# Patient Record
Sex: FEMALE | Race: WHITE | NOT HISPANIC OR LATINO | Employment: FULL TIME | URBAN - METROPOLITAN AREA
[De-identification: names, ages, dates, MRNs, and addresses within clinical notes are randomized per-mention and may not be internally consistent; named-entity substitution may affect disease eponyms.]

---

## 2016-10-27 LAB — EXTERNAL HIV SCREEN: NORMAL

## 2017-09-07 ENCOUNTER — ALLSCRIPTS OFFICE VISIT (OUTPATIENT)
Dept: OTHER | Facility: OTHER | Age: 37
End: 2017-09-07

## 2018-01-10 NOTE — MISCELLANEOUS
Provider Comments  Provider Comments:   Our records indicate that you missed an appointment on 09/12/2016  If you have not done so already, please call our office and we will be happy to schedule another appointment for you  If you must cancel your appointment, our office policy requires you to call our office at least 24 hours in advance so that we may utilize your appointment time for another patient who requires our services  If you have any questions, please contact our office at [phone]           Signatures   Electronically signed by : Jani Tavarez, ; Sep 19 2016  2:47PM EST                       (Author)

## 2018-01-14 NOTE — PROGRESS NOTES
Assessment    1  Encounter for preventive health examination (V70 0) (Z00 00)   2  Seasonal allergies (477 9) (J30 2)   3  Cigarette nicotine dependence without complication (837 3) (D38 320)   4  Chronic constipation (564 00) (K59 09)   5  Anxiety disorder (300 00) (F41 9)    Plan  Anxiety disorder    · ALPRAZolam 0 5 MG Oral Tablet (Xanax); TAKE 1 TABLET AT BEDTIME AS  NEEDED FOR SLEEP   · Sertraline HCl - 50 MG Oral Tablet; TAKE 1/2 TABLET DAILY FOR 1 WEEK THEN  TAKE ONE TAB ONCE A DAY  Chronic constipation    · Linzess 145 MCG Oral Capsule; take 1 capsule daily  Cigarette nicotine dependence without complication    · Chantix Starting Month Faheem 0 5 MG X 11 & 1 MG X 42 Oral Tablet; TAKE ONE 0 5MG  TABLET DAILY ON DAYS 1-3, THEN ONE 0 5MG TABLET TWICE DAILY ON DAYS 4-7,  THEN ONE 1MG TABLET TWICE DAILY THEREAFTER  Health Maintenance    · Levocetirizine Dihydrochloride 5 MG Oral Tablet (Xyzal); Take 1 tablet daily   · Follow-up visit in 1 year Evaluation and Treatment  Follow-up  Status: Hold For -  Scheduling  Requested for: 73GYI2564   · Begin a limited exercise program ; Status:Complete;   Done: 42YKG1452   · Brush your teeth 3 times a day and floss at least once a day ; Status:Complete;   Done:  05EJM8176   · Eat a low fat and low cholesterol diet ; Status:Complete;   Done: 76LBJ2884   · There are many ways to reduce your risk of catching or spreading a sexually transmitted  Infection ; Status:Complete;   Done: 37TCE3886   · Vitamins can help you get daily requirements that your diet may not be giving you ;  Status:Complete;   Done: 72OHX4491   · We recommend routine visits to a dentist ; Status:Complete;   Done: 71SKD4411   · We recommend that you bring your body mass index down to 26 ; Status:Complete;    Done: 98VNR2808   · We recommend that you follow these steps to lower your risk of osteoporosis  ;  Status:Complete;   Done: 89KNL6317   · Call [phone] if: You find a new or different kind of lump in your breast ;  Status:Complete;   Done: 76VXT7180   · Call [phone] if: You have any warning signs of skin cancer ; Status:Complete;    Done: 91OXK5793   · Call 911 if: You have symptoms of toxic shock ; Status:Complete;   Done: 42JDE4765   · 1 - Chrystie Ormond MD, Nicol ELLIS Obstetrics/Gynecology Physician Referral  Consult  Status: Hold  For - Scheduling  Requested for: 81SLF5977  Care Summary provided  : Yes   · Stop: Fluzone Quadrivalent 0 5 ML Intramuscular Suspension Prefilled  Syringe  Seasonal allergies    · Mometasone Furoate 50 MCG/ACT Nasal Suspension; USE 2 SPRAYS IN EACH  NOSTRIL ONCE DAILY    f/u in 1 month with Dr Phyllis Mejía     Discussion/Summary  health maintenance visit Currently, she eats an adequate diet and has an adequate exercise regimen  the risks and benefits of cervical cancer screening were discussed cervical cancer screening is current cervical cancer screening is managed by GYN Breast cancer screening: the risks and benefits of breast cancer screening were discussed and breast cancer screening is not indicated  Colorectal cancer screening: the risks and benefits of colorectal cancer screening were discussed and colorectal cancer screening is not indicated  Osteoporosis screening: the risks and benefits of osteoporosis screening were discussed and bone mineral density testing is not indicated  Screening lab work includes lipid profile  The patient declines immunizations  She was advised to be evaluated by an ophthalmologist and a dentist  Advice and education were given regarding aerobic exercise and calcium supplements  Chief Complaint  Pt here for her physical      History of Present Illness  HM, Adult Female: The patient is being seen for a health maintenance evaluation  The last health maintenance visit was 1 year(s) ago  General Health: The patient's health since the last visit is described as good  She has regular dental visits  She complains of vision problems   Vision care includes wearing reading glasses and an eye examination within the last year  She denies hearing loss  Immunizations status: not up to date  Lifestyle:  She consumes a diverse and healthy diet  She does not have any weight concerns  She exercises regularly  She does not use tobacco  She consumes alcohol  She uses illicit drugs  Reproductive health:  she reports normal menses  she uses no contraception  she is sexually active  she denies prior pregnancies  Screening: cancer screening reviewed and updated  metabolic screening reviewed and updated  risk screening reviewed and updated  HPI: anxiety since she is taking care of her grandmother  she is taking her friend's Xanax currently and its helping her with her anxiety and insomnia      Review of Systems    Constitutional: No fever, no chills, feels well, no tiredness, no recent weight gain or weight loss  Eyes: No complaints of eye pain, no red eyes, no eyesight problems, no discharge, no dry eyes, no itching of eyes  ENT: no complaints of earache, no loss of hearing, no nose bleeds, no nasal discharge, no sore throat, no hoarseness  Cardiovascular: No complaints of slow heart rate, no fast heart rate, no chest pain, no palpitations, no leg claudication, no lower extremity edema  Respiratory: No complaints of shortness of breath, no wheezing, no cough, no SOB on exertion, no orthopnea, no PND  Gastrointestinal: No complaints of abdominal pain, no constipation, no nausea or vomiting, no diarrhea, no bloody stools  Genitourinary: No complaints of dysuria, no incontinence, no pelvic pain, no dysmenorrhea, no vaginal discharge or bleeding  Musculoskeletal: No complaints of arthralgias, no myalgias, no joint swelling or stiffness, no limb pain or swelling  Integumentary: No complaints of skin rash or lesions, no itching, no skin wounds, no breast pain or lump     Neurological: No complaints of headache, no confusion, no convulsions, no numbness, no dizziness or fainting, no tingling, no limb weakness, no difficulty walking  Psychiatric: Not suicidal, no sleep disturbance, no anxiety or depression, no change in personality, no emotional problems  Endocrine: No complaints of proptosis, no hot flashes, no muscle weakness, no deepening of the voice, no feelings of weakness  Hematologic/Lymphatic: No complaints of swollen glands, no swollen glands in the neck, does not bleed easily, does not bruise easily  Active Problems    1  Asthma (493 90) (J45 909)   2  Cigarette nicotine dependence without complication (044 7) (Y10 590)   3  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)   4  Macular atrophy, retinal (362 51) (H35 89)   5  Muscle ache (729 1) (M79 1)   6  Muscle spasms of neck (728 85) (M62 838)   7  Need for DTaP vaccination (V06 1) (Z23)   8  Oral contraceptive prescribed (V25 01) (Z30 011)   9  Seasonal allergies (477 9) (J30 2)    Past Medical History    · History of seasonal allergies (V15 09) (Z88 9)    Surgical History    · History of Pilonidal Cyst Resection    Family History  Mother    · Family history of diabetes mellitus (V18 0) (Z83 3)   · Family history of hypertension (V17 49) (Z82 49)  Father    · Family history of rosacea (V19 4) (Z84 0)   · Family history of Seasonal allergies  Paternal Grandfather    · Family history of liver cancer (V16 0) (Z80 0)  Aunt    · Family history of malignant neoplasm of breast (V16 3) (Z80 3)    Social History    · Current some day smoker (305 1) (F17 210)   · Drinks coffee   · Occasional alcohol use    Current Meds   1  Cyclobenzaprine HCl - 5 MG Oral Tablet; TAKE 1 TABLET 3 TIMES DAILY AS NEEDED; Therapy: 82LQC8288 to (Evaluate:31Jan2016)  Requested for: 42IHZ4159; Last   Rx:11Jan2016 Ordered   2  Levocetirizine Dihydrochloride 5 MG Oral Tablet; TAKE 1 TABLET DAILY IN THE   EVENING; Therapy: 11Flr9072 to (Evaluate:63Kgg3521)  Requested for: 58Ezp6908; Last   Rx:25Jpp8160 Ordered   3   Super B Complex/Vitamin C Oral Tablet; TAKE 1 TABLET DAILY AS DIRECTED; Therapy: 62Uct0730 to Recorded   4  Ventolin  (90 Base) MCG/ACT Inhalation Aerosol Solution; INHALE 2 PUFFS   EVERY 4-6 HOURS AS NEEDED; Therapy: 40TWS5575 to (Last Rx:30Gss7985)  Requested for: 63PKK2902 Ordered    Allergies    1  No Known Drug Allergies    2  Animal dander - Cats   3  Dust   4  Mold   5  Seasonal   6  Trees    Vitals   Recorded: 14MLO6252 49:57UA   Systolic 389   Diastolic 76   Heart Rate 72   Respiration 16   Height 5 ft 3 27 in   Weight 148 lb 8 oz   BMI Calculated 26 08   BSA Calculated 1 71     Physical Exam    Constitutional   General appearance: No acute distress, well appearing and well nourished  Head and Face   Head and face: Normal     Palpation of the face and sinuses: No sinus tenderness  Eyes   Conjunctiva and lids: No swelling, erythema or discharge  Pupils and irises: Equal, round, reactive to light  Ophthalmoscopic examination: Normal fundi and optic discs  Ears, Nose, Mouth, and Throat   External inspection of ears and nose: Normal     Otoscopic examination: Tympanic membranes translucent with normal light reflex  Canals patent without erythema  Hearing: Normal     Nasal mucosa, septum, and turbinates: Normal without edema or erythema  Lips, teeth, and gums: Normal, good dentition  Oropharynx: Normal with no erythema, edema, exudate or lesions  Neck   Neck: Supple, symmetric, trachea midline, no masses  Thyroid: Normal, no thyromegaly  Pulmonary   Respiratory effort: No increased work of breathing or signs of respiratory distress  Percussion of chest: Normal     Auscultation of lungs: Clear to auscultation  Cardiovascular   Palpation of heart: Normal PMI, no thrills  Auscultation of heart: Normal rate and rhythm, normal S1 and S2, no murmurs      Examination of extremities for edema and/or varicosities: Normal     Chest   Chest: Normal     Abdomen   Abdomen: Non-tender, no masses  Liver and spleen: No hepatomegaly or splenomegaly  Lymphatic   Palpation of lymph nodes in neck: No lymphadenopathy  Palpation of lymph nodes in axillae: No lymphadenopathy  Palpation of lymph nodes in groin: No lymphadenopathy  Musculoskeletal   Gait and station: Normal     Digits and nails: Normal without clubbing or cyanosis  Joints, bones, and muscles: Normal     Skin   Skin and subcutaneous tissue: Normal without rashes or lesions  Neurologic   Cranial nerves: Cranial nerves II-XII intact  Cortical function: Normal mental status  Reflexes: 2+ and symmetric  Sensation: No sensory loss  Coordination: Normal finger to nose and heel to shin  Psychiatric   Judgment and insight: Normal     Orientation to person, place, and time: Normal     Recent and remote memory: Intact      Mood and affect: Normal        Future Appointments    Date/Time Provider Specialty Site   11/23/2016 04:00 PM Bethany Raya DO [facility] [address] [facility]     Signatures   Electronically signed by : Racheal Melo MD; Oct  5 2016  3:51PM EST                       (Author)

## 2018-01-17 NOTE — PROGRESS NOTES
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)   6  Macular atrophy, retinal (362 51) (H35 89)   7  Muscle ache (729 1) (M79 1)   8  Muscle spasms of neck (728 85) (M62 838)   9  Need for DTaP vaccination (V06 1) (Z23)   10  Need for revaccination (V05 9) (Z23)   11  Oral contraceptive prescribed (V25 01) (Z30 011)   12  Seasonal allergies (477 9) (J30 2)    Current Meds   1  ALPRAZolam 0 5 MG Oral Tablet; TAKE 1 TABLET AT BEDTIME AS NEEDED FOR   SLEEP; Therapy: 70AQS5797 to (Evaluate:04Nov2016); Last Rx:05Oct2016 Ordered   2  Chantix Starting Month Faheem 0 5 MG X 11 & 1 MG X 42 Oral Tablet; TAKE ONE 0 5MG   TABLET DAILY ON DAYS 1-3, THEN ONE 0 5MG TABLET TWICE DAILY ON DAYS 4-7,   THEN ONE 1MG TABLET TWICE DAILY THEREAFTER; Therapy: 39EGF3012 to (Haim Lemus)  Requested for: 45QKK6472; Last   Rx:05Oct2016 Ordered   3  Cyclobenzaprine HCl - 5 MG Oral Tablet; TAKE 1 TABLET 3 TIMES DAILY AS NEEDED; Therapy: 58BSV2671 to (Evaluate:31Jan2016)  Requested for: 81FHF1037; Last   Rx:11Jan2016 Ordered   4  Levocetirizine Dihydrochloride 5 MG Oral Tablet; Take 1 tablet daily; Therapy: 35Mej3791 to (Jeramie Richard)  Requested for: 77HVF2722; Last   Rx:05Oct2016 Ordered   5  Linzess 145 MCG Oral Capsule; take 1 capsule daily; Therapy: 97IPN1550 to (Evaluate:17Oct2016)  Requested for: 70KMB8371; Last   Rx:05Oct2016; Status: ACTIVE - Transmit to Pharmacy - Awaiting Verification Ordered   6  Mometasone Furoate 50 MCG/ACT Nasal Suspension; USE 2 SPRAYS IN EACH   NOSTRIL ONCE DAILY; Therapy: 85NUX8230 to (Last Rx:05Oct2016)  Requested for: 05Oct2016 Ordered   7  Sertraline HCl - 50 MG Oral Tablet; Take 1 tablet daily;    Therapy: 71SNV6356 to (Evaluate:78Wgy0128) Requested for: 16RVK6302; Last   Rx:65Wwx7433 Ordered   8  Super B Complex/Vitamin C Oral Tablet; TAKE 1 TABLET DAILY AS DIRECTED; Therapy: 56Tdf9510 to Recorded   9  Ventolin  (90 Base) MCG/ACT Inhalation Aerosol Solution; INHALE 2 PUFFS   EVERY 4-6 HOURS AS NEEDED; Therapy: 20RQG8733 to (Last Rx:60Fma9741)  Requested for: 20KMO3995 Ordered    Allergies    1  No Known Drug Allergies    2  Animal dander - Cats   3  Dust   4  Mold   5  Seasonal   6   Trees    Plan  Need for influenza vaccination    · Fluzone Quadrivalent 0 5 ML Intramuscular Suspension Prefilled Syringe    Signatures   Electronically signed by : Adrian Smalls DO; Sep  7 2017  5:05PM EST                       (Author)

## 2018-01-25 ENCOUNTER — OFFICE VISIT (OUTPATIENT)
Dept: FAMILY MEDICINE CLINIC | Facility: CLINIC | Age: 38
End: 2018-01-25

## 2018-01-25 RX ORDER — ALBUTEROL SULFATE 90 UG/1
2 AEROSOL, METERED RESPIRATORY (INHALATION) EVERY 4 HOURS PRN
COMMUNITY
Start: 2016-05-25

## 2018-01-25 RX ORDER — MOMETASONE FUROATE 50 UG/1
2 SPRAY, METERED NASAL AS NEEDED
COMMUNITY
Start: 2016-10-05 | End: 2020-11-18

## 2018-01-25 RX ORDER — ALPRAZOLAM 0.5 MG/1
1 TABLET ORAL
COMMUNITY
Start: 2016-10-05 | End: 2018-01-29 | Stop reason: CLARIF

## 2018-01-25 RX ORDER — LEVOCETIRIZINE DIHYDROCHLORIDE 5 MG/1
1 TABLET, FILM COATED ORAL DAILY
COMMUNITY
Start: 2015-09-10 | End: 2020-03-24 | Stop reason: ALTCHOICE

## 2018-01-26 ENCOUNTER — OFFICE VISIT (OUTPATIENT)
Dept: SURGICAL ONCOLOGY | Facility: CLINIC | Age: 38
End: 2018-01-26
Payer: COMMERCIAL

## 2018-01-26 VITALS
BODY MASS INDEX: 26.46 KG/M2 | HEART RATE: 82 BPM | HEIGHT: 64 IN | WEIGHT: 155 LBS | TEMPERATURE: 98.1 F | SYSTOLIC BLOOD PRESSURE: 110 MMHG | DIASTOLIC BLOOD PRESSURE: 80 MMHG | RESPIRATION RATE: 16 BRPM

## 2018-01-26 DIAGNOSIS — Z17.0 MALIGNANT NEOPLASM OF UPPER-OUTER QUADRANT OF RIGHT BREAST IN FEMALE, ESTROGEN RECEPTOR POSITIVE (HCC): Primary | ICD-10-CM

## 2018-01-26 DIAGNOSIS — C50.411 MALIGNANT NEOPLASM OF UPPER-OUTER QUADRANT OF RIGHT BREAST IN FEMALE, ESTROGEN RECEPTOR POSITIVE (HCC): Primary | ICD-10-CM

## 2018-01-26 PROCEDURE — 99245 OFF/OP CONSLTJ NEW/EST HI 55: CPT | Performed by: SURGERY

## 2018-01-26 NOTE — PROGRESS NOTES
Surgical Oncology Follow Up       [address] [facility]  [facility] [address]  1980  [id number]  [address] [facility] [facility]  [facility] [address] [address]    The patient presents with a new diagnosis of right breast cancer  Assessment/Plan     Advance Care Planning/Advance Directives:  Discussed disease status, cancer treatment plans and/or cancer treatment goals with the patient  Malignant neoplasm of upper-outer quadrant of right breast in female, estrogen receptor positive ([address] )    1/19/2018 Initial Diagnosis     Ultrasound guided core biopsy of right breast mass at [facility]   Malignant neoplasm of upper-outer quadrant of right breast in female, estrogen receptor positive ([facility] [address] ), Her 2 positive              History of Present Illness: In we 1st appreciated a mass in the upper outer quadrant of her right breast back in the summer when she was in her last trimester  She felt this was most likely a galactocele but then recently noticed it had not decreased in size and had a mammogram which demonstrated a dense spiculated mass in the upper outer right breast which corresponded with the palpable area  She had pleomorphic calcifications in this area  She also had scattered calcifications along the 10-11 o'clock axis from the mass towards the nipple  Patient subsequently had ultrasound which demonstrated a 3 6 cm irregular mass corresponding to the palpable area of concern there was a 2nd 1 4 cm mass at the 10 o'clock position 8 cm from the nipple  They were vague hypoechoic tubular elements seen along the 10 o'clock axis between the mass and the nipple  Right axillary adenopathy was also seen    The patient had the large palpable mass biopsied which demonstrated invasive ductal carcinoma with mucinous features, grade 2, ER 30-40%, OR 2-3%, HER2 [place] 3+/positive  The patient now presents for an opinion regarding further management  Review of Systems   Constitutional: Negative for activity change, appetite change, fatigue and unexpected weight change  HENT: Negative for ear pain, tinnitus, trouble swallowing and voice change  Eyes: Negative for pain and visual disturbance  Respiratory: Negative for cough, shortness of breath, wheezing and stridor  Cardiovascular: Negative for chest pain, palpitations and leg swelling  Gastrointestinal: Negative for abdominal distention, abdominal pain and blood in stool  Endocrine: Negative for cold intolerance and heat intolerance  Genitourinary: Negative for difficulty urinating, dysuria, flank pain and hematuria  Musculoskeletal: Negative for arthralgias, back pain, gait problem and joint swelling  Skin: Negative for color change, rash and wound  Allergic/Immunologic: Negative for immunocompromised state  Neurological: Negative for dizziness, seizures, speech difficulty, weakness and headaches  Hematological: Negative for adenopathy  Psychiatric/Behavioral: Negative for confusion  Patient Active Problem List   Diagnosis    Anxiety disorder    Asthma    Cigarette nicotine dependence without complication    Fear of flying    Macular atrophy, retinal    Seasonal allergies    Malignant neoplasm of upper-outer quadrant of right breast in female, estrogen receptor positive ([facility] [address] )     No past medical history on file  No past surgical history on file  No family history on file  Social History     Social History    Marital status: /Civil Union     Spouse name: N/A    Number of children: N/A    Years of education: N/A     Occupational History    Not on file       Social History Main Topics    Smoking status: Former Smoker     Quit date: 1/26/2015    Smokeless tobacco: Never Used    Alcohol use Not on file    Drug use: Unknown    Sexual activity: Not on file     Other Topics Concern    Not on file     Social History Narrative    No narrative on file       Current Outpatient Prescriptions:     albuterol (VENTOLIN HFA) 90 mcg/act inhaler, Inhale 2 puffs every 4 (four) hours as needed, Disp: , Rfl:     ALPRAZolam (XANAX) 0 5 mg tablet, Take 1 tablet by mouth daily at bedtime, Disp: , Rfl:     B Complex-C (SUPER B COMPLEX/VITAMIN C PO), Take 1 tablet by mouth daily, Disp: , Rfl:     levocetirizine (XYZAL) 5 MG tablet, Take 1 tablet by mouth daily, Disp: , Rfl:     Linaclotide (LINZESS) 145 MCG CAPS, Take 1 capsule by mouth daily, Disp: , Rfl:     mometasone (NASONEX) 50 mcg/act nasal spray, 2 sprays into each nostril daily, Disp: , Rfl:     sertraline (ZOLOFT) 50 mg tablet, Take 1 tablet by mouth daily, Disp: , Rfl:   Allergies   Allergen Reactions    Cat Hair Extract     Dust Mite Extract     Molds & Smuts     Pollen Extract     Tree Extract      Vitals:    01/26/18 1309   BP: 110/80   Pulse: 82   Resp: 16   Temp: 98 1 °F (36 7 °C)       Physical Exam   Constitutional: She is oriented to person, place, and time  She appears well-developed and well-nourished  HENT:   Head: Normocephalic and atraumatic  Mouth/Throat: Oropharynx is clear and moist    Eyes: EOM are normal  Pupils are equal, round, and reactive to light  Neck: Normal range of motion  Neck supple  No JVD present  No tracheal deviation present  No thyromegaly present  Cardiovascular: Normal rate, regular rhythm, normal heart sounds and intact distal pulses  Exam reveals no gallop and no friction rub  No murmur heard  Pulmonary/Chest: Effort normal and breath sounds normal  No respiratory distress  She has no wheezes  She has no rales  Right breast exhibits no inverted nipple, no mass, no nipple discharge, no skin change and no tenderness  Left breast exhibits no inverted nipple, no mass, no nipple discharge, no skin change and no tenderness   Breasts are symmetrical        The left breast is normal in both the sitting and supine position  The right breast demonstrates a dominant mass in the upper outer quadrant consistent with her imaging  There is a subtle but appreciable adenopathy also consistent with her ultrasound imaging  The breast parenchymal tissue is significantly increased between the mass and the nipple in comparison to the contralateral breast but no clear discernible dominant mass is appreciated  Abdominal: Soft  She exhibits no distension and no mass  There is no hepatomegaly  There is no tenderness  There is no rebound and no guarding  Musculoskeletal: Normal range of motion  She exhibits no edema or tenderness  Lymphadenopathy:     She has no cervical adenopathy  Neurological: She is alert and oriented to person, place, and time  No cranial nerve deficit  Skin: Skin is warm and dry  No rash noted  No erythema  Psychiatric: She has a normal mood and affect  Her behavior is normal    Vitals reviewed  Imaging  I reviewed the ultrasound films and reports the mammogram films and reports I concur with her result  I have reviewed them both with Dr Ning Horan  Given the microcalcifications in Abbott daily on clinical exam I have recommended an MRI to evaluate the right breast     Discussion/Summary:  Based on the 8th a cheese cc staging system this is a stage IB breast cancer  I am concerned that her low OH percentage, the classifying her as OH positive is functionally negative am also concerned about her HER2 positive status and most likely positive lymph nodes  I have recommended genetic testing as well as an MRI given her atypical findings on clinical examination mammogram and ultrasound between the mass and the nipple  We talked about possible neoadjuvant chemotherapy given her probable lymphatic metastasis and HER2 status  I will review this with Dr Sunshine Sandoval  We will contact the patient with her results and our discussions

## 2018-01-26 NOTE — LETTER
January 26, 2018     Donna Conde, [address] [facility]  [address] [address]    Patient: Mariano Acevedo   YOB: 1980   Date of Visit: 1/26/2018       Dear Dr Domi Vann:    Thank you for referring Zulma Zepeda to me for evaluation  Below are my notes for this consultation  If you have questions, please do not hesitate to call me  I look forward to following your patient along with you           Sincerely,        Lizeth Urbina MD        CC: No Recipients

## 2018-01-26 NOTE — LETTER
January 26, 2018     Radha Kim, [address] [facility]  [address] [facility]  [address] [address]    Patient: Madai Gerardo   YOB: 1980   Date of Visit: 1/26/2018       Dear Dr Nadya Tomlinson:    Thank you for referring Zhen Sellers to me for evaluation  Below are my notes for this consultation  If you have questions, please do not hesitate to call me  I look forward to following your patient along with you  Sincerely,        Sarita Horton MD        CC: No Recipients  Sarita Horton MD  1/26/2018  3:12 PM  Sign at close encounter               Surgical Oncology Follow Up       [address] [place] [address]  1980  [id number]  [address] [facility],[address]  [facility] [address] [address]    The patient presents with a new diagnosis of right breast cancer  Assessment/Plan     Advance Care Planning/Advance Directives:  Discussed disease status, cancer treatment plans and/or cancer treatment goals with the patient  Malignant neoplasm of upper-outer quadrant of right breast in female, estrogen receptor positive ([address] )    1/19/2018 Initial Diagnosis     Ultrasound guided core biopsy of right breast mass at [facility]   Malignant neoplasm of upper-outer quadrant of right breast in female, estrogen receptor positive ([facility] ), Her 2 positive              History of Present Illness: In we 1st appreciated a mass in the upper outer quadrant of her right breast back in the summer when she was in her last trimester  She felt this was most likely a galactocele but then recently noticed it had not decreased in size and had a mammogram which demonstrated a dense spiculated mass in the upper outer right breast which corresponded with the palpable area  She had pleomorphic calcifications in this area   She also had scattered calcifications along the 10-11 o'clock axis from the mass towards the nipple  Patient subsequently had ultrasound which demonstrated a 3 6 cm irregular mass corresponding to the palpable area of concern there was a 2nd 1 4 cm mass at the 10 o'clock position 8 cm from the nipple  They were vague hypoechoic tubular elements seen along the 10 o'clock axis between the mass and the nipple  Right axillary adenopathy was also seen  The patient had the large palpable mass biopsied which demonstrated invasive ductal carcinoma with mucinous features, grade 2, ER 30-40%, CO 2-3%, HER2 [place] 3+/positive  The patient now presents for an opinion regarding further management  Review of Systems   Constitutional: Negative for activity change, appetite change, fatigue and unexpected weight change  HENT: Negative for ear pain, tinnitus, trouble swallowing and voice change  Eyes: Negative for pain and visual disturbance  Respiratory: Negative for cough, shortness of breath, wheezing and stridor  Cardiovascular: Negative for chest pain, palpitations and leg swelling  Gastrointestinal: Negative for abdominal distention, abdominal pain and blood in stool  Endocrine: Negative for cold intolerance and heat intolerance  Genitourinary: Negative for difficulty urinating, dysuria, flank pain and hematuria  Musculoskeletal: Negative for arthralgias, back pain, gait problem and joint swelling  Skin: Negative for color change, rash and wound  Allergic/Immunologic: Negative for immunocompromised state  Neurological: Negative for dizziness, seizures, speech difficulty, weakness and headaches  Hematological: Negative for adenopathy  Psychiatric/Behavioral: Negative for confusion           Patient Active Problem List   Diagnosis    Anxiety disorder    Asthma    Cigarette nicotine dependence without complication    Fear of flying    Macular atrophy, retinal    Seasonal allergies    Malignant neoplasm of upper-outer quadrant of right breast in female, estrogen receptor positive ([facility] )     No past medical history on file  No past surgical history on file  No family history on file  Social History     Social History    Marital status: /Civil Union     Spouse name: N/A    Number of children: N/A    Years of education: N/A     Occupational History    Not on file  Social History Main Topics    Smoking status: Former Smoker     Quit date: 1/26/2015    Smokeless tobacco: Never Used    Alcohol use Not on file    Drug use: Unknown    Sexual activity: Not on file     Other Topics Concern    Not on file     Social History Narrative    No narrative on file       Current Outpatient Prescriptions:     albuterol (VENTOLIN HFA) 90 mcg/act inhaler, Inhale 2 puffs every 4 (four) hours as needed, Disp: , Rfl:     ALPRAZolam (XANAX) 0 5 mg tablet, Take 1 tablet by mouth daily at bedtime, Disp: , Rfl:     B Complex-C (SUPER B COMPLEX/VITAMIN C PO), Take 1 tablet by mouth daily, Disp: , Rfl:     levocetirizine (XYZAL) 5 MG tablet, Take 1 tablet by mouth daily, Disp: , Rfl:     Linaclotide (LINZESS) 145 MCG CAPS, Take 1 capsule by mouth daily, Disp: , Rfl:     mometasone (NASONEX) 50 mcg/act nasal spray, 2 sprays into each nostril daily, Disp: , Rfl:     sertraline (ZOLOFT) 50 mg tablet, Take 1 tablet by mouth daily, Disp: , Rfl:   Allergies   Allergen Reactions    Cat Hair Extract     Dust Mite Extract     Molds & Smuts     Pollen Extract     Tree Extract      Vitals:    01/26/18 1309   BP: 110/80   Pulse: 82   Resp: 16   Temp: 98 1 °F (36 7 °C)       Physical Exam   Constitutional: She is oriented to person, place, and time  She appears well-developed and well-nourished  HENT:   Head: Normocephalic and atraumatic  Mouth/Throat: Oropharynx is clear and moist    Eyes: EOM are normal  Pupils are equal, round, and reactive to light  Neck: Normal range of motion  Neck supple  No JVD present  No tracheal deviation present  No thyromegaly present  Cardiovascular: Normal rate, regular rhythm, normal heart sounds and intact distal pulses  Exam reveals no gallop and no friction rub  No murmur heard  Pulmonary/Chest: Effort normal and breath sounds normal  No respiratory distress  She has no wheezes  She has no rales  Right breast exhibits no inverted nipple, no mass, no nipple discharge, no skin change and no tenderness  Left breast exhibits no inverted nipple, no mass, no nipple discharge, no skin change and no tenderness  Breasts are symmetrical        The right breast is normal in both the sitting and supine position  The left breast demonstrates a dominant mass in the upper outer quadrant consistent with her imaging  There is a subtle but appreciable adenopathy also consistent with her ultrasound imaging  The breast parenchymal tissue is significantly increased between the mass and the nipple in comparison to the contralateral breast but no clear discernible dominant mass is appreciated  Abdominal: Soft  She exhibits no distension and no mass  There is no hepatomegaly  There is no tenderness  There is no rebound and no guarding  Musculoskeletal: Normal range of motion  She exhibits no edema or tenderness  Lymphadenopathy:     She has no cervical adenopathy  Neurological: She is alert and oriented to person, place, and time  No cranial nerve deficit  Skin: Skin is warm and dry  No rash noted  No erythema  Psychiatric: She has a normal mood and affect  Her behavior is normal    Vitals reviewed  Imaging  I reviewed the ultrasound films and reports the mammogram films and reports I concur with her result  I have reviewed them both with Dr Rohan Bangura  Given the microcalcifications in Abbott daily on clinical exam I have recommended an MRI to evaluate the right breast     Discussion/Summary:  Based on the 8th a cheese cc staging system this is a stage IB breast cancer    I am concerned that her low MI percentage, the classifying her as FL positive is functionally negative am also concerned about her HER2 positive status and most likely positive lymph nodes  I have recommended genetic testing as well as an MRI given her atypical findings on clinical examination mammogram and ultrasound between the mass and the nipple  We talked about possible neoadjuvant chemotherapy given her probable lymphatic metastasis and HER2 status  I will review this with Dr Antoine Camacho  We will contact the patient with her results and our discussions

## 2018-01-27 ENCOUNTER — HOSPITAL ENCOUNTER (OUTPATIENT)
Dept: RADIOLOGY | Facility: HOSPITAL | Age: 38
Discharge: HOME | End: 2018-01-27
Attending: SURGERY
Payer: COMMERCIAL

## 2018-01-27 PROCEDURE — C8908 MRI W/O FOL W/CONT, BREAST,: HCPCS

## 2018-01-27 PROCEDURE — A9585 GADOBUTROL INJECTION: HCPCS | Performed by: SURGERY

## 2018-01-27 PROCEDURE — 0159T HB CAD BREAST MRI: CPT

## 2018-01-27 RX ADMIN — GADOBUTROL 7 ML: 604.72 INJECTION INTRAVENOUS at 15:47

## 2018-01-28 LAB
BUN SERPL-MCNC: 16 MG/DL (ref 7–25)
BUN/CREAT SERPL: NORMAL (CALC) (ref 6–22)
CREAT SERPL-MCNC: 0.84 MG/DL (ref 0.5–1.1)
SL AMB EGFR AFRICAN AMERICAN: 104 ML/MIN/1.73M2
SL AMB EGFR NON AFRICAN AMERICAN: 90 ML/MIN/1.73M2

## 2018-01-29 ENCOUNTER — OFFICE VISIT (OUTPATIENT)
Dept: GYNECOLOGIC ONCOLOGY | Facility: CLINIC | Age: 38
End: 2018-01-29

## 2018-01-29 ENCOUNTER — OFFICE VISIT (OUTPATIENT)
Dept: FAMILY MEDICINE CLINIC | Facility: CLINIC | Age: 38
End: 2018-01-29
Payer: COMMERCIAL

## 2018-01-29 ENCOUNTER — LAB REQUISITION (OUTPATIENT)
Dept: LAB | Facility: HOSPITAL | Age: 38
End: 2018-01-29
Payer: COMMERCIAL

## 2018-01-29 VITALS
SYSTOLIC BLOOD PRESSURE: 122 MMHG | HEIGHT: 64 IN | RESPIRATION RATE: 16 BRPM | HEART RATE: 68 BPM | WEIGHT: 165.2 LBS | BODY MASS INDEX: 28.2 KG/M2 | DIASTOLIC BLOOD PRESSURE: 72 MMHG

## 2018-01-29 DIAGNOSIS — R11.0 NAUSEA: Primary | ICD-10-CM

## 2018-01-29 DIAGNOSIS — Z17.0 MALIGNANT NEOPLASM OF UPPER-OUTER QUADRANT OF RIGHT BREAST IN FEMALE, ESTROGEN RECEPTOR POSITIVE (HCC): ICD-10-CM

## 2018-01-29 DIAGNOSIS — J45.20 MILD INTERMITTENT ASTHMA WITHOUT COMPLICATION: ICD-10-CM

## 2018-01-29 DIAGNOSIS — C50.919 MALIGNANT NEOPLASM OF FEMALE BREAST (HCC): ICD-10-CM

## 2018-01-29 DIAGNOSIS — C50.411 MALIGNANT NEOPLASM OF UPPER-OUTER QUADRANT OF RIGHT BREAST IN FEMALE, ESTROGEN RECEPTOR POSITIVE (HCC): Primary | ICD-10-CM

## 2018-01-29 DIAGNOSIS — Z17.0 MALIGNANT NEOPLASM OF UPPER-OUTER QUADRANT OF RIGHT BREAST IN FEMALE, ESTROGEN RECEPTOR POSITIVE (HCC): Primary | ICD-10-CM

## 2018-01-29 DIAGNOSIS — C50.411 MALIGNANT NEOPLASM OF UPPER-OUTER QUADRANT OF RIGHT BREAST IN FEMALE, ESTROGEN RECEPTOR POSITIVE (HCC): ICD-10-CM

## 2018-01-29 DIAGNOSIS — K59.09 OTHER CONSTIPATION: ICD-10-CM

## 2018-01-29 DIAGNOSIS — F41.1 GENERALIZED ANXIETY DISORDER: Primary | ICD-10-CM

## 2018-01-29 PROCEDURE — 99214 OFFICE O/P EST MOD 30 MIN: CPT | Performed by: FAMILY MEDICINE

## 2018-01-29 PROCEDURE — 99205 OFFICE O/P NEW HI 60 MIN: CPT | Performed by: GENETIC COUNSELOR, MS

## 2018-01-29 RX ORDER — PROCHLORPERAZINE MALEATE 10 MG
10 TABLET ORAL EVERY 6 HOURS PRN
Qty: 30 TABLET | Refills: 0 | Status: SHIPPED | OUTPATIENT
Start: 2018-01-29 | End: 2018-03-15 | Stop reason: SDUPTHER

## 2018-01-29 RX ORDER — ALPRAZOLAM 0.25 MG/1
0.25 TABLET ORAL
Qty: 30 TABLET | Refills: 0 | Status: SHIPPED | OUTPATIENT
Start: 2018-01-29 | End: 2018-01-29 | Stop reason: ALTCHOICE

## 2018-01-29 RX ORDER — LORAZEPAM 1 MG/1
1 TABLET ORAL
Qty: 30 TABLET | Refills: 0 | Status: SHIPPED | OUTPATIENT
Start: 2018-01-29 | End: 2018-02-16 | Stop reason: SDUPTHER

## 2018-01-29 NOTE — PROGRESS NOTES
Assessment/Plan:       Diagnoses and all orders for this visit:    Generalized anxiety disorder  -     Discontinue: ALPRAZolam (XANAX) 0 25 mg tablet; Take 1 tablet (0 25 mg total) by mouth daily at bedtime as needed for anxiety  -     LORazepam (ATIVAN) 1 mg tablet; Take 1 tablet (1 mg total) by mouth daily at bedtime    Malignant neoplasm of upper-outer quadrant of right breast in female, estrogen receptor positive (HCC)    Other constipation  -     Linaclotide (LINZESS) 145 MCG CAPS; Take 1 capsule (145 mcg total) by mouth daily    Mild intermittent asthma without complication      - Albuterol PRN - well controlled at this time   - seeing hematologist at [facility] tomorrow for second opinion with her breast cancer     Subjective:      Patient ID: Hugo Childress is a 30 y o  female  Recently diagnosed with breast cancer  Seeing [facility] hematologist and Dr Raiza Bose for initial visit  Onset was 1 to 4 weeks ago  The problem has been waxing and waning  Symptoms include excessive worry, insomnia, irritability, muscle tension and nervous/anxious behavior  Patient reports no chest pain, dizziness, dry mouth or nausea  Symptoms occur most days  The severity of symptoms is mild  Exacerbated by: recent diagnosis of breast cancer      There are no known risk factors  Her past medical history is significant for anxiety/panic attacks and asthma  There is no history of bipolar disorder  Past treatments include benzodiazephines  The treatment provided moderate relief  Compliance with prior treatments has been good  Constipation   This is a chronic problem  The current episode started more than 1 year ago  The problem has been gradually improving since onset  Her stool frequency is 4 to 5 times per week  The patient is on a high fiber diet  She does not exercise regularly  There has been adequate water intake   Pertinent negatives include no abdominal pain, anorexia, fecal incontinence, melena, nausea or rectal pain  Risk factors include stress  She has tried nothing for the symptoms  The treatment provided moderate relief  There is no history of abdominal surgery, endocrine disease, metabolic disease, neuromuscular disease, psychiatric history or radiation treatment  The following portions of the patient's history were reviewed and updated as appropriate: allergies, current medications, past medical history, past social history, past surgical history and problem list     Review of Systems   Constitutional: Positive for irritability  HENT: Negative  Eyes: Negative  Respiratory: Negative  Cardiovascular: Negative  Negative for chest pain  Gastrointestinal: Positive for constipation  Negative for abdominal pain, anorexia, melena, nausea and rectal pain  Endocrine: Negative  Genitourinary: Negative  Musculoskeletal: Negative  Neurological: Negative  Negative for dizziness  Psychiatric/Behavioral: The patient is nervous/anxious and has insomnia  Past Medical History:   Diagnosis Date    [facility] 2018    Seasonal allergies      Past Surgical History:   Procedure Laterality Date     SECTION      PILONIDAL CYST EXCISION      resection     Social History     Social History    Marital status: /Civil Union     Spouse name: N/A    Number of children: N/A    Years of education: N/A     Occupational History    Not on file       Social History Main Topics    Smoking status: Former Smoker     Quit date: 2015    Smokeless tobacco: Never Used      Comment: current some day smoker per [employer]    Alcohol use Yes      Comment: occasional    Drug use: No    Sexual activity: Yes     Partners: Male     Birth control/ protection: Other     Other Topics Concern    Not on file     Social History Narrative    Drinks coffee         Allergies   Allergen Reactions    Cat Hair Extract     Dust Mite Extract     Molds & Smuts     Pollen Extract     Tree Extract          Family History   Problem Relation Age of Onset    Diabetes Mother     Hypertension Mother     Rosacea Father     Allergies Father      seasonal    Liver cancer Paternal Grandfather     Breast cancer Family            Current Outpatient Prescriptions:     albuterol (VENTOLIN HFA) 90 mcg/act inhaler, Inhale 2 puffs every 4 (four) hours as needed, Disp: , Rfl:     levocetirizine (XYZAL) 5 MG tablet, Take 1 tablet by mouth daily, Disp: , Rfl:     mometasone (NASONEX) 50 mcg/act nasal spray, 2 sprays into each nostril daily, Disp: , Rfl:     Linaclotide (LINZESS) 145 MCG CAPS, Take 1 capsule (145 mcg total) by mouth daily, Disp: 90 capsule, Rfl: 0    LORazepam (ATIVAN) 1 mg tablet, Take 1 tablet (1 mg total) by mouth daily at bedtime, Disp: 30 tablet, Rfl: 0      Vitals:    01/29/18 1506   BP: 122/72   Pulse: 68   Resp: 16         Objective:     Physical Exam   Constitutional: She is oriented to person, place, and time  She appears well-developed  HENT:   Head: Normocephalic  Eyes: Pupils are equal, round, and reactive to light  Neck: Normal range of motion  Cardiovascular: Normal rate and regular rhythm  Pulmonary/Chest: Effort normal    Abdominal: Soft  Musculoskeletal: Normal range of motion  Neurological: She is alert and oriented to person, place, and time  Skin: Skin is warm  Psychiatric: Her mood appears anxious

## 2018-01-29 NOTE — PROGRESS NOTES
Status:  Genetic testing pending, estimated turn around time: 2 weeks    Summary:    The patient was seen for genetic counseling regarding possible genetic testing, relative to her recent diagnosis of breast cancer    Family information was reviewed and a three generation pedigree drawn  The patient was recently diagnosed with breast cancer at the age of 30  The  family history is significant for a paternal aunt who was diagnosed with breast cancer at the age of 62, her paternal grandfather who had prostate cancer in his 76s, two paternal great aunts with breast cancer, and her maternal grandmother who was diagnsoed with breast cancer 48  The patient believes that her paternal aunt had genetic testing which was positive for a gene mutation but she does not know which gene and was not able to get additional information from her aunt  Please see pedigree for additional family history details  We reviewed the genetics of cancer, hereditary and familial risks, and the main benefits and limitation of genetic testing for susceptibility to cancer  We discussed hereditary cancer syndromes associated with breast cancer including BRCA1, BRCA2, and TP53  We reviewed cancer risks associated with these genes, options for medical management, and potential risks for family members  Genetic Testing: We reviewed the genetic testing process, insurance concerns, and possible results  The patient elected to pursue genetic testing for genes associated with hereditary breast cancer  Informed consent was obtained and a DNA sample was collected  The sample was sent to [facility] for the STAT breast cancer panel, with additional testing as part of the TME study  Initial test results should be available in approximately 2 weeks  The patient elected to have results disclosed by phone and she will be contacted once results are available

## 2018-02-01 ENCOUNTER — OFFICE VISIT (OUTPATIENT)
Dept: HEMATOLOGY ONCOLOGY | Facility: CLINIC | Age: 38
End: 2018-02-01
Payer: COMMERCIAL

## 2018-02-01 VITALS
RESPIRATION RATE: 16 BRPM | TEMPERATURE: 97.8 F | BODY MASS INDEX: 28.51 KG/M2 | HEIGHT: 64 IN | DIASTOLIC BLOOD PRESSURE: 68 MMHG | OXYGEN SATURATION: 95 % | HEART RATE: 78 BPM | WEIGHT: 167 LBS | SYSTOLIC BLOOD PRESSURE: 118 MMHG

## 2018-02-01 DIAGNOSIS — Z17.0 MALIGNANT NEOPLASM OF UPPER-OUTER QUADRANT OF RIGHT BREAST IN FEMALE, ESTROGEN RECEPTOR POSITIVE (HCC): ICD-10-CM

## 2018-02-01 DIAGNOSIS — L65.9 ALOPECIA: Primary | ICD-10-CM

## 2018-02-01 DIAGNOSIS — C50.411 MALIGNANT NEOPLASM OF UPPER-OUTER QUADRANT OF RIGHT BREAST IN FEMALE, ESTROGEN RECEPTOR POSITIVE (HCC): ICD-10-CM

## 2018-02-01 PROCEDURE — 99245 OFF/OP CONSLTJ NEW/EST HI 55: CPT | Performed by: INTERNAL MEDICINE

## 2018-02-01 RX ORDER — OMEPRAZOLE 20 MG/1
20 CAPSULE, DELAYED RELEASE ORAL DAILY
COMMUNITY

## 2018-02-01 NOTE — PROGRESS NOTES
Hematology / Oncology Outpatient Consult Note    Karina Burger 30 y o  female [id number]         Date:  2/1/2018        Assessment/ Plan:    A 29-year-old premenopausal woman with newly diagnosed locally advanced right breast cancer  She has quite large palpable right axillary adenopathy  This was mucinous histology, grade 2, ER 30% positive, RI 2% positive, her 2 3+ disease  She presents today with her mother to discuss neoadjuvant chemotherapy  She was previously recommended by physician at [facility] to have neoadjuvant TCH P  we had extensive discussion regarding the diagnosis, tumor phenotype, locally advanced nature of disease, potentially curable disease, high risk disease, prognosis and treatment options  Following 2 options for thoroughly discussed  1    TCH P, consistent of Taxotere, carboplatin, trastuzumab and pertuzumab x6 cycles  2   Dose dense AC x4 followed by weekly paclitaxel, trastuzumab and try weekly pertuzumab  Pros and cons of above options were thoroughly discussed  After lengthy discussion, concerning her lifestyle, she chose to be on option 1 which is very reasonable treatment options  Side effects of this regimen was thoroughly discussed, including but not limited to alopecia, nausea, vomiting, neutropenia, risk of infection, cardiotoxicity, allergic reaction as well as neuropathy  I told her that she may have up to 5% risk of cardiotoxicity  Risk of secondary leukemia is very small  She may have 20 to 30% risk of permanent menopause with this regimen  She understood and wished to proceed  At this moment, I am going to schedule CT scan of chest abdomen pelvis and bone scan to rule out distant metastasis  I am going to ask interventional radiologist to place a Port-A-Cath  She is going to have MUGA scan for initial cardiac monitoring    She is going to start neoadjuvant chemotherapy in February 16, 2018 at which time I am going to see her again  All the patient's and her mother's questions were answered to their satisfaction  Subjective:       CC:  Locally advanced right breast cancer with invasive mucinous histology, grade 2, ER 30% positive, TX 2% positive, her 2 3+ disease  Diagnosed in January 2018  HPI:  A 26-year-old premenopausal woman who initially noticed right breast lump when she was 8 months pregnant  She delivered 1st baby in June 2017  After the delivery, she noticed right breast lump to be slowly enlarging  She brought this to medical attention  She underwent mammography which was quite abnormal   Biopsy from right breast at 10:00 position 12 cm from nipple showed mucinous carcinoma, grade 2  This was ER 30 to 40% positive, TX 2 to 3% positive, her 2 3+ disease  She was seen by Dr Fatmata Arriaza who ordered MRI which showed extensive abnormality in her right breast including 3 5 cm of right breast mass as well as 4 cm of axillary adenopathy  She was referred to me to discuss neoadjuvant chemotherapy  She went to [facility] where she was recommended to have [address] P  she presents today with her mother to discuss treatment options  She is somewhat anxious  Otherwise, she feels well  She denied any bone pain  Her weight is stable  She has no respiratory symptoms  Her performance status is normal  She does not have significant past medical history  Her paternal aunt had breast cancer at age of 62  Her 2 paternal grandfather's sister had breast cancer in their 46s  She underwent genetic testing of which result is pending at this time  ROS: Review of Systems   All other systems reviewed and are negative  Objective:     Primary Diagnosis:    Locally advanced right breast cancer, invasive mucinous histology, grade 2, ER 30% positive, TX 2% positive, her 2 3+ disease  Diagnosed in January 2018      Cancer Staging:  Malignant neoplasm of upper-outer quadrant of right breast in female, estrogen receptor positive ([facility] )    Staging form: Breast, AJCC 8th Edition    - Clinical stage from 1/26/2018: Stage IB (cT2(3), cN1, cM0, G2, ER: Positive, FL: Positive, HER2: Positive) - Signed by Merary Tariq MD on 1/26/2018      Previous Hematologic/ Oncologic Treatment:         Current Hematologic/ Oncologic Treatment:    Neoadjuvant chemotherapy with [facility] with pertuzumab to be started in February 16, 2018  Disease Status:         Test Results:    Pathology:    Biopsy from right breast showed invasive mucinous carcinoma, grade 2, ER 30 to 40% positive, FL 2 to 3% positive, her 2 3+ disease  Radiology:    MRI of the breast showed extensive abnormality in her right breast including 3 5 cm of mass at 10:00 position as well as 4 cm of axillary adenopathy  Imaging: Mri Breast Bilateral W Or Wo Contrast    Result Date: 1/29/2018  Narrative: Recent diagnosis of right breast cancer  MRI Breast Bilateral W or WO Contrast: January 27, 2018 - Check In #: [de-identified] Technologist: Jun Bailon TECHNIQUE:  MRI of both breasts was performed in axial and sagittal planes utilizing a combination of fat suppressed sagittal T2 , gradient echo in phase T1 weighting followed by sagittal dynamic post contrast imaging with (VIBRANT) 3D fat suppressed gradient-echo T1 sequences at 1 5 minute intervals  Subtraction image reconstruction was also provided  This exam was read in conjunction with [employer] software  IV Contrast:  7 mL of gadobutrol injection (MULTI-DOSE) Comparison: No mammography or outside imaging is currently available to me Breasts are symmetric in size with normal skin and areolar complex  The breasts are comprised of moderate heterogeneous and confluent parenchymal elements throughout  A single metallic clip artifact is noted in the upper outer posterior right breast  T2 sequence shows no significant cyst formation or fluid collections   Within the left breast, dynamic postcontrast imaging shows no dominant mass or region of suspicious asymmetric enhancement to suggest invasive malignancy  The right breast however demonstrates suspicious enhancement involving multiple quadrants including metastatic lymphadenopathy in the axilla  The dominant enhancement is identified at the 8:00 through 12:00 position of the right breast   Suspicious enhancement can be identified throughout the upper outer quadrant  A 2nd prominent mass is noted in the anterior retroareolar breast   The largest "measurable" component measures up to 3 5 cm at the 10 o'clock position  However, this mass extends inferomedially  Other scattered subcentimeter foci of suspicious enhancement are identified in the breast  Metastatic lymphadenopathy is present in the right axilla (largest approaching 4 cm in greatest dimension  )  This large largest lymph node somewhat obliterates the fat plane between the chest wall (series 8, image 124)  IMPRESSION: Extensive disease throughout the upper outer right breast   2nd prominent mass is noted in the anterior right breast   Numerous other foci of suspicious enhancement including metastatic right axillary lymphadenopathy  No evidence of left breast disease  ACR BI-RADS® Assessments: BiRad:6 - Known biopsy proven malignancy - Right Recommendation: Surgical evaluation of the right breast  The patient is scheduled in a reminder system for screening mammography   Transcription Location: [address] [place], [place] [address]way: [id number] Risk Value(s): Tyrer-Cuzick 10 Year: 0 900%, Tyrer-Cuzick Lifetime: 11 700%, Myriad Table: 1 5%, RAMON 5 Year: 0 2%, NCI Lifetime: 6 9%      Labs: No results found for: WBC, HGB, HCT, MCV, PLT  Lab Results   Component Value Date    BUN 16 01/27/2018    CREATININE 0 84 01/27/2018         Active Problems:   Patient Active Problem List   Diagnosis    Asthma    Other constipation    Cigarette nicotine dependence without complication    Macular atrophy, retinal    Seasonal allergies    Malignant neoplasm of upper-outer quadrant of right breast in female, estrogen receptor positive ([facility] )       Past Medical History:   Past Medical History:   Diagnosis Date    [facility]    Seasonal allergies        Surgical History:   Past Surgical History:   Procedure Laterality Date     SECTION      PILONIDAL CYST EXCISION      resection       Family History:    Family History   Problem Relation Age of Onset    Diabetes Mother     Hypertension Mother     Rosacea Father     Allergies Father      seasonal    Liver cancer Paternal Grandfather     Breast cancer Family        Cancer-related family history includes Breast cancer in her family; Liver cancer in her paternal grandfather  Social History:   Social History     Social History    Marital status: /Civil Union     Spouse name: N/A    Number of children: N/A    Years of education: N/A     Occupational History    Not on file       Social History Main Topics    Smoking status: Former Smoker     Quit date: 2015    Smokeless tobacco: Never Used      Comment: current some day smoker per [employer]    Alcohol use Yes      Comment: occasional    Drug use: No    Sexual activity: Yes     Partners: Male     Birth control/ protection: Other     Other Topics Concern    Not on file     Social History Narrative    Drinks coffee           Current Medications:   Current Outpatient Prescriptions   Medication Sig Dispense Refill    albuterol (VENTOLIN HFA) 90 mcg/act inhaler Inhale 2 puffs every 4 (four) hours as needed      levocetirizine (XYZAL) 5 MG tablet Take 1 tablet by mouth daily      Linaclotide (LINZESS) 145 MCG CAPS Take 1 capsule (145 mcg total) by mouth daily 90 capsule 0    LORazepam (ATIVAN) 1 mg tablet Take 1 tablet (1 mg total) by mouth daily at bedtime 30 tablet 0    mometasone (NASONEX) 50 mcg/act nasal spray 2 sprays into each nostril daily      omeprazole (PriLOSEC) 20 mg delayed release capsule Take 20 mg by mouth      prochlorperazine (COMPAZINE) 10 mg tablet Take 1 tablet (10 mg total) by mouth every 6 (six) hours as needed for nausea or vomiting 30 tablet 0     No current facility-administered medications for this visit  Allergies: Allergies   Allergen Reactions    Cat Hair Extract     Dust Mite Extract     Molds & Smuts     Pollen Extract     Tree Extract          Physical Exam:    Body surface area is 1 81 meters squared  Wt Readings from Last 3 Encounters:   02/01/18 75 8 kg (167 lb)   01/29/18 74 9 kg (165 lb 3 2 oz)   01/26/18 70 3 kg (155 lb)        Temp Readings from Last 3 Encounters:   02/01/18 97 8 °F (36 6 °C) (Tympanic)   01/26/18 98 1 °F (36 7 °C)        BP Readings from Last 3 Encounters:   02/01/18 118/68   01/29/18 122/72   01/26/18 110/80         Pulse Readings from Last 3 Encounters:   02/01/18 78   01/29/18 68   01/26/18 82     @LASTSAO2(3)@    General Appearance:    Alert, oriented        Eyes:    PERRL   Ears:    Normal external ear canals, both ears   Nose:   Nares normal, septum midline   Throat:   Mucosa moist  Pharynx without injection  Neck:   Supple       Lungs:     Clear to auscultation bilaterally   Chest Wall:    No tenderness or deformity    Heart:    Regular rate and rhythm       Abdomen:     Soft, non-tender, bowel sounds +, no organomegaly           Extremities:   Extremities no cyanosis or edema       Skin:   no rash or icterus  Lymph nodes:   Cervical, supraclavicular, and axillary nodes normal   Neurologic:   CNII-XII intact, normal strength, sensation and reflexes     Throughout          Breast exam: Large right axillary adenopathy measuring 5 x 6 cm  Adjacent to the right axillary adenopathy, abnormal soft tissue was noted in the right breast   Left breast exam is negative

## 2018-02-01 NOTE — LETTER
February 1, 2018     Lisette Sy MD  [address] [place] [id number]    Patient: María Elena Kaba   YOB: 1980   Date of Visit: 2/1/2018       Dear Dr Karry Phoenix:    Thank you for referring Scott Rodriguez to me for evaluation  Below are my notes for this consultation  If you have questions, please do not hesitate to call me  I look forward to following your patient along with you  Sincerely,        Heide Hoover MD        CC: No Recipients  Heide Hoover MD  2/1/2018  2:22 PM  Sign at close encounter  Hematology / Oncology Outpatient Consult Note    María Elena Kaba 30 y o  female [id number]         Date:  2/1/2018        Assessment/ Plan:    A 17-year-old premenopausal woman with newly diagnosed locally advanced right breast cancer  She has quite large palpable right axillary adenopathy  This was mucinous histology, grade 2, ER 30% positive, ID 2% positive, her 2 3+ disease  She presents today with her mother to discuss neoadjuvant chemotherapy  She was previously recommended by physician at [facility] to have neoadjuvant TCH P  we had extensive discussion regarding the diagnosis, tumor phenotype, locally advanced nature of disease, potentially curable disease, high risk disease, prognosis and treatment options  Following 2 options for thoroughly discussed  1    TCH P, consistent of Taxotere, carboplatin, trastuzumab and pertuzumab x6 cycles  2   Dose dense AC x4 followed by weekly paclitaxel, trastuzumab and try weekly pertuzumab  Pros and cons of above options were thoroughly discussed  After lengthy discussion, concerning her lifestyle, she chose to be on option 1 which is very reasonable treatment options  Side effects of this regimen was thoroughly discussed, including but not limited to alopecia, nausea, vomiting, neutropenia, risk of infection, cardiotoxicity, allergic reaction as well as neuropathy    I told her that she may have up to 5% risk of cardiotoxicity  Risk of secondary leukemia is very small  She may have 20 to 30% risk of permanent menopause with this regimen  She understood and wished to proceed  At this moment, I am going to schedule CT scan of chest abdomen pelvis and bone scan to rule out distant metastasis  I am going to ask interventional radiologist to place a Port-A-Cath  She is going to have MUGA scan for initial cardiac monitoring  She is going to start neoadjuvant chemotherapy in February 16, 2018 at which time I am going to see her again  All the patient's and her mother's questions were answered to their satisfaction  Subjective:       CC:  Locally advanced right breast cancer with invasive mucinous histology, grade 2, ER 30% positive, WI 2% positive, her 2 3+ disease  Diagnosed in January 2018  HPI:  A 73-year-old premenopausal woman who initially noticed right breast lump when she was 8 months pregnant  She delivered 1st baby in June 2017  After the delivery, she noticed right breast lump to be slowly enlarging  She brought this to medical attention  She underwent mammography which was quite abnormal   Biopsy from right breast at 10:00 position 12 cm from nipple showed mucinous carcinoma, grade 2  This was ER 30 to 40% positive, WI 2 to 3% positive, her 2 3+ disease  She was seen by Dr Meena Bonilla who ordered MRI which showed extensive abnormality in her right breast including 3 5 cm of right breast mass as well as 4 cm of axillary adenopathy  She was referred to me to discuss neoadjuvant chemotherapy  She went to [facility] where she was recommended to have [address] P  she presents today with her mother to discuss treatment options  She is somewhat anxious  Otherwise, she feels well  She denied any bone pain  Her weight is stable  She has no respiratory symptoms  Her performance status is normal  She does not have significant past medical history    Her paternal aunt had breast cancer at age of 62  Her 2 paternal grandfather's sister had breast cancer in their 46s  She underwent genetic testing of which result is pending at this time  ROS: Review of Systems   All other systems reviewed and are negative  Objective:     Primary Diagnosis:    Locally advanced right breast cancer, invasive mucinous histology, grade 2, ER 30% positive, MD 2% positive, her 2 3+ disease  Diagnosed in January 2018  Cancer Staging:  Malignant neoplasm of upper-outer quadrant of right breast in female, estrogen receptor positive ([facility] )    Staging form: Breast, AJCC 8th Edition    - Clinical stage from 1/26/2018: Stage IB (cT2(3), cN1, cM0, G2, ER: Positive, MD: Positive, HER2: Positive) - Signed by Roosevelt oBcanegra MD on 1/26/2018      Previous Hematologic/ Oncologic Treatment:         Current Hematologic/ Oncologic Treatment:    Neoadjuvant chemotherapy with [employer] [facility] with pertuzumab to be started in February 16, 2018  Disease Status:         Test Results:    Pathology:    Biopsy from right breast showed invasive mucinous carcinoma, grade 2, ER 30 to 40% positive, MD 2 to 3% positive, her 2 3+ disease  Radiology:    MRI of the breast showed extensive abnormality in her right breast including 3 5 cm of mass at 10:00 position as well as 4 cm of axillary adenopathy  Imaging: Mri Breast Bilateral W Or Wo Contrast    Result Date: 1/29/2018  Narrative: Recent diagnosis of right breast cancer  MRI Breast Bilateral W or WO Contrast: January 27, 2018 - Check In #: [de-identified] Technologist: Krystal Salcido TECHNIQUE:  MRI of both breasts was performed in axial and sagittal planes utilizing a combination of fat suppressed sagittal T2 , gradient echo in phase T1 weighting followed by sagittal dynamic post contrast imaging with (VIBRANT) 3D fat suppressed gradient-echo T1 sequences at 1 5 minute intervals  Subtraction image reconstruction was also provided   This exam was read in conjunction with [employer] software  IV Contrast:  7 mL of gadobutrol injection (MULTI-DOSE) Comparison: No mammography or outside imaging is currently available to me Breasts are symmetric in size with normal skin and areolar complex  The breasts are comprised of moderate heterogeneous and confluent parenchymal elements throughout  A single metallic clip artifact is noted in the upper outer posterior right breast  T2 sequence shows no significant cyst formation or fluid collections  Within the left breast, dynamic postcontrast imaging shows no dominant mass or region of suspicious asymmetric enhancement to suggest invasive malignancy  The right breast however demonstrates suspicious enhancement involving multiple quadrants including metastatic lymphadenopathy in the axilla  The dominant enhancement is identified at the 8:00 through 12:00 position of the right breast   Suspicious enhancement can be identified throughout the upper outer quadrant  A 2nd prominent mass is noted in the anterior retroareolar breast   The largest "measurable" component measures up to 3 5 cm at the 10 o'clock position  However, this mass extends inferomedially  Other scattered subcentimeter foci of suspicious enhancement are identified in the breast  Metastatic lymphadenopathy is present in the right axilla (largest approaching 4 cm in greatest dimension  )  This large largest lymph node somewhat obliterates the fat plane between the chest wall (series 8, image 124)  IMPRESSION: Extensive disease throughout the upper outer right breast   2nd prominent mass is noted in the anterior right breast   Numerous other foci of suspicious enhancement including metastatic right axillary lymphadenopathy  No evidence of left breast disease  ACR BI-RADS® Assessments: BiRad:6 - Known biopsy proven malignancy - Right Recommendation: Surgical evaluation of the right breast  The patient is scheduled in a reminder system for screening mammography   Transcription Location: [id number] Signing Station: [id number] Risk Value(s): Tyrer-Cuzick 10 Year: 0 900%, Tyrer-Cuzick Lifetime: 11 700%, Myriad Table: 1 5%, RAMON 5 Year: 0 2%, NCI Lifetime: 6 9%      Labs: No results found for: WBC, HGB, HCT, MCV, PLT  Lab Results   Component Value Date    BUN 16 2018    CREATININE 0 84 2018         Active Problems:   Patient Active Problem List   Diagnosis    Asthma    Other constipation    Cigarette nicotine dependence without complication    Macular atrophy, retinal    Seasonal allergies    Malignant neoplasm of upper-outer quadrant of right breast in female, estrogen receptor positive ([facility] )       Past Medical History:   Past Medical History:   Diagnosis Date    Breast cancer ([facility] ) 2018    Seasonal allergies        Surgical History:   Past Surgical History:   Procedure Laterality Date     SECTION      PILONIDAL CYST EXCISION      resection       Family History:    Family History   Problem Relation Age of Onset    Diabetes Mother     Hypertension Mother     Rosacea Father     Allergies Father      seasonal    Liver cancer Paternal Grandfather     Breast cancer Family        Cancer-related family history includes Breast cancer in her family; Liver cancer in her paternal grandfather  Social History:   Social History     Social History    Marital status: /Civil Union     Spouse name: N/A    Number of children: N/A    Years of education: N/A     Occupational History    Not on file       Social History Main Topics    Smoking status: Former Smoker     Quit date: 2015    Smokeless tobacco: Never Used      Comment: current some day smoker per [employer]    Alcohol use Yes      Comment: occasional    Drug use: No    Sexual activity: Yes     Partners: Male     Birth control/ protection: Other     Other Topics Concern    Not on file     Social History Narrative    Drinks coffee           Current Medications:   Current Outpatient Prescriptions   Medication Sig Dispense Refill    albuterol (VENTOLIN HFA) 90 mcg/act inhaler Inhale 2 puffs every 4 (four) hours as needed      levocetirizine (XYZAL) 5 MG tablet Take 1 tablet by mouth daily      Linaclotide (LINZESS) 145 MCG CAPS Take 1 capsule (145 mcg total) by mouth daily 90 capsule 0    LORazepam (ATIVAN) 1 mg tablet Take 1 tablet (1 mg total) by mouth daily at bedtime 30 tablet 0    mometasone (NASONEX) 50 mcg/act nasal spray 2 sprays into each nostril daily      omeprazole (PriLOSEC) 20 mg delayed release capsule Take 20 mg by mouth      prochlorperazine (COMPAZINE) 10 mg tablet Take 1 tablet (10 mg total) by mouth every 6 (six) hours as needed for nausea or vomiting 30 tablet 0     No current facility-administered medications for this visit  Allergies: Allergies   Allergen Reactions    Cat Hair Extract     Dust Mite Extract     Molds & Smuts     Pollen Extract     Tree Extract          Physical Exam:    Body surface area is 1 81 meters squared  Wt Readings from Last 3 Encounters:   02/01/18 75 8 kg (167 lb)   01/29/18 74 9 kg (165 lb 3 2 oz)   01/26/18 70 3 kg (155 lb)        Temp Readings from Last 3 Encounters:   02/01/18 97 8 °F (36 6 °C) (Tympanic)   01/26/18 98 1 °F (36 7 °C)        BP Readings from Last 3 Encounters:   02/01/18 118/68   01/29/18 122/72   01/26/18 110/80         Pulse Readings from Last 3 Encounters:   02/01/18 78   01/29/18 68   01/26/18 82     @LASTSAO2(3)@    General Appearance:    Alert, oriented        Eyes:    PERRL   Ears:    Normal external ear canals, both ears   Nose:   Nares normal, septum midline   Throat:   Mucosa moist  Pharynx without injection  Neck:   Supple       Lungs:     Clear to auscultation bilaterally   Chest Wall:    No tenderness or deformity    Heart:    Regular rate and rhythm       Abdomen:     Soft, non-tender, bowel sounds +, no organomegaly           Extremities:   Extremities no cyanosis or edema       Skin:   no rash or icterus  Lymph nodes:   Cervical, supraclavicular, and axillary nodes normal   Neurologic:   CNII-XII intact, normal strength, sensation and reflexes     Throughout          Breast exam: Large right axillary adenopathy measuring 5 x 6 cm  Adjacent to the right axillary adenopathy, abnormal soft tissue was noted in the right breast   Left breast exam is negative

## 2018-02-02 ENCOUNTER — TELEPHONE (OUTPATIENT)
Dept: HEMATOLOGY ONCOLOGY | Facility: CLINIC | Age: 38
End: 2018-02-02

## 2018-02-02 DIAGNOSIS — IMO0001 EFFECT OF CHEMOTHERAPY, INITIAL ENCOUNTER: Primary | ICD-10-CM

## 2018-02-02 NOTE — TELEPHONE ENCOUNTER
PT SPOKE TO HER [place], [place], AND THEY DECLINED A MUGA SCAN  SHE SAID TO GO AHEAD WITH SCHEDULING HER FOR AN ECHO INSTEAD  PLEASE CALL WITH DATE AND TIME (MONDAY IS FINE)    TXS

## 2018-02-07 ENCOUNTER — TELEPHONE (OUTPATIENT)
Dept: HEMATOLOGY ONCOLOGY | Facility: CLINIC | Age: 38
End: 2018-02-07

## 2018-02-07 NOTE — TELEPHONE ENCOUNTER
Spoke with patient  She would like to change her chemo treatments to [facility] and preferably after noon  I told her I would try as she is in for a 5 1/2 hour slot in the infusion center  I will return her call with a new schedule

## 2018-02-07 NOTE — TELEPHONE ENCOUNTER
PT CALLED AND WANTS TO SPEAK TO YOU ABOUT CHANGING HER WHOLE CHEMO SCHED AND THE APT WITH DR KOLB      PLEASE CALL HER

## 2018-02-07 NOTE — TELEPHONE ENCOUNTER
Rescheduled patient to [facility] but was unable to get an appointment after 10am  She verbalized understanding

## 2018-02-08 ENCOUNTER — HOSPITAL ENCOUNTER (OUTPATIENT)
Dept: NON INVASIVE DIAGNOSTICS | Facility: HOSPITAL | Age: 38
Discharge: HOME | End: 2018-02-08
Attending: INTERNAL MEDICINE
Payer: COMMERCIAL

## 2018-02-08 ENCOUNTER — TELEPHONE (OUTPATIENT)
Dept: RADIOLOGY | Facility: HOSPITAL | Age: 38
End: 2018-02-08

## 2018-02-08 PROCEDURE — 93306 TTE W/DOPPLER COMPLETE: CPT | Performed by: INTERNAL MEDICINE

## 2018-02-08 PROCEDURE — 93306 TTE W/DOPPLER COMPLETE: CPT

## 2018-02-08 RX ORDER — SODIUM CHLORIDE 9 MG/ML
75 INJECTION, SOLUTION INTRAVENOUS CONTINUOUS
Status: CANCELLED | OUTPATIENT
Start: 2018-02-08

## 2018-02-09 ENCOUNTER — HOSPITAL ENCOUNTER (OUTPATIENT)
Dept: CT IMAGING | Facility: HOSPITAL | Age: 38
Discharge: HOME | End: 2018-02-09
Attending: INTERNAL MEDICINE
Payer: COMMERCIAL

## 2018-02-09 ENCOUNTER — HOSPITAL ENCOUNTER (OUTPATIENT)
Dept: NON INVASIVE DIAGNOSTICS | Facility: CLINIC | Age: 38
Discharge: HOME | End: 2018-02-09
Payer: COMMERCIAL

## 2018-02-09 ENCOUNTER — ANESTHESIA EVENT (OUTPATIENT)
Dept: PERIOP | Facility: AMBULARY SURGERY CENTER | Age: 38
End: 2018-02-09
Payer: COMMERCIAL

## 2018-02-09 PROCEDURE — 74177 CT ABD & PELVIS W/CONTRAST: CPT

## 2018-02-09 PROCEDURE — 71260 CT THORAX DX C+: CPT

## 2018-02-09 PROCEDURE — A9503 TC99M MEDRONATE: HCPCS

## 2018-02-09 PROCEDURE — 78306 BONE IMAGING WHOLE BODY: CPT

## 2018-02-09 RX ADMIN — IOHEXOL 100 ML: 350 INJECTION, SOLUTION INTRAVENOUS at 07:45

## 2018-02-12 ENCOUNTER — TELEPHONE (OUTPATIENT)
Dept: SURGICAL ONCOLOGY | Facility: CLINIC | Age: 38
End: 2018-02-12

## 2018-02-12 NOTE — TELEPHONE ENCOUNTER
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    Discussion  Genetic testing results were disclosed to the patient  A 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  A likely pathogenic variant is a gene variation with strong evidence in favor of pathogenicity; however the evidence is not yet sufficient to classify the variation as a pathogenic mutation  A likely pathogenic variant is considered clinically actionable therefore high risk medical management and testing for family members is recommended  Associated risks and management:  PALB2 mutations are associated with increased risks of pancreatic and breast cancers  Females with a PALB2 mutation have a 2 to 4-fold increase in risk for breast cancer  Women with PALB2 mutation have a 35% lifetime risk to develop breast cancer, this risk increases to 58%  for women with two or more relatives with breast cancer  The risk of pancreatic cancer is known to be increased with PALB2 mutations; however these risks are not well classified at this time  We discussed screening and preventative measures for breast cancer for women who carry a PALB2 mutation  The NCCN recommended screening for PALB2 mutation carriers includes mammography and MRI performed yearly, alternating every 6 months, beginning at the age of 27  Risk reducing mastectomy may be considered based on the patients family history  At this time there are no specific screening guidelines for pancreatic cancer  Current investigational protocols recommended by the [employer] of [place] includes endoscopic ultrasonography (EUS) and/or MRI/magnetic resonance cholangiopancreatography  Full body skin and eye examinations may be considered for melanoma risk  (NCCN Clinical Practice Guidelines in Oncology; Genetic/Familial High-Risk Assesment: Breast and Ovarian   Version 1 2018) We also discussed that the patient's relatives are at risk to have inherited the same mutation  All first degree relatives, which include parents, siblings and children, have a 50% chance to have inherited the same mutation  The patient's siblings, children, and extended relatives may want to consider genetic testing to determine if they are at increased risk to develop cancer  Relatives who do not have genetic testing to determine if they carry the mutation should be considered to be at an increased risk and should share this information with their physicians  Children are eligible for testing after the age of 25  The patients questions were answered and she was encouraged to call with any future questions or concerns

## 2018-02-13 ENCOUNTER — TRANSCRIBE ORDERS (OUTPATIENT)
Dept: LAB | Facility: CLINIC | Age: 38
End: 2018-02-13

## 2018-02-13 ENCOUNTER — APPOINTMENT (OUTPATIENT)
Dept: LAB | Facility: CLINIC | Age: 38
End: 2018-02-13
Payer: COMMERCIAL

## 2018-02-13 ENCOUNTER — ANESTHESIA (OUTPATIENT)
Dept: PERIOP | Facility: AMBULARY SURGERY CENTER | Age: 38
End: 2018-02-13
Payer: COMMERCIAL

## 2018-02-13 ENCOUNTER — HOSPITAL ENCOUNTER (OUTPATIENT)
Facility: AMBULARY SURGERY CENTER | Age: 38
Setting detail: OUTPATIENT SURGERY
Discharge: HOME | End: 2018-02-13
Attending: RADIOLOGY | Admitting: RADIOLOGY
Payer: COMMERCIAL

## 2018-02-13 ENCOUNTER — TELEPHONE (OUTPATIENT)
Dept: HEMATOLOGY ONCOLOGY | Facility: CLINIC | Age: 38
End: 2018-02-13

## 2018-02-13 ENCOUNTER — APPOINTMENT (OUTPATIENT)
Dept: RADIOLOGY | Facility: AMBULARY SURGERY CENTER | Age: 38
End: 2018-02-13
Payer: COMMERCIAL

## 2018-02-13 VITALS
HEART RATE: 70 BPM | DIASTOLIC BLOOD PRESSURE: 50 MMHG | TEMPERATURE: 97.6 F | HEIGHT: 63 IN | RESPIRATION RATE: 18 BRPM | BODY MASS INDEX: 27.11 KG/M2 | OXYGEN SATURATION: 100 % | WEIGHT: 153 LBS | SYSTOLIC BLOOD PRESSURE: 108 MMHG

## 2018-02-13 DIAGNOSIS — Z95.828 PORT CATHETER IN PLACE: Primary | ICD-10-CM

## 2018-02-13 DIAGNOSIS — Z95.828 PORT CATHETER IN PLACE: ICD-10-CM

## 2018-02-13 LAB
ALBUMIN SERPL BCP-MCNC: 3.6 G/DL (ref 3.5–5)
ALP SERPL-CCNC: 63 U/L (ref 46–116)
ALT SERPL W P-5'-P-CCNC: 21 U/L (ref 12–78)
ANION GAP SERPL CALCULATED.3IONS-SCNC: 7 MMOL/L (ref 4–13)
AST SERPL W P-5'-P-CCNC: 13 U/L (ref 5–45)
BASOPHILS # BLD AUTO: 0.04 THOUSANDS/ΜL (ref 0–0.1)
BASOPHILS NFR BLD AUTO: 1 % (ref 0–1)
BILIRUB SERPL-MCNC: 0.4 MG/DL (ref 0.2–1)
BUN SERPL-MCNC: 18 MG/DL (ref 5–25)
CALCIUM SERPL-MCNC: 8.5 MG/DL (ref 8.3–10.1)
CHLORIDE SERPL-SCNC: 105 MMOL/L (ref 100–108)
CO2 SERPL-SCNC: 26 MMOL/L (ref 21–32)
CREAT SERPL-MCNC: 0.84 MG/DL (ref 0.6–1.3)
EOSINOPHIL # BLD AUTO: 0.07 THOUSAND/ΜL (ref 0–0.61)
EOSINOPHIL NFR BLD AUTO: 1 % (ref 0–6)
ERYTHROCYTE [DISTWIDTH] IN BLOOD BY AUTOMATED COUNT: 12.9 % (ref 11.6–15.1)
GFR SERPL CREATININE-BSD FRML MDRD: 90 ML/MIN/1.73SQ M
GLUCOSE P FAST SERPL-MCNC: 101 MG/DL (ref 65–99)
HCT VFR BLD AUTO: 39 % (ref 34.8–46.1)
HGB BLD-MCNC: 13.1 G/DL (ref 11.5–15.4)
LYMPHOCYTES # BLD AUTO: 2.15 THOUSANDS/ΜL (ref 0.6–4.47)
LYMPHOCYTES NFR BLD AUTO: 35 % (ref 14–44)
MCH RBC QN AUTO: 31.6 PG (ref 26.8–34.3)
MCHC RBC AUTO-ENTMCNC: 33.6 G/DL (ref 31.4–37.4)
MCV RBC AUTO: 94 FL (ref 82–98)
MONOCYTES # BLD AUTO: 0.59 THOUSAND/ΜL (ref 0.17–1.22)
MONOCYTES NFR BLD AUTO: 10 % (ref 4–12)
NEUTROPHILS # BLD AUTO: 3.33 THOUSANDS/ΜL (ref 1.85–7.62)
NEUTS SEG NFR BLD AUTO: 53 % (ref 43–75)
PLATELET # BLD AUTO: 317 THOUSANDS/UL (ref 149–390)
PMV BLD AUTO: 8.8 FL (ref 8.9–12.7)
POTASSIUM SERPL-SCNC: 4.2 MMOL/L (ref 3.5–5.3)
PROT SERPL-MCNC: 7.1 G/DL (ref 6.4–8.2)
RBC # BLD AUTO: 4.15 MILLION/UL (ref 3.81–5.12)
SODIUM SERPL-SCNC: 138 MMOL/L (ref 136–145)
WBC # BLD AUTO: 6.18 THOUSAND/UL (ref 4.31–10.16)

## 2018-02-13 PROCEDURE — 77001 FLUOROGUIDE FOR VEIN DEVICE: CPT | Performed by: RADIOLOGY

## 2018-02-13 PROCEDURE — 36415 COLL VENOUS BLD VENIPUNCTURE: CPT | Performed by: INTERNAL MEDICINE

## 2018-02-13 PROCEDURE — C1788 PORT, INDWELLING, IMP: HCPCS | Performed by: RADIOLOGY

## 2018-02-13 PROCEDURE — 77001 FLUOROGUIDE FOR VEIN DEVICE: CPT

## 2018-02-13 PROCEDURE — 36561 INSERT TUNNELED CV CATH: CPT | Performed by: RADIOLOGY

## 2018-02-13 PROCEDURE — 76937 US GUIDE VASCULAR ACCESS: CPT | Performed by: RADIOLOGY

## 2018-02-13 PROCEDURE — 80053 COMPREHEN METABOLIC PANEL: CPT | Performed by: INTERNAL MEDICINE

## 2018-02-13 PROCEDURE — 85025 COMPLETE CBC W/AUTO DIFF WBC: CPT | Performed by: INTERNAL MEDICINE

## 2018-02-13 DEVICE — PORT DIGINTY 8FR MICRO-STICK KIT HEMODIAL MID SIZE: Type: IMPLANTABLE DEVICE | Site: NECK | Status: FUNCTIONAL

## 2018-02-13 RX ORDER — MIDAZOLAM HYDROCHLORIDE 1 MG/ML
INJECTION INTRAMUSCULAR; INTRAVENOUS AS NEEDED
Status: DISCONTINUED | OUTPATIENT
Start: 2018-02-13 | End: 2018-02-13 | Stop reason: SURG

## 2018-02-13 RX ORDER — PSEUDOEPHEDRINE HYDROCHLORIDE 30 MG/1
60 TABLET ORAL EVERY 4 HOURS PRN
COMMUNITY
End: 2018-06-22 | Stop reason: HOSPADM

## 2018-02-13 RX ORDER — FENTANYL CITRATE/PF 50 MCG/ML
50 SYRINGE (ML) INJECTION
Status: DISCONTINUED | OUTPATIENT
Start: 2018-02-13 | End: 2018-02-13 | Stop reason: HOSPADM

## 2018-02-13 RX ORDER — FENTANYL CITRATE 50 UG/ML
INJECTION, SOLUTION INTRAMUSCULAR; INTRAVENOUS AS NEEDED
Status: DISCONTINUED | OUTPATIENT
Start: 2018-02-13 | End: 2018-02-13 | Stop reason: SURG

## 2018-02-13 RX ORDER — SODIUM CHLORIDE 9 MG/ML
75 INJECTION, SOLUTION INTRAVENOUS CONTINUOUS
Status: DISCONTINUED | OUTPATIENT
Start: 2018-02-13 | End: 2018-02-13 | Stop reason: HOSPADM

## 2018-02-13 RX ORDER — OXYCODONE AND ACETAMINOPHEN 10; 325 MG/1; MG/1
1 TABLET ORAL EVERY 4 HOURS PRN
Qty: 10 TABLET | Refills: 0 | Status: SHIPPED | OUTPATIENT
Start: 2018-02-13 | End: 2018-02-16 | Stop reason: ALTCHOICE

## 2018-02-13 RX ORDER — LIDOCAINE HYDROCHLORIDE AND EPINEPHRINE 10; 10 MG/ML; UG/ML
INJECTION, SOLUTION INFILTRATION; PERINEURAL AS NEEDED
Status: DISCONTINUED | OUTPATIENT
Start: 2018-02-13 | End: 2018-02-13 | Stop reason: HOSPADM

## 2018-02-13 RX ORDER — OXYCODONE AND ACETAMINOPHEN 10; 325 MG/1; MG/1
1 TABLET ORAL EVERY 4 HOURS PRN
Qty: 10 TABLET | Refills: 0 | Status: SHIPPED | OUTPATIENT
Start: 2018-02-13 | End: 2018-02-13 | Stop reason: SDUPTHER

## 2018-02-13 RX ORDER — LIDOCAINE AND PRILOCAINE 25; 25 MG/G; MG/G
CREAM TOPICAL AS NEEDED
Qty: 30 G | Refills: 0 | Status: SHIPPED | OUTPATIENT
Start: 2018-02-13 | End: 2019-03-21 | Stop reason: ALTCHOICE

## 2018-02-13 RX ORDER — PROPOFOL 10 MG/ML
INJECTION, EMULSION INTRAVENOUS AS NEEDED
Status: DISCONTINUED | OUTPATIENT
Start: 2018-02-13 | End: 2018-02-13 | Stop reason: SURG

## 2018-02-13 RX ORDER — ONDANSETRON 2 MG/ML
4 INJECTION INTRAMUSCULAR; INTRAVENOUS ONCE AS NEEDED
Status: DISCONTINUED | OUTPATIENT
Start: 2018-02-13 | End: 2018-02-13 | Stop reason: HOSPADM

## 2018-02-13 RX ADMIN — FENTANYL CITRATE 100 MCG: 50 INJECTION, SOLUTION INTRAMUSCULAR; INTRAVENOUS at 09:13

## 2018-02-13 RX ADMIN — PROPOFOL 40 MG: 10 INJECTION, EMULSION INTRAVENOUS at 09:35

## 2018-02-13 RX ADMIN — SODIUM CHLORIDE: 0.9 INJECTION, SOLUTION INTRAVENOUS at 09:10

## 2018-02-13 RX ADMIN — PROPOFOL 60 MG: 10 INJECTION, EMULSION INTRAVENOUS at 09:31

## 2018-02-13 RX ADMIN — PROPOFOL 30 MG: 10 INJECTION, EMULSION INTRAVENOUS at 09:39

## 2018-02-13 RX ADMIN — MIDAZOLAM HYDROCHLORIDE 2 MG: 1 INJECTION, SOLUTION INTRAMUSCULAR; INTRAVENOUS at 09:13

## 2018-02-13 NOTE — ANESTHESIA PREPROCEDURE EVALUATION
Review of Systems/Medical History  Patient summary reviewed  Chart reviewed  No history of anesthetic complications     Cardiovascular  Negative cardio ROS    Pulmonary  Asthma: ,        GI/Hepatic    GERD ,             Endo/Other     GYN       Hematology   Musculoskeletal       Neurology   Psychology   Anxiety,              Physical Exam    Airway    Mallampati score: II  TM Distance: >3 FB  Neck ROM: full     Dental       Cardiovascular  Comment: Negative ROS,     Pulmonary      Other Findings        Anesthesia Plan  ASA Score- 2     Anesthesia Type- IV sedation with anesthesia with ASA Monitors  Additional Monitors:   Airway Plan:         Plan Factors-    Induction- intravenous  Postoperative Plan-     Informed Consent- Anesthetic plan and risks discussed with patient  I personally reviewed this patient with the CRNA  Discussed and agreed on the Anesthesia Plan with the CRNA  Carlin Arauz

## 2018-02-13 NOTE — PROGRESS NOTES
Patient arrived to [facility] for port placement    The procedure and risks were discussed with the patient  All questions were answered  Informed consent was obtained

## 2018-02-13 NOTE — DISCHARGE INSTRUCTIONS
Do not lay flat for 6 hours  Take off dressing in 24 hours  Allow skin glue to fall off on its own  Do not apply lotions or creams to site until healed  May shower is 24 hours  Report any signs of infection (reddness, swelling, foul smelling drainage, fever over 101) or bleeding to MD   May take over the counter pain meds as needed  Call MD if pain not tolerable  No heavy lifting over 5 pounds for 1 week

## 2018-02-13 NOTE — TELEPHONE ENCOUNTER
Spoke with patient  She is having 8/10 pain from her port placement this morning  She would like something stronger for the pain  She would also like to go over her CT scan results  I will discuss with Dr Claude Junes

## 2018-02-13 NOTE — H&P (VIEW-ONLY)
Hematology / Oncology Outpatient Consult Note    Rachell Coronado 30 y o  female [id number]         Date:  2/1/2018        Assessment/ Plan:    A 68-year-old premenopausal woman with newly diagnosed locally advanced right breast cancer  She has quite large palpable right axillary adenopathy  This was mucinous histology, grade 2, ER 30% positive, ND 2% positive, her 2 3+ disease  She presents today with her mother to discuss neoadjuvant chemotherapy  She was previously recommended by physician at [facility] to have neoadjuvant TCH P  we had extensive discussion regarding the diagnosis, tumor phenotype, locally advanced nature of disease, potentially curable disease, high risk disease, prognosis and treatment options  Following 2 options for thoroughly discussed  1    TCH P, consistent of Taxotere, carboplatin, trastuzumab and pertuzumab x6 cycles  2   Dose dense AC x4 followed by weekly paclitaxel, trastuzumab and try weekly pertuzumab  Pros and cons of above options were thoroughly discussed  After lengthy discussion, concerning her lifestyle, she chose to be on option 1 which is very reasonable treatment options  Side effects of this regimen was thoroughly discussed, including but not limited to alopecia, nausea, vomiting, neutropenia, risk of infection, cardiotoxicity, allergic reaction as well as neuropathy  I told her that she may have up to 5% risk of cardiotoxicity  Risk of secondary leukemia is very small  She may have 20 to 30% risk of permanent menopause with this regimen  She understood and wished to proceed  At this moment, I am going to schedule CT scan of chest abdomen pelvis and bone scan to rule out distant metastasis  I am going to ask interventional radiologist to place a Port-A-Cath  She is going to have MUGA scan for initial cardiac monitoring    She is going to start neoadjuvant chemotherapy in February 16, 2018 at which time I am going to see her again  All the patient's and her mother's questions were answered to their satisfaction  Subjective:       CC:  Locally advanced right breast cancer with invasive mucinous histology, grade 2, ER 30% positive, OH 2% positive, her 2 3+ disease  Diagnosed in January 2018  HPI:  A 29-year-old premenopausal woman who initially noticed right breast lump when she was 8 months pregnant  She delivered 1st baby in June 2017  After the delivery, she noticed right breast lump to be slowly enlarging  She brought this to medical attention  She underwent mammography which was quite abnormal   Biopsy from right breast at 10:00 position 12 cm from nipple showed mucinous carcinoma, grade 2  This was ER 30 to 40% positive, OH 2 to 3% positive, her 2 3+ disease  She was seen by Dr Lesley Jain who ordered MRI which showed extensive abnormality in her right breast including 3 5 cm of right breast mass as well as 4 cm of axillary adenopathy  She was referred to me to discuss neoadjuvant chemotherapy  She went to [facility] where she was recommended to have [address] P  she presents today with her mother to discuss treatment options  She is somewhat anxious  Otherwise, she feels well  She denied any bone pain  Her weight is stable  She has no respiratory symptoms  Her performance status is normal  She does not have significant past medical history  Her paternal aunt had breast cancer at age of 62  Her 2 paternal grandfather's sister had breast cancer in their 46s  She underwent genetic testing of which result is pending at this time  ROS: Review of Systems   All other systems reviewed and are negative  Objective:     Primary Diagnosis:    Locally advanced right breast cancer, invasive mucinous histology, grade 2, ER 30% positive, OH 2% positive, her 2 3+ disease  Diagnosed in January 2018      Cancer Staging:  Malignant neoplasm of upper-outer quadrant of right breast in female, estrogen receptor positive ([facility] [address] )    Staging form: Breast, AJCC 8th Edition    - Clinical stage from 1/26/2018: Stage IB (cT2(3), cN1, cM0, G2, ER: Positive, IA: Positive, HER2: Positive) - Signed by Dina Zaidi MD on 1/26/2018      Previous Hematologic/ Oncologic Treatment:         Current Hematologic/ Oncologic Treatment:    Neoadjuvant chemotherapy with [facility] with pertuzumab to be started in February 16, 2018  Disease Status:         Test Results:    Pathology:    Biopsy from right breast showed invasive mucinous carcinoma, grade 2, ER 30 to 40% positive, IA 2 to 3% positive, her 2 3+ disease  Radiology:    MRI of the breast showed extensive abnormality in her right breast including 3 5 cm of mass at 10:00 position as well as 4 cm of axillary adenopathy  Imaging: Mri Breast Bilateral W Or Wo Contrast    Result Date: 1/29/2018  Narrative: Recent diagnosis of right breast cancer  MRI Breast Bilateral W or WO Contrast: January 27, 2018 - Check In #: [de-identified] Technologist: Laxmi Cleveland TECHNIQUE:  MRI of both breasts was performed in axial and sagittal planes utilizing a combination of fat suppressed sagittal T2 , gradient echo in phase T1 weighting followed by sagittal dynamic post contrast imaging with (VIBRANT) 3D fat suppressed gradient-echo T1 sequences at 1 5 minute intervals  Subtraction image reconstruction was also provided  This exam was read in conjunction with [employer] software  IV Contrast:  7 mL of gadobutrol injection (MULTI-DOSE) Comparison: No mammography or outside imaging is currently available to me Breasts are symmetric in size with normal skin and areolar complex  The breasts are comprised of moderate heterogeneous and confluent parenchymal elements throughout  A single metallic clip artifact is noted in the upper outer posterior right breast  T2 sequence shows no significant cyst formation or fluid collections   Within the left breast, dynamic postcontrast imaging shows no dominant mass or region of suspicious asymmetric enhancement to suggest invasive malignancy  The right breast however demonstrates suspicious enhancement involving multiple quadrants including metastatic lymphadenopathy in the axilla  The dominant enhancement is identified at the 8:00 through 12:00 position of the right breast   Suspicious enhancement can be identified throughout the upper outer quadrant  A 2nd prominent mass is noted in the anterior retroareolar breast   The largest "measurable" component measures up to 3 5 cm at the 10 o'clock position  However, this mass extends inferomedially  Other scattered subcentimeter foci of suspicious enhancement are identified in the breast  Metastatic lymphadenopathy is present in the right axilla (largest approaching 4 cm in greatest dimension  )  This large largest lymph node somewhat obliterates the fat plane between the chest wall (series 8, image 124)  IMPRESSION: Extensive disease throughout the upper outer right breast   2nd prominent mass is noted in the anterior right breast   Numerous other foci of suspicious enhancement including metastatic right axillary lymphadenopathy  No evidence of left breast disease  ACR BI-RADS® Assessments: BiRad:6 - Known biopsy proven malignancy - Right Recommendation: Surgical evaluation of the right breast  The patient is scheduled in a reminder system for screening mammography   Transcription Location: [facility]: [id number] Risk Value(s): Tyrer-Cuzick 10 Year: 0 900%, Tyrer-Cuzick Lifetime: 11 700%, Myriad Table: 1 5%, RAMON 5 Year: 0 2%, NCI Lifetime: 6 9%      Labs: No results found for: WBC, HGB, HCT, MCV, PLT  Lab Results   Component Value Date    BUN 16 01/27/2018    CREATININE 0 84 01/27/2018         Active Problems:   Patient Active Problem List   Diagnosis    Asthma    Other constipation    Cigarette nicotine dependence without complication    Macular atrophy, retinal    Seasonal allergies    Malignant neoplasm of upper-outer quadrant of right breast in female, estrogen receptor positive ([facility] [address] )       Past Medical History:   Past Medical History:   Diagnosis Date    [facility] 2018    Seasonal allergies        Surgical History:   Past Surgical History:   Procedure Laterality Date     SECTION      PILONIDAL CYST EXCISION      resection       Family History:    Family History   Problem Relation Age of Onset    Diabetes Mother     Hypertension Mother     Rosacea Father     Allergies Father      seasonal    Liver cancer Paternal Grandfather     Breast cancer Family        Cancer-related family history includes Breast cancer in her family; Liver cancer in her paternal grandfather  Social History:   Social History     Social History    Marital status: /Civil Union     Spouse name: N/A    Number of children: N/A    Years of education: N/A     Occupational History    Not on file       Social History Main Topics    Smoking status: Former Smoker     Quit date: 2015    Smokeless tobacco: Never Used      Comment: current some day smoker per [employer]    Alcohol use Yes      Comment: occasional    Drug use: No    Sexual activity: Yes     Partners: Male     Birth control/ protection: Other     Other Topics Concern    Not on file     Social History Narrative    Drinks coffee           Current Medications:   Current Outpatient Prescriptions   Medication Sig Dispense Refill    albuterol (VENTOLIN HFA) 90 mcg/act inhaler Inhale 2 puffs every 4 (four) hours as needed      levocetirizine (XYZAL) 5 MG tablet Take 1 tablet by mouth daily      Linaclotide (LINZESS) 145 MCG CAPS Take 1 capsule (145 mcg total) by mouth daily 90 capsule 0    LORazepam (ATIVAN) 1 mg tablet Take 1 tablet (1 mg total) by mouth daily at bedtime 30 tablet 0    mometasone (NASONEX) 50 mcg/act nasal spray 2 sprays into each nostril daily      omeprazole (PriLOSEC) 20 mg delayed release capsule Take 20 mg by mouth      prochlorperazine (COMPAZINE) 10 mg tablet Take 1 tablet (10 mg total) by mouth every 6 (six) hours as needed for nausea or vomiting 30 tablet 0     No current facility-administered medications for this visit  Allergies: Allergies   Allergen Reactions    Cat Hair Extract     Dust Mite Extract     Molds & Smuts     Pollen Extract     Tree Extract          Physical Exam:    Body surface area is 1 81 meters squared  Wt Readings from Last 3 Encounters:   02/01/18 75 8 kg (167 lb)   01/29/18 74 9 kg (165 lb 3 2 oz)   01/26/18 70 3 kg (155 lb)        Temp Readings from Last 3 Encounters:   02/01/18 97 8 °F (36 6 °C) (Tympanic)   01/26/18 98 1 °F (36 7 °C)        BP Readings from Last 3 Encounters:   02/01/18 118/68   01/29/18 122/72   01/26/18 110/80         Pulse Readings from Last 3 Encounters:   02/01/18 78   01/29/18 68   01/26/18 82     @LASTSAO2(3)@    General Appearance:    Alert, oriented        Eyes:    PERRL   Ears:    Normal external ear canals, both ears   Nose:   Nares normal, septum midline   Throat:   Mucosa moist  Pharynx without injection  Neck:   Supple       Lungs:     Clear to auscultation bilaterally   Chest Wall:    No tenderness or deformity    Heart:    Regular rate and rhythm       Abdomen:     Soft, non-tender, bowel sounds +, no organomegaly           Extremities:   Extremities no cyanosis or edema       Skin:   no rash or icterus  Lymph nodes:   Cervical, supraclavicular, and axillary nodes normal   Neurologic:   CNII-XII intact, normal strength, sensation and reflexes     Throughout          Breast exam: Large right axillary adenopathy measuring 5 x 6 cm  Adjacent to the right axillary adenopathy, abnormal soft tissue was noted in the right breast   Left breast exam is negative

## 2018-02-13 NOTE — TELEPHONE ENCOUNTER
Spoke with patient again  Did get a script for percocet for her  She will come to [facility] to pick it up  She verbalized understanding

## 2018-02-13 NOTE — OP NOTE
Chest port placement 2/13/2018      History:      Breast carcinoma    Procedure:     The patient was identified verbally and by wristband  Timeout was performed  Informed consent was obtained       All elements of maximal sterile barrier technique were followed (cap, mask, sterile gown, sterile gloves, large sterile sheet, hand hygiene and 2% chlorhexidine for cutaneous antisepsis)      Following obtaining informed consent, the patient was prepped and draped in the usual sterile fashion  Using ultrasound guidance, access was gained to the patient's left  internal jugular vein using a micropuncture system  The micropuncture wire was removed and a  035 wire was advanced to the level of the inferior vena cava using fluoroscopic guidance      Using 1% lidocaine, the region of the left anterior chest was was anesthetized  A small incision was made using a 15 blade scalpel  The port pocket was created using blunt dissection      The catheter tubing was tunneled from the incision to the venotomy  The micropuncture dilator was exchanged for a peel-away sheath, using fluoroscopic guidance  The catheter was place through the peel-away sheath  The catheter was measured and cut to size  The catheter was attached to the port  The port was flushed with saline to ensure patency without evidence of leakage  The port was placed in the port pocket  The port was sutured in the pocket using 3-0 Vicryl      The incisions were approximated with 3-0 Vicryl and Histoacryl  The patient tolerated the procedure well without apparent immediate complications  The patient left the IR department in unchanged condition      Dr Cornell Kang performed and directly supervised the entire procedure      Findings:      Using ultrasound guidance, the left internal jugular vein was cannulated using Seldinger technique  The left internal jugular vein was evaluated as a potential access site  The left internal jugular vein was patent, and free of thrombus  Static images of the vessel was obtained  Visualization of real time needle entry into the vessel was obtained      Fluoroscopic spot image demonstrates a newly placed single lumen chest port via the left internal jugular vein with the most central aspect at the SVC/RA junction   The catheter tubing is smooth in contour         IMPRESSION:     Successful placement of a single lumen chest port via the left internal jugular vein

## 2018-02-13 NOTE — INTERVAL H&P NOTE
H & P reviewed after examining the patient and I find no changes in the patient condition since the H & P has been written  Patient re-evaluated   Accept as history and physical     Tati Mike DO/February 13, 2018/8:16 AM

## 2018-02-15 RX ORDER — SODIUM CHLORIDE 9 MG/ML
20 INJECTION, SOLUTION INTRAVENOUS CONTINUOUS
Status: DISCONTINUED | OUTPATIENT
Start: 2018-02-16 | End: 2018-02-19 | Stop reason: HOSPADM

## 2018-02-16 ENCOUNTER — OFFICE VISIT (OUTPATIENT)
Dept: HEMATOLOGY ONCOLOGY | Facility: CLINIC | Age: 38
End: 2018-02-16
Payer: COMMERCIAL

## 2018-02-16 ENCOUNTER — TELEPHONE (OUTPATIENT)
Dept: FAMILY MEDICINE CLINIC | Facility: CLINIC | Age: 38
End: 2018-02-16

## 2018-02-16 ENCOUNTER — HOSPITAL ENCOUNTER (OUTPATIENT)
Dept: INFUSION CENTER | Facility: CLINIC | Age: 38
Discharge: HOME | End: 2018-02-16
Payer: COMMERCIAL

## 2018-02-16 VITALS
HEIGHT: 64 IN | SYSTOLIC BLOOD PRESSURE: 124 MMHG | BODY MASS INDEX: 28.68 KG/M2 | OXYGEN SATURATION: 97 % | RESPIRATION RATE: 16 BRPM | HEART RATE: 80 BPM | WEIGHT: 168 LBS | TEMPERATURE: 99.4 F | DIASTOLIC BLOOD PRESSURE: 70 MMHG

## 2018-02-16 VITALS
TEMPERATURE: 98.8 F | DIASTOLIC BLOOD PRESSURE: 70 MMHG | BODY MASS INDEX: 28.68 KG/M2 | HEART RATE: 80 BPM | SYSTOLIC BLOOD PRESSURE: 124 MMHG | WEIGHT: 167.99 LBS | HEIGHT: 64 IN | RESPIRATION RATE: 16 BRPM

## 2018-02-16 DIAGNOSIS — F41.1 GENERALIZED ANXIETY DISORDER: ICD-10-CM

## 2018-02-16 DIAGNOSIS — C50.411 MALIGNANT NEOPLASM OF UPPER-OUTER QUADRANT OF RIGHT BREAST IN FEMALE, ESTROGEN RECEPTOR POSITIVE (HCC): Primary | ICD-10-CM

## 2018-02-16 DIAGNOSIS — C50.919 MALIGNANT NEOPLASM OF FEMALE BREAST, UNSPECIFIED ESTROGEN RECEPTOR STATUS, UNSPECIFIED LATERALITY, UNSPECIFIED SITE OF BREAST (HCC): Primary | ICD-10-CM

## 2018-02-16 DIAGNOSIS — Z17.0 MALIGNANT NEOPLASM OF UPPER-OUTER QUADRANT OF RIGHT BREAST IN FEMALE, ESTROGEN RECEPTOR POSITIVE (HCC): Primary | ICD-10-CM

## 2018-02-16 DIAGNOSIS — R11.2 NAUSEA AND VOMITING, INTRACTABILITY OF VOMITING NOT SPECIFIED, UNSPECIFIED VOMITING TYPE: Primary | ICD-10-CM

## 2018-02-16 PROCEDURE — 96417 CHEMO IV INFUS EACH ADDL SEQ: CPT

## 2018-02-16 PROCEDURE — 96415 CHEMO IV INFUSION ADDL HR: CPT

## 2018-02-16 PROCEDURE — 96413 CHEMO IV INFUSION 1 HR: CPT

## 2018-02-16 PROCEDURE — 99215 OFFICE O/P EST HI 40 MIN: CPT | Performed by: INTERNAL MEDICINE

## 2018-02-16 PROCEDURE — 96375 TX/PRO/DX INJ NEW DRUG ADDON: CPT

## 2018-02-16 RX ORDER — LORAZEPAM 1 MG/1
1 TABLET ORAL
Qty: 30 TABLET | Refills: 0 | Status: SHIPPED | OUTPATIENT
Start: 2018-02-16 | End: 2018-02-22 | Stop reason: SDUPTHER

## 2018-02-16 RX ORDER — ONDANSETRON 4 MG/1
4 TABLET, FILM COATED ORAL EVERY 6 HOURS PRN
Qty: 30 TABLET | Refills: 1 | Status: CANCELLED | OUTPATIENT
Start: 2018-02-16

## 2018-02-16 RX ORDER — ONDANSETRON 4 MG/1
4 TABLET, FILM COATED ORAL EVERY 6 HOURS PRN
Qty: 30 TABLET | Refills: 1 | Status: SHIPPED | OUTPATIENT
Start: 2018-02-16 | End: 2018-09-24 | Stop reason: ALTCHOICE

## 2018-02-16 RX ADMIN — DOCETAXEL 136 MG: 20 INJECTION, SOLUTION, CONCENTRATE INTRAVENOUS at 13:06

## 2018-02-16 RX ADMIN — DEXAMETHASONE SODIUM PHOSPHATE: 10 INJECTION, SOLUTION INTRAMUSCULAR; INTRAVENOUS at 09:40

## 2018-02-16 RX ADMIN — TRASTUZUMAB 600 MG: 150 INJECTION, POWDER, LYOPHILIZED, FOR SOLUTION INTRAVENOUS at 11:28

## 2018-02-16 RX ADMIN — PERTUZUMAB 840 MG: 30 INJECTION, SOLUTION, CONCENTRATE INTRAVENOUS at 10:08

## 2018-02-16 RX ADMIN — Medication 300 UNITS: at 15:20

## 2018-02-16 RX ADMIN — CARBOPLATIN 752 MG: 10 INJECTION, SOLUTION INTRAVENOUS at 14:09

## 2018-02-16 RX ADMIN — SODIUM CHLORIDE 20 ML/HR: 0.9 INJECTION, SOLUTION INTRAVENOUS at 09:25

## 2018-02-16 NOTE — PROGRESS NOTES
Patient tolerated treatment well today without any complications    Patient given AVS and confirmed next appt

## 2018-02-16 NOTE — PROGRESS NOTES
Hematology / Oncology Outpatient Follow Up Note    Ankita Sanchez 30 y o  female [facility]:[birth year] [facility]:[id number]         Date:  2/16/2018    Assessment / Plan:  A 82-year-old premenopausal woman with newly diagnosed locally advanced right breast cancer  She has quite large palpable right breast mass and axillary adenopathy  This was mucinous histology, grade 2, ER 30% positive, MO 2% positive, HER2 3+ disease  She has no evidence of distant metastasis based on the CT scan and bone scan  We previously had extensive discussion regarding neoadjuvant chemotherapy  She was agreeable to start [facility] with pertuzumab  She has adequate cardiac function as well as ANC to start 1st cycle of neoadjuvant chemotherapy  We discussed side effects of this regimen, again  She was instructed to give us a call immediately if she has fever above 100 4  I will see her again in 3 weeks, prior to the 2nd cycle of treatment  She is in agreement with my recommendations  Subjective:     HPI:  A 82-year-old premenopausal woman who initially noticed right breast lump when she was 8 months pregnant  She delivered 1st baby in June 2017  After the delivery, she noticed right breast lump to be slowly enlarging  She brought this to medical attention  She underwent mammography which was quite abnormal   Biopsy from right breast at 10:00 position 12 cm from nipple showed mucinous carcinoma, grade 2  This was ER 30 to 40% positive, MO 2 to 3% positive, her 2 3+ disease  She was seen by Dr Braulio Mccoy who ordered MRI which showed extensive abnormality in her right breast including 3 5 cm of right breast mass as well as 4 cm of axillary adenopathy  She was referred to me to discuss neoadjuvant chemotherapy  She went to [facility] where she was recommended to have [facility] P  she presents today with her mother to discuss treatment options  She is somewhat anxious  Otherwise, she feels well  She denied any bone pain    Her weight is stable  She has no respiratory symptoms  Her performance status is normal  She does not have significant past medical history  Her paternal aunt had breast cancer at age of 62  Her 2 paternal grandfather's sister had breast cancer in their 46s  She underwent genetic testing of which result is pending at this time  Interval History:          Objective:     Primary Diagnosis:    Locally advanced right breast cancer with invasive mucinous histology, grade 2, ER 30% positive, AZ 2% positive, her 2 3+ disease  Diagnosed in January 2018  Cancer Staging:  Malignant neoplasm of upper-outer quadrant of right breast in female, estrogen receptor positive ([facility] [address] )    Staging form: Breast, AJCC 8th Edition    - Clinical stage from 1/26/2018: Stage IB (cT2(3), cN1, cM0, G2, ER: Positive, AZ: Positive, HER2: Positive) - Signed by Sumeet Tobias MD on 1/26/2018      Previous Hematologic/ Oncologic Treatment:         Current Hematologic/ Oncologic Treatment:      Neoadjuvant chemotherapy with TCH with pertuzumab  First cycle to be started today  Disease Status:     Not evaluated at this time  Test Results:    Pathology:    Biopsy from right breast showed invasive mucinous carcinoma, grade 2, ER 30 to 40% positive, AZ 2 to 3% positive, HER2 3+ disease  Radiology:    MRI of the breast showed extensive abnormality in her right breast including 3 5 cm of mass at 10:00 position as well as 4 cm of axillary adenopathy  CT scan of chest abdomen pelvis in February 2017 showed large right breast mass as well as right axillary adenopathy  No evidence of distant metastasis  Bone scan showed no evidence of osseous metastasis  Echocardiogram showed ejection fraction 63%      Laboratory:    See below    Physical Exam:      General Appearance:    Alert, oriented        Eyes:    PERRL   Ears:    Normal external ear canals, both ears   Nose:   Nares normal, septum midline   Throat:   Mucosa moist  Pharynx without injection  Neck:   Supple       Lungs:     Clear to auscultation bilaterally   Chest Wall:    No tenderness or deformity    Heart:    Regular rate and rhythm       Abdomen:     Soft, non-tender, bowel sounds +, no organomegaly           Extremities:   Extremities no cyanosis or edema       Skin:   no rash or icterus  Lymph nodes:   Cervical, supraclavicular, and axillary nodes normal   Neurologic:   CNII-XII intact, normal strength, sensation and reflexes     Throughout          Breast exam: 6 cm of mass at outer upper quadrant of the right breast   2 5 cm of right axillary adenopathy  Left breast exam is negative  ROS: Review of Systems   All other systems reviewed and are negative  Imaging: Nm Bone Scan Whole Body    Result Date: 2/9/2018  Narrative: BONE SCAN  WHOLE BODY INDICATION:  Baseline bone scan, right breast carcinoma staging  PREVIOUS FILM CORRELATION:    CT of chest abdomen and pelvis, same day TECHNIQUE:   This study was performed following the intravenous administration of 25 2 mCi Tc-99m labeled MDP  Delayed, anterior and posterior whole body images were acquired, 2-3 hours after radiopharmaceutical administration  FINDINGS:  There is mild thoracolumbar scoliosis  Mildly increased activity at L4-L5 with correlate with CT finding of degenerative disc disease at this level  There is no scintigraphic evidence of osseous metastasis  Impression: No scintigraphic evidence of osseous metastasis  Workstation performed: [id number]     Ct Chest Abdomen Pelvis W Contrast    Result Date: 2/9/2018  Narrative: CT CHEST, ABDOMEN AND PELVIS WITH IV CONTRAST INDICATION:  Dense COMPARISON: None  TECHNIQUE: CT examination of the chest, abdomen and pelvis was performed  Reformatted images were created in axial, sagittal, and coronal planes  Radiation dose length product (DLP) for this visit:  573 mGy-cm     This examination, like all CT scans performed in the [facility], was performed utilizing techniques to minimize radiation dose exposure, including the use of iterative reconstruction and automated exposure control  IV Contrast:  100 mL of iohexol (OMNIPAQUE) Enteric Contrast: Enteric contrast was administered  FINDINGS: CHEST LUNGS:  Lungs are clear  There is no tracheal or endobronchial lesion  PLEURA:  Unremarkable  HEART/GREAT VESSELS:  Unremarkable for patient's age  MEDIASTINUM AND MARVIN:  Unremarkable  CHEST WALL AND LOWER NECK:   Enhancing right breast mass in keeping with provided history of breast cancer measuring 5 2 x 4 7 cm  Extensive metastatic right axillary adenopathy the largest nodes measuring 2 4 x 2 2 cm and 2 3 x 3 0 cm  Incidental discovery of one or more thyroid nodule(s) measuring more than 1 cm but without suspicious features is noted in this patient who is younger than 28years old; according to guidelines published in the February 2015 white paper on incidental thyroid nodules in the Journal of the [employer] of [employer] Jovita Angel), further evaluation with thyroid ultrasound is recommended  ABDOMEN LIVER/BILIARY TREE:  Geographic hypodensity in the inferior left lobe laterally suggesting geographic fatty infiltration  GALLBLADDER:  No calcified gallstones  No pericholecystic inflammatory change  SPLEEN:  Unremarkable  PANCREAS:  Unremarkable  ADRENAL GLANDS: Unremarkable  KIDNEYS/URETERS:  Unremarkable  No hydronephrosis  STOMACH AND BOWEL:  Unremarkable  APPENDIX:  No findings to suggest appendicitis  ABDOMINOPELVIC CAVITY:  No ascites or free intraperitoneal air  No lymphadenopathy  VESSELS:  Unremarkable for patient's age  PELVIS REPRODUCTIVE ORGANS:  Large enhancing mass in the uterus likely a fibroid measuring 7 3 x 5 8 cm deviating the endometrium anteriorly  URINARY BLADDER:  Unremarkable  ABDOMINAL WALL/INGUINAL REGIONS:  Unremarkable  OSSEOUS STRUCTURES:  No acute fracture or destructive osseous lesion       Impression: Enhancing right breast mass in keeping with provided history of breast cancer measuring 5 2 x 4 7 cm  Extensive metastatic right axillary adenopathy the largest nodes measuring 2 4 x 2 2 cm and 2 3 x 3 0 cm  No metastatic disease in the abdomen or pelvis  Left thyroid nodule measuring 1 3 cm  Incidental discovery of one or more thyroid nodule(s) measuring more than 1 cm but without suspicious features is noted in this patient who is younger than 28years old; according to guidelines published in the February 2015 white paper on incidental thyroid nodules in the Journal of the [employer] Angela Mendoza), further evaluation with thyroid ultrasound is recommended  Large enhancing mass in the uterus likely a fibroid measuring 7 3 x 5 8 cm deviating the endometrium anteriorly  Pelvic ultrasound could be obtained if there are clinical symptoms  Workstation performed: [id number]     Mri Breast Bilateral W Or Wo Contrast    Result Date: 1/29/2018  Narrative: Recent diagnosis of right breast cancer  MRI Breast Bilateral W or WO Contrast: January 27, 2018 - Check In #: [de-identified] Technologist: Tra Covarrubias TECHNIQUE:  MRI of both breasts was performed in axial and sagittal planes utilizing a combination of fat suppressed sagittal T2 , gradient echo in phase T1 weighting followed by sagittal dynamic post contrast imaging with (VIBRANT) 3D fat suppressed gradient-echo T1 sequences at 1 5 minute intervals  Subtraction image reconstruction was also provided  This exam was read in conjunction with EpiSensor software  IV Contrast:  7 mL of gadobutrol injection (MULTI-DOSE) Comparison: No mammography or outside imaging is currently available to me Breasts are symmetric in size with normal skin and areolar complex  The breasts are comprised of moderate heterogeneous and confluent parenchymal elements throughout   A single metallic clip artifact is noted in the upper outer posterior right breast  T2 sequence shows no significant cyst formation or fluid collections  Within the left breast, dynamic postcontrast imaging shows no dominant mass or region of suspicious asymmetric enhancement to suggest invasive malignancy  The right breast however demonstrates suspicious enhancement involving multiple quadrants including metastatic lymphadenopathy in the axilla  The dominant enhancement is identified at the 8:00 through 12:00 position of the right breast   Suspicious enhancement can be identified throughout the upper outer quadrant  A 2nd prominent mass is noted in the anterior retroareolar breast   The largest "measurable" component measures up to 3 5 cm at the 10 o'clock position  However, this mass extends inferomedially  Other scattered subcentimeter foci of suspicious enhancement are identified in the breast  Metastatic lymphadenopathy is present in the right axilla (largest approaching 4 cm in greatest dimension  )  This large largest lymph node somewhat obliterates the fat plane between the chest wall (series 8, image 124)  IMPRESSION: Extensive disease throughout the upper outer right breast   2nd prominent mass is noted in the anterior right breast   Numerous other foci of suspicious enhancement including metastatic right axillary lymphadenopathy  No evidence of left breast disease  ACR BI-RADS® Assessments: BiRad:6 - Known biopsy proven malignancy - Right Recommendation: Surgical evaluation of the right breast  The patient is scheduled in a reminder system for screening mammography  Transcription Location: [facility] 98: [id number] Risk Value(s): Tyrer-Cuzick 10 Year: 0 900%, Tyrer-Cuzick Lifetime: 11 700%, Myriad Table: 1 5%, RAMON 5 Year: 0 2%, NCI Lifetime: 6 9%    Fl Guided Central Venous Access Device Insertion    Result Date: 2/14/2018  Narrative: Chest port placement 2/13/2018 History: Breast carcinoma Procedure: The patient was identified verbally and by wristband  Timeout was performed  Informed consent was obtained  All elements of maximal sterile barrier technique were followed (cap, mask, sterile gown, sterile gloves, large sterile sheet, hand hygiene and 2% chlorhexidine for cutaneous antisepsis)  Following obtaining informed consent, the patient was prepped and draped in the usual sterile fashion  Using ultrasound guidance, access was gained to the patient's left internal jugular vein using a micropuncture system  The micropuncture wire was removed and a  035 wire was advanced to the level of the inferior vena cava using fluoroscopic guidance  Using 1% lidocaine, the region of the left anterior chest was was anesthetized  A small incision was made using a 15 blade scalpel  The port pocket was created using blunt dissection  The catheter tubing was tunneled from the incision to the venotomy  The micropuncture dilator was exchanged for a peel-away sheath, using fluoroscopic guidance  The catheter was place through the peel-away sheath  The catheter was measured and cut to size  The catheter was attached to the port  The port was flushed with saline to ensure patency without evidence of leakage  The port was placed in the port pocket  The port was sutured in the pocket using 3-0 Vicryl  The incisions were approximated with 3-0 Vicryl and Histoacryl  The patient tolerated the procedure well without apparent immediate complications  The patient left the IR department in unchanged condition  Dr Constantin Avila performed and directly supervised the entire procedure  Findings: Using ultrasound guidance, the left internal jugular vein was cannulated using Seldinger technique  The left internal jugular vein was evaluated as a potential access site  The left internal jugular vein was patent, and free of thrombus  Static images of the vessel was obtained  Visualization of real time needle entry into the vessel was obtained   Fluoroscopic spot image demonstrates a newly placed single lumen chest port via the left internal jugular vein with the most central aspect at the SVC/RA junction  The catheter tubing is smooth in contour  Impression: Impression:  Successful placement of a single lumen chest port via the left internal jugular vein  Workstation performed: [id number]         Labs:   Lab Results   Component Value Date    WBC 6 18 02/13/2018    HGB 13 1 02/13/2018    HCT 39 0 02/13/2018    MCV 94 02/13/2018     02/13/2018     Lab Results   Component Value Date     02/13/2018    K 4 2 02/13/2018     02/13/2018    CO2 26 02/13/2018    ANIONGAP 7 02/13/2018    BUN 18 02/13/2018    CREATININE 0 84 02/13/2018    GLUF 101 (H) 02/13/2018    CALCIUM 8 5 02/13/2018    AST 13 02/13/2018    ALT 21 02/13/2018    ALKPHOS 63 02/13/2018    PROT 7 1 02/13/2018    BILITOT 0 40 02/13/2018    EGFR 90 02/13/2018         Current Medications: Reviewed  Allergies: Reviewed  PMH/FH/SH:  Reviewed      Vital Sign:    Body surface area is 1 82 meters squared      Wt Readings from Last 3 Encounters:   02/16/18 76 2 kg (167 lb 15 9 oz)   02/16/18 76 2 kg (168 lb)   02/13/18 69 4 kg (153 lb)        Temp Readings from Last 3 Encounters:   02/16/18 98 8 °F (37 1 °C) (Tympanic)   02/16/18 99 4 °F (37 4 °C) (Tympanic)   02/13/18 97 6 °F (36 4 °C) (Temporal)        BP Readings from Last 3 Encounters:   02/16/18 124/70   02/16/18 124/70   02/13/18 108/50         Pulse Readings from Last 3 Encounters:   02/16/18 80   02/16/18 80   02/13/18 70     @LASTSAO2(3)@

## 2018-02-16 NOTE — PROGRESS NOTES
Patient arrived without complaint today  New patient to chemo today  Hayden César visited with patient as premeds running

## 2018-02-20 ENCOUNTER — TELEPHONE (OUTPATIENT)
Dept: HEMATOLOGY ONCOLOGY | Facility: HOSPITAL | Age: 38
End: 2018-02-20

## 2018-02-20 NOTE — TELEPHONE ENCOUNTER
Spoke with patient  She is having indigestion and stated that nothing is helping  She tried prilosec and tums but they aren't working  Dr Ariel Silvestre recommended over the counter Maalox  I will recommend her to try

## 2018-02-20 NOTE — TELEPHONE ENCOUNTER
Spoke with patient  Recommended she try Maalox  She verbalized understanding and will call if does not improve

## 2018-02-20 NOTE — TELEPHONE ENCOUNTER
Patient called has been having headache and indigestion since her Chemo on Friday  Describes headache as a "ice pick headache"  Has taken medication for headache  and has taken Prilosec for indigestion  Nothing is helping

## 2018-02-22 DIAGNOSIS — F41.1 GENERALIZED ANXIETY DISORDER: ICD-10-CM

## 2018-02-22 DIAGNOSIS — R19.7 DIARRHEA, UNSPECIFIED TYPE: Primary | ICD-10-CM

## 2018-02-22 RX ORDER — DIPHENOXYLATE HYDROCHLORIDE AND ATROPINE SULFATE 2.5; .025 MG/1; MG/1
1 TABLET ORAL 4 TIMES DAILY PRN
Qty: 60 TABLET | Refills: 2 | Status: ON HOLD | OUTPATIENT
Start: 2018-02-22 | End: 2018-09-19 | Stop reason: ALTCHOICE

## 2018-02-26 RX ORDER — LORAZEPAM 1 MG/1
1 TABLET ORAL
Qty: 30 TABLET | Refills: 0 | Status: SHIPPED | OUTPATIENT
Start: 2018-02-26 | End: 2018-03-27 | Stop reason: SDUPTHER

## 2018-03-05 ENCOUNTER — TELEPHONE (OUTPATIENT)
Dept: SURGICAL ONCOLOGY | Facility: CLINIC | Age: 38
End: 2018-03-05

## 2018-03-06 ENCOUNTER — TELEPHONE (OUTPATIENT)
Dept: HEMATOLOGY ONCOLOGY | Facility: CLINIC | Age: 38
End: 2018-03-06

## 2018-03-06 ENCOUNTER — APPOINTMENT (OUTPATIENT)
Dept: LAB | Facility: CLINIC | Age: 38
End: 2018-03-06
Payer: COMMERCIAL

## 2018-03-06 ENCOUNTER — TELEPHONE (OUTPATIENT)
Dept: FAMILY MEDICINE CLINIC | Facility: CLINIC | Age: 38
End: 2018-03-06

## 2018-03-06 DIAGNOSIS — Z17.0 MALIGNANT NEOPLASM OF UPPER-OUTER QUADRANT OF RIGHT BREAST IN FEMALE, ESTROGEN RECEPTOR POSITIVE (HCC): ICD-10-CM

## 2018-03-06 DIAGNOSIS — C50.411 MALIGNANT NEOPLASM OF UPPER-OUTER QUADRANT OF RIGHT BREAST IN FEMALE, ESTROGEN RECEPTOR POSITIVE (HCC): ICD-10-CM

## 2018-03-06 LAB
ALBUMIN SERPL BCP-MCNC: 3.6 G/DL (ref 3.5–5)
ALP SERPL-CCNC: 66 U/L (ref 46–116)
ALT SERPL W P-5'-P-CCNC: 29 U/L (ref 12–78)
ANION GAP SERPL CALCULATED.3IONS-SCNC: 8 MMOL/L (ref 4–13)
AST SERPL W P-5'-P-CCNC: 15 U/L (ref 5–45)
BASOPHILS # BLD AUTO: 0.06 THOUSANDS/ΜL (ref 0–0.1)
BASOPHILS NFR BLD AUTO: 1 % (ref 0–1)
BILIRUB SERPL-MCNC: 0.2 MG/DL (ref 0.2–1)
BUN SERPL-MCNC: 16 MG/DL (ref 5–25)
CALCIUM SERPL-MCNC: 8.8 MG/DL (ref 8.3–10.1)
CHLORIDE SERPL-SCNC: 103 MMOL/L (ref 100–108)
CO2 SERPL-SCNC: 28 MMOL/L (ref 21–32)
CREAT SERPL-MCNC: 0.9 MG/DL (ref 0.6–1.3)
EOSINOPHIL # BLD AUTO: 0 THOUSAND/ΜL (ref 0–0.61)
EOSINOPHIL NFR BLD AUTO: 0 % (ref 0–6)
ERYTHROCYTE [DISTWIDTH] IN BLOOD BY AUTOMATED COUNT: 12.8 % (ref 11.6–15.1)
GFR SERPL CREATININE-BSD FRML MDRD: 83 ML/MIN/1.73SQ M
GLUCOSE SERPL-MCNC: 114 MG/DL (ref 65–140)
HCT VFR BLD AUTO: 35.4 % (ref 34.8–46.1)
HGB BLD-MCNC: 11.9 G/DL (ref 11.5–15.4)
LYMPHOCYTES # BLD AUTO: 2.28 THOUSANDS/ΜL (ref 0.6–4.47)
LYMPHOCYTES NFR BLD AUTO: 35 % (ref 14–44)
MCH RBC QN AUTO: 31.6 PG (ref 26.8–34.3)
MCHC RBC AUTO-ENTMCNC: 33.6 G/DL (ref 31.4–37.4)
MCV RBC AUTO: 94 FL (ref 82–98)
MONOCYTES # BLD AUTO: 0.55 THOUSAND/ΜL (ref 0.17–1.22)
MONOCYTES NFR BLD AUTO: 9 % (ref 4–12)
NEUTROPHILS # BLD AUTO: 3.56 THOUSANDS/ΜL (ref 1.85–7.62)
NEUTS SEG NFR BLD AUTO: 55 % (ref 43–75)
PLATELET # BLD AUTO: 297 THOUSANDS/UL (ref 149–390)
PMV BLD AUTO: 8.8 FL (ref 8.9–12.7)
POTASSIUM SERPL-SCNC: 3.6 MMOL/L (ref 3.5–5.3)
PROT SERPL-MCNC: 7.2 G/DL (ref 6.4–8.2)
RBC # BLD AUTO: 3.77 MILLION/UL (ref 3.81–5.12)
SODIUM SERPL-SCNC: 139 MMOL/L (ref 136–145)
WBC # BLD AUTO: 6.45 THOUSAND/UL (ref 4.31–10.16)

## 2018-03-06 PROCEDURE — 36415 COLL VENOUS BLD VENIPUNCTURE: CPT

## 2018-03-06 PROCEDURE — 85025 COMPLETE CBC W/AUTO DIFF WBC: CPT

## 2018-03-06 PROCEDURE — 80053 COMPREHEN METABOLIC PANEL: CPT

## 2018-03-06 NOTE — TELEPHONE ENCOUNTER
Patient called said you were working on a prescription for her to get cold cap prescription to be sent to [facility]   Please call her back

## 2018-03-06 NOTE — TELEPHONE ENCOUNTER
Spoke to Mary Marrero at Dr Anette Kaplan office they are aware of Lissette's call and Sherif Navarro the nurse in the office is taking care of if for her  I did call Joe Capone also to let her know as well

## 2018-03-07 NOTE — PROGRESS NOTES
History of Present Illness    Revaccination   Vaccine Information: Vaccine(s) Given (names): Tdap (Adacel)  Spoke with patient regarding vaccine out of temperature range  Action(s): Pt will be revaccinated  Pt called (attempt 1): [id number] 10:06 JLF  Pt called (attempt 2): [id number] 16:14 JLF  Pt called (attempt 3): [id number] [facility]   [id number] 17:42 called back  Other Information: [id number] Patient in third trimester of pregnancy; declining RVAC as she will be receiving Adacel from her OB/GYN   [id number] [facility] cell for patient to call back regarding [address] [facility] for her and [address] [facility] for her grandmother, Derek Durant  [id number] 17:42 pt called back she pregnant and will check with OB/Gyn if ok and will call back to schedule  Revaccination Completed: 05/03/2017  Active Problems    1  Anxiety disorder (300 00) (F41 9)   2  Asthma (493 90) (J45 909)   3  Chronic constipation (564 00) (K59 09)   4  Cigarette nicotine dependence without complication (141 0) (L26 786)   5  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)   6  Macular atrophy, retinal (362 51) (H35 89)   7  Muscle ache (729 1) (M79 1)   8  Muscle spasms of neck (728 85) (M62 838)   9  Need for DTaP vaccination (V06 1) (Z23)   10  Need for revaccination (V05 9) (Z23)   11  Oral contraceptive prescribed (V25 01) (Z30 011)   12  Seasonal allergies (477 9) (J30 2)    Immunizations  Tdap --- Cal Arteaga: 10-Sep-2015; Meliton Arteaga: 03-May-2017     Current Meds   1  ALPRAZolam 0 5 MG Oral Tablet; TAKE 1 TABLET AT BEDTIME AS NEEDED FOR SLEEP   2  Chantix Starting Month Faheem 0 5 MG X 11 & 1 MG X 42 Oral Tablet; TAKE ONE 0 5MG   TABLET DAILY ON DAYS 1-3, THEN ONE 0 5MG TABLET TWICE DAILY ON DAYS 4-7,   THEN ONE 1MG TABLET TWICE DAILY THEREAFTER   3  Cyclobenzaprine HCl - 5 MG Oral Tablet; TAKE 1 TABLET 3 TIMES DAILY AS NEEDED   4  Levocetirizine Dihydrochloride 5 MG Oral Tablet; Take 1 tablet daily   5  Linzess 145 MCG Oral Capsule; take 1 capsule daily   6  Mometasone Furoate 50 MCG/ACT Nasal Suspension; USE 2 SPRAYS IN EACH   NOSTRIL ONCE DAILY   7  Sertraline HCl - 50 MG Oral Tablet; Take 1 tablet daily   8  Super B Complex/Vitamin C Oral Tablet; TAKE 1 TABLET DAILY AS DIRECTED   9  Ventolin  (90 Base) MCG/ACT Inhalation Aerosol Solution; INHALE 2 PUFFS   EVERY 4-6 HOURS AS NEEDED    Allergies    1  No Known Drug Allergies    2  Animal dander - Cats   3  Dust   4  Mold   5  Seasonal   6   Trees    Signatures   Electronically signed by : Olesya Vieira DO; Jan 21 2018  3:20PM EST                       (Author)

## 2018-03-08 ENCOUNTER — DOCUMENTATION (OUTPATIENT)
Dept: HEMATOLOGY ONCOLOGY | Facility: CLINIC | Age: 38
End: 2018-03-08

## 2018-03-08 ENCOUNTER — OFFICE VISIT (OUTPATIENT)
Dept: HEMATOLOGY ONCOLOGY | Facility: CLINIC | Age: 38
End: 2018-03-08
Payer: COMMERCIAL

## 2018-03-08 VITALS
HEIGHT: 64 IN | DIASTOLIC BLOOD PRESSURE: 80 MMHG | OXYGEN SATURATION: 98 % | SYSTOLIC BLOOD PRESSURE: 140 MMHG | WEIGHT: 165.4 LBS | BODY MASS INDEX: 28.24 KG/M2 | TEMPERATURE: 98.4 F | HEART RATE: 74 BPM

## 2018-03-08 DIAGNOSIS — L65.8 ALOPECIA DUE TO CYTOTOXIC DRUG: Primary | ICD-10-CM

## 2018-03-08 DIAGNOSIS — C50.411 MALIGNANT NEOPLASM OF UPPER-OUTER QUADRANT OF RIGHT BREAST IN FEMALE, ESTROGEN RECEPTOR POSITIVE (HCC): Primary | ICD-10-CM

## 2018-03-08 DIAGNOSIS — T45.1X5A ALOPECIA DUE TO CYTOTOXIC DRUG: Primary | ICD-10-CM

## 2018-03-08 DIAGNOSIS — Z17.0 MALIGNANT NEOPLASM OF UPPER-OUTER QUADRANT OF RIGHT BREAST IN FEMALE, ESTROGEN RECEPTOR POSITIVE (HCC): Primary | ICD-10-CM

## 2018-03-08 PROCEDURE — 99214 OFFICE O/P EST MOD 30 MIN: CPT | Performed by: INTERNAL MEDICINE

## 2018-03-08 RX ORDER — SODIUM CHLORIDE 9 MG/ML
20 INJECTION, SOLUTION INTRAVENOUS CONTINUOUS
Status: DISCONTINUED | OUTPATIENT
Start: 2018-03-09 | End: 2018-03-12 | Stop reason: HOSPADM

## 2018-03-08 NOTE — PROGRESS NOTES
Spoke with patient on Monday  She stated that she would like a cold cap ordered for her to have to use during chemotherapy  While trying to enter the order, I was told by Wilian Vega that we do not use these cold caps  I spoke with the , Ángel Pederson, she stated that there are patient's that use the cold caps in the infusion center  Alexsander Hill and Jyotsna agreed that if the patient signs consent stating that she is fully responsible for the cold cap and she understands the side effects she can use it  The patient did sign a consent for the cold cap and the order was placed under a miscellaneous order, as we do not have an order for the cold caps in epic  The patient was very appreciative and verbalized understanding

## 2018-03-08 NOTE — PROGRESS NOTES
Hematology / Oncology Outpatient Follow Up Note    Cristofer Sanchez 30 y o  female Q:[birth year] CPS:[id number]         Date:  3/8/2018    Assessment / Plan:  A 61-year-old premenopausal woman with locally advanced right breast cancer  Kait Hansen has quite large palpable right breast mass and axillary adenopathy  This was mucinous histology, grade 2, ER 30% positive, NH 2% positive, HER2 3+ disease  she is currently on neoadjuvant chemotherapy with [address] with pertuzumab with expected side effects  Based on physical examination, she appeared to have at least minor response  She has adequate ANC to have 2nd cycle treatment, tomorrow  I am going to add famotidine to alleviate acid reflux disease  I will see her again in 3 weeks, prior to the 3rd cycle of treatment  She is in agreement with my recommendations               Subjective:      HPI:  A 61-year-old premenopausal woman who initially noticed right breast lump when she was 8 months pregnant   She delivered 1st baby in June 2017  Trula Raring the delivery, she noticed right breast lump to be slowly enlarging   She brought this to medical attention   She underwent mammography which was quite abnormal   Biopsy from right breast at 10:00 position 12 cm from nipple showed mucinous carcinoma, grade 2   This was ER 30 to 40% positive, NH 2 to 3% positive, her 2 3+ disease   She was seen by Dr Raiford Shone who ordered MRI which showed extensive abnormality in her right breast including 3 5 cm of right breast mass as well as 4 cm of axillary adenopathy   She was referred to me to discuss neoadjuvant chemotherapy   She went to [facility] where she was recommended to have [address] P  she presents today with her mother to discuss treatment options  Kait Hansen is somewhat anxious   Otherwise, she feels well   She denied any bone pain   Her weight is stable   She has no respiratory symptoms   Her performance status is normal  She does not have significant past medical history  Aquilino Goldsmith paternal aunt had breast cancer at age of 62  RIZWAN S  [facility] 2 paternal grandfather's sister had breast cancer in their 46s   She underwent genetic testing of which result is pending at this time            Interval History:   A 43-year-old premenopausal woman with newly diagnosed locally advanced right breast cancer   She has quite large palpable right breast mass and axillary adenopathy  This was mucinous histology, grade 2, ER 30% positive, MD 2% positive, HER2 3+ disease  She started neoadjuvant chemotherapy with [address] with pertuzumab  She had 7 days of nausea without vomiting  She also had diarrhea for which she required Lomotil  She has minimal weight loss  She has no history of neutropenic fever  She she denied any pain  She has no respiratory symptoms  She is going to have 2nd cycle treatment, tomorrow  She has complaint of indigestion  She is on PPI  Her performance status is normal            Objective:      Primary Diagnosis:     Locally advanced right breast cancer with invasive mucinous histology, grade 2, ER 30% positive, MD 2% positive, her 2 3+ disease   Diagnosed in January 2018      Cancer Staging:  Malignant neoplasm of upper-outer quadrant of right breast in female, estrogen receptor positive ([facility] [address] )    Staging form: Breast, AJCC 8th Edition    - Clinical stage from 1/26/2018: Stage IB (cT2(3), cN1, cM0, G2, ER: Positive, MD: Positive, HER2: Positive) - Signed by Rashid Vasquez MD on 1/26/2018        Previous Hematologic/ Oncologic Treatment:            Current Hematologic/ Oncologic Treatment:       Neoadjuvant chemotherapy with TCH with pertuzumab     2nd cycle to be given tomorrow      Disease Status:      Clinical minor to partial response      Test Results:     Pathology:     Biopsy from right breast showed invasive mucinous carcinoma, grade 2, ER 30 to 40% positive, MD 2 to 3% positive, HER2 3+ disease      Radiology:     MRI of the breast showed extensive abnormality in her right breast including 3 5 cm of mass at 10:00 position as well as 4 cm of axillary adenopathy  CT scan of chest abdomen pelvis in February 2017 showed large right breast mass as well as right axillary adenopathy  No evidence of distant metastasis  Bone scan showed no evidence of osseous metastasis  Echocardiogram showed ejection fraction 63%      Laboratory:     See below     Physical Exam:        General Appearance:    Alert, oriented          Eyes:    PERRL   Ears:    Normal external ear canals, both ears   Nose:   Nares normal, septum midline   Throat:   Mucosa moist  Pharynx without injection  Neck:   Supple         Lungs:     Clear to auscultation bilaterally   Chest Wall:    No tenderness or deformity    Heart:    Regular rate and rhythm         Abdomen:     Soft, non-tender, bowel sounds +, no organomegaly               Extremities:   Extremities no cyanosis or edema         Skin:   no rash or icterus  Lymph nodes:   Cervical, supraclavicular, and axillary nodes normal   Neurologic:   CNII-XII intact, normal strength, sensation and reflexes     Throughout             Breast exam: 4 cm of mass at outer upper quadrant of the right breast   2 5 cm of right axillary adenopathy  Left breast exam is negative  ROS: Review of Systems   All other systems reviewed and are negative  Imaging: Nm Bone Scan Whole Body    Result Date: 2/9/2018  Narrative: BONE SCAN  WHOLE BODY INDICATION:  Baseline bone scan, right breast carcinoma staging  PREVIOUS FILM CORRELATION:    CT of chest abdomen and pelvis, same day TECHNIQUE:   This study was performed following the intravenous administration of 25 2 mCi Tc-99m labeled MDP  Delayed, anterior and posterior whole body images were acquired, 2-3 hours after radiopharmaceutical administration  FINDINGS:  There is mild thoracolumbar scoliosis  Mildly increased activity at L4-L5 with correlate with CT finding of degenerative disc disease at this level    There is no scintigraphic evidence of osseous metastasis  Impression: No scintigraphic evidence of osseous metastasis  Workstation performed: [id number]     Ct Chest Abdomen Pelvis W Contrast    Result Date: 2/9/2018  Narrative: CT CHEST, ABDOMEN AND PELVIS WITH IV CONTRAST INDICATION:  Dense COMPARISON: None  TECHNIQUE: CT examination of the chest, abdomen and pelvis was performed  Reformatted images were created in axial, sagittal, and coronal planes  Radiation dose length product (DLP) for this visit:  573 mGy-cm   This examination, like all CT scans performed in the [facility], was performed utilizing techniques to minimize radiation dose exposure, including the use of iterative reconstruction and automated exposure control  IV Contrast:  100 mL of iohexol (OMNIPAQUE) Enteric Contrast: Enteric contrast was administered  FINDINGS: CHEST LUNGS:  Lungs are clear  There is no tracheal or endobronchial lesion  PLEURA:  Unremarkable  HEART/GREAT VESSELS:  Unremarkable for patient's age  MEDIASTINUM AND MARVIN:  Unremarkable  CHEST WALL AND LOWER NECK:   Enhancing right breast mass in keeping with provided history of breast cancer measuring 5 2 x 4 7 cm  Extensive metastatic right axillary adenopathy the largest nodes measuring 2 4 x 2 2 cm and 2 3 x 3 0 cm  Incidental discovery of one or more thyroid nodule(s) measuring more than 1 cm but without suspicious features is noted in this patient who is younger than 28years old; according to guidelines published in the February 2015 white paper on incidental thyroid nodules in the Journal of the [employer] of Radiology [employer] [employer]), further evaluation with thyroid ultrasound is recommended  ABDOMEN LIVER/BILIARY TREE:  Geographic hypodensity in the inferior left lobe laterally suggesting geographic fatty infiltration  GALLBLADDER:  No calcified gallstones  No pericholecystic inflammatory change  SPLEEN:  Unremarkable  PANCREAS:  Unremarkable   ADRENAL GLANDS: Unremarkable  KIDNEYS/URETERS:  Unremarkable  No hydronephrosis  STOMACH AND BOWEL:  Unremarkable  APPENDIX:  No findings to suggest appendicitis  ABDOMINOPELVIC CAVITY:  No ascites or free intraperitoneal air  No lymphadenopathy  VESSELS:  Unremarkable for patient's age  PELVIS REPRODUCTIVE ORGANS:  Large enhancing mass in the uterus likely a fibroid measuring 7 3 x 5 8 cm deviating the endometrium anteriorly  URINARY BLADDER:  Unremarkable  ABDOMINAL WALL/INGUINAL REGIONS:  Unremarkable  OSSEOUS STRUCTURES:  No acute fracture or destructive osseous lesion  Impression: Enhancing right breast mass in keeping with provided history of breast cancer measuring 5 2 x 4 7 cm  Extensive metastatic right axillary adenopathy the largest nodes measuring 2 4 x 2 2 cm and 2 3 x 3 0 cm  No metastatic disease in the abdomen or pelvis  Left thyroid nodule measuring 1 3 cm  Incidental discovery of one or more thyroid nodule(s) measuring more than 1 cm but without suspicious features is noted in this patient who is younger than 28years old; according to guidelines published in the February 2015 white paper on incidental thyroid nodules in the Journal of the [address] [place] [address] of Radiology Mary Kate), further evaluation with thyroid ultrasound is recommended  Large enhancing mass in the uterus likely a fibroid measuring 7 3 x 5 8 cm deviating the endometrium anteriorly  Pelvic ultrasound could be obtained if there are clinical symptoms  Workstation performed: [id number]     Fl Guided Central Venous Access Device Insertion    Result Date: 2/14/2018  Narrative: Chest port placement 2/13/2018 History: Breast carcinoma Procedure: The patient was identified verbally and by wristband  Timeout was performed  Informed consent was obtained  All elements of maximal sterile barrier technique were followed (cap, mask, sterile gown, sterile gloves, large sterile sheet, hand hygiene and 2% chlorhexidine for cutaneous antisepsis)   Following obtaining informed consent, the patient was prepped and draped in the usual sterile fashion  Using ultrasound guidance, access was gained to the patient's left internal jugular vein using a micropuncture system  The micropuncture wire was removed and a  035 wire was advanced to the level of the inferior vena cava using fluoroscopic guidance  Using 1% lidocaine, the region of the left anterior chest was was anesthetized  A small incision was made using a 15 blade scalpel  The port pocket was created using blunt dissection  The catheter tubing was tunneled from the incision to the venotomy  The micropuncture dilator was exchanged for a peel-away sheath, using fluoroscopic guidance  The catheter was place through the peel-away sheath  The catheter was measured and cut to size  The catheter was attached to the port  The port was flushed with saline to ensure patency without evidence of leakage  The port was placed in the port pocket  The port was sutured in the pocket using 3-0 Vicryl  The incisions were approximated with 3-0 Vicryl and Histoacryl  The patient tolerated the procedure well without apparent immediate complications  The patient left the IR department in unchanged condition  Dr Shahid Garza performed and directly supervised the entire procedure  Findings: Using ultrasound guidance, the left internal jugular vein was cannulated using Seldinger technique  The left internal jugular vein was evaluated as a potential access site  The left internal jugular vein was patent, and free of thrombus  Static images of the vessel was obtained  Visualization of real time needle entry into the vessel was obtained  Fluoroscopic spot image demonstrates a newly placed single lumen chest port via the left internal jugular vein with the most central aspect at the SVC/RA junction  The catheter tubing is smooth in contour       Impression: Impression:  Successful placement of a single lumen chest port via the left internal jugular vein  Workstation performed: [id number]         Labs:   Lab Results   Component Value Date    WBC 6 45 03/06/2018    HGB 11 9 03/06/2018    HCT 35 4 03/06/2018    MCV 94 03/06/2018     03/06/2018     Lab Results   Component Value Date     03/06/2018    K 3 6 03/06/2018     03/06/2018    CO2 28 03/06/2018    ANIONGAP 8 03/06/2018    BUN 16 03/06/2018    CREATININE 0 90 03/06/2018    GLUCOSE 114 03/06/2018    GLUF 101 (H) 02/13/2018    CALCIUM 8 8 03/06/2018    AST 15 03/06/2018    ALT 29 03/06/2018    ALKPHOS 66 03/06/2018    PROT 7 2 03/06/2018    BILITOT 0 20 03/06/2018    EGFR 83 03/06/2018         Current Medications: Reviewed  Allergies: Reviewed  PMH/FH/SH:  Reviewed      Vital Sign:    Body surface area is 1 8 meters squared      Wt Readings from Last 3 Encounters:   03/08/18 75 kg (165 lb 6 4 oz)   02/16/18 76 2 kg (167 lb 15 9 oz)   02/16/18 76 2 kg (168 lb)        Temp Readings from Last 3 Encounters:   03/08/18 98 4 °F (36 9 °C) (Tympanic)   02/16/18 98 8 °F (37 1 °C) (Tympanic)   02/16/18 99 4 °F (37 4 °C) (Tympanic)        BP Readings from Last 3 Encounters:   03/08/18 140/80   02/16/18 124/70   02/16/18 124/70         Pulse Readings from Last 3 Encounters:   03/08/18 74   02/16/18 80   02/16/18 80     @LASTSAO2(3)@

## 2018-03-09 ENCOUNTER — HOSPITAL ENCOUNTER (OUTPATIENT)
Dept: INFUSION CENTER | Facility: CLINIC | Age: 38
Discharge: HOME | End: 2018-03-09
Payer: COMMERCIAL

## 2018-03-09 VITALS
RESPIRATION RATE: 16 BRPM | BODY MASS INDEX: 29.23 KG/M2 | SYSTOLIC BLOOD PRESSURE: 108 MMHG | HEIGHT: 63 IN | OXYGEN SATURATION: 98 % | HEART RATE: 73 BPM | DIASTOLIC BLOOD PRESSURE: 70 MMHG | TEMPERATURE: 98.3 F | WEIGHT: 165 LBS

## 2018-03-09 PROCEDURE — 96367 TX/PROPH/DG ADDL SEQ IV INF: CPT

## 2018-03-09 PROCEDURE — 96417 CHEMO IV INFUS EACH ADDL SEQ: CPT

## 2018-03-09 PROCEDURE — 96413 CHEMO IV INFUSION 1 HR: CPT

## 2018-03-09 RX ADMIN — DEXAMETHASONE SODIUM PHOSPHATE: 10 INJECTION, SOLUTION INTRAMUSCULAR; INTRAVENOUS at 11:29

## 2018-03-09 RX ADMIN — TRASTUZUMAB 455 MG: 150 INJECTION, POWDER, LYOPHILIZED, FOR SOLUTION INTRAVENOUS at 13:03

## 2018-03-09 RX ADMIN — Medication 300 UNITS: at 16:00

## 2018-03-09 RX ADMIN — PERTUZUMAB 420 MG: 30 INJECTION, SOLUTION, CONCENTRATE INTRAVENOUS at 12:28

## 2018-03-09 RX ADMIN — CARBOPLATIN 751 MG: 10 INJECTION, SOLUTION INTRAVENOUS at 14:47

## 2018-03-09 RX ADMIN — SODIUM CHLORIDE 20 ML/HR: 0.9 INJECTION, SOLUTION INTRAVENOUS at 11:22

## 2018-03-09 RX ADMIN — FAMOTIDINE 20 MG: 10 INJECTION, SOLUTION INTRAVENOUS at 11:54

## 2018-03-09 RX ADMIN — DOCETAXEL 136 MG: 20 INJECTION, SOLUTION, CONCENTRATE INTRAVENOUS at 13:40

## 2018-03-09 NOTE — PLAN OF CARE
Problem: SAFETY ADULT  Goal: Patient will remain free of falls  INTERVENTIONS:  - Assess patient frequently for physical needs  -  Identify cognitive and physical deficits and behaviors that affect risk of falls  -  [place] fall precautions as indicated by assessment   - Educate patient/family on patient safety including physical limitations  - Instruct patient to call for assistance with activity based on assessment  - Modify environment to reduce risk of injury  - Consider OT/PT consult to assist with strengthening/mobility  Outcome: Progressing      Problem: Knowledge Deficit  Goal: Patient/family/caregiver demonstrates understanding of disease process, treatment plan, medications, and discharge instructions  Complete learning assessment and assess knowledge base    Interventions:  - Provide teaching at level of understanding  - Provide teaching via preferred learning methods  Outcome: Progressing

## 2018-03-15 ENCOUNTER — OFFICE VISIT (OUTPATIENT)
Dept: FAMILY MEDICINE CLINIC | Facility: CLINIC | Age: 38
End: 2018-03-15
Payer: COMMERCIAL

## 2018-03-15 VITALS
SYSTOLIC BLOOD PRESSURE: 102 MMHG | DIASTOLIC BLOOD PRESSURE: 68 MMHG | HEART RATE: 76 BPM | HEIGHT: 63 IN | WEIGHT: 164 LBS | BODY MASS INDEX: 29.06 KG/M2 | RESPIRATION RATE: 16 BRPM

## 2018-03-15 DIAGNOSIS — E04.1 LEFT THYROID NODULE: ICD-10-CM

## 2018-03-15 DIAGNOSIS — Z00.00 WELL ADULT EXAM: Primary | ICD-10-CM

## 2018-03-15 DIAGNOSIS — R11.0 NAUSEA: ICD-10-CM

## 2018-03-15 PROCEDURE — 99395 PREV VISIT EST AGE 18-39: CPT | Performed by: FAMILY MEDICINE

## 2018-03-15 RX ORDER — PROCHLORPERAZINE MALEATE 10 MG
10 TABLET ORAL EVERY 6 HOURS PRN
Qty: 90 TABLET | Refills: 0 | Status: SHIPPED | OUTPATIENT
Start: 2018-03-15 | End: 2018-09-24 | Stop reason: ALTCHOICE

## 2018-03-15 NOTE — PROGRESS NOTES
Keanu Terrazas was seen today for annual exam     Diagnoses and all orders for this visit:    Well adult exam    Nausea  -     prochlorperazine (COMPAZINE) 10 mg tablet; Take 1 tablet (10 mg total) by mouth every 6 (six) hours as needed for nausea or vomiting    Left thyroid nodule  -     TSH, 3rd generation with T4 reflex; Future  -     US thyroid; Future      Patient Counseling:  --Nutrition: Stressed importance of moderation in sodium/caffeine intake, saturated fat and cholesterol, caloric balance, sufficient intake of fresh fruits, vegetables, fiber, calcium, iron, and 1 mg of folate supplement per day   --Discussed  calcium supplement, and the daily use of baby aspirin  --Exercise: Stressed the importance of regular exercise  --Substance Abuse: Discussed cessation/primary prevention of tobacco, alcohol, or other drug use; driving or other dangerous activities under the influence; availability of treatment for abuse  --Sexuality: Discussed sexually transmitted diseases, partner selection, use of condoms, avoidance of unintended pregnancy  and contraceptive alternatives  --Injury prevention: Discussed safety belts, safety helmets, smoke detector, smoking near bedding or upholstery  --Dental health: Discussed importance of regular tooth brushing, flossing, and dental visits  --Immunizations reviewed  --Discussed benefits of screening colonoscopy    Chief Complaint   Patient presents with    Annual Exam       HPI    Patient here for a comprehensive physical exam The patient reports no problems other than losing hair from Chemo     Do you take any herbs or supplements that were not prescribed by a doctor? no   Are you taking calcium supplements? yes   Are you taking aspirin daily? no  How often do you exercise? 2-3 times a week   Diet? 2-3 serving of fruits and vegetables   Dental visit:  6 months ago     Vision test: wears glasses for reading   Exam 2 years ago      History:  LMP: last month   Last pap date: 2018  Abnormal pap? no  : 1  Para: 1      History   Sexual Activity    Sexual activity: Yes    Partners: Male    Birth control/ protection: Other         Review of Systems   Constitutional: Negative  HENT: Negative  Eyes: Negative  Respiratory: Negative  Cardiovascular: Negative  Gastrointestinal: Negative  Endocrine: Negative  Genitourinary: Negative  Musculoskeletal: Negative  Skin: Negative  Allergic/Immunologic: Negative  Neurological: Negative  Hematological: Negative  Psychiatric/Behavioral: Negative  Family History   Problem Relation Age of Onset    Diabetes Mother     Hypertension Mother     Rosacea Father     Allergies Father      seasonal    Liver cancer Paternal Grandfather     Breast cancer Family        Past Medical History:   Diagnosis Date    [facility] cancer [facility] [address] ) 2018    Seasonal allergies          Social History     Social History    Marital status: /Civil Union     Spouse name: N/A    Number of children: N/A    Years of education: N/A     Occupational History    Not on file       Social History Main Topics    Smoking status: Former Smoker     Quit date: 2015    Smokeless tobacco: Former User      Comment: current some day smoker per [employer]    Alcohol use Yes      Comment: occasional    Drug use: No    Sexual activity: Yes     Partners: Male     Birth control/ protection: Other     Other Topics Concern    Not on file     Social History Narrative    Drinks coffee           Current Outpatient Prescriptions on File Prior to Visit   Medication Sig Dispense Refill    albuterol (VENTOLIN HFA) 90 mcg/act inhaler Inhale 2 puffs every 4 (four) hours as needed      diphenoxylate-atropine (LOMOTIL) 2 5-0 025 mg per tablet Take 1 tablet by mouth 4 (four) times a day as needed for diarrhea 60 tablet 2    levocetirizine (XYZAL) 5 MG tablet Take 1 tablet by mouth daily      lidocaine-prilocaine (EMLA) cream Apply topically as needed for mild pain 30 g 0    Linaclotide (LINZESS) 145 MCG CAPS Take 1 capsule (145 mcg total) by mouth daily 90 capsule 0    LORazepam (ATIVAN) 1 mg tablet Take 1 tablet (1 mg total) by mouth daily at bedtime 30 tablet 0    Misc  Devices MISC Cold Cap- Apply as directed during chemotherapy  1 each 0    mometasone (NASONEX) 50 mcg/act nasal spray 2 sprays into each nostril daily      Multiple Vitamins-Minerals (HAIR SKIN AND NAILS FORMULA PO) Take 1 tablet by mouth daily      omeprazole (PriLOSEC) 20 mg delayed release capsule Take 20 mg by mouth      ondansetron (ZOFRAN) 4 mg tablet Take 1 tablet (4 mg total) by mouth every 6 (six) hours as needed for nausea or vomiting 30 tablet 1    pseudoephedrine (SUDAFED) 30 mg tablet Take 60 mg by mouth every 4 (four) hours as needed for congestion      [DISCONTINUED] prochlorperazine (COMPAZINE) 10 mg tablet Take 1 tablet (10 mg total) by mouth every 6 (six) hours as needed for nausea or vomiting 30 tablet 0     No current facility-administered medications on file prior to visit  Allergies   Allergen Reactions    Cat Hair Extract     Dust Mite Extract     Molds & Smuts     Pollen Extract     Tree Extract     Trichophyton          Vitals:    03/15/18 1702   BP: 102/68   BP Location: Left arm   Patient Position: Sitting   Cuff Size: Standard   Pulse: 76   Resp: 16   Weight: 74 4 kg (164 lb)   Height: 5' 3 27" (1 607 m)         Physical Exam   Constitutional: She is oriented to person, place, and time  Vital signs are normal  She appears well-developed and well-nourished  HENT:   Head: Normocephalic and atraumatic  Nose: Nose normal    Mouth/Throat: Oropharynx is clear and moist    Eyes: Pupils are equal, round, and reactive to light  Neck: Normal range of motion  Neck supple  No thyromegaly present  Cardiovascular: Normal rate and regular rhythm  No murmur heard    Pulmonary/Chest: Effort normal and breath sounds normal  Abdominal: Soft  Bowel sounds are normal    Musculoskeletal: Normal range of motion  She exhibits no edema or deformity  Neurological: She is alert and oriented to person, place, and time  She has normal reflexes  Skin: Skin is warm  No rash noted  No erythema  Psychiatric: She has a normal mood and affect   Her behavior is normal        Recent Results (from the past 336 hour(s))   CBC and differential    Collection Time: 03/06/18  4:30 PM   Result Value Ref Range    WBC 6 45 4 31 - 10 16 Thousand/uL    RBC 3 77 (L) 3 81 - 5 12 Million/uL    Hemoglobin 11 9 11 5 - 15 4 g/dL    Hematocrit 35 4 34 8 - 46 1 %    MCV 94 82 - 98 fL    MCH 31 6 26 8 - 34 3 pg    MCHC 33 6 31 4 - 37 4 g/dL    RDW 12 8 11 6 - 15 1 %    MPV 8 8 (L) 8 9 - 12 7 fL    Platelets 284 655 - 503 Thousands/uL    Neutrophils Relative 55 43 - 75 %    Lymphocytes Relative 35 14 - 44 %    Monocytes Relative 9 4 - 12 %    Eosinophils Relative 0 0 - 6 %    Basophils Relative 1 0 - 1 %    Neutrophils Absolute 3 56 1 85 - 7 62 Thousands/µL    Lymphocytes Absolute 2 28 0 60 - 4 47 Thousands/µL    Monocytes Absolute 0 55 0 17 - 1 22 Thousand/µL    Eosinophils Absolute 0 00 0 00 - 0 61 Thousand/µL    Basophils Absolute 0 06 0 00 - 0 10 Thousands/µL   Comprehensive metabolic panel    Collection Time: 03/06/18  4:30 PM   Result Value Ref Range    Sodium 139 136 - 145 mmol/L    Potassium 3 6 3 5 - 5 3 mmol/L    Chloride 103 100 - 108 mmol/L    CO2 28 21 - 32 mmol/L    Anion Gap 8 4 - 13 mmol/L    BUN 16 5 - 25 mg/dL    Creatinine 0 90 0 60 - 1 30 mg/dL    Glucose 114 65 - 140 mg/dL    Calcium 8 8 8 3 - 10 1 mg/dL    AST 15 5 - 45 U/L    ALT 29 12 - 78 U/L    Alkaline Phosphatase 66 46 - 116 U/L    Total Protein 7 2 6 4 - 8 2 g/dL    Albumin 3 6 3 5 - 5 0 g/dL    Total Bilirubin 0 20 0 20 - 1 00 mg/dL    eGFR 83 ml/min/1 73sq m

## 2018-03-23 ENCOUNTER — TELEPHONE (OUTPATIENT)
Dept: HEMATOLOGY ONCOLOGY | Facility: CLINIC | Age: 38
End: 2018-03-23

## 2018-03-23 NOTE — TELEPHONE ENCOUNTER
Needs a letter stating it is OK to have routine cleaning at her dentist  She would like to  at [facility]  Call when ready

## 2018-03-27 DIAGNOSIS — F41.1 GENERALIZED ANXIETY DISORDER: ICD-10-CM

## 2018-03-28 ENCOUNTER — APPOINTMENT (OUTPATIENT)
Dept: LAB | Facility: CLINIC | Age: 38
End: 2018-03-28
Payer: COMMERCIAL

## 2018-03-28 DIAGNOSIS — C50.919 MALIGNANT NEOPLASM OF FEMALE BREAST, UNSPECIFIED ESTROGEN RECEPTOR STATUS, UNSPECIFIED LATERALITY, UNSPECIFIED SITE OF BREAST (HCC): ICD-10-CM

## 2018-03-28 DIAGNOSIS — Z17.0 MALIGNANT NEOPLASM OF UPPER-OUTER QUADRANT OF RIGHT BREAST IN FEMALE, ESTROGEN RECEPTOR POSITIVE (HCC): ICD-10-CM

## 2018-03-28 DIAGNOSIS — E04.1 LEFT THYROID NODULE: ICD-10-CM

## 2018-03-28 DIAGNOSIS — C50.411 MALIGNANT NEOPLASM OF UPPER-OUTER QUADRANT OF RIGHT BREAST IN FEMALE, ESTROGEN RECEPTOR POSITIVE (HCC): ICD-10-CM

## 2018-03-28 LAB
ALBUMIN SERPL BCP-MCNC: 3.7 G/DL (ref 3.5–5)
ALP SERPL-CCNC: 76 U/L (ref 46–116)
ALT SERPL W P-5'-P-CCNC: 28 U/L (ref 12–78)
ANION GAP SERPL CALCULATED.3IONS-SCNC: 10 MMOL/L (ref 4–13)
AST SERPL W P-5'-P-CCNC: 16 U/L (ref 5–45)
BASOPHILS # BLD AUTO: 0.04 THOUSANDS/ΜL (ref 0–0.1)
BASOPHILS NFR BLD AUTO: 1 % (ref 0–1)
BILIRUB SERPL-MCNC: 0.2 MG/DL (ref 0.2–1)
BUN SERPL-MCNC: 17 MG/DL (ref 5–25)
CALCIUM SERPL-MCNC: 8.4 MG/DL (ref 8.3–10.1)
CHLORIDE SERPL-SCNC: 104 MMOL/L (ref 100–108)
CO2 SERPL-SCNC: 26 MMOL/L (ref 21–32)
CREAT SERPL-MCNC: 0.89 MG/DL (ref 0.6–1.3)
EOSINOPHIL # BLD AUTO: 0 THOUSAND/ΜL (ref 0–0.61)
EOSINOPHIL NFR BLD AUTO: 0 % (ref 0–6)
ERYTHROCYTE [DISTWIDTH] IN BLOOD BY AUTOMATED COUNT: 13.8 % (ref 11.6–15.1)
GFR SERPL CREATININE-BSD FRML MDRD: 84 ML/MIN/1.73SQ M
GLUCOSE SERPL-MCNC: 93 MG/DL (ref 65–140)
HCT VFR BLD AUTO: 35.5 % (ref 34.8–46.1)
HGB BLD-MCNC: 11.8 G/DL (ref 11.5–15.4)
LYMPHOCYTES # BLD AUTO: 2.27 THOUSANDS/ΜL (ref 0.6–4.47)
LYMPHOCYTES NFR BLD AUTO: 41 % (ref 14–44)
MCH RBC QN AUTO: 31.4 PG (ref 26.8–34.3)
MCHC RBC AUTO-ENTMCNC: 33.2 G/DL (ref 31.4–37.4)
MCV RBC AUTO: 94 FL (ref 82–98)
MONOCYTES # BLD AUTO: 0.43 THOUSAND/ΜL (ref 0.17–1.22)
MONOCYTES NFR BLD AUTO: 8 % (ref 4–12)
NEUTROPHILS # BLD AUTO: 2.83 THOUSANDS/ΜL (ref 1.85–7.62)
NEUTS SEG NFR BLD AUTO: 50 % (ref 43–75)
PLATELET # BLD AUTO: 320 THOUSANDS/UL (ref 149–390)
PMV BLD AUTO: 8.9 FL (ref 8.9–12.7)
POTASSIUM SERPL-SCNC: 4 MMOL/L (ref 3.5–5.3)
PROT SERPL-MCNC: 7.1 G/DL (ref 6.4–8.2)
RBC # BLD AUTO: 3.76 MILLION/UL (ref 3.81–5.12)
SODIUM SERPL-SCNC: 140 MMOL/L (ref 136–145)
TSH SERPL DL<=0.05 MIU/L-ACNC: 2.29 UIU/ML (ref 0.36–3.74)
WBC # BLD AUTO: 5.57 THOUSAND/UL (ref 4.31–10.16)

## 2018-03-28 PROCEDURE — 36415 COLL VENOUS BLD VENIPUNCTURE: CPT

## 2018-03-28 PROCEDURE — 85025 COMPLETE CBC W/AUTO DIFF WBC: CPT

## 2018-03-28 PROCEDURE — 80053 COMPREHEN METABOLIC PANEL: CPT

## 2018-03-28 PROCEDURE — 84443 ASSAY THYROID STIM HORMONE: CPT

## 2018-03-28 RX ORDER — SODIUM CHLORIDE 9 MG/ML
20 INJECTION, SOLUTION INTRAVENOUS CONTINUOUS
Status: DISCONTINUED | OUTPATIENT
Start: 2018-03-29 | End: 2018-04-01 | Stop reason: HOSPADM

## 2018-03-29 ENCOUNTER — HOSPITAL ENCOUNTER (OUTPATIENT)
Dept: INFUSION CENTER | Facility: CLINIC | Age: 38
Discharge: HOME | End: 2018-03-29
Payer: COMMERCIAL

## 2018-03-29 VITALS
DIASTOLIC BLOOD PRESSURE: 70 MMHG | HEIGHT: 64 IN | HEART RATE: 88 BPM | RESPIRATION RATE: 16 BRPM | WEIGHT: 165 LBS | SYSTOLIC BLOOD PRESSURE: 112 MMHG | TEMPERATURE: 98.3 F | OXYGEN SATURATION: 98 % | BODY MASS INDEX: 28.17 KG/M2

## 2018-03-29 PROCEDURE — 96549 UNLISTED CHEMOTHERAPY PX: CPT

## 2018-03-29 PROCEDURE — 96415 CHEMO IV INFUSION ADDL HR: CPT

## 2018-03-29 PROCEDURE — 96375 TX/PRO/DX INJ NEW DRUG ADDON: CPT

## 2018-03-29 PROCEDURE — 96413 CHEMO IV INFUSION 1 HR: CPT

## 2018-03-29 PROCEDURE — 96417 CHEMO IV INFUS EACH ADDL SEQ: CPT

## 2018-03-29 RX ADMIN — DOCETAXEL 135 MG: 20 INJECTION, SOLUTION, CONCENTRATE INTRAVENOUS at 11:21

## 2018-03-29 RX ADMIN — PERTUZUMAB 420 MG: 30 INJECTION, SOLUTION, CONCENTRATE INTRAVENOUS at 14:23

## 2018-03-29 RX ADMIN — CARBOPLATIN 751 MG: 10 INJECTION, SOLUTION INTRAVENOUS at 12:26

## 2018-03-29 RX ADMIN — FAMOTIDINE 20 MG: 10 INJECTION, SOLUTION INTRAVENOUS at 11:02

## 2018-03-29 RX ADMIN — Medication 300 UNITS: at 14:55

## 2018-03-29 RX ADMIN — SODIUM CHLORIDE 20 ML/HR: 0.9 INJECTION, SOLUTION INTRAVENOUS at 10:30

## 2018-03-29 RX ADMIN — TRASTUZUMAB 450 MG: 150 INJECTION, POWDER, LYOPHILIZED, FOR SOLUTION INTRAVENOUS at 13:38

## 2018-03-29 RX ADMIN — DEXAMETHASONE SODIUM PHOSPHATE: 10 INJECTION, SOLUTION INTRAMUSCULAR; INTRAVENOUS at 10:46

## 2018-04-03 ENCOUNTER — OFFICE VISIT (OUTPATIENT)
Dept: HEMATOLOGY ONCOLOGY | Facility: CLINIC | Age: 38
End: 2018-04-03
Payer: COMMERCIAL

## 2018-04-03 VITALS
WEIGHT: 162 LBS | TEMPERATURE: 98.7 F | OXYGEN SATURATION: 98 % | SYSTOLIC BLOOD PRESSURE: 116 MMHG | RESPIRATION RATE: 16 BRPM | DIASTOLIC BLOOD PRESSURE: 78 MMHG | HEIGHT: 64 IN | BODY MASS INDEX: 27.66 KG/M2 | HEART RATE: 112 BPM

## 2018-04-03 DIAGNOSIS — C50.411 MALIGNANT NEOPLASM OF UPPER-OUTER QUADRANT OF RIGHT BREAST IN FEMALE, ESTROGEN RECEPTOR POSITIVE (HCC): Primary | ICD-10-CM

## 2018-04-03 DIAGNOSIS — Z17.0 MALIGNANT NEOPLASM OF UPPER-OUTER QUADRANT OF RIGHT BREAST IN FEMALE, ESTROGEN RECEPTOR POSITIVE (HCC): Primary | ICD-10-CM

## 2018-04-03 PROCEDURE — 99214 OFFICE O/P EST MOD 30 MIN: CPT | Performed by: INTERNAL MEDICINE

## 2018-04-03 NOTE — PROGRESS NOTES
Hematology / Oncology Outpatient Follow Up Note    Kota Sanchez 30 y o  female FVC:[birth year] QQP:[id number]         Date:  4/3/2018    Assessment / Plan:  A 82-year-old premenopausal woman with locally advanced right breast cancer  Reed Doan has quite large palpable right breast mass and axillary adenopathy  This was mucinous histology, grade 2, ER 30% positive, AK 2% positive, HER2 3+ disease    She is currently on neoadjuvant chemotherapy with TCH with pertuzumab with excellent tolerance  Based on physical examination, she has partial response  I recommended her to continue with [address] with pertuzumab every 3 weeks  I will see her again in the beginning of May 2018 for follow-up  She is in agreement with my recommendations               Subjective:      HPI:  A 82-year-old premenopausal woman who initially noticed right breast lump when she was 8 months pregnant   She delivered 1st baby in June 2017  Hasmukh Sheela the delivery, she noticed right breast lump to be slowly enlarging   She brought this to medical attention   She underwent mammography which was quite abnormal   Biopsy from right breast at 10:00 position 12 cm from nipple showed mucinous carcinoma, grade 2   This was ER 30 to 40% positive, AK 2 to 3% positive, her 2 3+ disease   She was seen by Dr Paulette Wakefield who ordered MRI which showed extensive abnormality in her right breast including 3 5 cm of right breast mass as well as 4 cm of axillary adenopathy   She was referred to me to discuss neoadjuvant chemotherapy   She went to [facility] where she was recommended to have [address] P  she presents today with her mother to discuss treatment options  Reed Doan is somewhat anxious   Otherwise, she feels well   She denied any bone pain   Her weight is stable   She has no respiratory symptoms   Her performance status is normal  She does not have significant past medical history   Her paternal aunt had breast cancer at age of 62   Her 2 paternal grandfather's sister had breast cancer in their 46s   She underwent genetic testing of which result is pending at this time            Interval History:   A 30-year-old premenopausal woman with locally advanced right breast cancer  Leah Hicks has quite large palpable right breast mass and axillary adenopathy  This was mucinous histology, grade 2, ER 30% positive, VA 2% positive, HER2 3+ disease  She is currently on neoadjuvant chemotherapy with [address] with pertuzumab  She has been tolerating treatment very well  She has several days of nausea each time but no history of vomiting  She has no history of neutropenic fever even without G-CSF support  Her weight is stable  She denied any mouth sore or diarrhea  She has no respiratory symptoms  She is aware that she has strong can tumor  She has good performance status            Objective:      Primary Diagnosis:     Locally advanced right breast cancer with invasive mucinous histology, grade 2, ER 30% positive, VA 2% positive, her 2 3+ disease   Diagnosed in January 2018      Cancer Staging:  Malignant neoplasm of upper-outer quadrant of right breast in female, estrogen receptor positive ([facility] [address] )    Staging form: Breast, AJCC 8th Edition    - Clinical stage from 1/26/2018: Stage IB (cT2(3), cN1, cM0, G2, ER: Positive, VA: Positive, HER2: Positive) - Signed by Alexandra Marquez MD on 1/26/2018        Previous Hematologic/ Oncologic Treatment:            Current Hematologic/ Oncologic Treatment:       Neoadjuvant chemotherapy with [address] with pertuzumab   4th cycle of chemotherapy to be given in April 18, 2018      Disease Status:      Clinical partial response      Test Results:     Pathology:     Biopsy from right breast showed invasive mucinous carcinoma, grade 2, ER 30 to 40% positive, VA 2 to 3% positive, HER2 3+ disease      Radiology:     MRI of the breast showed extensive abnormality in her right breast including 3 5 cm of mass at 10:00 position as well as 4 cm of axillary adenopathy    CT scan of chest abdomen pelvis in February 2017 showed large right breast mass as well as right axillary adenopathy   No evidence of distant metastasis  Bone scan showed no evidence of osseous metastasis  Echocardiogram showed ejection fraction 63%      Laboratory:     See below     Physical Exam:        General Appearance:    Alert, oriented          Eyes:    PERRL   Ears:    Normal external ear canals, both ears   Nose:   Nares normal, septum midline   Throat:   Mucosa moist  Pharynx without injection  Neck:   Supple         Lungs:     Clear to auscultation bilaterally   Chest Wall:    No tenderness or deformity    Heart:    Regular rate and rhythm         Abdomen:     Soft, non-tender, bowel sounds +, no organomegaly               Extremities:   Extremities no cyanosis or edema         Skin:   no rash or icterus  Lymph nodes:   Cervical, supraclavicular, and axillary nodes normal   Neurologic:   CNII-XII intact, normal strength, sensation and reflexes     Throughout             Breast exam: 2 5-3 cm of mass at outer upper quadrant of the right breast   1 cm of palpable right axillary adenopathy  Left breast exam is negative  ROS: Review of Systems   All other systems reviewed and are negative  Imaging: Radiology Results    Result Date: 3/20/2018  Narrative: Ordered by an unspecified provider          Labs:   Lab Results   Component Value Date    WBC 5 57 03/28/2018    HGB 11 8 03/28/2018    HCT 35 5 03/28/2018    MCV 94 03/28/2018     03/28/2018     Lab Results   Component Value Date     03/28/2018    K 4 0 03/28/2018     03/28/2018    CO2 26 03/28/2018    ANIONGAP 10 03/28/2018    BUN 17 03/28/2018    CREATININE 0 89 03/28/2018    GLUCOSE 93 03/28/2018    GLUF 101 (H) 02/13/2018    CALCIUM 8 4 03/28/2018    AST 16 03/28/2018    ALT 28 03/28/2018    ALKPHOS 76 03/28/2018    PROT 7 1 03/28/2018    BILITOT 0 20 03/28/2018    EGFR 84 03/28/2018         Current Medications: Reviewed  Allergies: Reviewed  PMH/FH/SH:  Reviewed      Vital Sign:    Body surface area is 1 78 meters squared      Wt Readings from Last 3 Encounters:   04/03/18 73 5 kg (162 lb)   03/29/18 74 8 kg (165 lb)   03/15/18 74 4 kg (164 lb)        Temp Readings from Last 3 Encounters:   04/03/18 98 7 °F (37 1 °C) (Tympanic)   03/29/18 98 3 °F (36 8 °C) (Oral)   03/09/18 98 3 °F (36 8 °C) (Oral)        BP Readings from Last 3 Encounters:   04/03/18 116/78   03/29/18 112/70   03/15/18 102/68         Pulse Readings from Last 3 Encounters:   04/03/18 (!) 112   03/29/18 88   03/15/18 76     @LASTSAO2(3)@

## 2018-04-04 RX ORDER — LORAZEPAM 1 MG/1
1 TABLET ORAL
Qty: 30 TABLET | Refills: 0 | Status: SHIPPED | OUTPATIENT
Start: 2018-04-04 | End: 2018-05-31 | Stop reason: SDUPTHER

## 2018-04-09 ENCOUNTER — APPOINTMENT (OUTPATIENT)
Dept: LAB | Facility: CLINIC | Age: 38
End: 2018-04-09
Payer: COMMERCIAL

## 2018-04-09 ENCOUNTER — TELEPHONE (OUTPATIENT)
Dept: HEMATOLOGY ONCOLOGY | Facility: CLINIC | Age: 38
End: 2018-04-09

## 2018-04-09 DIAGNOSIS — Z17.0 MALIGNANT NEOPLASM OF UPPER-OUTER QUADRANT OF RIGHT BREAST IN FEMALE, ESTROGEN RECEPTOR POSITIVE (HCC): Primary | ICD-10-CM

## 2018-04-09 DIAGNOSIS — C50.411 MALIGNANT NEOPLASM OF UPPER-OUTER QUADRANT OF RIGHT BREAST IN FEMALE, ESTROGEN RECEPTOR POSITIVE (HCC): Primary | ICD-10-CM

## 2018-04-09 LAB
ALBUMIN SERPL BCP-MCNC: 3.4 G/DL (ref 3.5–5)
ALP SERPL-CCNC: 78 U/L (ref 46–116)
ALT SERPL W P-5'-P-CCNC: 31 U/L (ref 12–78)
ANION GAP SERPL CALCULATED.3IONS-SCNC: 8 MMOL/L (ref 4–13)
AST SERPL W P-5'-P-CCNC: 18 U/L (ref 5–45)
BASOPHILS # BLD MANUAL: 0 THOUSAND/UL (ref 0–0.1)
BASOPHILS NFR MAR MANUAL: 0 % (ref 0–1)
BILIRUB SERPL-MCNC: 0.2 MG/DL (ref 0.2–1)
BUN SERPL-MCNC: 17 MG/DL (ref 5–25)
CALCIUM SERPL-MCNC: 8.5 MG/DL
CHLORIDE SERPL-SCNC: 103 MMOL/L (ref 100–108)
CO2 SERPL-SCNC: 28 MMOL/L (ref 21–32)
CREAT SERPL-MCNC: 0.76 MG/DL (ref 0.6–1.3)
EOSINOPHIL # BLD MANUAL: 0.02 THOUSAND/UL (ref 0–0.4)
EOSINOPHIL NFR BLD MANUAL: 1 % (ref 0–6)
ERYTHROCYTE [DISTWIDTH] IN BLOOD BY AUTOMATED COUNT: 12.6 % (ref 11.6–15.1)
GFR SERPL CREATININE-BSD FRML MDRD: 102 ML/MIN/1.73SQ M
GLUCOSE SERPL-MCNC: 85 MG/DL (ref 65–140)
HCT VFR BLD AUTO: 30.2 % (ref 34.8–46.1)
HGB BLD-MCNC: 10.4 G/DL (ref 11.5–15.4)
LYMPHOCYTES # BLD AUTO: 1.39 THOUSAND/UL (ref 0.6–4.47)
LYMPHOCYTES # BLD AUTO: 80 % (ref 14–44)
MCH RBC QN AUTO: 31.8 PG (ref 26.8–34.3)
MCHC RBC AUTO-ENTMCNC: 34.4 G/DL (ref 31.4–37.4)
MCV RBC AUTO: 92 FL (ref 82–98)
MONOCYTES # BLD AUTO: 0.24 THOUSAND/UL (ref 0–1.22)
MONOCYTES NFR BLD: 14 % (ref 4–12)
MYELOCYTES NFR BLD MANUAL: 1 % (ref 0–1)
NEUTROPHILS # BLD MANUAL: 0.05 THOUSAND/UL (ref 1.85–7.62)
NEUTS SEG NFR BLD AUTO: 3 % (ref 43–75)
PLATELET # BLD AUTO: 113 THOUSANDS/UL (ref 149–390)
PLATELET BLD QL SMEAR: ABNORMAL
PMV BLD AUTO: 9.3 FL (ref 8.9–12.7)
POLYCHROMASIA BLD QL SMEAR: PRESENT
POTASSIUM SERPL-SCNC: 3.3 MMOL/L (ref 3.5–5.3)
PROT SERPL-MCNC: 7.1 G/DL (ref 6.4–8.2)
RBC # BLD AUTO: 3.27 MILLION/UL (ref 3.81–5.12)
SODIUM SERPL-SCNC: 139 MMOL/L (ref 136–145)
TOTAL CELLS COUNTED SPEC: 100
VARIANT LYMPHS # BLD AUTO: 1 %
WBC # BLD AUTO: 1.74 THOUSAND/UL (ref 4.31–10.16)

## 2018-04-09 PROCEDURE — 85027 COMPLETE CBC AUTOMATED: CPT

## 2018-04-09 PROCEDURE — 80053 COMPREHEN METABOLIC PANEL: CPT

## 2018-04-09 PROCEDURE — 36415 COLL VENOUS BLD VENIPUNCTURE: CPT

## 2018-04-09 PROCEDURE — 85007 BL SMEAR W/DIFF WBC COUNT: CPT

## 2018-04-10 ENCOUNTER — TELEPHONE (OUTPATIENT)
Dept: HEMATOLOGY ONCOLOGY | Facility: CLINIC | Age: 38
End: 2018-04-10

## 2018-04-10 NOTE — TELEPHONE ENCOUNTER
req that we call her back with the bloodwork results from 4 9 18  She stated that Yash Mina asked her to get bw done yesterday

## 2018-04-10 NOTE — TELEPHONE ENCOUNTER
Reviewed the labs with Dr Kaleb Shay and he stated this low counts are to be expected at this time after treatment  Called the patient to see how she is feeling  She states she is tired, and had a temp of 99 8  I instructed her to call if her temp reaches 100 4 or she experiences any other new symptoms  I also instructed her to stay away from anyone who is sick, and make sure she is performing good hand hygiene  She verbalized understanding and has no further questions

## 2018-04-18 ENCOUNTER — APPOINTMENT (OUTPATIENT)
Dept: LAB | Facility: CLINIC | Age: 38
End: 2018-04-18
Payer: COMMERCIAL

## 2018-04-18 ENCOUNTER — TRANSCRIBE ORDERS (OUTPATIENT)
Dept: LAB | Facility: CLINIC | Age: 38
End: 2018-04-18

## 2018-04-18 DIAGNOSIS — Z17.0 MALIGNANT NEOPLASM OF UPPER-OUTER QUADRANT OF RIGHT BREAST IN FEMALE, ESTROGEN RECEPTOR POSITIVE (HCC): ICD-10-CM

## 2018-04-18 DIAGNOSIS — C50.411 MALIGNANT NEOPLASM OF UPPER-OUTER QUADRANT OF RIGHT BREAST IN FEMALE, ESTROGEN RECEPTOR POSITIVE (HCC): ICD-10-CM

## 2018-04-18 LAB
ALBUMIN SERPL BCP-MCNC: 3.5 G/DL (ref 3.5–5)
ALP SERPL-CCNC: 86 U/L (ref 46–116)
ALT SERPL W P-5'-P-CCNC: 37 U/L (ref 12–78)
ANION GAP SERPL CALCULATED.3IONS-SCNC: 8 MMOL/L (ref 4–13)
AST SERPL W P-5'-P-CCNC: 17 U/L (ref 5–45)
BASOPHILS # BLD AUTO: 0.02 THOUSANDS/ΜL (ref 0–0.1)
BASOPHILS NFR BLD AUTO: 0 % (ref 0–1)
BILIRUB SERPL-MCNC: 0.2 MG/DL (ref 0.2–1)
BUN SERPL-MCNC: 14 MG/DL (ref 5–25)
CALCIUM SERPL-MCNC: 8.3 MG/DL (ref 8.3–10.1)
CHLORIDE SERPL-SCNC: 102 MMOL/L (ref 100–108)
CO2 SERPL-SCNC: 29 MMOL/L (ref 21–32)
CREAT SERPL-MCNC: 0.9 MG/DL (ref 0.6–1.3)
EOSINOPHIL # BLD AUTO: 0 THOUSAND/ΜL (ref 0–0.61)
EOSINOPHIL NFR BLD AUTO: 0 % (ref 0–6)
ERYTHROCYTE [DISTWIDTH] IN BLOOD BY AUTOMATED COUNT: 13.8 % (ref 11.6–15.1)
GFR SERPL CREATININE-BSD FRML MDRD: 83 ML/MIN/1.73SQ M
GLUCOSE SERPL-MCNC: 92 MG/DL (ref 65–140)
HCT VFR BLD AUTO: 30.7 % (ref 34.8–46.1)
HGB BLD-MCNC: 10.5 G/DL (ref 11.5–15.4)
LYMPHOCYTES # BLD AUTO: 1.89 THOUSANDS/ΜL (ref 0.6–4.47)
LYMPHOCYTES NFR BLD AUTO: 43 % (ref 14–44)
MCH RBC QN AUTO: 31.9 PG (ref 26.8–34.3)
MCHC RBC AUTO-ENTMCNC: 34.2 G/DL (ref 31.4–37.4)
MCV RBC AUTO: 93 FL (ref 82–98)
MONOCYTES # BLD AUTO: 0.33 THOUSAND/ΜL (ref 0.17–1.22)
MONOCYTES NFR BLD AUTO: 7 % (ref 4–12)
NEUTROPHILS # BLD AUTO: 2.21 THOUSANDS/ΜL (ref 1.85–7.62)
NEUTS SEG NFR BLD AUTO: 50 % (ref 43–75)
PLATELET # BLD AUTO: 307 THOUSANDS/UL (ref 149–390)
PMV BLD AUTO: 8.5 FL (ref 8.9–12.7)
POTASSIUM SERPL-SCNC: 3.7 MMOL/L (ref 3.5–5.3)
PROT SERPL-MCNC: 7.7 G/DL (ref 6.4–8.2)
RBC # BLD AUTO: 3.29 MILLION/UL (ref 3.81–5.12)
SODIUM SERPL-SCNC: 139 MMOL/L (ref 136–145)
WBC # BLD AUTO: 4.45 THOUSAND/UL (ref 4.31–10.16)

## 2018-04-18 PROCEDURE — 36415 COLL VENOUS BLD VENIPUNCTURE: CPT

## 2018-04-18 PROCEDURE — 80053 COMPREHEN METABOLIC PANEL: CPT

## 2018-04-18 PROCEDURE — 85025 COMPLETE CBC W/AUTO DIFF WBC: CPT

## 2018-04-18 RX ORDER — SODIUM CHLORIDE 9 MG/ML
20 INJECTION, SOLUTION INTRAVENOUS CONTINUOUS
Status: DISCONTINUED | OUTPATIENT
Start: 2018-04-20 | End: 2018-04-23 | Stop reason: HOSPADM

## 2018-04-20 ENCOUNTER — HOSPITAL ENCOUNTER (OUTPATIENT)
Dept: INFUSION CENTER | Facility: CLINIC | Age: 38
Discharge: HOME | End: 2018-04-20
Payer: COMMERCIAL

## 2018-04-20 ENCOUNTER — DOCUMENTATION (OUTPATIENT)
Dept: HEMATOLOGY ONCOLOGY | Facility: CLINIC | Age: 38
End: 2018-04-20

## 2018-04-20 VITALS
DIASTOLIC BLOOD PRESSURE: 60 MMHG | HEIGHT: 64 IN | TEMPERATURE: 98.1 F | HEART RATE: 89 BPM | OXYGEN SATURATION: 98 % | WEIGHT: 163 LBS | SYSTOLIC BLOOD PRESSURE: 106 MMHG | BODY MASS INDEX: 27.83 KG/M2 | RESPIRATION RATE: 16 BRPM

## 2018-04-20 PROCEDURE — 96367 TX/PROPH/DG ADDL SEQ IV INF: CPT

## 2018-04-20 PROCEDURE — 96417 CHEMO IV INFUS EACH ADDL SEQ: CPT

## 2018-04-20 PROCEDURE — 96413 CHEMO IV INFUSION 1 HR: CPT

## 2018-04-20 RX ADMIN — FAMOTIDINE 20 MG: 10 INJECTION, SOLUTION INTRAVENOUS at 11:07

## 2018-04-20 RX ADMIN — SODIUM CHLORIDE 20 ML/HR: 0.9 INJECTION, SOLUTION INTRAVENOUS at 10:35

## 2018-04-20 RX ADMIN — TRASTUZUMAB 450 MG: 150 INJECTION, POWDER, LYOPHILIZED, FOR SOLUTION INTRAVENOUS at 14:04

## 2018-04-20 RX ADMIN — Medication 300 UNITS: at 15:21

## 2018-04-20 RX ADMIN — DOCETAXEL 135 MG: 20 INJECTION, SOLUTION, CONCENTRATE INTRAVENOUS at 11:48

## 2018-04-20 RX ADMIN — CARBOPLATIN 751 MG: 10 INJECTION, SOLUTION INTRAVENOUS at 12:57

## 2018-04-20 RX ADMIN — DEXAMETHASONE SODIUM PHOSPHATE: 10 INJECTION, SOLUTION INTRAMUSCULAR; INTRAVENOUS at 10:43

## 2018-04-20 RX ADMIN — PERTUZUMAB 420 MG: 30 INJECTION, SOLUTION, CONCENTRATE INTRAVENOUS at 14:45

## 2018-04-20 NOTE — SOCIAL WORK
MSW made visit with pt while she was in the infusion unit  Pt has been receiving treatment and her mother was with her on this day  The pt presented with much anger and it was primarily focused on the path her treatment has been taking since she was first diagnosed  The pt explained that she had difficulty getting a timely initial appointment, was having difficulty identifying a plastic surgeon and did not feel as though anyone was giving her the information she felt she needed  MSW listened to the pt's concerns and her mother was also jumping in with her similar concerns  MSW asked the pt is she would be open to speaking with the nurse navigator and pt agreed  The pt also presented as very frustrated with her employer and her inability to qualify for FMLA as she recently used this for her maternity leave  The pt is dealing with many stressors in her life at this time and MSW actively listened and provided significant emotional support  MSW will plan to meet pt again at her next infusion date  MSW also referred pt to Breast RN Navigator

## 2018-04-20 NOTE — PROGRESS NOTES
Oncology Navigator Visit    Assessment:  Met with patient at THE [facility] infusion for purpose of initial navigator outreach  Referral received from [facility] Cancer counselor today  Discussion included chemotherapy, side effects, antiemetics, surgical-Oncology, and plastics  Provided therapeutic listening and answered all her questions to her satisfaction  Will continue to follow up prn, on an as needed basis         Potential Barriers:    Care Coordination:    Communication/Education:    Cultural/Latter-day/Spiritual:    Health Promotion:    Insurance/Medical Costs:    Logistical:    Psychosocial/Distress/Behavioral:    Work/School:    Interventions:    Referrals: [facility]car[facility]omas Cancer Counselor        Comments:

## 2018-04-20 NOTE — PLAN OF CARE
Problem: Potential for Falls  Goal: Patient will remain free of falls  INTERVENTIONS:  - Assess patient frequently for physical needs  -  Identify cognitive and physical deficits and behaviors that affect risk of falls    -  [place] fall precautions as indicated by assessment   - Educate patient/family on patient safety including physical limitations  - Instruct patient to call for assistance with activity based on assessment  - Modify environment to reduce risk of injury  - Consider OT/PT consult to assist with strengthening/mobility   Outcome: Progressing

## 2018-04-26 DIAGNOSIS — K59.09 OTHER CONSTIPATION: ICD-10-CM

## 2018-04-26 RX ORDER — LINACLOTIDE 145 UG/1
1 CAPSULE, GELATIN COATED ORAL DAILY
Qty: 90 CAPSULE | Refills: 0 | Status: SHIPPED | OUTPATIENT
Start: 2018-04-26 | End: 2018-06-07 | Stop reason: HOSPADM

## 2018-05-01 ENCOUNTER — OFFICE VISIT (OUTPATIENT)
Dept: HEMATOLOGY ONCOLOGY | Facility: CLINIC | Age: 38
End: 2018-05-01
Payer: COMMERCIAL

## 2018-05-01 VITALS
SYSTOLIC BLOOD PRESSURE: 120 MMHG | BODY MASS INDEX: 27.49 KG/M2 | OXYGEN SATURATION: 98 % | HEIGHT: 64 IN | WEIGHT: 161 LBS | RESPIRATION RATE: 16 BRPM | HEART RATE: 91 BPM | DIASTOLIC BLOOD PRESSURE: 82 MMHG | TEMPERATURE: 99.1 F

## 2018-05-01 DIAGNOSIS — C50.411 MALIGNANT NEOPLASM OF UPPER-OUTER QUADRANT OF RIGHT BREAST IN FEMALE, ESTROGEN RECEPTOR POSITIVE (HCC): Primary | ICD-10-CM

## 2018-05-01 DIAGNOSIS — Z17.0 MALIGNANT NEOPLASM OF UPPER-OUTER QUADRANT OF RIGHT BREAST IN FEMALE, ESTROGEN RECEPTOR POSITIVE (HCC): Primary | ICD-10-CM

## 2018-05-01 PROCEDURE — 99214 OFFICE O/P EST MOD 30 MIN: CPT | Performed by: INTERNAL MEDICINE

## 2018-05-01 NOTE — PROGRESS NOTES
Hematology / Oncology Outpatient Follow Up Note    Bay Sanchez 30 y o  female GHD:[birth year] [id number]         Date:  5/1/2018    Assessment / Plan:  A 60-year-old premenopausal woman with locally advanced right breast cancer  She has PALB-2 variant  She has quite large palpable right breast mass and axillary adenopathy  This was mucinous histology, grade 2, ER 30% positive, KS 2% positive, HER2 3+ disease    She is currently on neoadjuvant chemotherapy with TCH with pertuzumab with excellent tolerance  based on physical examination, she has partial response  Because of progressively worse chemotherapy-induced nausea, I am going to add aprepitant 150 mg on day of chemotherapy  I am going to order echocardiogram for cardiac monitoring  I will see her again in 4 weeks, prior to the last cycle of neoadjuvant chemotherapy with TCH with pertuzumab  She is committed to have bilateral mastectomy  She is going to see Dr Lexi Wise as well as Dr Nikia Awan in a few weeks            Subjective:      HPI:  A 60-year-old premenopausal woman who initially noticed right breast lump when she was 8 months pregnant   She delivered 1st baby in June 2017   After the delivery, she noticed right breast lump to be slowly enlarging   She brought this to medical attention   She underwent mammography which was quite abnormal   Biopsy from right breast at 10:00 position 12 cm from nipple showed mucinous carcinoma, grade 2   This was ER 30 to 40% positive, KS 2 to 3% positive, her 2 3+ disease   She was seen by Dr Lexi Wise who ordered MRI which showed extensive abnormality in her right breast including 3 5 cm of right breast mass as well as 4 cm of axillary adenopathy   She was referred to me to discuss neoadjuvant chemotherapy   She went to [facility] where she was recommended to have [address] P  she presents today with her mother to discuss treatment options  Jose Dunn is somewhat anxious   Otherwise, she feels well   She denied any bone pain   Her weight is stable   She has no respiratory symptoms   Her performance status is normal  She does not have significant past medical history   Her paternal aunt had breast cancer at age of 62   Her 2 paternal grandfather's sister had breast cancer in their 46s   She underwent genetic testing of which result is pending at this time            Interval History:   A 37-year-old premenopausal woman with locally advanced right breast cancer  Rachel Robison has quite large palpable right breast mass and axillary adenopathy  This was mucinous histology, grade 2, ER 30% positive, WA 2% positive, HER2 3+ disease   She is currently on neoadjuvant chemotherapy with TCH with pertuzumab  She has been tolerating treatment very well  However, she has progressively worse nausea year which lasted 4-5 days after the chemotherapy  She has no vomiting  She is afebrile  She has no respiratory symptoms  She has been somewhat anorexic  She denied bone pain  Her performance status is normal        Objective:      Primary Diagnosis:     1  Locally advanced right breast cancer with invasive mucinous histology, grade 2, ER 30% positive, WA 2% positive, her 2 3+ disease   Diagnosed in January 2018  2   PALB2 variant      Cancer Staging:  Malignant neoplasm of upper-outer quadrant of right breast in female, estrogen receptor positive ([facility] [address] )    Staging form: Breast, AJCC 8th Edition    - Clinical stage from 1/26/2018: Stage IB (cT2(3), cN1, cM0, G2, ER: Positive, WA: Positive, HER2: Positive) - Signed by Dana Tan MD on 1/26/2018        Previous Hematologic/ Oncologic Treatment:            Current Hematologic/ Oncologic Treatment:       Neoadjuvant chemotherapy with TCH with pertuzumab   5th cycle of chemotherapy to be given in May 11, 2018      Disease Status:      Clinical partial response      Test Results:     Pathology:     Biopsy from right breast showed invasive mucinous carcinoma, grade 2, ER 30 to 40% positive, WA 2 to 3% positive, HER2 3+ disease      Radiology:     MRI of the breast showed extensive abnormality in her right breast including 3 5 cm of mass at 10:00 position as well as 4 cm of axillary adenopathy  CT scan of chest abdomen pelvis in February 2017 showed large right breast mass as well as right axillary adenopathy   No evidence of distant metastasis  Bone scan showed no evidence of osseous metastasis  Echocardiogram showed ejection fraction 63%      Laboratory:     See below     Physical Exam:        General Appearance:    Alert, oriented          Eyes:    PERRL   Ears:    Normal external ear canals, both ears   Nose:   Nares normal, septum midline   Throat:   Mucosa moist  Pharynx without injection  Neck:   Supple         Lungs:     Clear to auscultation bilaterally   Chest Wall:    No tenderness or deformity    Heart:    Regular rate and rhythm         Abdomen:     Soft, non-tender, bowel sounds +, no organomegaly               Extremities:   Extremities no cyanosis or edema         Skin:   no rash or icterus  Lymph nodes:   Cervical, supraclavicular, and axillary nodes normal   Neurologic:   CNII-XII intact, normal strength, sensation and reflexes     Throughout             Breast exam: 2 5-3 cm of mass at outer upper quadrant of the right breast    her previous right axillary adenopathy is hardly palpable  ROS: Review of Systems   Constitutional:        Fatigue   All other systems reviewed and are negative  Imaging: No results found        Labs:   Lab Results   Component Value Date    WBC 4 45 04/18/2018    HGB 10 5 (L) 04/18/2018    HCT 30 7 (L) 04/18/2018    MCV 93 04/18/2018     04/18/2018     Lab Results   Component Value Date     04/18/2018    K 3 7 04/18/2018     04/18/2018    CO2 29 04/18/2018    ANIONGAP 8 04/18/2018    BUN 14 04/18/2018    CREATININE 0 90 04/18/2018    GLUCOSE 92 04/18/2018    GLUF 101 (H) 02/13/2018    CALCIUM 8 3 04/18/2018    AST 17 04/18/2018    ALT 37 04/18/2018    ALKPHOS 86 04/18/2018    PROT 7 7 04/18/2018    BILITOT 0 20 04/18/2018    EGFR 83 04/18/2018         Current Medications: Reviewed  Allergies: Reviewed  PMH/FH/SH:  Reviewed      Vital Sign:    Body surface area is 1 77 meters squared      Wt Readings from Last 3 Encounters:   05/01/18 73 kg (161 lb)   04/20/18 73 9 kg (163 lb)   04/03/18 73 5 kg (162 lb)        Temp Readings from Last 3 Encounters:   05/01/18 99 1 °F (37 3 °C) (Tympanic)   04/20/18 98 1 °F (36 7 °C) (Oral)   04/03/18 98 7 °F (37 1 °C) (Tympanic)        BP Readings from Last 3 Encounters:   05/01/18 120/82   04/20/18 106/60   04/03/18 116/78         Pulse Readings from Last 3 Encounters:   05/01/18 91   04/20/18 89   04/03/18 (!) 112     @LASTSAO2(3)@

## 2018-05-07 DIAGNOSIS — Z79.899 ENCOUNTER FOR MONITORING CARDIOTOXIC DRUG THERAPY: Primary | ICD-10-CM

## 2018-05-07 DIAGNOSIS — Z51.81 ENCOUNTER FOR MONITORING CARDIOTOXIC DRUG THERAPY: Primary | ICD-10-CM

## 2018-05-08 ENCOUNTER — APPOINTMENT (OUTPATIENT)
Dept: LAB | Facility: CLINIC | Age: 38
End: 2018-05-08
Payer: COMMERCIAL

## 2018-05-08 ENCOUNTER — HOSPITAL ENCOUNTER (OUTPATIENT)
Dept: NON INVASIVE DIAGNOSTICS | Facility: CLINIC | Age: 38
Discharge: HOME | End: 2018-05-08
Payer: COMMERCIAL

## 2018-05-08 DIAGNOSIS — Z51.81 ENCOUNTER FOR MONITORING CARDIOTOXIC DRUG THERAPY: ICD-10-CM

## 2018-05-08 DIAGNOSIS — Z79.899 ENCOUNTER FOR MONITORING CARDIOTOXIC DRUG THERAPY: ICD-10-CM

## 2018-05-08 LAB
ALBUMIN SERPL BCP-MCNC: 3.2 G/DL (ref 3.5–5)
ALP SERPL-CCNC: 71 U/L (ref 46–116)
ALT SERPL W P-5'-P-CCNC: 31 U/L (ref 12–78)
ANION GAP SERPL CALCULATED.3IONS-SCNC: 8 MMOL/L (ref 4–13)
AST SERPL W P-5'-P-CCNC: 16 U/L (ref 5–45)
BASOPHILS # BLD AUTO: 0.02 THOUSANDS/ΜL (ref 0–0.1)
BASOPHILS NFR BLD AUTO: 0 % (ref 0–1)
BILIRUB SERPL-MCNC: 0.2 MG/DL (ref 0.2–1)
BUN SERPL-MCNC: 14 MG/DL (ref 5–25)
CALCIUM SERPL-MCNC: 8.4 MG/DL (ref 8.3–10.1)
CHLORIDE SERPL-SCNC: 106 MMOL/L (ref 100–108)
CO2 SERPL-SCNC: 28 MMOL/L (ref 21–32)
CREAT SERPL-MCNC: 0.94 MG/DL (ref 0.6–1.3)
EOSINOPHIL # BLD AUTO: 0.01 THOUSAND/ΜL (ref 0–0.61)
EOSINOPHIL NFR BLD AUTO: 0 % (ref 0–6)
ERYTHROCYTE [DISTWIDTH] IN BLOOD BY AUTOMATED COUNT: 16.8 % (ref 11.6–15.1)
GFR SERPL CREATININE-BSD FRML MDRD: 78 ML/MIN/1.73SQ M
GLUCOSE SERPL-MCNC: 81 MG/DL (ref 65–140)
HCT VFR BLD AUTO: 30.7 % (ref 34.8–46.1)
HGB BLD-MCNC: 10.1 G/DL (ref 11.5–15.4)
LYMPHOCYTES # BLD AUTO: 1.3 THOUSANDS/ΜL (ref 0.6–4.47)
LYMPHOCYTES NFR BLD AUTO: 21 % (ref 14–44)
MCH RBC QN AUTO: 31.9 PG (ref 26.8–34.3)
MCHC RBC AUTO-ENTMCNC: 32.9 G/DL (ref 31.4–37.4)
MCV RBC AUTO: 97 FL (ref 82–98)
MONOCYTES # BLD AUTO: 0.68 THOUSAND/ΜL (ref 0.17–1.22)
MONOCYTES NFR BLD AUTO: 11 % (ref 4–12)
NEUTROPHILS # BLD AUTO: 4.3 THOUSANDS/ΜL (ref 1.85–7.62)
NEUTS SEG NFR BLD AUTO: 68 % (ref 43–75)
PLATELET # BLD AUTO: 137 THOUSANDS/UL (ref 149–390)
PMV BLD AUTO: 8.9 FL (ref 8.9–12.7)
POTASSIUM SERPL-SCNC: 3.7 MMOL/L (ref 3.5–5.3)
PROT SERPL-MCNC: 6.7 G/DL (ref 6.4–8.2)
RBC # BLD AUTO: 3.17 MILLION/UL (ref 3.81–5.12)
SODIUM SERPL-SCNC: 142 MMOL/L (ref 136–145)
WBC # BLD AUTO: 6.31 THOUSAND/UL (ref 4.31–10.16)

## 2018-05-08 PROCEDURE — 85025 COMPLETE CBC W/AUTO DIFF WBC: CPT | Performed by: INTERNAL MEDICINE

## 2018-05-08 PROCEDURE — 93321 DOPPLER ECHO F-UP/LMTD STD: CPT | Performed by: INTERNAL MEDICINE

## 2018-05-08 PROCEDURE — 93325 DOPPLER ECHO COLOR FLOW MAPG: CPT | Performed by: INTERNAL MEDICINE

## 2018-05-08 PROCEDURE — 36415 COLL VENOUS BLD VENIPUNCTURE: CPT | Performed by: INTERNAL MEDICINE

## 2018-05-08 PROCEDURE — 80053 COMPREHEN METABOLIC PANEL: CPT | Performed by: INTERNAL MEDICINE

## 2018-05-08 PROCEDURE — 93308 TTE F-UP OR LMTD: CPT

## 2018-05-08 PROCEDURE — 93308 TTE F-UP OR LMTD: CPT | Performed by: INTERNAL MEDICINE

## 2018-05-09 ENCOUNTER — OFFICE VISIT (OUTPATIENT)
Dept: FAMILY MEDICINE CLINIC | Facility: CLINIC | Age: 38
End: 2018-05-09
Payer: COMMERCIAL

## 2018-05-09 VITALS
HEART RATE: 90 BPM | BODY MASS INDEX: 28.67 KG/M2 | WEIGHT: 161.8 LBS | HEIGHT: 63 IN | DIASTOLIC BLOOD PRESSURE: 62 MMHG | SYSTOLIC BLOOD PRESSURE: 106 MMHG | TEMPERATURE: 99.6 F | RESPIRATION RATE: 14 BRPM

## 2018-05-09 DIAGNOSIS — C50.411 MALIGNANT NEOPLASM OF UPPER-OUTER QUADRANT OF RIGHT BREAST IN FEMALE, ESTROGEN RECEPTOR POSITIVE (HCC): ICD-10-CM

## 2018-05-09 DIAGNOSIS — J06.9 ACUTE UPPER RESPIRATORY INFECTION: Primary | ICD-10-CM

## 2018-05-09 DIAGNOSIS — Z17.0 MALIGNANT NEOPLASM OF UPPER-OUTER QUADRANT OF RIGHT BREAST IN FEMALE, ESTROGEN RECEPTOR POSITIVE (HCC): ICD-10-CM

## 2018-05-09 PROCEDURE — 99213 OFFICE O/P EST LOW 20 MIN: CPT | Performed by: NURSE PRACTITIONER

## 2018-05-09 RX ORDER — BENZONATATE 100 MG/1
100 CAPSULE ORAL 3 TIMES DAILY PRN
Qty: 20 CAPSULE | Refills: 0 | Status: SHIPPED | OUTPATIENT
Start: 2018-05-09 | End: 2018-06-07 | Stop reason: HOSPADM

## 2018-05-09 RX ORDER — AZITHROMYCIN 250 MG/1
TABLET, FILM COATED ORAL
Qty: 6 TABLET | Refills: 0 | Status: SHIPPED | OUTPATIENT
Start: 2018-05-09 | End: 2018-05-13

## 2018-05-09 NOTE — PROGRESS NOTES
Assessment/Plan:           Problem List Items Addressed This Visit        Other    Malignant neoplasm of upper-outer quadrant of right breast in female, estrogen receptor positive ([facility] [address] )      Other Visit Diagnoses     Acute upper respiratory infection    -  Primary    Relevant Medications    benzonatate (TESSALON PERLES) 100 mg capsule    azithromycin (ZITHROMAX) 250 mg tablet            Subjective:      Patient ID: Emre Kang is a 41 y o  female  Here for c/o illness  Started to get ill this week  Son with illness  Patient undergoing chemo for breast cancer and to have chemo this week  Cough, nasal congestion          The following portions of the patient's history were reviewed and updated as appropriate: allergies, current medications, past family history, past medical history, past social history, past surgical history and problem list     Review of Systems   Constitutional: Positive for fatigue and fever  Negative for activity change and chills  HENT: Positive for congestion, postnasal drip and rhinorrhea  Negative for ear pain, facial swelling, sinus pain, sinus pressure and sore throat  Respiratory: Positive for cough  Negative for shortness of breath and wheezing  Cardiovascular: Negative for chest pain and palpitations  Gastrointestinal: Negative for constipation, diarrhea, nausea and vomiting  Musculoskeletal: Negative for arthralgias and back pain  Skin: Negative for rash  Neurological: Positive for headaches  Negative for dizziness and light-headedness  Hematological: Negative for adenopathy           Objective:      /62   Pulse 90   Temp 99 6 °F (37 6 °C)   Resp 14   Ht 5' 3" (1 6 m)   Wt 73 4 kg (161 lb 12 8 oz)   BMI 28 66 kg/m²     Family History   Problem Relation Age of Onset    Diabetes Mother     Hypertension Mother     Rosacea Father     Allergies Father      seasonal    Liver cancer Paternal Grandfather     Breast cancer Family      Past Medical History:   Diagnosis Date    [facility]    Seasonal allergies      Social History     Social History    Marital status: /Civil Union     Spouse name: N/A    Number of children: N/A    Years of education: N/A     Occupational History    Not on file  Social History Main Topics    Smoking status: Former Smoker     Quit date: 1/26/2015    Smokeless tobacco: Former User      Comment: current some day smoker per [employer]    Alcohol use Yes      Comment: occasional    Drug use: No    Sexual activity: Yes     Partners: Male     Birth control/ protection: Other     Other Topics Concern    Not on file     Social History Narrative    Drinks coffee           Current Outpatient Prescriptions:     albuterol (VENTOLIN HFA) 90 mcg/act inhaler, Inhale 2 puffs every 4 (four) hours as needed, Disp: , Rfl:     azithromycin (ZITHROMAX) 250 mg tablet, Take 2 tablets today then 1 tablet daily x 4 days, Disp: 6 tablet, Rfl: 0    benzonatate (TESSALON PERLES) 100 mg capsule, Take 1 capsule (100 mg total) by mouth 3 (three) times a day as needed for cough, Disp: 20 capsule, Rfl: 0    diphenoxylate-atropine (LOMOTIL) 2 5-0 025 mg per tablet, Take 1 tablet by mouth 4 (four) times a day as needed for diarrhea, Disp: 60 tablet, Rfl: 2    levocetirizine (XYZAL) 5 MG tablet, Take 1 tablet by mouth daily, Disp: , Rfl:     lidocaine-prilocaine (EMLA) cream, Apply topically as needed for mild pain, Disp: 30 g, Rfl: 0    LINZESS 145 MCG CAPS, TAKE 1 CAPSULE (145 MCG TOTAL) BY MOUTH DAILY, Disp: 90 capsule, Rfl: 0    LORazepam (ATIVAN) 1 mg tablet, Take 1 tablet (1 mg total) by mouth daily at bedtime, Disp: 30 tablet, Rfl: 0    Misc  Devices MISC, Cold Cap- Apply as directed during chemotherapy  , Disp: 1 each, Rfl: 0    mometasone (NASONEX) 50 mcg/act nasal spray, 2 sprays into each nostril daily, Disp: , Rfl:     Multiple Vitamins-Minerals (HAIR SKIN AND NAILS FORMULA PO), Take 1 tablet by mouth daily, Disp: , Rfl:     omeprazole (PriLOSEC) 20 mg delayed release capsule, Take 20 mg by mouth, Disp: , Rfl:     ondansetron (ZOFRAN) 4 mg tablet, Take 1 tablet (4 mg total) by mouth every 6 (six) hours as needed for nausea or vomiting, Disp: 30 tablet, Rfl: 1    prochlorperazine (COMPAZINE) 10 mg tablet, Take 1 tablet (10 mg total) by mouth every 6 (six) hours as needed for nausea or vomiting, Disp: 90 tablet, Rfl: 0    pseudoephedrine (SUDAFED) 30 mg tablet, Take 60 mg by mouth every 4 (four) hours as needed for congestion, Disp: , Rfl:   Allergies   Allergen Reactions    Cat Hair Extract     Dust Mite Extract     Molds & Smuts     Pollen Extract     Tree Extract     Trichophyton         Physical Exam   Constitutional: She is oriented to person, place, and time  She appears well-developed and well-nourished  HENT:   Head: Normocephalic and atraumatic  Right Ear: External ear normal    Left Ear: External ear normal    Nose: Mucosal edema and rhinorrhea present  Mouth/Throat: Posterior oropharyngeal erythema present  Eyes: Conjunctivae are normal    Neck: Normal range of motion  Neck supple  Cardiovascular: Normal rate, regular rhythm and normal heart sounds  Pulmonary/Chest: Effort normal and breath sounds normal    Abdominal: Soft  Bowel sounds are normal    Musculoskeletal: Normal range of motion  Neurological: She is alert and oriented to person, place, and time  Skin: Skin is warm and dry  Psychiatric: She has a normal mood and affect  Her behavior is normal  Judgment and thought content normal    Nursing note and vitals reviewed

## 2018-05-10 RX ORDER — SODIUM CHLORIDE 9 MG/ML
20 INJECTION, SOLUTION INTRAVENOUS CONTINUOUS
Status: DISCONTINUED | OUTPATIENT
Start: 2018-05-11 | End: 2018-05-14 | Stop reason: HOSPADM

## 2018-05-11 ENCOUNTER — HOSPITAL ENCOUNTER (OUTPATIENT)
Dept: INFUSION CENTER | Facility: CLINIC | Age: 38
Discharge: HOME | End: 2018-05-11
Payer: COMMERCIAL

## 2018-05-11 VITALS
BODY MASS INDEX: 27.66 KG/M2 | WEIGHT: 162 LBS | DIASTOLIC BLOOD PRESSURE: 62 MMHG | HEART RATE: 84 BPM | SYSTOLIC BLOOD PRESSURE: 96 MMHG | RESPIRATION RATE: 16 BRPM | HEIGHT: 64 IN | OXYGEN SATURATION: 98 % | TEMPERATURE: 98.2 F

## 2018-05-11 DIAGNOSIS — R11.0 NAUSEA: Primary | ICD-10-CM

## 2018-05-11 PROCEDURE — 96367 TX/PROPH/DG ADDL SEQ IV INF: CPT

## 2018-05-11 PROCEDURE — 96375 TX/PRO/DX INJ NEW DRUG ADDON: CPT

## 2018-05-11 PROCEDURE — 96413 CHEMO IV INFUSION 1 HR: CPT

## 2018-05-11 PROCEDURE — 96417 CHEMO IV INFUS EACH ADDL SEQ: CPT

## 2018-05-11 RX ORDER — OLANZAPINE 10 MG/1
10 TABLET ORAL
Qty: 10 TABLET | Refills: 0 | Status: SHIPPED | OUTPATIENT
Start: 2018-05-11 | End: 2018-06-07 | Stop reason: HOSPADM

## 2018-05-11 RX ADMIN — PERTUZUMAB 420 MG: 30 INJECTION, SOLUTION, CONCENTRATE INTRAVENOUS at 14:48

## 2018-05-11 RX ADMIN — DEXAMETHASONE SODIUM PHOSPHATE: 10 INJECTION, SOLUTION INTRAMUSCULAR; INTRAVENOUS at 10:44

## 2018-05-11 RX ADMIN — SODIUM CHLORIDE 150 MG: 9 INJECTION, SOLUTION INTRAVENOUS at 11:19

## 2018-05-11 RX ADMIN — SODIUM CHLORIDE 20 ML/HR: 0.9 INJECTION, SOLUTION INTRAVENOUS at 10:30

## 2018-05-11 RX ADMIN — TRASTUZUMAB 450 MG: 150 INJECTION, POWDER, LYOPHILIZED, FOR SOLUTION INTRAVENOUS at 14:10

## 2018-05-11 RX ADMIN — Medication 300 UNITS: at 15:20

## 2018-05-11 RX ADMIN — DOCETAXEL 134 MG: 20 INJECTION, SOLUTION, CONCENTRATE INTRAVENOUS at 11:55

## 2018-05-11 RX ADMIN — FAMOTIDINE 20 MG: 10 INJECTION INTRAVENOUS at 11:02

## 2018-05-11 RX ADMIN — CARBOPLATIN 747 MG: 10 INJECTION, SOLUTION INTRAVENOUS at 12:59

## 2018-05-11 NOTE — PROGRESS NOTES
Spoke with Shabana Morrison will prescribe Zyprexa for days 1-5 of her chemo  Script called in to pt's pharmacy and pt notified  Pt told to call MD if she does not have relief with this medication

## 2018-05-11 NOTE — PROGRESS NOTES
Spoke with Bard Rosas and brought up concerns that pt has both zofran and compazine at home, neither of which seem to help her nausea  Pt also has Ativan prescribed for anxiety but states that when she took that it made her feel more nauseated  Bard Rosas will speak with Zahra Torres and call back

## 2018-05-11 NOTE — PROGRESS NOTES
Pt here for chemo  She reports just getting over a cold  Pt reports having nausea at home after her past treatment even when taking her zofran, compazine and ativan at home  Emend added to her treatment  Will speak to Aditi Townsend to see if anythign else might be available  Vitals stable; labs within parameters for treatment  Callbell within reach; will continue to monitor

## 2018-05-14 ENCOUNTER — DOCUMENTATION (OUTPATIENT)
Dept: GYNECOLOGIC ONCOLOGY | Facility: CLINIC | Age: 38
End: 2018-05-14

## 2018-05-14 NOTE — PROGRESS NOTES
Left message for patient regarding availability of additional genetic testing results  The patient was encouraged to call back to discuss results

## 2018-05-16 ENCOUNTER — OFFICE VISIT (OUTPATIENT)
Dept: SURGICAL ONCOLOGY | Facility: CLINIC | Age: 38
End: 2018-05-16
Payer: COMMERCIAL

## 2018-05-16 VITALS
RESPIRATION RATE: 16 BRPM | DIASTOLIC BLOOD PRESSURE: 78 MMHG | SYSTOLIC BLOOD PRESSURE: 120 MMHG | HEIGHT: 64 IN | HEART RATE: 73 BPM | BODY MASS INDEX: 27.49 KG/M2 | TEMPERATURE: 97.6 F | WEIGHT: 161 LBS

## 2018-05-16 DIAGNOSIS — Z17.0 MALIGNANT NEOPLASM OF UPPER-OUTER QUADRANT OF RIGHT BREAST IN FEMALE, ESTROGEN RECEPTOR POSITIVE (HCC): Primary | ICD-10-CM

## 2018-05-16 DIAGNOSIS — C50.411 MALIGNANT NEOPLASM OF UPPER-OUTER QUADRANT OF RIGHT BREAST IN FEMALE, ESTROGEN RECEPTOR POSITIVE (HCC): Primary | ICD-10-CM

## 2018-05-16 PROCEDURE — 99214 OFFICE O/P EST MOD 30 MIN: CPT | Performed by: SURGERY

## 2018-05-16 NOTE — PROGRESS NOTES
Surgical Oncology Follow Up       [address]  [facility]  [address] [address]    Rony Duke Dhuyyolanda  1980  [id number]  [address] [facility]  [facility]  [address] [place] [facility] [address]    Chief Complaint   Patient presents with    Breast Cancer     follow up after cara adjuvant chemo          Assessment & Plan:   Assessment/Plan   Right breast cancer metastatic to regional lymph on  neoadjuvant chemotherapy  MRI, plastic surgery consult  Cancer History:        Malignant neoplasm of upper-outer quadrant of right breast in female, estrogen receptor positive ([address] )    1/19/2018 Initial Diagnosis     Ultrasound guided core biopsy of right breast mass at [facility]   Malignant neoplasm of upper-outer quadrant of right breast in female, estrogen receptor positive ([address] ), Her 2 positive           1/29/2018 Genetic Testing     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  2/2018 -  Chemotherapy     neoadjuvant chemotherapy with [facility] with pertuzumab  Dr Kelly Love            History of Present Illness:   See above    Interval History:   The patient has had a partial response  She still has palpable adenopathy as well as a palpable mass in the upper outer quadrant  Review of Systems:   Review of Systems   Constitutional: Positive for fatigue  Negative for activity change, appetite change and unexpected weight change  HENT: Negative for ear pain, tinnitus, trouble swallowing and voice change  Eyes: Negative for pain and visual disturbance  Respiratory: Negative for cough, shortness of breath, wheezing and stridor  Cardiovascular: Negative for chest pain, palpitations and leg swelling  Gastrointestinal: Negative for abdominal distention, abdominal pain and blood in stool  Endocrine: Negative for cold intolerance and heat intolerance     Genitourinary: Negative for difficulty urinating, dysuria, flank pain and hematuria  Musculoskeletal: Negative for arthralgias, back pain, gait problem and joint swelling  Skin: Negative for color change, rash and wound  Allergic/Immunologic: Negative for immunocompromised state  Neurological: Negative for dizziness, seizures, speech difficulty, weakness and headaches  Hematological: Negative for adenopathy  Psychiatric/Behavioral: Negative for confusion  Past Medical History     Patient Active Problem List   Diagnosis    Asthma    Other constipation    Cigarette nicotine dependence without complication    Macular atrophy, retinal    Seasonal allergies    Malignant neoplasm of upper-outer quadrant of right breast in female, estrogen receptor positive ([facility] )     Past Medical History:   Diagnosis Date    Breast cancer ([facility] ) 2018    Seasonal allergies      Past Surgical History:   Procedure Laterality Date     SECTION      PILONIDAL CYST EXCISION      resection    [place] INSJ TUNNELED CTR VAD W/SUBQ PORT AGE 5 YR/> Left 2018    Procedure: INSERTION VENOUS PORT ( PORT-A-CATH) IR;  Surgeon: Sheryl Grace DO;  [facility]: [facility];  Service: Interventional Radiology    TUBAL LIGATION       Family History   Problem Relation Age of Onset    Diabetes Mother     Hypertension Mother     Rosacea Father     Allergies Father      seasonal    Liver cancer Paternal Grandfather     Breast cancer Family      Social History     Social History    Marital status: /Civil Union     Spouse name: N/A    Number of children: N/A    Years of education: N/A     Occupational History    Not on file       Social History Main Topics    Smoking status: Former Smoker     Quit date: 2015    Smokeless tobacco: Former User      Comment: current some day smoker per [employer]    Alcohol use Yes      Comment: occasional    Drug use: No    Sexual activity: Yes     Partners: Male     Birth control/ protection: Other     Other Topics Concern    Not on file     Social History Narrative    Drinks coffee           Current Outpatient Prescriptions:     albuterol (VENTOLIN HFA) 90 mcg/act inhaler, Inhale 2 puffs every 4 (four) hours as needed, Disp: , Rfl:     benzonatate (TESSALON PERLES) 100 mg capsule, Take 1 capsule (100 mg total) by mouth 3 (three) times a day as needed for cough, Disp: 20 capsule, Rfl: 0    diphenoxylate-atropine (LOMOTIL) 2 5-0 025 mg per tablet, Take 1 tablet by mouth 4 (four) times a day as needed for diarrhea, Disp: 60 tablet, Rfl: 2    levocetirizine (XYZAL) 5 MG tablet, Take 1 tablet by mouth daily, Disp: , Rfl:     lidocaine-prilocaine (EMLA) cream, Apply topically as needed for mild pain, Disp: 30 g, Rfl: 0    LINZESS 145 MCG CAPS, TAKE 1 CAPSULE (145 MCG TOTAL) BY MOUTH DAILY, Disp: 90 capsule, Rfl: 0    LORazepam (ATIVAN) 1 mg tablet, Take 1 tablet (1 mg total) by mouth daily at bedtime, Disp: 30 tablet, Rfl: 0    Misc  Devices MISC, Cold Cap- Apply as directed during chemotherapy  , Disp: 1 each, Rfl: 0    mometasone (NASONEX) 50 mcg/act nasal spray, 2 sprays into each nostril daily, Disp: , Rfl:     Multiple Vitamins-Minerals (HAIR SKIN AND NAILS FORMULA PO), Take 1 tablet by mouth daily, Disp: , Rfl:     OLANZapine (ZyPREXA) 10 mg tablet, Take 1 tablet (10 mg total) by mouth daily at bedtime Take days 1-5 throughout chemotherapy treatment, Disp: 10 tablet, Rfl: 0    omeprazole (PriLOSEC) 20 mg delayed release capsule, Take 20 mg by mouth, Disp: , Rfl:     ondansetron (ZOFRAN) 4 mg tablet, Take 1 tablet (4 mg total) by mouth every 6 (six) hours as needed for nausea or vomiting, Disp: 30 tablet, Rfl: 1    prochlorperazine (COMPAZINE) 10 mg tablet, Take 1 tablet (10 mg total) by mouth every 6 (six) hours as needed for nausea or vomiting, Disp: 90 tablet, Rfl: 0    pseudoephedrine (SUDAFED) 30 mg tablet, Take 60 mg by mouth every 4 (four) hours as needed for congestion, Disp: , Rfl:   Allergies   Allergen Reactions    Cat Hair Extract     Dust Mite Extract     Molds & Smuts     Pollen Extract     Tree Extract     Trichophyton        Physical Exam:     Vitals:    05/16/18 1609   BP: 120/78   Pulse: 73   Resp: 16   Temp: 97 6 °F (36 4 °C)     Physical Exam   Constitutional: She is oriented to person, place, and time  She appears well-developed and well-nourished  HENT:   Head: Normocephalic and atraumatic  Mouth/Throat: Oropharynx is clear and moist    Eyes: EOM are normal  Pupils are equal, round, and reactive to light  Neck: Normal range of motion  Neck supple  No JVD present  No tracheal deviation present  No thyromegaly present  Cardiovascular: Normal rate, regular rhythm, normal heart sounds and intact distal pulses  Exam reveals no gallop and no friction rub  No murmur heard  Pulmonary/Chest: Effort normal and breath sounds normal  No respiratory distress  She has no wheezes  She has no rales  Examination the right breast demonstrates palpable adenopathy in addition to the mass and her axillary tail and relatively dense tissue extending toward her nipple  Some of this tissue was closed to the skin by palpation  Abdominal: Soft  She exhibits no distension and no mass  There is no hepatomegaly  There is no tenderness  There is no rebound and no guarding  Musculoskeletal: Normal range of motion  She exhibits no edema or tenderness  Lymphadenopathy:     She has no cervical adenopathy  Neurological: She is alert and oriented to person, place, and time  No cranial nerve deficit  Skin: Skin is warm and dry  No rash noted  No erythema  Psychiatric: She has a normal mood and affect  Her behavior is normal    Vitals reviewed  Results:       Discussion/Summary:   I recommended the patient have a repeat MRI to evaluate the response and to see how close the tissue is to her skin to decide what skin sections would need to be resected    At this point I think she has sufficient adenopathy to warrant an axillary dissection  We will coordinate the MRI as well as plastic surgery consult and see her back shortly thereafter  Advance Care Planning/Advance Directives:  I discussed the disease status, treatment plans and follow-up with the patient

## 2018-05-16 NOTE — LETTER
May 16, 2018     Shen Mccoy MD  [address]  [address] [address]    Patient: Corey Bowels   YOB: 1980   Date of Visit: 5/16/2018       Dear Dr Crispin Perkins:    Thank you for referring Jozef Newberry to me for evaluation  Below are my notes for this consultation  If you have questions, please do not hesitate to call me  I look forward to following your patient along with you  Sincerely,        Vy Lake MD        CC: No Recipients  Vy Lake MD  5/16/2018  5:09 PM  Sign at close encounter     Surgical Oncology Follow Up       [address] [place] [address] [address]  1980  [id number]  [address] [facility],[address]  [facility] [address] [address]    Chief Complaint   Patient presents with    Breast Cancer     follow up after cara adjuvant chemo          Assessment & Plan:   Assessment/Plan   Right breast cancer metastatic to regional lymph on  neoadjuvant chemotherapy  MRI, plastic surgery consult  Cancer History:        Malignant neoplasm of upper-outer quadrant of right breast in female, estrogen receptor positive ([address] )    1/19/2018 Initial Diagnosis     Ultrasound guided core biopsy of right breast mass at [facility]   Malignant neoplasm of upper-outer quadrant of right breast in female, estrogen receptor positive ([address] ), Her 2 positive           1/29/2018 Genetic Testing     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  2/2018 -  Chemotherapy     neoadjuvant chemotherapy with [facility] with pertuzumab  Dr Jennifer Mcghee            History of Present Illness:   See above    Interval History:   The patient has had a partial response  She still has palpable adenopathy as well as a palpable mass in the upper outer quadrant      Review of Systems:   Review of Systems   Constitutional: Positive for fatigue  Negative for activity change, appetite change and unexpected weight change  HENT: Negative for ear pain, tinnitus, trouble swallowing and voice change  Eyes: Negative for pain and visual disturbance  Respiratory: Negative for cough, shortness of breath, wheezing and stridor  Cardiovascular: Negative for chest pain, palpitations and leg swelling  Gastrointestinal: Negative for abdominal distention, abdominal pain and blood in stool  Endocrine: Negative for cold intolerance and heat intolerance  Genitourinary: Negative for difficulty urinating, dysuria, flank pain and hematuria  Musculoskeletal: Negative for arthralgias, back pain, gait problem and joint swelling  Skin: Negative for color change, rash and wound  Allergic/Immunologic: Negative for immunocompromised state  Neurological: Negative for dizziness, seizures, speech difficulty, weakness and headaches  Hematological: Negative for adenopathy  Psychiatric/Behavioral: Negative for confusion         Past Medical History     Patient Active Problem List   Diagnosis    Asthma    Other constipation    Cigarette nicotine dependence without complication    Macular atrophy, retinal    Seasonal allergies    Malignant neoplasm of upper-outer quadrant of right breast in female, estrogen [facility]     Past Medical History:   Diagnosis Date    Breast cancer [facility] ) 2018    Seasonal allergies      Past Surgical History:   Procedure Laterality Date     SECTION      PILONIDAL CYST EXCISION      resection    [place] INSJ TUNNELED CTR VAD W/SUBQ PORT AGE 5 YR/> Left 2018    Procedure: INSERTION VENOUS PORT ( PORT-A-CATH) IR;  Surgeon: Radha Whitaker DO;  [facility];  Service: Interventional Radiology    TUBAL LIGATION       Family History   Problem Relation Age of Onset    Diabetes Mother     Hypertension Mother     Rosacea Father     Allergies Father      seasonal    Liver cancer Paternal Grandfather     Breast cancer Family      Social History     Social History    Marital status: /Civil Union     Spouse name: N/A    Number of children: N/A    Years of education: N/A     Occupational History    Not on file  Social History Main Topics    Smoking status: Former Smoker     Quit date: 1/26/2015    Smokeless tobacco: Former User      Comment: current some day smoker per [employer]    Alcohol use Yes      Comment: occasional    Drug use: No    Sexual activity: Yes     Partners: Male     Birth control/ protection: Other     Other Topics Concern    Not on file     Social History Narrative    Drinks coffee           Current Outpatient Prescriptions:     albuterol (VENTOLIN HFA) 90 mcg/act inhaler, Inhale 2 puffs every 4 (four) hours as needed, Disp: , Rfl:     benzonatate (TESSALON PERLES) 100 mg capsule, Take 1 capsule (100 mg total) by mouth 3 (three) times a day as needed for cough, Disp: 20 capsule, Rfl: 0    diphenoxylate-atropine (LOMOTIL) 2 5-0 025 mg per tablet, Take 1 tablet by mouth 4 (four) times a day as needed for diarrhea, Disp: 60 tablet, Rfl: 2    levocetirizine (XYZAL) 5 MG tablet, Take 1 tablet by mouth daily, Disp: , Rfl:     lidocaine-prilocaine (EMLA) cream, Apply topically as needed for mild pain, Disp: 30 g, Rfl: 0    LINZESS 145 MCG CAPS, TAKE 1 CAPSULE (145 MCG TOTAL) BY MOUTH DAILY, Disp: 90 capsule, Rfl: 0    LORazepam (ATIVAN) 1 mg tablet, Take 1 tablet (1 mg total) by mouth daily at bedtime, Disp: 30 tablet, Rfl: 0    Misc  Devices MISC, Cold Cap- Apply as directed during chemotherapy  , Disp: 1 each, Rfl: 0    mometasone (NASONEX) 50 mcg/act nasal spray, 2 sprays into each nostril daily, Disp: , Rfl:     Multiple Vitamins-Minerals (HAIR SKIN AND NAILS FORMULA PO), Take 1 tablet by mouth daily, Disp: , Rfl:     OLANZapine (ZyPREXA) 10 mg tablet, Take 1 tablet (10 mg total) by mouth daily at bedtime Take days 1-5 throughout chemotherapy treatment, Disp: 10 tablet, Rfl: 0    omeprazole (PriLOSEC) 20 mg delayed release capsule, Take 20 mg by mouth, Disp: , Rfl:     ondansetron (ZOFRAN) 4 mg tablet, Take 1 tablet (4 mg total) by mouth every 6 (six) hours as needed for nausea or vomiting, Disp: 30 tablet, Rfl: 1    prochlorperazine (COMPAZINE) 10 mg tablet, Take 1 tablet (10 mg total) by mouth every 6 (six) hours as needed for nausea or vomiting, Disp: 90 tablet, Rfl: 0    pseudoephedrine (SUDAFED) 30 mg tablet, Take 60 mg by mouth every 4 (four) hours as needed for congestion, Disp: , Rfl:   Allergies   Allergen Reactions    Cat Hair Extract     Dust Mite Extract     Molds & Smuts     Pollen Extract     Tree Extract     Trichophyton        Physical Exam:     Vitals:    05/16/18 1609   BP: 120/78   Pulse: 73   Resp: 16   Temp: 97 6 °F (36 4 °C)     Physical Exam   Constitutional: She is oriented to person, place, and time  She appears well-developed and well-nourished  HENT:   Head: Normocephalic and atraumatic  Mouth/Throat: Oropharynx is clear and moist    Eyes: EOM are normal  Pupils are equal, round, and reactive to light  Neck: Normal range of motion  Neck supple  No JVD present  No tracheal deviation present  No thyromegaly present  Cardiovascular: Normal rate, regular rhythm, normal heart sounds and intact distal pulses  Exam reveals no gallop and no friction rub  No murmur heard  Pulmonary/Chest: Effort normal and breath sounds normal  No respiratory distress  She has no wheezes  She has no rales  Examination the right breast demonstrates palpable adenopathy in addition to the mass and her axillary tail and relatively dense tissue extending toward her nipple  Some of this tissue was closed to the skin by palpation  Abdominal: Soft  She exhibits no distension and no mass  There is no hepatomegaly  There is no tenderness  There is no rebound and no guarding  Musculoskeletal: Normal range of motion   She exhibits no edema or tenderness  Lymphadenopathy:     She has no cervical adenopathy  Neurological: She is alert and oriented to person, place, and time  No cranial nerve deficit  Skin: Skin is warm and dry  No rash noted  No erythema  Psychiatric: She has a normal mood and affect  Her behavior is normal    Vitals reviewed  Results:       Discussion/Summary:   I recommended the patient have a repeat MRI to evaluate the response and to see how close the tissue is to her skin to decide what skin sections would need to be resected  At this point I think she has sufficient adenopathy to warrant an axillary dissection  We will coordinate the MRI as well as plastic surgery consult and see her back shortly thereafter  Advance Care Planning/Advance Directives:  I discussed the disease status, treatment plans and follow-up with the patient

## 2018-05-17 ENCOUNTER — TELEPHONE (OUTPATIENT)
Dept: SURGICAL ONCOLOGY | Facility: CLINIC | Age: 38
End: 2018-05-17

## 2018-05-21 ENCOUNTER — TELEPHONE (OUTPATIENT)
Dept: GYNECOLOGIC ONCOLOGY | Facility: CLINIC | Age: 38
End: 2018-05-21

## 2018-05-21 NOTE — TELEPHONE ENCOUNTER
Genetic Testing Results  Genetic testing results are POSITIVE for a likely pathogenic variant: PALB2 deletion exons 11-12    No mutations identified in: ALK, APC, TOM, AXIN2, BAP1, BARD1, BLM, BMPR1A, BRCA1, BRCA2, BRIP1, CASR, CDC73, CDH1, CDK4, CDKN1B, CDKN1C, CDKN2A, CEBPA, CHEK2, DICER1, DIS3L2, EGFR, EPCAM, FH, FLCN, GATA2, GPC3, GREM1, HOXB13, HRAS, KIT, MAX, MEN1, MET, MITF, MLH1, MSH2, MSH6, MUTYH, NBN, NF1, NF2 ,PDGFRA, PHOX2B, PMS2, POLD1, POLE, POT1, ZNKYH1S, PTCH1, PTEN, RAD50, RAD51C, RAD51D, RB1, RECQL4, RET, RUNX1, SDHA, SDHAF2, SDHB, SDHC, SDHD, SMAD4, SMARCA4, SMARCB1, SMARCE1, STK11, SUFU, TERC, TERT, CUWJ065, TP53, TSC1, TSC2, VHL, WRN, WT1 ([employer] Multi-Cancer Panel)      Discussion  Spoke with the patient to discuss the results of the additional genetic testing performed as part of the [facility] genetic testing research study  Initial results identified a likely pathogenic mutation in PALB2, as discussed in a previous note  No variants were identified in any of the additional genes tested  The patients questions were answered and she was encouraged to call with any future questions or concerns

## 2018-05-29 ENCOUNTER — OFFICE VISIT (OUTPATIENT)
Dept: HEMATOLOGY ONCOLOGY | Facility: CLINIC | Age: 38
End: 2018-05-29
Payer: COMMERCIAL

## 2018-05-29 ENCOUNTER — APPOINTMENT (OUTPATIENT)
Dept: LAB | Facility: CLINIC | Age: 38
End: 2018-05-29
Payer: COMMERCIAL

## 2018-05-29 VITALS
RESPIRATION RATE: 16 BRPM | SYSTOLIC BLOOD PRESSURE: 106 MMHG | DIASTOLIC BLOOD PRESSURE: 82 MMHG | HEART RATE: 92 BPM | TEMPERATURE: 98.7 F | BODY MASS INDEX: 27.31 KG/M2 | HEIGHT: 64 IN | WEIGHT: 160 LBS | OXYGEN SATURATION: 99 %

## 2018-05-29 DIAGNOSIS — C50.411 MALIGNANT NEOPLASM OF UPPER-OUTER QUADRANT OF RIGHT BREAST IN FEMALE, ESTROGEN RECEPTOR POSITIVE (HCC): Primary | ICD-10-CM

## 2018-05-29 DIAGNOSIS — Z17.0 MALIGNANT NEOPLASM OF UPPER-OUTER QUADRANT OF RIGHT BREAST IN FEMALE, ESTROGEN RECEPTOR POSITIVE (HCC): Primary | ICD-10-CM

## 2018-05-29 PROCEDURE — 99214 OFFICE O/P EST MOD 30 MIN: CPT | Performed by: INTERNAL MEDICINE

## 2018-05-29 NOTE — PROGRESS NOTES
Hematology / Oncology Outpatient Follow Up Note    Nima Sanchez 41 y o  female TILA:6[birth year] GSE:[id number]         Date:  5/29/2018    Assessment / Plan:  A 24-year-old premenopausal woman with locally advanced right breast cancer  She has PALB-2 probable pathogenic mutation  She has quite large palpable right breast mass and axillary adenopathy  This was mucinous histology, grade 2, ER 30% positive, KY 2% positive, HER2 3+ disease    She is currently on neoadjuvant chemotherapy with TCH with pertuzumab with excellent tolerance  Clinically, she has partial response  She is going to have last dose of TCH and pertuzumab in June 1, 2018  She is scheduled to have bilateral mastectomy in June 21, 2018  I recommended her to continue with trastuzumab monotherapy every 3 weeks  She has no cardiac toxicity  I will see her again in late July 2018 for follow-up to discuss pathology results  She is in agreement with my recommendations         Subjective:      HPI:  A 24-year-old premenopausal woman who initially noticed right breast lump when she was 8 months pregnant   She delivered 1st baby in June 2017  Susan Cruz the delivery, she noticed right breast lump to be slowly enlarging   She brought this to medical attention   She underwent mammography which was quite abnormal   Biopsy from right breast at 10:00 position 12 cm from nipple showed mucinous carcinoma, grade 2   This was ER 30 to 40% positive, KY 2 to 3% positive, her 2 3+ disease   She was seen by Dr Dony Mcneill who ordered MRI which showed extensive abnormality in her right breast including 3 5 cm of right breast mass as well as 4 cm of axillary adenopathy  She was referred to me to discuss neoadjuvant chemotherapy   She went to [facility] where she was recommended to have [address] P  she presents today with her mother to discuss treatment options  Bernardo Damon is somewhat anxious   Otherwise, she feels well   She denied any bone pain   Her weight is stable   She has no respiratory symptoms   Her performance status is normal  She does not have significant past medical history   Her paternal aunt had breast cancer at age of 62   Her 2 paternal grandfather's sister had breast cancer in their 46s   She underwent genetic testing of which result is pending at this time            Interval History:   A 53-year-old premenopausal woman with locally advanced right breast cancer  Abrahan Jones has quite large palpable right breast mass and axillary adenopathy  This was mucinous histology, grade 2, ER 30% positive, PA 2% positive, HER2 3+ disease   She is currently on neoadjuvant chemotherapy with TCH with pertuzumab   She has been tolerating treatment very well except significant nausea and occasional vomiting  We had aprepitant which improved her nausea  However, she could not tolerate olanzapin due to the drowsiness  She presents today prior to the last dose of neoadjuvant chemotherapy  She has no history of neutropenic fever  She denied any pain  Her weight is stable  She has no respiratory symptoms  She is going to have bilateral mastectomy in June 21, 2018 with immediate reconstruction          Objective:      Primary Diagnosis:     1  Locally advanced right breast cancer with invasive mucinous histology, grade 2, ER 30% positive, PA 2% positive, her 2 3+ disease   Diagnosed in January 2018  2   PALB2 probable pathogenic mutation      Cancer Staging:  Malignant neoplasm of upper-outer quadrant of right breast in female, estrogen receptor positive ([facility] [address] )    Staging form: Breast, AJCC 8th Edition    - Clinical stage from 1/26/2018: Stage IB (cT2(3), cN1, cM0, G2, ER: Positive, PA: Positive, HER2: Positive) - Signed by Moe Jacques MD on 1/26/2018        Previous Hematologic/ Oncologic Treatment:            Current Hematologic/ Oncologic Treatment:       Neoadjuvant chemotherapy with [address] with pertuzumab   6th cycle of chemotherapy to be given in June 1, 2018      Disease Status:    Clinical partial response      Test Results:     Pathology:     Biopsy from right breast showed invasive mucinous carcinoma, grade 2, ER 30 to 40% positive, RI 2 to 3% positive, HER2 3+ disease      Radiology:     MRI of the breast showed extensive abnormality in her right breast including 3 5 cm of mass at 10:00 position as well as 4 cm of axillary adenopathy  CT scan of chest abdomen pelvis in February 2017 showed large right breast mass as well as right axillary adenopathy   No evidence of distant metastasis  Bone scan showed no evidence of osseous metastasis  Echocardiogram showed ejection fraction 60% in May 2018      Laboratory:     See below     Physical Exam:        General Appearance:    Alert, oriented          Eyes:    PERRL   Ears:    Normal external ear canals, both ears   Nose:   Nares normal, septum midline   Throat:   Mucosa moist  Pharynx without injection  Neck:   Supple         Lungs:     Clear to auscultation bilaterally   Chest Wall:    No tenderness or deformity    Heart:    Regular rate and rhythm         Abdomen:     Soft, non-tender, bowel sounds +, no organomegaly               Extremities:   Extremities no cyanosis or edema         Skin:   no rash or icterus  Lymph nodes:   Cervical, supraclavicular, and axillary nodes normal   Neurologic:   CNII-XII intact, normal strength, sensation and reflexes     Throughout             Breast exam: 2 5-3 cm of mass at outer upper quadrant of the right breast    her previous right axillary adenopathy is hardly palpable  ROS: Review of Systems   All other systems reviewed and are negative  Imaging: No results found        Labs:   Lab Results   Component Value Date    WBC 6 31 05/08/2018    HGB 10 1 (L) 05/08/2018    HCT 30 7 (L) 05/08/2018    MCV 97 05/08/2018     (L) 05/08/2018     Lab Results   Component Value Date     05/08/2018    K 3 7 05/08/2018     05/08/2018    CO2 28 05/08/2018    ANIONGAP 8 05/08/2018    BUN 14 05/08/2018    CREATININE 0 94 05/08/2018    GLUCOSE 81 05/08/2018    GLUF 101 (H) 02/13/2018    CALCIUM 8 4 05/08/2018    AST 16 05/08/2018    ALT 31 05/08/2018    ALKPHOS 71 05/08/2018    PROT 6 7 05/08/2018    BILITOT 0 20 05/08/2018    EGFR 78 05/08/2018         Current Medications: Reviewed  Allergies: Reviewed  PMH/FH/SH:  Reviewed      Vital Sign:    Body surface area is 1 77 meters squared      Wt Readings from Last 3 Encounters:   05/29/18 72 6 kg (160 lb)   05/16/18 73 kg (161 lb)   05/11/18 73 5 kg (162 lb)        Temp Readings from Last 3 Encounters:   05/29/18 98 7 °F (37 1 °C) (Tympanic)   05/16/18 97 6 °F (36 4 °C)   05/11/18 98 2 °F (36 8 °C) (Oral)        BP Readings from Last 3 Encounters:   05/29/18 106/82   05/16/18 120/78   05/11/18 96/62         Pulse Readings from Last 3 Encounters:   05/29/18 92   05/16/18 73   05/11/18 84     @LASTSAO2(3)@

## 2018-05-31 ENCOUNTER — HOSPITAL ENCOUNTER (OUTPATIENT)
Dept: RADIOLOGY | Facility: HOSPITAL | Age: 38
Discharge: HOME | End: 2018-05-31
Attending: SURGERY
Payer: COMMERCIAL

## 2018-05-31 ENCOUNTER — TELEPHONE (OUTPATIENT)
Dept: FAMILY MEDICINE CLINIC | Facility: CLINIC | Age: 38
End: 2018-05-31

## 2018-05-31 DIAGNOSIS — F41.1 GENERALIZED ANXIETY DISORDER: ICD-10-CM

## 2018-05-31 DIAGNOSIS — C50.411 MALIGNANT NEOPLASM OF UPPER-OUTER QUADRANT OF RIGHT BREAST IN FEMALE, ESTROGEN RECEPTOR POSITIVE (HCC): ICD-10-CM

## 2018-05-31 DIAGNOSIS — Z17.0 MALIGNANT NEOPLASM OF UPPER-OUTER QUADRANT OF RIGHT BREAST IN FEMALE, ESTROGEN RECEPTOR POSITIVE (HCC): ICD-10-CM

## 2018-05-31 PROCEDURE — A9585 GADOBUTROL INJECTION: HCPCS | Performed by: SURGERY

## 2018-05-31 PROCEDURE — C8908 MRI W/O FOL W/CONT, BREAST,: HCPCS

## 2018-05-31 RX ORDER — SODIUM CHLORIDE 9 MG/ML
20 INJECTION, SOLUTION INTRAVENOUS CONTINUOUS
Status: DISCONTINUED | OUTPATIENT
Start: 2018-06-01 | End: 2018-06-04 | Stop reason: HOSPADM

## 2018-05-31 RX ORDER — LORAZEPAM 1 MG/1
1 TABLET ORAL
Qty: 30 TABLET | Refills: 0 | OUTPATIENT
Start: 2018-05-31 | End: 2018-07-10 | Stop reason: SDUPTHER

## 2018-05-31 RX ADMIN — GADOBUTROL 7 ML: 604.72 INJECTION INTRAVENOUS at 18:50

## 2018-06-01 ENCOUNTER — HOSPITAL ENCOUNTER (OUTPATIENT)
Dept: INFUSION CENTER | Facility: CLINIC | Age: 38
Discharge: HOME | End: 2018-06-01
Payer: COMMERCIAL

## 2018-06-01 VITALS
WEIGHT: 160 LBS | TEMPERATURE: 98.2 F | SYSTOLIC BLOOD PRESSURE: 104 MMHG | HEART RATE: 87 BPM | OXYGEN SATURATION: 98 % | BODY MASS INDEX: 27.31 KG/M2 | HEIGHT: 64 IN | DIASTOLIC BLOOD PRESSURE: 62 MMHG | RESPIRATION RATE: 16 BRPM

## 2018-06-01 PROCEDURE — 96367 TX/PROPH/DG ADDL SEQ IV INF: CPT

## 2018-06-01 PROCEDURE — 96417 CHEMO IV INFUS EACH ADDL SEQ: CPT

## 2018-06-01 PROCEDURE — 96413 CHEMO IV INFUSION 1 HR: CPT

## 2018-06-01 RX ADMIN — SODIUM CHLORIDE 20 ML/HR: 0.9 INJECTION, SOLUTION INTRAVENOUS at 10:35

## 2018-06-01 RX ADMIN — SODIUM CHLORIDE 150 MG: 0.9 INJECTION, SOLUTION INTRAVENOUS at 11:40

## 2018-06-01 RX ADMIN — FAMOTIDINE 20 MG: 10 INJECTION INTRAVENOUS at 11:15

## 2018-06-01 RX ADMIN — DEXAMETHASONE SODIUM PHOSPHATE: 10 INJECTION, SOLUTION INTRAMUSCULAR; INTRAVENOUS at 10:53

## 2018-06-01 RX ADMIN — Medication 300 UNITS: at 15:35

## 2018-06-01 RX ADMIN — DOCETAXEL 134 MG: 20 INJECTION, SOLUTION, CONCENTRATE INTRAVENOUS at 12:17

## 2018-06-01 RX ADMIN — CARBOPLATIN 747 MG: 10 INJECTION, SOLUTION INTRAVENOUS at 13:24

## 2018-06-01 RX ADMIN — TRASTUZUMAB 450 MG: 150 INJECTION, POWDER, LYOPHILIZED, FOR SOLUTION INTRAVENOUS at 14:30

## 2018-06-01 RX ADMIN — PERTUZUMAB 420 MG: 30 INJECTION, SOLUTION, CONCENTRATE INTRAVENOUS at 15:04

## 2018-06-07 ENCOUNTER — OFFICE VISIT (OUTPATIENT)
Dept: SURGICAL ONCOLOGY | Facility: CLINIC | Age: 38
End: 2018-06-07
Payer: COMMERCIAL

## 2018-06-07 VITALS
WEIGHT: 159.5 LBS | HEART RATE: 96 BPM | SYSTOLIC BLOOD PRESSURE: 110 MMHG | DIASTOLIC BLOOD PRESSURE: 70 MMHG | HEIGHT: 64 IN | TEMPERATURE: 98.1 F | BODY MASS INDEX: 27.23 KG/M2 | RESPIRATION RATE: 14 BRPM

## 2018-06-07 DIAGNOSIS — C50.411 MALIGNANT NEOPLASM OF UPPER-OUTER QUADRANT OF RIGHT BREAST IN FEMALE, ESTROGEN RECEPTOR POSITIVE (HCC): Primary | ICD-10-CM

## 2018-06-07 DIAGNOSIS — Z17.0 MALIGNANT NEOPLASM OF UPPER-OUTER QUADRANT OF RIGHT BREAST IN FEMALE, ESTROGEN RECEPTOR POSITIVE (HCC): Primary | ICD-10-CM

## 2018-06-07 PROCEDURE — 99213 OFFICE O/P EST LOW 20 MIN: CPT | Performed by: SURGERY

## 2018-06-07 RX ORDER — CEPHALEXIN 500 MG/1
CAPSULE ORAL
Status: ON HOLD | COMMUNITY
Start: 2018-06-06 | End: 2018-09-19 | Stop reason: ALTCHOICE

## 2018-06-07 NOTE — LETTER
June 7, 2018     Nahid Brewer MD  [address]  [address] [place] [address]    Patient: Shruthi William   YOB: 1980   Date of Visit: 6/7/2018       Dear Dr Rai Davis:    Thank you for referring John Marrero to me for evaluation  Below are my notes for this consultation  If you have questions, please do not hesitate to call me  I look forward to following your patient along with you  Sincerely,        Eladia Darby MD        CC: No Recipients  Eladia Darby MD  6/7/2018  4:17 PM  Sign at close encounter     Surgical Oncology Follow Up       [address] [place] [address]  1980  [id number]  [address] [facility] [facility]  [facility] [address] [address]    Chief Complaint   Patient presents with    Breast Cancer     pre op consent for bilateral mastectomy    Pre-op Exam          Assessment & Plan:   The patient has had her MRI which demonstrated some decrease in the size of her disease  She still has relatively extensive adenopathy  She has a variant of uncertain clinical significance in the PALB gene  We went through the process of informed consent for a right modified radical mastectomy and a simple left mastectomy all questions were answered the patient's satisfaction  She will undergo reconstruction with Dr Jayme Mortensen     Cancer History:        Malignant neoplasm of upper-outer quadrant of right breast in female, estrogen receptor positive ([address] )    1/19/2018 Initial Diagnosis     Ultrasound guided core biopsy of right breast mass at [facility]   Malignant neoplasm of upper-outer quadrant of right breast in female, estrogen receptor positive ([address] ), Her 2 positive           1/29/2018 Genetic Testing     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           2/2018 -  Chemotherapy     neoadjuvant chemotherapy with [facility] with pertuzumab  Dr Cali Rose              Interval History:   See above patient has no new complaints referable to her breast     Review of Systems:   Review of Systems   All other systems reviewed and are negative  Past Medical History     Patient Active Problem List   Diagnosis    Asthma    Other constipation    Cigarette nicotine dependence without complication    Macular atrophy, retinal    Seasonal allergies    Malignant neoplasm of upper-outer quadrant of right breast in female, estrogen receptor positive ([facility] [address] )     Past Medical History:   Diagnosis Date    Breast cancer ([facility] [address] ) 2018    Seasonal allergies      Past Surgical History:   Procedure Laterality Date     SECTION      PILONIDAL CYST EXCISION      resection    AL INSJ TUNNELED CTR VAD W/SUBQ PORT AGE 5 YR/> Left 2018    Procedure: INSERTION VENOUS PORT ( PORT-A-CATH) IR;  Surgeon: Anil Polanco DO;  Location: [facility];  Service: Interventional Radiology    TUBAL LIGATION       Family History   Problem Relation Age of Onset    Diabetes Mother     Hypertension Mother     Rosacea Father     Allergies Father      seasonal    Liver cancer Paternal Grandfather     Breast cancer Family      Social History     Social History    Marital status: /Civil Union     Spouse name: N/A    Number of children: N/A    Years of education: N/A     Occupational History    Not on file       Social History Main Topics    Smoking status: Former Smoker     Quit date: 2015    Smokeless tobacco: Former User      Comment: current some day smoker per [employer]    Alcohol use Yes      Comment: occasional    Drug use: No    Sexual activity: Yes     Partners: Male     Birth control/ protection: Other     Other Topics Concern    Not on file     Social History Narrative    Drinks coffee           Current Outpatient Prescriptions:     albuterol (VENTOLIN HFA) 90 mcg/act inhaler, Inhale 2 puffs every 4 (four) hours as needed, Disp: , Rfl:     diphenoxylate-atropine (LOMOTIL) 2 5-0 025 mg per tablet, Take 1 tablet by mouth 4 (four) times a day as needed for diarrhea, Disp: 60 tablet, Rfl: 2    levocetirizine (XYZAL) 5 MG tablet, Take 1 tablet by mouth daily, Disp: , Rfl:     lidocaine-prilocaine (EMLA) cream, Apply topically as needed for mild pain, Disp: 30 g, Rfl: 0    LORazepam (ATIVAN) 1 mg tablet, Take 1 tablet (1 mg total) by mouth daily at bedtime, Disp: 30 tablet, Rfl: 0    mometasone (NASONEX) 50 mcg/act nasal spray, 2 sprays into each nostril daily, Disp: , Rfl:     Multiple Vitamins-Minerals (HAIR SKIN AND NAILS FORMULA PO), Take 1 tablet by mouth daily, Disp: , Rfl:     omeprazole (PriLOSEC) 20 mg delayed release capsule, Take 20 mg by mouth, Disp: , Rfl:     ondansetron (ZOFRAN) 4 mg tablet, Take 1 tablet (4 mg total) by mouth every 6 (six) hours as needed for nausea or vomiting, Disp: 30 tablet, Rfl: 1    prochlorperazine (COMPAZINE) 10 mg tablet, Take 1 tablet (10 mg total) by mouth every 6 (six) hours as needed for nausea or vomiting, Disp: 90 tablet, Rfl: 0    pseudoephedrine (SUDAFED) 30 mg tablet, Take 60 mg by mouth every 4 (four) hours as needed for congestion, Disp: , Rfl:     cephalexin (KEFLEX) 500 mg capsule, , Disp: , Rfl:     Misc  Devices MISC, Cold Cap- Apply as directed during chemotherapy  , Disp: 1 each, Rfl: 0    mupirocin (BACTROBAN) 2 % ointment, , Disp: , Rfl:   Allergies   Allergen Reactions    Trichophyton Other (See Comments)     Pt does not know reaction     Cat Hair Extract Sneezing    Dust Mite Extract Sneezing    Molds & Smuts Sneezing    Pollen Extract Sneezing    Tree Extract Sneezing       Physical Exam:     Vitals:    06/07/18 1539   BP: 110/70   Pulse: 96   Resp: 14   Temp: 98 1 °F (36 7 °C)     Physical Exam   Constitutional: She is oriented to person, place, and time  She appears well-developed and well-nourished     HENT:   Head: Normocephalic and atraumatic  Mouth/Throat: Oropharynx is clear and moist    Eyes: EOM are normal  Pupils are equal, round, and reactive to light  Neck: Normal range of motion  Neck supple  No JVD present  No tracheal deviation present  No thyromegaly present  Cardiovascular: Normal rate, regular rhythm, normal heart sounds and intact distal pulses  Exam reveals no gallop and no friction rub  No murmur heard  Pulmonary/Chest: Effort normal and breath sounds normal  No respiratory distress  She has no wheezes  She has no rales  Examination of the right breast demonstrates a dominant mass in the upper outer quadrant as well as right axillary adenopathy  The left breast is normal    Abdominal: Soft  She exhibits no distension and no mass  There is no hepatomegaly  There is no tenderness  There is no rebound and no guarding  Musculoskeletal: Normal range of motion  She exhibits no edema or tenderness  Lymphadenopathy:     She has no cervical adenopathy  Neurological: She is alert and oriented to person, place, and time  No cranial nerve deficit  Skin: Skin is warm and dry  No rash noted  No erythema  Psychiatric: She has a normal mood and affect  Her behavior is normal    Vitals reviewed  Results:   I reviewed her MRI as well as her genetic results  I have discussed them with the patient  Advance Care Planning/Advance Directives:  I discussed the disease status, treatment plans and follow-up with the patient

## 2018-06-07 NOTE — PATIENT INSTRUCTIONS
Pre-Surgery Instructions:   Medication Instructions    albuterol (VENTOLIN HFA) 90 mcg/act inhaler Take morning of surgery    LORazepam (ATIVAN) 1 mg tablet Take morning of surgery    mometasone (NASONEX) 50 mcg/act nasal spray Take morning of surgery    Multiple Vitamins-Minerals (HAIR SKIN AND NAILS FORMULA PO) Stop taking 1 days prior to surgery    omeprazole (PriLOSEC) 20 mg delayed release capsule Stop taking 1 days prior to surgery         Sami-Proctor Drain Care   AMBULATORY CARE:   A Sami-Proctor (KRUPA) drain  is used to remove fluids that build up in an area of your body after surgery  The KRUPA drain is a bulb shaped device connected to a tube  One end of the tube is placed inside you during surgery  The other end comes out through a small cut in your skin  The bulb is connected to this end  You may have a stitch to hold the tube in place  Seek care immediately if:   · Your KRUPA drain breaks or comes out  · You have cloudy yellow or brown drainage from your KRUPA drain site, or the drainage smells bad  Contact your healthcare provider if:   · You drain less than 30 milliliters (2 tablespoons) in 24 hours  This may mean your drain can be removed  · You suddenly stop draining fluid or think your KRUPA drain is blocked  · You have a fever higher than 101 5°F (38 6°C)  · You have increased pain, redness, or swelling around the drain site  · You have questions about your KRUPA drain care  How a Sami-Proctor drain works: The KRUPA drain removes fluids by creating suction in the tube  The bulb is squeezed flat and connected to the tube that sticks out of your body  The bulb expands as it fills with fluid  How to change the bandage around your Sami-Proctor drain:  If you have a bandage, change it once a day  You may need to change your bandage more than once a day if it gets completely wet  · Wash your hands with soap and water  · Loosen the tape and gently remove the old bandage   Throw the old bandage into a plastic trash bag  · Use soap and water or saline (salt water) solution to clean your KRUPA drain site  Dip a cotton swab or gauze pad in the solution and gently clean your skin  · Pat the area dry  · Place a new bandage on your KRUPA drain site and secure it to your skin with medical tape  · Wash your hands  How to empty the Sami-Proctor drain:  Empty the bulb when it is half full or every 8 to 12 hours  · Wash your hands with soap and water  · Remove the plug from the bulb  · Pour the fluid into a measuring cup  · Clean the plug with an alcohol swab or a cotton ball dipped in rubbing alcohol  · Squeeze the bulb flat and put the plug back in  The bulb should stay flat until it starts to fill with fluid again  · Measure the amount of fluid you pour out  Write down how much fluid you empty from the KRUPA drain and the date and time you collected it  · Flush the fluid down the toilet  Wash your hands  Clear clogged tubing: Use the following steps to clear your Sami-Proctor tubing:  · Hold the tubing between your thumb and first finger at the place closest to your skin  This hand will prevent the tube from being pulled out of your skin  · Use your other thumb and first finger to slide the clog down the tubing toward the bulb  You may have to repeat the sliding until the tubing is unclogged  Sami-Proctor drain removal:  The amount of fluid that you drain will decrease as your wound heals  The KRUPA drain usually is removed when less than 30 milliliters (2 tablespoons) is collected in 24 hours  Ask your healthcare provider when and how your KRUPA drain will be removed  Follow up with your healthcare provider as directed:  Write down your questions so you remember to ask them during your visits  © 2017 [address] Information is for End User's use only and may not be sold, redistributed or otherwise used for commercial purposes   All illustrations and images included in CareNotes® are the copyrighted property of A D A M , Inc  or Octaviano De Luna  The above information is an  only  It is not intended as medical advice for individual conditions or treatments  Talk to your doctor, nurse or pharmacist before following any medical regimen to see if it is safe and effective for you  Mastectomy   AMBULATORY CARE:   What you need to know about a mastectomy:  A mastectomy is surgery to remove all or part of your breast  Tissue, lymph nodes, or muscle near the breast may also be removed  A mastectomy is done to treat breast cancer and prevent cancer from spreading  A mastectomy can also be done to prevent breast cancer  This may be a choice if you are at high risk for breast cancer  The type of mastectomy you need may depend on the size of the tumor  It may also depend on if the cancer has spread  How to prepare for a mastectomy:  Your healthcare provider will talk to you about how to prepare for surgery  He may tell you not to eat or drink anything after midnight on the day of your surgery  He will tell you what medicines to take or not take on the day of your surgery  You may need to stop taking aspirin or blood thinners several days before surgery  Arrange for someone to drive you home and stay with you after surgery  This person can help care for you and watch for complications from surgery  What will happen during a mastectomy:   · You will be given general anesthesia to keep you asleep and free from pain during surgery  You may be given an antibiotic through your IV to help prevent a bacterial infection  Your healthcare provider will make an incision over your breast  He will remove the tumor and breast tissue  He may also remove muscle from behind your breast  If lymph nodes will be taken, a small incision will be made in your armpit  The lymph nodes will be removed and tested for cancer       · One or more drains may be inserted near your incision  A drain removes extra fluid and helps your incision heal  Your healthcare provider will close your incision with stitches and cover it with a bandage  He may also wrap a tight-fitting bandage around both of your breasts  This may decrease swelling, bleeding, and pain  What will happen after a mastectomy:  Healthcare providers will monitor you until you are awake  You may need to spend 1 to 2 nights in the hospital  You may have difficulty moving your arm closest to your mastectomy  This should get better in a few days  Get out of bed and walk when your healthcare provider says it is safe  This will help prevent blood clots  Risks of a mastectomy:  You may bleed more than expected or get an infection  Nerves, blood vessels, and muscles may be damaged during your surgery  Blood or fluid may collect under your skin  You may need other procedures to remove the fluid or blood  You may have swelling in your arm closest to the mastectomy or where lymph nodes were removed  This swelling is called lymphedema  Lymphedema may cause tingling, numbness, stiffness, and weakness in your arm  This may be permanent  You may get a blood clot in your arm or leg  The blood clot may travel to your heart, lungs, or brain  This may become life-threatening  Call 911 for any of the following:   · You feel lightheaded, short of breath, and have chest pain  · You cough up blood  · You have trouble breathing  Seek care immediately if:   · Blood soaks through your bandage  · Your stitches come apart  · Your bruise suddenly gets bigger  · Your leg or arm is larger than normal and painful  Contact your healthcare provider if:   · You have a fever or chills  · Your wound is red, swollen, or draining pus  · You have nausea or are vomiting  · Your skin is itchy, swollen, or you have a rash  · Your pain does not get better after you take pain medicine       · Your drain falls out or stops draining fluid      · Your drain has pus or foul-smelling fluid coming out of it  · You have numbness, tingling, or swelling in your arm or hand  · You feel very sad or anxious  · You have trouble coping with your condition  · You have questions or concerns about your condition or care  Medicines: You may need any of the following:  · Antibiotics  help prevent a bacterial infection  · Prescription pain medicine  may be given  Ask your healthcare provider how to take this medicine safely  Some prescription pain medicines contain acetaminophen  Do not take other medicines that contain acetaminophen without talking to your healthcare provider  Too much acetaminophen may cause liver damage  Prescription pain medicine may cause constipation  Ask your healthcare provider how to prevent or treat constipation  · NSAIDs , such as ibuprofen, help decrease swelling, pain, and fever  NSAIDs can cause stomach bleeding or kidney problems in certain people  If you take blood thinner medicine, always ask your healthcare provider if NSAIDs are safe for you  Always read the medicine label and follow directions  · Take your medicine as directed  Contact your healthcare provider if you think your medicine is not helping or if you have side effects  Tell him or her if you are allergic to any medicine  Keep a list of the medicines, vitamins, and herbs you take  Include the amounts, and when and why you take them  Bring the list or the pill bottles to follow-up visits  Carry your medicine list with you in case of an emergency  Care for your wound as directed: If you have a tight-fitting bandage, you can remove it in 24 to 48 hours, or as directed  Ask your healthcare provider when your incision can get wet  You may need to take a sponge bath until your drain is removed  Carefully wash around the incision with soap and water  It is okay to allow the soap and water to gently run over your incision   Gently pat dry the area and put on new, clean bandages as directed  Change your bandages when they get wet or dirty  If lymph nodes were removed from your armpit, ask your healthcare provider when you can wear deodorant  Check your incision every day for redness, pus, or swelling  Self-care:   · Apply ice  on your incision for 15 to 20 minutes every hour or as directed  Use an ice pack, or put crushed ice in a plastic bag  Cover it with a towel  Ice helps prevent tissue damage and decreases swelling and pain  · Elevate  your arm nearest to your incision above the level of your heart  Do this as often as you can  This will help decrease swelling and pain  Prop your arm on pillows or blankets to keep it elevated comfortably  · Rest  as directed  Do not lift anything heavier than 5 pounds  Do not push or pull with your arms  You can use your arms to groom, eat, and bathe  Take short walks around the house  Gradually walk further as you feel better  Ask your healthcare provider when you can return to your normal activities  · Do not sleep on your stomach  This will put too much pressure on your incision  Sleep on your back or on the side opposite to your incision  · Empty your drain  as directed  You may need to write down how much fluid you empty from your drain each day  Ask your healthcare provider for more information about how to empty your drain  · Wear a supportive bra  as directed  Wait until you remove the tight-fitting bandage to wear a bra  You may be given a surgical bra or told to wear a sports bra  A supportive bra may help hold your bandages in place  It may also help with swelling and pain  Do not  wear bras with lace or underwire  They may rub against your incision and cause discomfort  Arm stretches: Your healthcare provider may show you how to do arm stretches  Arm stretches may prevent stiff arms or shoulders  You may need to wait until after your drains are removed to begin stretching   Do not do arm stretches until your healthcare provider says it is okay  Ask your healthcare provider for more information about arm stretches  More information and support: You may have difficulty coping with the changes to your body  Talk to your family or friends about how you are feeling  Ask your healthcare provider about support groups  It may be helpful to talk with other women who have had a mastectomy  · [address]  [address]  [facility]   [facility]  Phone: [phone]  Web Address: http://"[employer]"/  [employer]  · [address] [place] [address]  Phone: 7- [phone]  Web Address: http://"[employer]"/  gov  Follow up with your healthcare provider as directed:  Write down your questions so you remember to ask them during your visits  © 2017 [address] [place] [address] Information is for End User's use only and may not be sold, redistributed or otherwise used for commercial purposes  All illustrations and images included in CareNotes® are the copyrighted property of A D A M , Inc  or Octavaino De Luna  The above information is an  only  It is not intended as medical advice for individual conditions or treatments  Talk to your doctor, nurse or pharmacist before following any medical regimen to see if it is safe and effective for you

## 2018-06-07 NOTE — PROGRESS NOTES
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  We went through the process of informed consent for a right modified radical mastectomy and a simple left mastectomy all questions were answered the patient's satisfaction  She will undergo reconstruction with Dr Oj Correia     Cancer History:        Malignant neoplasm of upper-outer quadrant of right breast in female, estrogen receptor positive ([address] )    1/19/2018 Initial Diagnosis     Ultrasound guided core biopsy of right breast mass at [facility]   Malignant neoplasm of upper-outer quadrant of right breast in female, estrogen receptor positive ([address] ), Her 2 positive           1/29/2018 Genetic Testing     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  2/2018 -  Chemotherapy     neoadjuvant chemotherapy with TCH with pertuzumab  Dr Abimbola Austin              Interval History:   See above patient has no new complaints referable to her breast     Review of Systems:   Review of Systems   All other systems reviewed and are negative        Past Medical History     Patient Active Problem List   Diagnosis    Asthma    Other constipation    Cigarette nicotine dependence without complication    Macular atrophy, retinal    Seasonal allergies    Malignant neoplasm of upper-outer quadrant of right breast in female, estrogen receptor positive [facility])     Past Medical History:   Diagnosis Date    Breast cancer ([address] ) 2018    Seasonal allergies      Past Surgical History:   Procedure Laterality Date     SECTION      PILONIDAL CYST EXCISION      resection    LA INSJ TUNNELED CTR VAD W/SUBQ PORT AGE 5 YR/> Left 2018    Procedure: INSERTION VENOUS PORT ( PORT-A-CATH) IR;  Surgeon: Radha Whitaker DO;  Location: [facility];  Service: Interventional Radiology    TUBAL LIGATION       Family History   Problem Relation Age of Onset    Diabetes Mother     Hypertension Mother     Rosacea Father     Allergies Father      seasonal    Liver cancer Paternal Grandfather     Breast cancer Family      Social History     Social History    Marital status: /Civil Union     Spouse name: N/A    Number of children: N/A    Years of education: N/A     Occupational History    Not on file       Social History Main Topics    Smoking status: Former Smoker     Quit date: 2015    Smokeless tobacco: Former User      Comment: current some day smoker per [employer]    Alcohol use Yes      Comment: occasional    Drug use: No    Sexual activity: Yes     Partners: Male     Birth control/ protection: Other     Other Topics Concern    Not on file     Social History Narrative    Drinks coffee           Current Outpatient Prescriptions:     albuterol (VENTOLIN HFA) 90 mcg/act inhaler, Inhale 2 puffs every 4 (four) hours as needed, Disp: , Rfl:     diphenoxylate-atropine (LOMOTIL) 2 5-0 025 mg per tablet, Take 1 tablet by mouth 4 (four) times a day as needed for diarrhea, Disp: 60 tablet, Rfl: 2    levocetirizine (XYZAL) 5 MG tablet, Take 1 tablet by mouth daily, Disp: , Rfl:     lidocaine-prilocaine (EMLA) cream, Apply topically as needed for mild pain, Disp: 30 g, Rfl: 0    LORazepam (ATIVAN) 1 mg tablet, Take 1 tablet (1 mg total) by mouth daily at bedtime, Disp: 30 tablet, Rfl: 0    mometasone (NASONEX) 50 mcg/act nasal spray, 2 sprays into each nostril daily, Disp: , Rfl:     Multiple Vitamins-Minerals (HAIR SKIN AND NAILS FORMULA PO), Take 1 tablet by mouth daily, Disp: , Rfl:     omeprazole (PriLOSEC) 20 mg delayed release capsule, Take 20 mg by mouth, Disp: , Rfl:     ondansetron (ZOFRAN) 4 mg tablet, Take 1 tablet (4 mg total) by mouth every 6 (six) hours as needed for nausea or vomiting, Disp: 30 tablet, Rfl: 1    prochlorperazine (COMPAZINE) 10 mg tablet, Take 1 tablet (10 mg total) by mouth every 6 (six) hours as needed for nausea or vomiting, Disp: 90 tablet, Rfl: 0    pseudoephedrine (SUDAFED) 30 mg tablet, Take 60 mg by mouth every 4 (four) hours as needed for congestion, Disp: , Rfl:     cephalexin (KEFLEX) 500 mg capsule, , Disp: , Rfl:     Misc  Devices MISC, Cold Cap- Apply as directed during chemotherapy  , Disp: 1 each, Rfl: 0    mupirocin (BACTROBAN) 2 % ointment, , Disp: , Rfl:   Allergies   Allergen Reactions    Trichophyton Other (See Comments)     Pt does not know reaction     Cat Hair Extract Sneezing    Dust Mite Extract Sneezing    Molds & Smuts Sneezing    Pollen Extract Sneezing    Tree Extract Sneezing       Physical Exam:     Vitals:    06/07/18 1539   BP: 110/70   Pulse: 96   Resp: 14   Temp: 98 1 °F (36 7 °C)     Physical Exam   Constitutional: She is oriented to person, place, and time  She appears well-developed and well-nourished  HENT:   Head: Normocephalic and atraumatic  Mouth/Throat: Oropharynx is clear and moist    Eyes: EOM are normal  Pupils are equal, round, and reactive to light  Neck: Normal range of motion  Neck supple  No JVD present  No tracheal deviation present  No thyromegaly present  Cardiovascular: Normal rate, regular rhythm, normal heart sounds and intact distal pulses  Exam reveals no gallop and no friction rub  No murmur heard  Pulmonary/Chest: Effort normal and breath sounds normal  No respiratory distress  She has no wheezes  She has no rales     Examination of the right breast demonstrates a dominant mass in the upper outer quadrant as well as right axillary adenopathy  The left breast is normal    Abdominal: Soft  She exhibits no distension and no mass  There is no hepatomegaly  There is no tenderness  There is no rebound and no guarding  Musculoskeletal: Normal range of motion  She exhibits no edema or tenderness  Lymphadenopathy:     She has no cervical adenopathy  Neurological: She is alert and oriented to person, place, and time  No cranial nerve deficit  Skin: Skin is warm and dry  No rash noted  No erythema  Psychiatric: She has a normal mood and affect  Her behavior is normal    Vitals reviewed  Results:   I reviewed her MRI as well as her genetic results  I have discussed them with the patient  Advance Care Planning/Advance Directives:  I discussed the disease status, treatment plans and follow-up with the patient

## 2018-06-11 NOTE — PRE-PROCEDURE INSTRUCTIONS
Pre-Surgery Instructions:   Medication Instructions    albuterol (VENTOLIN HFA) 90 mcg/act inhaler Instructed patient per Anesthesia Guidelines   diphenoxylate-atropine (LOMOTIL) 2 5-0 025 mg per tablet Instructed patient per Anesthesia Guidelines   levocetirizine (XYZAL) 5 MG tablet Instructed patient per Anesthesia Guidelines   lidocaine-prilocaine (EMLA) cream Instructed patient per Anesthesia Guidelines   LORazepam (ATIVAN) 1 mg tablet Instructed patient per Anesthesia Guidelines   mometasone (NASONEX) 50 mcg/act nasal spray Instructed patient per Anesthesia Guidelines   Multiple Vitamins-Minerals (HAIR SKIN AND NAILS FORMULA PO) Patient was instructed by Physician and understands   omeprazole (PriLOSEC) 20 mg delayed release capsule Instructed patient per Anesthesia Guidelines   ondansetron (ZOFRAN) 4 mg tablet Instructed patient per Anesthesia Guidelines   prochlorperazine (COMPAZINE) 10 mg tablet Instructed patient per Anesthesia Guidelines   pseudoephedrine (SUDAFED) 30 mg tablet Instructed patient per Anesthesia Guidelines  Pre op and bathing instructions reviewed   Pt has hibiclens

## 2018-06-12 ENCOUNTER — HOSPITAL ENCOUNTER (OUTPATIENT)
Dept: RADIOLOGY | Facility: HOSPITAL | Age: 38
Discharge: HOME | End: 2018-06-12
Attending: SURGERY
Payer: COMMERCIAL

## 2018-06-12 ENCOUNTER — TRANSCRIBE ORDERS (OUTPATIENT)
Dept: RADIOLOGY | Facility: HOSPITAL | Age: 38
End: 2018-06-12

## 2018-06-12 ENCOUNTER — TRANSCRIBE ORDERS (OUTPATIENT)
Dept: LAB | Facility: CLINIC | Age: 38
End: 2018-06-12

## 2018-06-12 ENCOUNTER — APPOINTMENT (OUTPATIENT)
Dept: LAB | Facility: CLINIC | Age: 38
End: 2018-06-12
Payer: COMMERCIAL

## 2018-06-12 DIAGNOSIS — Z17.0 MALIGNANT NEOPLASM OF UPPER-OUTER QUADRANT OF RIGHT BREAST IN FEMALE, ESTROGEN RECEPTOR POSITIVE (HCC): ICD-10-CM

## 2018-06-12 DIAGNOSIS — C50.411 MALIGNANT NEOPLASM OF UPPER-OUTER QUADRANT OF RIGHT BREAST IN FEMALE, ESTROGEN RECEPTOR POSITIVE (HCC): ICD-10-CM

## 2018-06-12 LAB
ALBUMIN SERPL BCP-MCNC: 3.8 G/DL (ref 3.5–5)
ALP SERPL-CCNC: 84 U/L (ref 46–116)
ALT SERPL W P-5'-P-CCNC: 33 U/L (ref 12–78)
ANION GAP SERPL CALCULATED.3IONS-SCNC: 10 MMOL/L (ref 4–13)
AST SERPL W P-5'-P-CCNC: 24 U/L (ref 5–45)
BASOPHILS # BLD AUTO: 0.01 THOUSANDS/ΜL (ref 0–0.1)
BASOPHILS NFR BLD AUTO: 1 % (ref 0–1)
BILIRUB SERPL-MCNC: 0.2 MG/DL (ref 0.2–1)
BUN SERPL-MCNC: 12 MG/DL (ref 5–25)
CALCIUM SERPL-MCNC: 8.4 MG/DL (ref 8.3–10.1)
CHLORIDE SERPL-SCNC: 103 MMOL/L (ref 100–108)
CO2 SERPL-SCNC: 27 MMOL/L (ref 21–32)
CREAT SERPL-MCNC: 0.94 MG/DL (ref 0.6–1.3)
EOSINOPHIL # BLD AUTO: 0.01 THOUSAND/ΜL (ref 0–0.61)
EOSINOPHIL NFR BLD AUTO: 1 % (ref 0–6)
ERYTHROCYTE [DISTWIDTH] IN BLOOD BY AUTOMATED COUNT: 16.3 % (ref 11.6–15.1)
GFR SERPL CREATININE-BSD FRML MDRD: 78 ML/MIN/1.73SQ M
GLUCOSE SERPL-MCNC: 99 MG/DL (ref 65–140)
HCT VFR BLD AUTO: 29.4 % (ref 34.8–46.1)
HGB BLD-MCNC: 9.8 G/DL (ref 11.5–15.4)
LYMPHOCYTES # BLD AUTO: 1.49 THOUSANDS/ΜL (ref 0.6–4.47)
LYMPHOCYTES NFR BLD AUTO: 71 % (ref 14–44)
MCH RBC QN AUTO: 32.9 PG (ref 26.8–34.3)
MCHC RBC AUTO-ENTMCNC: 33.3 G/DL (ref 31.4–37.4)
MCV RBC AUTO: 99 FL (ref 82–98)
MONOCYTES # BLD AUTO: 0.49 THOUSAND/ΜL (ref 0.17–1.22)
MONOCYTES NFR BLD AUTO: 23 % (ref 4–12)
NEUTROPHILS # BLD AUTO: 0.09 THOUSANDS/ΜL (ref 1.85–7.62)
NEUTS SEG NFR BLD AUTO: 4 % (ref 43–75)
PLATELET # BLD AUTO: 206 THOUSANDS/UL (ref 149–390)
PMV BLD AUTO: 8.9 FL (ref 8.9–12.7)
POTASSIUM SERPL-SCNC: 3.2 MMOL/L (ref 3.5–5.3)
PROT SERPL-MCNC: 7.8 G/DL (ref 6.4–8.2)
RBC # BLD AUTO: 2.98 MILLION/UL (ref 3.81–5.12)
SODIUM SERPL-SCNC: 140 MMOL/L (ref 136–145)
WBC # BLD AUTO: 2.09 THOUSAND/UL (ref 4.31–10.16)

## 2018-06-12 PROCEDURE — 36415 COLL VENOUS BLD VENIPUNCTURE: CPT

## 2018-06-12 PROCEDURE — 80053 COMPREHEN METABOLIC PANEL: CPT

## 2018-06-12 PROCEDURE — 71046 X-RAY EXAM CHEST 2 VIEWS: CPT

## 2018-06-12 PROCEDURE — 85025 COMPLETE CBC W/AUTO DIFF WBC: CPT

## 2018-06-20 ENCOUNTER — ANESTHESIA EVENT (OUTPATIENT)
Dept: PERIOP | Facility: HOSPITAL | Age: 38
DRG: 582 | End: 2018-06-20
Payer: COMMERCIAL

## 2018-06-21 ENCOUNTER — ANESTHESIA (OUTPATIENT)
Dept: PERIOP | Facility: HOSPITAL | Age: 38
DRG: 582 | End: 2018-06-21
Payer: COMMERCIAL

## 2018-06-21 ENCOUNTER — CONVERSION ENCOUNTER (OUTPATIENT)
Dept: MAMMOGRAPHY | Facility: HOSPITAL | Age: 38
End: 2018-06-21

## 2018-06-21 ENCOUNTER — HOSPITAL ENCOUNTER (INPATIENT)
Facility: HOSPITAL | Age: 38
LOS: 1 days | Discharge: HOME | DRG: 582 | End: 2018-06-22
Attending: SURGERY | Admitting: PLASTIC SURGERY
Payer: COMMERCIAL

## 2018-06-21 DIAGNOSIS — Z17.0 MALIGNANT NEOPLASM OF UPPER-OUTER QUADRANT OF RIGHT BREAST IN FEMALE, ESTROGEN RECEPTOR POSITIVE (HCC): Primary | ICD-10-CM

## 2018-06-21 DIAGNOSIS — C50.411 MALIGNANT NEOPLASM OF UPPER-OUTER QUADRANT OF RIGHT BREAST IN FEMALE, ESTROGEN RECEPTOR POSITIVE (HCC): Primary | ICD-10-CM

## 2018-06-21 PROCEDURE — 88341 IMHCHEM/IMCYTCHM EA ADD ANTB: CPT | Performed by: PATHOLOGY

## 2018-06-21 PROCEDURE — 94664 DEMO&/EVAL PT USE INHALER: CPT

## 2018-06-21 PROCEDURE — 19303 MAST SIMPLE COMPLETE: CPT | Performed by: PHYSICIAN ASSISTANT

## 2018-06-21 PROCEDURE — 19307 MAST MOD RAD: CPT | Performed by: SURGERY

## 2018-06-21 PROCEDURE — 19303 MAST SIMPLE COMPLETE: CPT | Performed by: SURGERY

## 2018-06-21 PROCEDURE — 88361 TUMOR IMMUNOHISTOCHEM/COMPUT: CPT | Performed by: PATHOLOGY

## 2018-06-21 PROCEDURE — 0HRV0JZ REPLACEMENT OF BILATERAL BREAST WITH SYNTHETIC SUBSTITUTE, OPEN APPROACH: ICD-10-PCS | Performed by: SURGERY

## 2018-06-21 PROCEDURE — 19307 MAST MOD RAD: CPT | Performed by: PHYSICIAN ASSISTANT

## 2018-06-21 PROCEDURE — 88342 IMHCHEM/IMCYTCHM 1ST ANTB: CPT | Performed by: PATHOLOGY

## 2018-06-21 PROCEDURE — C1781 MESH (IMPLANTABLE): HCPCS | Performed by: SURGERY

## 2018-06-21 PROCEDURE — 88307 TISSUE EXAM BY PATHOLOGIST: CPT | Performed by: PATHOLOGY

## 2018-06-21 PROCEDURE — C1789 PROSTHESIS, BREAST, IMP: HCPCS | Performed by: SURGERY

## 2018-06-21 DEVICE — SMOOTH HIGH PROFILE XTRA 700CC  SMOOTH ROUND SILICONE
Type: IMPLANTABLE DEVICE | Site: BREAST | Status: FUNCTIONAL
Brand: MENTOR MEMORYGEL XTRA BREAST IMPLANT

## 2018-06-21 DEVICE — (128SQ CM) IMPLANT FLEXHD PLIABLE BRST 8 X 16CM 0.7-1.4MM THCK: Type: IMPLANTABLE DEVICE | Site: BREAST | Status: FUNCTIONAL

## 2018-06-21 RX ORDER — PROPOFOL 10 MG/ML
INJECTION, EMULSION INTRAVENOUS AS NEEDED
Status: DISCONTINUED | OUTPATIENT
Start: 2018-06-21 | End: 2018-06-21 | Stop reason: SURG

## 2018-06-21 RX ORDER — ONDANSETRON 2 MG/ML
INJECTION INTRAMUSCULAR; INTRAVENOUS AS NEEDED
Status: DISCONTINUED | OUTPATIENT
Start: 2018-06-21 | End: 2018-06-21 | Stop reason: SURG

## 2018-06-21 RX ORDER — SODIUM CHLORIDE 9 MG/ML
INJECTION, SOLUTION INTRAVENOUS CONTINUOUS PRN
Status: DISCONTINUED | OUTPATIENT
Start: 2018-06-21 | End: 2018-06-21 | Stop reason: SURG

## 2018-06-21 RX ORDER — MEPERIDINE HYDROCHLORIDE 25 MG/ML
12.5 INJECTION INTRAMUSCULAR; INTRAVENOUS; SUBCUTANEOUS
Status: DISCONTINUED | OUTPATIENT
Start: 2018-06-21 | End: 2018-06-21 | Stop reason: HOSPADM

## 2018-06-21 RX ORDER — PROMETHAZINE HYDROCHLORIDE 25 MG/1
25 TABLET ORAL EVERY 6 HOURS PRN
Status: DISCONTINUED | OUTPATIENT
Start: 2018-06-21 | End: 2018-06-22 | Stop reason: HOSPADM

## 2018-06-21 RX ORDER — BUPIVACAINE HYDROCHLORIDE AND EPINEPHRINE 2.5; 5 MG/ML; UG/ML
INJECTION, SOLUTION EPIDURAL; INFILTRATION; INTRACAUDAL; PERINEURAL AS NEEDED
Status: DISCONTINUED | OUTPATIENT
Start: 2018-06-21 | End: 2018-06-21 | Stop reason: HOSPADM

## 2018-06-21 RX ORDER — DOCUSATE SODIUM 100 MG/1
100 CAPSULE, LIQUID FILLED ORAL 2 TIMES DAILY
Status: DISCONTINUED | OUTPATIENT
Start: 2018-06-21 | End: 2018-06-21

## 2018-06-21 RX ORDER — DOCUSATE SODIUM 100 MG/1
100 CAPSULE, LIQUID FILLED ORAL 2 TIMES DAILY
Qty: 10 CAPSULE | Refills: 0 | Status: CANCELLED | OUTPATIENT
Start: 2018-06-21

## 2018-06-21 RX ORDER — PROMETHAZINE HYDROCHLORIDE 25 MG/ML
25 INJECTION, SOLUTION INTRAMUSCULAR; INTRAVENOUS ONCE AS NEEDED
Status: DISCONTINUED | OUTPATIENT
Start: 2018-06-21 | End: 2018-06-21 | Stop reason: HOSPADM

## 2018-06-21 RX ORDER — HYDROMORPHONE HYDROCHLORIDE 2 MG/ML
INJECTION, SOLUTION INTRAMUSCULAR; INTRAVENOUS; SUBCUTANEOUS AS NEEDED
Status: DISCONTINUED | OUTPATIENT
Start: 2018-06-21 | End: 2018-06-21 | Stop reason: SURG

## 2018-06-21 RX ORDER — OXYCODONE HYDROCHLORIDE AND ACETAMINOPHEN 5; 325 MG/1; MG/1
2 TABLET ORAL EVERY 4 HOURS PRN
Status: DISCONTINUED | OUTPATIENT
Start: 2018-06-21 | End: 2018-06-22 | Stop reason: HOSPADM

## 2018-06-21 RX ORDER — GLYCOPYRROLATE 0.2 MG/ML
INJECTION INTRAMUSCULAR; INTRAVENOUS AS NEEDED
Status: DISCONTINUED | OUTPATIENT
Start: 2018-06-21 | End: 2018-06-21

## 2018-06-21 RX ORDER — ROCURONIUM BROMIDE 10 MG/ML
INJECTION, SOLUTION INTRAVENOUS AS NEEDED
Status: DISCONTINUED | OUTPATIENT
Start: 2018-06-21 | End: 2018-06-21 | Stop reason: SURG

## 2018-06-21 RX ORDER — ONDANSETRON 2 MG/ML
4 INJECTION INTRAMUSCULAR; INTRAVENOUS EVERY 6 HOURS PRN
Status: DISCONTINUED | OUTPATIENT
Start: 2018-06-21 | End: 2018-06-22 | Stop reason: HOSPADM

## 2018-06-21 RX ORDER — FENTANYL CITRATE/PF 50 MCG/ML
25 SYRINGE (ML) INJECTION
Status: COMPLETED | OUTPATIENT
Start: 2018-06-21 | End: 2018-06-21

## 2018-06-21 RX ORDER — SCOLOPAMINE TRANSDERMAL SYSTEM 1 MG/1
PATCH, EXTENDED RELEASE TRANSDERMAL
Status: COMPLETED
Start: 2018-06-21 | End: 2018-06-21

## 2018-06-21 RX ORDER — SODIUM CHLORIDE 9 MG/ML
100 INJECTION, SOLUTION INTRAVENOUS CONTINUOUS
Status: DISCONTINUED | OUTPATIENT
Start: 2018-06-21 | End: 2018-06-21 | Stop reason: HOSPADM

## 2018-06-21 RX ORDER — LORATADINE 10 MG/1
10 TABLET ORAL DAILY
Status: DISCONTINUED | OUTPATIENT
Start: 2018-06-21 | End: 2018-06-22 | Stop reason: HOSPADM

## 2018-06-21 RX ORDER — MORPHINE SULFATE 4 MG/ML
3 INJECTION, SOLUTION INTRAMUSCULAR; INTRAVENOUS
Status: DISCONTINUED | OUTPATIENT
Start: 2018-06-21 | End: 2018-06-22 | Stop reason: HOSPADM

## 2018-06-21 RX ORDER — FENTANYL CITRATE 50 UG/ML
INJECTION, SOLUTION INTRAMUSCULAR; INTRAVENOUS AS NEEDED
Status: DISCONTINUED | OUTPATIENT
Start: 2018-06-21 | End: 2018-06-21 | Stop reason: SURG

## 2018-06-21 RX ORDER — SUCCINYLCHOLINE CHLORIDE 20 MG/ML
INJECTION INTRAMUSCULAR; INTRAVENOUS AS NEEDED
Status: DISCONTINUED | OUTPATIENT
Start: 2018-06-21 | End: 2018-06-21 | Stop reason: SURG

## 2018-06-21 RX ORDER — DIPHENHYDRAMINE HCL 25 MG
50 TABLET ORAL EVERY 6 HOURS PRN
Status: DISCONTINUED | OUTPATIENT
Start: 2018-06-21 | End: 2018-06-22 | Stop reason: HOSPADM

## 2018-06-21 RX ORDER — DIPHENOXYLATE HYDROCHLORIDE AND ATROPINE SULFATE 2.5; .025 MG/1; MG/1
1 TABLET ORAL 4 TIMES DAILY PRN
Status: DISCONTINUED | OUTPATIENT
Start: 2018-06-21 | End: 2018-06-22 | Stop reason: HOSPADM

## 2018-06-21 RX ORDER — PANTOPRAZOLE SODIUM 20 MG/1
20 TABLET, DELAYED RELEASE ORAL
Status: DISCONTINUED | OUTPATIENT
Start: 2018-06-22 | End: 2018-06-22 | Stop reason: HOSPADM

## 2018-06-21 RX ORDER — PROCHLORPERAZINE MALEATE 10 MG
10 TABLET ORAL EVERY 6 HOURS PRN
Status: DISCONTINUED | OUTPATIENT
Start: 2018-06-21 | End: 2018-06-22 | Stop reason: HOSPADM

## 2018-06-21 RX ORDER — MIDAZOLAM HYDROCHLORIDE 1 MG/ML
INJECTION INTRAMUSCULAR; INTRAVENOUS AS NEEDED
Status: DISCONTINUED | OUTPATIENT
Start: 2018-06-21 | End: 2018-06-21 | Stop reason: SURG

## 2018-06-21 RX ORDER — LORAZEPAM 2 MG/ML
0.5 INJECTION INTRAMUSCULAR
Status: COMPLETED | OUTPATIENT
Start: 2018-06-21 | End: 2018-06-21

## 2018-06-21 RX ORDER — SODIUM CHLORIDE, SODIUM LACTATE, POTASSIUM CHLORIDE, CALCIUM CHLORIDE 600; 310; 30; 20 MG/100ML; MG/100ML; MG/100ML; MG/100ML
INJECTION, SOLUTION INTRAVENOUS CONTINUOUS PRN
Status: DISCONTINUED | OUTPATIENT
Start: 2018-06-21 | End: 2018-06-21 | Stop reason: SURG

## 2018-06-21 RX ORDER — SCOLOPAMINE TRANSDERMAL SYSTEM 1 MG/1
1 PATCH, EXTENDED RELEASE TRANSDERMAL ONCE
Status: COMPLETED | OUTPATIENT
Start: 2018-06-21 | End: 2018-06-21

## 2018-06-21 RX ORDER — FLUTICASONE PROPIONATE 50 MCG
1 SPRAY, SUSPENSION (ML) NASAL DAILY
Status: DISCONTINUED | OUTPATIENT
Start: 2018-06-21 | End: 2018-06-22 | Stop reason: HOSPADM

## 2018-06-21 RX ORDER — METOCLOPRAMIDE HYDROCHLORIDE 5 MG/ML
10 INJECTION INTRAMUSCULAR; INTRAVENOUS ONCE AS NEEDED
Status: DISCONTINUED | OUTPATIENT
Start: 2018-06-21 | End: 2018-06-21 | Stop reason: HOSPADM

## 2018-06-21 RX ORDER — DOCUSATE SODIUM 100 MG/1
100 CAPSULE, LIQUID FILLED ORAL 2 TIMES DAILY
Status: DISCONTINUED | OUTPATIENT
Start: 2018-06-21 | End: 2018-06-22 | Stop reason: HOSPADM

## 2018-06-21 RX ORDER — SODIUM CHLORIDE, SODIUM LACTATE, POTASSIUM CHLORIDE, CALCIUM CHLORIDE 600; 310; 30; 20 MG/100ML; MG/100ML; MG/100ML; MG/100ML
100 INJECTION, SOLUTION INTRAVENOUS CONTINUOUS
Status: DISCONTINUED | OUTPATIENT
Start: 2018-06-21 | End: 2018-06-22 | Stop reason: HOSPADM

## 2018-06-21 RX ORDER — ALBUTEROL SULFATE 90 UG/1
2 AEROSOL, METERED RESPIRATORY (INHALATION) EVERY 4 HOURS PRN
Status: DISCONTINUED | OUTPATIENT
Start: 2018-06-21 | End: 2018-06-22 | Stop reason: HOSPADM

## 2018-06-21 RX ORDER — LIDOCAINE HYDROCHLORIDE 10 MG/ML
INJECTION, SOLUTION INFILTRATION; PERINEURAL AS NEEDED
Status: DISCONTINUED | OUTPATIENT
Start: 2018-06-21 | End: 2018-06-21 | Stop reason: SURG

## 2018-06-21 RX ADMIN — SODIUM CHLORIDE, SODIUM LACTATE, POTASSIUM CHLORIDE, AND CALCIUM CHLORIDE: .6; .31; .03; .02 INJECTION, SOLUTION INTRAVENOUS at 11:16

## 2018-06-21 RX ADMIN — LIDOCAINE HYDROCHLORIDE 50 MG: 10 INJECTION, SOLUTION INFILTRATION; PERINEURAL at 07:44

## 2018-06-21 RX ADMIN — SODIUM CHLORIDE, SODIUM LACTATE, POTASSIUM CHLORIDE, AND CALCIUM CHLORIDE 100 ML/HR: .6; .31; .03; .02 INJECTION, SOLUTION INTRAVENOUS at 16:17

## 2018-06-21 RX ADMIN — MORPHINE SULFATE 3 MG: 4 INJECTION, SOLUTION INTRAMUSCULAR; INTRAVENOUS at 16:16

## 2018-06-21 RX ADMIN — MORPHINE SULFATE 3 MG: 4 INJECTION, SOLUTION INTRAMUSCULAR; INTRAVENOUS at 18:41

## 2018-06-21 RX ADMIN — FLUTICASONE PROPIONATE 1 SPRAY: 50 SPRAY, METERED NASAL at 17:50

## 2018-06-21 RX ADMIN — SODIUM CHLORIDE: 0.9 INJECTION, SOLUTION INTRAVENOUS at 10:21

## 2018-06-21 RX ADMIN — PROPOFOL 50 MG: 10 INJECTION, EMULSION INTRAVENOUS at 07:47

## 2018-06-21 RX ADMIN — PROPOFOL 150 MG: 10 INJECTION, EMULSION INTRAVENOUS at 07:44

## 2018-06-21 RX ADMIN — MORPHINE SULFATE 3 MG: 4 INJECTION, SOLUTION INTRAMUSCULAR; INTRAVENOUS at 21:08

## 2018-06-21 RX ADMIN — FENTANYL CITRATE 25 MCG: 50 INJECTION INTRAMUSCULAR; INTRAVENOUS at 13:59

## 2018-06-21 RX ADMIN — FENTANYL CITRATE 25 MCG: 50 INJECTION INTRAMUSCULAR; INTRAVENOUS at 14:08

## 2018-06-21 RX ADMIN — MIDAZOLAM HYDROCHLORIDE 2 MG: 1 INJECTION, SOLUTION INTRAMUSCULAR; INTRAVENOUS at 07:37

## 2018-06-21 RX ADMIN — SODIUM CHLORIDE: 0.9 INJECTION, SOLUTION INTRAVENOUS at 07:48

## 2018-06-21 RX ADMIN — ONDANSETRON 4 MG: 2 INJECTION INTRAMUSCULAR; INTRAVENOUS at 07:37

## 2018-06-21 RX ADMIN — FENTANYL CITRATE 25 MCG: 50 INJECTION INTRAMUSCULAR; INTRAVENOUS at 13:56

## 2018-06-21 RX ADMIN — SODIUM CHLORIDE: 0.9 INJECTION, SOLUTION INTRAVENOUS at 07:25

## 2018-06-21 RX ADMIN — SCOPALAMINE 1 PATCH: 1 PATCH, EXTENDED RELEASE TRANSDERMAL at 07:30

## 2018-06-21 RX ADMIN — ROCURONIUM BROMIDE 10 MG: 10 INJECTION INTRAVENOUS at 07:44

## 2018-06-21 RX ADMIN — HYDROMORPHONE HYDROCHLORIDE 0.5 MG: 1 INJECTION, SOLUTION INTRAMUSCULAR; INTRAVENOUS; SUBCUTANEOUS at 14:43

## 2018-06-21 RX ADMIN — FENTANYL CITRATE 25 MCG: 50 INJECTION INTRAMUSCULAR; INTRAVENOUS at 14:03

## 2018-06-21 RX ADMIN — MORPHINE SULFATE 3 MG: 4 INJECTION, SOLUTION INTRAMUSCULAR; INTRAVENOUS at 23:24

## 2018-06-21 RX ADMIN — Medication 1000 MG: at 11:49

## 2018-06-21 RX ADMIN — HYDROMORPHONE HYDROCHLORIDE 0.2 MG: 1 INJECTION, SOLUTION INTRAMUSCULAR; INTRAVENOUS; SUBCUTANEOUS at 14:26

## 2018-06-21 RX ADMIN — FENTANYL CITRATE 50 MCG: 50 INJECTION INTRAMUSCULAR; INTRAVENOUS at 10:36

## 2018-06-21 RX ADMIN — SODIUM CHLORIDE: 0.9 INJECTION, SOLUTION INTRAVENOUS at 12:20

## 2018-06-21 RX ADMIN — LORATADINE 10 MG: 10 TABLET ORAL at 21:08

## 2018-06-21 RX ADMIN — LORAZEPAM 0.5 MG: 2 INJECTION INTRAMUSCULAR; INTRAVENOUS at 14:33

## 2018-06-21 RX ADMIN — FENTANYL CITRATE 50 MCG: 50 INJECTION INTRAMUSCULAR; INTRAVENOUS at 07:48

## 2018-06-21 RX ADMIN — FENTANYL CITRATE 50 MCG: 50 INJECTION INTRAMUSCULAR; INTRAVENOUS at 09:07

## 2018-06-21 RX ADMIN — HYDROMORPHONE HYDROCHLORIDE 0.2 MG: 1 INJECTION, SOLUTION INTRAMUSCULAR; INTRAVENOUS; SUBCUTANEOUS at 14:30

## 2018-06-21 RX ADMIN — HYDROMORPHONE HYDROCHLORIDE 0.25 MG: 2 INJECTION, SOLUTION INTRAMUSCULAR; INTRAVENOUS; SUBCUTANEOUS at 13:08

## 2018-06-21 RX ADMIN — PHENYLEPHRINE HYDROCHLORIDE 25 MCG/MIN: 10 INJECTION INTRAVENOUS at 08:27

## 2018-06-21 RX ADMIN — DEXAMETHASONE SODIUM PHOSPHATE 4 MG: 10 INJECTION INTRAMUSCULAR; INTRAVENOUS at 08:00

## 2018-06-21 RX ADMIN — SUCCINYLCHOLINE CHLORIDE 100 MG: 20 INJECTION, SOLUTION INTRAMUSCULAR; INTRAVENOUS at 07:44

## 2018-06-21 RX ADMIN — HYDROMORPHONE HYDROCHLORIDE 0.5 MG: 2 INJECTION, SOLUTION INTRAMUSCULAR; INTRAVENOUS; SUBCUTANEOUS at 13:40

## 2018-06-21 RX ADMIN — OXYCODONE HYDROCHLORIDE AND ACETAMINOPHEN 2 TABLET: 5; 325 TABLET ORAL at 17:50

## 2018-06-21 RX ADMIN — CEFAZOLIN SODIUM 2000 MG: 2 SOLUTION INTRAVENOUS at 21:10

## 2018-06-21 RX ADMIN — HYDROMORPHONE HYDROCHLORIDE 0.25 MG: 2 INJECTION, SOLUTION INTRAMUSCULAR; INTRAVENOUS; SUBCUTANEOUS at 13:20

## 2018-06-21 RX ADMIN — HYDROMORPHONE HYDROCHLORIDE 0.2 MG: 1 INJECTION, SOLUTION INTRAMUSCULAR; INTRAVENOUS; SUBCUTANEOUS at 14:19

## 2018-06-21 RX ADMIN — LORAZEPAM 0.5 MG: 2 INJECTION INTRAMUSCULAR; INTRAVENOUS at 14:13

## 2018-06-21 RX ADMIN — FENTANYL CITRATE 50 MCG: 50 INJECTION INTRAMUSCULAR; INTRAVENOUS at 07:44

## 2018-06-21 RX ADMIN — Medication 1000 MG: at 07:45

## 2018-06-21 RX ADMIN — HYDROMORPHONE HYDROCHLORIDE 0.2 MG: 1 INJECTION, SOLUTION INTRAMUSCULAR; INTRAVENOUS; SUBCUTANEOUS at 14:11

## 2018-06-21 RX ADMIN — DOCUSATE SODIUM 100 MG: 100 CAPSULE, LIQUID FILLED ORAL at 17:50

## 2018-06-21 NOTE — DISCHARGE INSTRUCTIONS
[address], [address] [facility], [address] [facility], [facility], [address] [place] [address] [phone]/[email or web address]       No heavy lifting >20 pounds    Do not raise hands above head    Consider using button up shirts so you don't have to raise your hands above your head to put the shirt on    Wear the surgical bra at all times except the shower   If the bra is tight and is leaving marks on the skin unclasp the bottom clasps of the bra    No ice or heating pack to chest    Ok to shower    Measure drain output three times per day    Keep the drains secured below the level of the breast  Securing at the waist level is best    No bathing    Change dressing daily    Apply antibiotic ointment to drain site three times/day if [place] dressing falls off    Do not lay on stomach    Use a necklace or lanyard in the shower to support drains    No smoking    No pushing or pulling    Call the office for an appointment in 5-10 days - [phone]

## 2018-06-21 NOTE — PLAN OF CARE
CHANGE IN BODY IMAGE     Pt/Family communicate acceptance of loss or change in body image and expresses psychological comfort and peace Progressing        COPING     Pt/Family able to verbalize concerns and demonstrate effective coping strategies Progressing     Will report anxiety at manageable levels Progressing        DISCHARGE PLANNING     Discharge to home or other facility with appropriate resources Progressing        GASTROINTESTINAL - ADULT     Minimal or absence of nausea and/or vomiting Progressing     Maintains or returns to baseline bowel function Progressing     Maintains adequate nutritional intake Progressing        GENITOURINARY - ADULT     Maintains or returns to baseline urinary function Progressing     Absence of urinary retention Progressing        INFECTION - ADULT     Absence or prevention of progression during hospitalization Progressing     Absence of fever/infection during neutropenic period Progressing        Knowledge Deficit     Patient/family/caregiver demonstrates understanding of disease process, treatment plan, medications, and discharge instructions Progressing        METABOLIC, FLUID AND ELECTROLYTES - ADULT     Electrolytes maintained within normal limits Progressing     Fluid balance maintained Progressing        Nutrition/Hydration-ADULT     Nutrient/Hydration intake appropriate for improving, restoring or maintaining nutritional needs Progressing        PAIN - ADULT     Verbalizes/displays adequate comfort level or baseline comfort level Progressing        SAFETY ADULT     Patient will remain free of falls Progressing     Maintain or return to baseline ADL function Progressing     Maintain or return mobility status to optimal level Progressing        SKIN/TISSUE INTEGRITY - ADULT     Skin integrity remains intact Progressing     Incision(s), wounds(s) or drain site(s) healing without S/S of infection Progressing

## 2018-06-21 NOTE — RESPIRATORY THERAPY NOTE
RT Protocol Note  Supa Sanchez 41 y o  female MRN: [id number]  Unit/Bed#: -01 Encounter: [id number]    Assessment    Principal Problem:    Malignant neoplasm of upper-outer quadrant of right breast in female, estrogen receptor positive ([facility] )      Home Pulmonary Medications:  Alb  Inhaler         Past Medical History:   Diagnosis Date    Asthma     allergy excaberated    Breast cancer ([facility] ) 01/25/2018    GERD (gastroesophageal reflux disease)     Seasonal allergies      Social History     Social History    Marital status: /Civil Union     Spouse name: N/A    Number of children: N/A    Years of education: N/A     Social History Main Topics    Smoking status: Former Smoker     Quit date: 1/26/2015    Smokeless tobacco: Never Used    Alcohol use Yes      Comment: rare    Drug use: No    Sexual activity: Yes     Partners: Male     Birth control/ protection: Other     Other Topics Concern    None     Social History Narrative    Drinks coffee           Subjective         Objective    Physical Exam:   Assessment Type: Assess only  General Appearance: Awake, Alert, Eyes open/responds to stimulus  Respiratory Pattern: Normal, Symmetrical, Spontaneous  Chest Assessment: Chest expansion symmetrical  Bilateral Breath Sounds: Clear, Diminished    Vitals:  Blood pressure 103/57, pulse 74, temperature 98 7 °F (37 1 °C), temperature source Oral, resp  rate 16, height 5' 3 5" (1 613 m), weight 71 7 kg (158 lb), last menstrual period 03/01/2018, SpO2 98 %  Imaging and other studies: I have personally reviewed pertinent reports  Plan    Respiratory Plan: Home Bronchodilator Patient pathway, Discontinue Protocol  Airway Clearance Plan: Discontinue Protocol, Incentive Spirometer     Resp Comments: Patient assessed per respiratory protocol  Patient has a history of asthama, and uses an inhaler for it    Patient does not use any other pulmonary medication and also does not use pulmonary machines  I will be discontinuing protocol, but I will be ordering an inhaler for patient

## 2018-06-21 NOTE — ANESTHESIA PREPROCEDURE EVALUATION
Review of Systems/Medical History  Patient summary reviewed  Chart reviewed  No history of anesthetic complications     Cardiovascular  Negative cardio ROS    Pulmonary  Asthma , well controlled/ stable ,        GI/Hepatic    GERD ,             Endo/Other     GYN       Hematology   Musculoskeletal       Neurology   Psychology   Anxiety,              Physical Exam    Airway    Mallampati score: II  TM Distance: >3 FB  Neck ROM: full     Dental       Cardiovascular  Comment: Negative ROS,     Pulmonary      Other Findings        Anesthesia Plan  ASA Score- 2     Anesthesia Type- general with ASA Monitors  Additional Monitors:   Airway Plan: ETT  Comment: General anesthesia, endotracheal tube; standard ASA monitors  Risks and benefits discussed with patient; patient consented and agrees to proceed  I saw and evaluated the patient  If seen with CRNA, we have discussed the anesthetic plan and I am in agreement that the plan is appropriate for the patient  S/p tubal ligation  Plan Factors-    Induction- intravenous  Postoperative Plan-     Informed Consent- Anesthetic plan and risks discussed with patient  I personally reviewed this patient with the CRNA  Discussed and agreed on the Anesthesia Plan with the CRNA  Lexis Fuchs

## 2018-06-21 NOTE — ANESTHESIA POSTPROCEDURE EVALUATION
Post-Op Assessment Note      CV Status:  Stable    Mental Status:  Alert and awake    Hydration Status:  Euvolemic    PONV Controlled:  Controlled    Airway Patency:  Patent    Post Op Vitals Reviewed: Yes          Staff: CRNA           BP 99/51 (06/21/18 1352)    Temp 97 7 °F (36 5 °C) (06/21/18 1352)    Pulse 103 (06/21/18 1352)   Resp 16 (06/21/18 1352)    SpO2   100 RA

## 2018-06-21 NOTE — OP NOTE
OPERATIVE REPORT  PATIENT NAME: Shannon Sanchez    :  1980  MRN: [id number]  Pt Location: [facility] OR ROOM 03    SURGERY DATE: 2018    Surgeon(s) and Role:  Panel 1:     * Juliana Garza MD - Primary     * Gwendel Rinne, [place] - Assisting    Panel 2:     * Brandy Soares MD - Primary     * Gwendel Rinne, PA-C - Assisting    Preop Diagnosis:  Malignant neoplasm of upper-outer quadrant of right breast in female, estrogen receptor positive ([address] ) [C50 411, Z17 0]    Post-Op Diagnosis Codes:     * Malignant neoplasm of upper-outer quadrant of right breast in female, estrogen receptor positive ([address] ) [C50 411, Z17 0]    Procedure(s) (LRB):  BREAST SIMPLE MASTECTOMY (Left)  BREAST MODIFIED RADICAL MASTECTOMY (Right)  INSERTION/PLACEMENT TISSUE EXPANDER (EXCHANGE) (Bilateral)  RECONSTRUCTION BREAST W/ IMPLANT (Bilateral)  INSERTION/PLACEMENT IMPLANT BREAST (EXCHANGE) (Bilateral)    Specimen(s):  ID Type Source Tests Collected by Time Destination   1 : left breast simple mastectomy Tissue Breast, Left TISSUE EXAM Juliana Garza MD 2018 0820    2 : right breast Tissue Breast, Right TISSUE EXAM Juliana Garza MD 2018 2978    3 : right axillary contents Tissue Axillary TISSUE EXAM Juliana Garza MD 2018 1004        Estimated Blood Loss:   75 mL    Drains:  Urethral Catheter Latex 16 Fr   (Active)   Number of days: 0       Anesthesia Type:   General    Operative Indications:  Malignant neoplasm of upper-outer quadrant of right breast in female, estrogen receptor positive ([address] ) [C50 411, Z17 0]  Right breast cancer metastatic to regional lymph nodes    Operative Findings:  Considerable adenopathy residual cancer close to upper outer quadrant skin grossly negative margins    Complications:   None    Procedure:     Left Simple mastectomy    The patient was brought to the operation room and placed under general anesthesia   Attention was paid to ensure appropriate padding and correct positioning   The bilateral breast were then prepped and draped in a sterile fashion   I initiated a time-out, identifying the patient, the correct side and the above procedure   All parties agreed with the time out      Surgery was initiated on the prophylactic left side  The planned incision was then injected with 0 25% Marcaine and epinephrine for local anesthesia   The incision was sharply incised   Thin flaps were then elevated using electrocautery starting superiorly and then continuing medially, inferiorly and finally laterally  The breast parenchyma was extensively tethered to the underlying skin  At 1 point the anterior flap traversed through the skin this was closed primarily  The tail of Roena Courtney was then dissected away from the axilla proper leaving the breast tethered to the underlying musculature   The breast was then removed from the muscles in a sub-facial plane  The breast was oriented with sutures (short=superior; medium=medial; long=lateral)  The breast was sent to pathology in formalin for permanent pathologic evaluation   Meticulous hemostasis was maintained with electrocautery   The wound was extensively irrigated  Right modified radical mastectomy     The planned right breast incision was then injected with 0 25% Marcaine and epinephrine for local anesthesia   The incision was sharply incised   Thin flaps were then elevated using electrocautery starting superiorly and then continuing medially, inferiorly and finally laterally   The tumor and some adenopathy was adjacent to the skin of the upper outer quadrant  Meticulous attention was paid to be as close to the skin to provide negative margins on the tumor  The tail of Roena Courtney was then dissected away from the axilla proper leaving the breast tethered to the underlying musculature       The breast was then removed from the muscles in a sub-facial plane but remained tethered to the axillary contents    It was ultimately decided that a counter incision in the lower axilla was necessary to remove her axillary contents  This was done in the breast tail  Sanchez and lower axillary lymph nodes were divided and sent off the field     The breast was oriented with sutures (short=superior; medium=medial; long=lateral)  The breast was sent to pathology in formalin for permanent pathologic evaluation  Subsequently electrocautery was used to elevate medial, lateral and superior flaps in the axilla  The deltopectoral fascia was indentified and divided just inferior the axillary vein  Dissection through the superificial adipose tissue was performed with electrocautery and the superficial vein entering the axilla was ligated and divided  Further dissection at this level identified both the thoracodorsal neurovascular bundle and the long thoracic nerve  These structures were carefully preserved  The axillary contents were withdrawn from level II and dissection continued along the medial axillary wall  The axillary contents (I and II) were retracted inferiorly and  from their lateral attachments and ultimately the inferior attachments  There were several grossly suspicious lymph nodes within the specimen  Small vessels and lymphatics were controled with clips, ligated or if small, electrocautery  The axillary contents were sent for permanent analysis  Meticulous hemostasis was maintained with electrocautery   The wound was extensively irrigated  The counts correct were correct x2  Dr Ailyn Nagy then entered the room to initiate reconstruction       A qualified resident physician was not available    Patient Disposition:  PACU     SIGNATURE: Carli Sierra MD  DATE: June 21, 2018  TIME: 10:38 AM

## 2018-06-21 NOTE — OP NOTE
OPERATIVE REPORT  PATIENT NAME: Aimee Ramirez    :  1980  MRN: [id number]  Pt Location: [facility] OR ROOM 03    SURGERY DATE: 2018    Surgeon(s) and Role:  Panel 1:     * Melody Mccord MD - Primary     * Elle Mitchell - Assisting    Panel 2:     * Lulú Rincon MD - Primary     * Marcia Ascencio PA-C - Assisting    Preop Diagnosis:  Malignant neoplasm of upper-outer quadrant of right breast in female, estrogen receptor positive ([address] ) [C50 411, Z17 0]    Post-Op Diagnosis Codes:     * Malignant neoplasm of upper-outer quadrant of right breast in female, estrogen receptor positive ([address] ) [C50 411, Z17 0]    Procedure(s) (LRB):  BREAST SIMPLE MASTECTOMY (Left)  BREAST MODIFIED RADICAL MASTECTOMY (Right)  RECONSTRUCTION BREAST W/ IMPLANT (Bilateral)    Specimen(s):  ID Type Source Tests Collected by Time Destination   1 : left breast simple mastectomy Tissue Breast, Left TISSUE EXAM Melody Mccord MD 2018 0820    2 : right breast Tissue Breast, Right TISSUE EXAM Melody Mccord MD 2018 8245    3 : right axillary contents Tissue Axillary TISSUE EXAM Melody Mccord MD 2018 1004        Estimated Blood Loss:   125 mL    Drains:  Closed/Suction Drain Left Breast Bulb 15 Fr  (Active)   Drainage Appearance Bloody 2018  3:00 PM   Status To bulb suction 2018  3:00 PM   Output (mL) 25 mL 2018  3:00 PM   Number of days: 0       Closed/Suction Drain Right Breast Bulb 15 Fr  (Active)   Drainage Appearance Serosanguineous 2018  3:00 PM   Status To bulb suction 2018  3:00 PM   Intake (mL) 55 mL 2018  3:00 PM   Number of days: 0       Urethral Catheter Latex 16 Fr   (Active)   Number of days: 0       Anesthesia Type:   General    Operative Indications:  Malignant neoplasm of upper-outer quadrant of right breast in female, estrogen receptor positive ([address] ) [C50 411, Z17 0]      Operative Findings:      Complications:   None    Procedure and Technique:  The patient underwent bilateral mastectomy and this will be dictated separately  A time-out procedure was performed patient had already received IV antibiotics and SCDs were already applied  The patient was re-prepped and draped using standard surgical technique and new surgical instruments were brought onto the field  Attention was turned to the left breast   The wound was copiously irrigated excellent hemostasis was observed  The skin flaps were well-perfused  A silicone Sizer was placed into the breast pocket and the skin was stapled closed to confirm that there was no tension on the skin and the skin was still well perfused  At this point the decision was made to proceed with direct breast reconstruction  The inferior skin flap was advanced superiorly and inset to the periosteum to reconstruct the inframammary fold as an internal Chinmay flap with a 14 cm squared flap  This was inset securing the deep dermis of the flap to the periosteum using 2-0 PDS suture in horizontal mattress technique  The wound was copiously irrigated with antibiotic irrigation  A 15 Filipino round Rod drain was tunneled laterally and secured with a 3-0 nylon suture  Two pieces of flex HD the were draped over the Sizer in order to design a complete anterior coverage of the implant  These 2 pieces were sutured together using 3-0 PDS suture in running continuous technique  The extra flex HD pieces were excised  The skin of the breast was re-prepped all surgical team members changed our gloves  And the pocket was irrigated with antibiotic irrigation was allowed to dwell for 5 minutes  A [place] 700ml high profile extra implant was placed into the defect covered by the flex HD  This was circumferentially sutured to the underlying muscular fascia using 2-0 PDS suture in horizontal mattress technique to maintain the implant in appropriate position  The skin was closed using 3-0 PDS and 3-0 Vicryl suture in interrupted deep dermal technique    The superficial skin was closed using 3-0 strata fix in running continuous technique  Attention was turned to the right breast   The wound was copiously irrigated excellent hemostasis was observed  The skin flaps were well-perfused  A silicone Sizer was placed into the breast pocket and the skin was stapled closed to confirm that there was no tension on the skin and the skin was still well perfused  At this point the decision was made to proceed with direct breast reconstruction  The inferior skin flap was advanced superiorly and inset to the periosteum to reconstruct the inframammary fold as an internal Chinmay flap with a 14 cm squared flap  This was inset securing the deep dermis of the flap to the periosteum using 2-0 PDS suture in horizontal mattress technique  The wound was copiously irrigated with antibiotic irrigation  A 15 Telugu round Rod drain was tunneled laterally and secured with a 3-0 nylon suture  Two pieces of flex HD the were draped over the Sizer in order to design a complete anterior coverage of the implant  These 2 pieces were sutured together using 3-0 PDS suture in running continuous technique  The extra flex HD pieces were excised  The skin of the breast was re-prepped all surgical team members changed our gloves  And the pocket was irrigated with antibiotic irrigation was allowed to dwell for 5 minutes  A [place] 700ml high profile extra implant was placed into the defect covered by the flex HD  This was circumferentially sutured to the underlying muscular fascia using 2-0 PDS suture in horizontal mattress technique to maintain the implant in appropriate position  The skin was closed using 3-0 PDS and 3-0 Vicryl suture in interrupted deep dermal technique  The superficial skin was closed using 3-0 strata fix in running continuous technique  All the wounds were cleaned and dried skin glue sterile gauze and a bra were applied   Benzoin Steri-Strips and a Biopatch was applied to each drain site and covered with Tegaderm     I was present for the entire procedure and A qualified resident physician was not available    Patient Disposition:  hemodynamically stable and extubated and stable    SIGNATURE: Flavia Lynch MD  DATE: June 21, 2018  TIME: 3:35 PM

## 2018-06-21 NOTE — H&P (VIEW-ONLY)
Surgical Oncology Follow Up       [address] [facility],[address] [facility]  [facility] [address] [place] Kassandra Sanchez  1980  [id number]  [address] [facility],[address] [place] [address]  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  We went through the process of informed consent for a right modified radical mastectomy and a simple left mastectomy all questions were answered the patient's satisfaction  She will undergo reconstruction with Dr Denise England     Cancer History:        Malignant neoplasm of upper-outer quadrant of right breast in female, estrogen receptor positive ([address] )    2018 Initial Diagnosis     Ultrasound guided core biopsy of right breast mass at [facility]   Malignant neoplasm of upper-outer quadrant of right breast in female, estrogen receptor positive ([address] ), Her 2 positive           2018 Genetic Testing     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  2018 -  Chemotherapy     neoadjuvant chemotherapy with TCH with pertuzumab  Dr Alan Galicia              Interval History:   See above patient has no new complaints referable to her breast     Review of Systems:   Review of Systems   All other systems reviewed and are negative        Past Medical History     Patient Active Problem List   Diagnosis    Asthma    Other constipation    Cigarette nicotine dependence without complication    Macular atrophy, retinal    Seasonal allergies    Malignant neoplasm of upper-outer quadrant of right breast in female, estrogen receptor positive [facility])     Past Medical History:   Diagnosis Date    Breast cancer ([address] ) 2018    Seasonal allergies      Past Surgical History:   Procedure Laterality Date     SECTION      PILONIDAL CYST EXCISION      resection    [place] INSJ TUNNELED CTR VAD W/SUBQ PORT AGE 5 YR/> Left 2018    Procedure: INSERTION VENOUS PORT ( PORT-A-CATH) IR;  Surgeon: Kp Anaya DO;  [facility]: [facility];  Service: Interventional Radiology    TUBAL LIGATION       Family History   Problem Relation Age of Onset    Diabetes Mother     Hypertension Mother     Rosacea Father     Allergies Father      seasonal    Liver cancer Paternal Grandfather     Breast cancer Family      Social History     Social History    Marital status: /Civil Union     Spouse name: N/A    Number of children: N/A    Years of education: N/A     Occupational History    Not on file       Social History Main Topics    Smoking status: Former Smoker     Quit date: 2015    Smokeless tobacco: Former User      Comment: current some day smoker per [employer]    Alcohol use Yes      Comment: occasional    Drug use: No    Sexual activity: Yes     Partners: Male     Birth control/ protection: Other     Other Topics Concern    Not on file     Social History Narrative    Drinks coffee           Current Outpatient Prescriptions:     albuterol (VENTOLIN HFA) 90 mcg/act inhaler, Inhale 2 puffs every 4 (four) hours as needed, Disp: , Rfl:     diphenoxylate-atropine (LOMOTIL) 2 5-0 025 mg per tablet, Take 1 tablet by mouth 4 (four) times a day as needed for diarrhea, Disp: 60 tablet, Rfl: 2    levocetirizine (XYZAL) 5 MG tablet, Take 1 tablet by mouth daily, Disp: , Rfl:     lidocaine-prilocaine (EMLA) cream, Apply topically as needed for mild pain, Disp: 30 g, Rfl: 0    LORazepam (ATIVAN) 1 mg tablet, Take 1 tablet (1 mg total) by mouth daily at bedtime, Disp: 30 tablet, Rfl: 0    mometasone (NASONEX) 50 mcg/act nasal spray, 2 sprays into each nostril daily, Disp: , Rfl:     Multiple Vitamins-Minerals (HAIR SKIN AND NAILS FORMULA PO), Take 1 tablet by mouth daily, Disp: , Rfl:     omeprazole (PriLOSEC) 20 mg delayed release capsule, Take 20 mg by mouth, Disp: , Rfl:     ondansetron (ZOFRAN) 4 mg tablet, Take 1 tablet (4 mg total) by mouth every 6 (six) hours as needed for nausea or vomiting, Disp: 30 tablet, Rfl: 1    prochlorperazine (COMPAZINE) 10 mg tablet, Take 1 tablet (10 mg total) by mouth every 6 (six) hours as needed for nausea or vomiting, Disp: 90 tablet, Rfl: 0    pseudoephedrine (SUDAFED) 30 mg tablet, Take 60 mg by mouth every 4 (four) hours as needed for congestion, Disp: , Rfl:     cephalexin (KEFLEX) 500 mg capsule, , Disp: , Rfl:     Misc  Devices MISC, Cold Cap- Apply as directed during chemotherapy  , Disp: 1 each, Rfl: 0    mupirocin (BACTROBAN) 2 % ointment, , Disp: , Rfl:   Allergies   Allergen Reactions    Trichophyton Other (See Comments)     Pt does not know reaction     Cat Hair Extract Sneezing    Dust Mite Extract Sneezing    Molds & Smuts Sneezing    Pollen Extract Sneezing    Tree Extract Sneezing       Physical Exam:     Vitals:    06/07/18 1539   BP: 110/70   Pulse: 96   Resp: 14   Temp: 98 1 °F (36 7 °C)     Physical Exam   Constitutional: She is oriented to person, place, and time  She appears well-developed and well-nourished  HENT:   Head: Normocephalic and atraumatic  Mouth/Throat: Oropharynx is clear and moist    Eyes: EOM are normal  Pupils are equal, round, and reactive to light  Neck: Normal range of motion  Neck supple  No JVD present  No tracheal deviation present  No thyromegaly present  Cardiovascular: Normal rate, regular rhythm, normal heart sounds and intact distal pulses  Exam reveals no gallop and no friction rub  No murmur heard  Pulmonary/Chest: Effort normal and breath sounds normal  No respiratory distress  She has no wheezes  She has no rales     Examination of the right breast demonstrates a dominant mass in the upper outer quadrant as well as right axillary adenopathy  The left breast is normal    Abdominal: Soft  She exhibits no distension and no mass  There is no hepatomegaly  There is no tenderness  There is no rebound and no guarding  Musculoskeletal: Normal range of motion  She exhibits no edema or tenderness  Lymphadenopathy:     She has no cervical adenopathy  Neurological: She is alert and oriented to person, place, and time  No cranial nerve deficit  Skin: Skin is warm and dry  No rash noted  No erythema  Psychiatric: She has a normal mood and affect  Her behavior is normal    Vitals reviewed  Results:   I reviewed her MRI as well as her genetic results  I have discussed them with the patient  Advance Care Planning/Advance Directives:  I discussed the disease status, treatment plans and follow-up with the patient

## 2018-06-22 VITALS
RESPIRATION RATE: 18 BRPM | TEMPERATURE: 98.7 F | BODY MASS INDEX: 26.98 KG/M2 | HEIGHT: 64 IN | OXYGEN SATURATION: 100 % | HEART RATE: 63 BPM | DIASTOLIC BLOOD PRESSURE: 56 MMHG | WEIGHT: 158 LBS | SYSTOLIC BLOOD PRESSURE: 101 MMHG

## 2018-06-22 RX ORDER — DOCUSATE SODIUM 100 MG/1
100 CAPSULE, LIQUID FILLED ORAL 2 TIMES DAILY
Qty: 10 CAPSULE | Refills: 0 | Status: ON HOLD | OUTPATIENT
Start: 2018-06-22 | End: 2018-09-19 | Stop reason: ALTCHOICE

## 2018-06-22 RX ORDER — OXYCODONE AND ACETAMINOPHEN 10; 325 MG/1; MG/1
1 TABLET ORAL EVERY 4 HOURS PRN
Qty: 15 TABLET | Refills: 0 | Status: SHIPPED | OUTPATIENT
Start: 2018-06-22 | End: 2018-07-02

## 2018-06-22 RX ADMIN — DOCUSATE SODIUM 100 MG: 100 CAPSULE, LIQUID FILLED ORAL at 08:13

## 2018-06-22 RX ADMIN — MORPHINE SULFATE 3 MG: 4 INJECTION, SOLUTION INTRAMUSCULAR; INTRAVENOUS at 04:05

## 2018-06-22 RX ADMIN — ENOXAPARIN SODIUM 40 MG: 100 INJECTION SUBCUTANEOUS at 08:14

## 2018-06-22 RX ADMIN — CEFAZOLIN SODIUM 2000 MG: 2 SOLUTION INTRAVENOUS at 03:34

## 2018-06-22 RX ADMIN — OXYCODONE HYDROCHLORIDE AND ACETAMINOPHEN 2 TABLET: 5; 325 TABLET ORAL at 04:53

## 2018-06-22 RX ADMIN — MORPHINE SULFATE 3 MG: 4 INJECTION, SOLUTION INTRAMUSCULAR; INTRAVENOUS at 03:05

## 2018-06-22 RX ADMIN — PANTOPRAZOLE SODIUM 20 MG: 20 TABLET, DELAYED RELEASE ORAL at 06:31

## 2018-06-22 RX ADMIN — MORPHINE SULFATE 3 MG: 4 INJECTION, SOLUTION INTRAMUSCULAR; INTRAVENOUS at 06:31

## 2018-06-22 RX ADMIN — OXYCODONE HYDROCHLORIDE AND ACETAMINOPHEN 2 TABLET: 5; 325 TABLET ORAL at 13:26

## 2018-06-22 RX ADMIN — SODIUM CHLORIDE, SODIUM LACTATE, POTASSIUM CHLORIDE, AND CALCIUM CHLORIDE 100 ML/HR: .6; .31; .03; .02 INJECTION, SOLUTION INTRAVENOUS at 03:01

## 2018-06-22 RX ADMIN — OXYCODONE HYDROCHLORIDE AND ACETAMINOPHEN 2 TABLET: 5; 325 TABLET ORAL at 09:03

## 2018-06-22 NOTE — PROGRESS NOTES
Progress Note - General Surgery   Chris Sanchez 41 y o  female MRN: [id number]  Unit/Bed#: -01 Encounter: [id number]        Subjective/Objective   Chief Complaint:     Subjective: pain managed, ambulating, voiding    Objective: incisions cdi, no hematoma, no seroma, skin well perfused    Assessment:  S/p b/l mastectomy/reconstruction    Plan:  dvt prophyalxis  abx  Instructions explained      Blood pressure 110/58, pulse 63, temperature 97 7 °F (36 5 °C), temperature source Oral, resp  rate 18, height 5' 3 5" (1 613 m), weight 71 7 kg (158 lb), last menstrual period 03/01/2018, SpO2 100 %  ,Body mass index is 27 55 kg/m²        Intake/Output Summary (Last 24 hours) at 06/22/18 0616  Last data filed at 06/22/18 0344   Gross per 24 hour   Intake             4755 ml   Output             3625 ml   Net             1130 ml       Invasive Devices     Central Venous Catheter Line            Port A Cath Left Internal jugular -- days          Peripheral Intravenous Line            Peripheral IV 06/21/18 Left Hand less than 1 day          Drain            Closed/Suction Drain Left Breast Bulb 15 Fr  less than 1 day    Closed/Suction Drain Right Breast Bulb 15 Fr  less than 1 day                      Arcelia Colby MD  6/22/2018  6:16 AM

## 2018-06-22 NOTE — CASE MANAGEMENT
Initial Clinical Review    Age/Sex: 41 y o  female    Surgery Date: 6/21/2018    Procedure: BREAST SIMPLE MASTECTOMY (Left)  BREAST MODIFIED RADICAL MASTECTOMY (Right)  INSERTION/PLACEMENT TISSUE EXPANDER (EXCHANGE) (Bilateral)  RECONSTRUCTION BREAST W/ IMPLANT (Bilateral)  INSERTION/PLACEMENT IMPLANT BREAST (EXCHANGE) (Bilateral)    Anesthesia: general     Admission Orders: Date/Time/Statement: 6/21/18 @ 1433     Orders Placed This Encounter   Procedures    Inpatient Admission     Standing Status:   Standing     Number of Occurrences:   1     Order Specific Question:   Admitting Physician     Answer:   Eliu Marin     Order Specific Question:   Level of Care     Answer:   Med Surg [16]     Order Specific Question:   Estimated length of stay     Answer:   Inpatient Only Surgery       Vital Signs: /56 (BP Location: Left arm)   Pulse 63   Temp 98 7 °F (37 1 °C) (Oral)   Resp 18   Ht 5' 3 5" (1 613 m)   Wt 71 7 kg (158 lb)   LMP 03/01/2018   SpO2 100%   BMI 27 55 kg/m²     Diet:        Diet Orders            Start     Ordered    06/21/18 1711  Room Service  Once     Question:  Type of Service  Answer:  Room Service - Appropriate with Assistance    06/21/18 1711    06/21/18 1433  Diet Regular; Regular House  Diet effective now     Question Answer Comment   Diet Type Regular    Regular Regular House    RD to adjust diet per protocol? Yes        06/21/18 1433          Mobility: ambulatory    DVT Prophylaxis: lovenox 40 sq daily  scds    Pain Control: morphine 3 mg iv - used x 4 on 6/21 and x 3 thus far 6/22  Percocet 2 tabs - used x 2  Pain Medications             prochlorperazine (COMPAZINE) 10 mg tablet Take 1 tablet (10 mg total) by mouth every 6 (six) hours as needed for nausea or vomiting          OTHER ORDERS:  Po medication:  Pantoprazole  Colace  Loratadine  Fluticasone      ivf @ 100 cc/h  Ancef 2gms iv q 8h      Thank you,  [facility] in the [place] by Octaviano De Luna for 2017  Network Utilization Review Department  Phone: [phone]; Fax [phone]  ATTENTION: The Network Utilization Review Department is now centralized for our 7 Facilities  Make a note that we have a new phone and fax numbers for our Department  Please call with any questions or concerns to [phone] and carefully follow the prompts so that you are directed to the right person  All voicemails are confidential  Fax any determinations, approvals, denials, and requests for initial or continue stay review clinical to [phone]  Due to HIGH CALL volume, it would be easier if you could please send faxed requests to expedite your requests and in part, help us provide discharge notifications faster

## 2018-06-22 NOTE — DISCHARGE SUMMARY
Discharge Summary - Medical Gris Sanchez 41 y o  female MRN: [id number]    [address]  Room / Bed: /-01 Encounter: [id number]    BRIEF OVERVIEW  Admitting Provider: Neisha Abdalla MD  Discharge Provider: Sonali Azar MD  Primary Care Physician at Discharge: Flor Hartmann [phone]    Discharge To: Home      Admission Date: 6/21/2018     Discharge Date: No discharge date for patient encounter  Code Status: Level 1 - Full Code  Advance Directive and Living Will: Received  Power of :        Primary Discharge Diagnosis  Principal Problem:    Malignant neoplasm of upper-outer quadrant of right breast in female, estrogen receptor positive ([facility] [address] )  Resolved Problems:    * No resolved hospital problems   *        Discharge Disposition: [address] [place] [address]    Presenting Problem/History of Present Illness  Malignant neoplasm of upper-outer quadrant of right breast in female, estrogen receptor positive [facility])      Discharge Condition: stable    Patient tolerated the procedure well, recovered and was discharged home in stable condition    Neisha Abdalla MD  6/22/2018  6:15 AM

## 2018-06-22 NOTE — POST OP PROGRESS NOTES
Post Op Check Note -Surgery SYDNEY Sanchez 41 y o  female MRN: [id number]  Unit/Bed#: -Erika Encounter: [id number]    ASSESSMENT/PLAN:  Problem List     * (Principal)Malignant neoplasm of upper-outer quadrant of right breast in female, estrogen receptor positive ([facility] [address] )    S/P B mastectomy with reconstruction - doing well  B KRUPA drains with serosanguinous drainage, R-90cc,   L - 140 cc  · OOB and ambulate   · Pain control  · Transition to po pain meds  · IVF   · Diet as tolerate  · Drain care  · Probable discharge home tomorrow  She does not want VNA services  VTE Pharmacologic Prophylaxis: Sequential compression device (Venodyne)  and Enoxaparin (Lovenox)    Subjective/Objective     Subjective: no complaints    Objective/Physical Exam:   Blood pressure 106/53, pulse 84, temperature 98 °F (36 7 °C), temperature source Oral, resp  rate 16, height 5' 3 5" (1 613 m), weight 71 7 kg (158 lb), last menstrual period 03/01/2018, SpO2 99 %  ,Body mass index is 27 55 kg/m²  General appearance: alert and oriented, in no acute distress   Chest: incisions C/D/I and B breast soft   JPs in place B       Current Facility-Administered Medications:     albuterol (PROVENTIL HFA,VENTOLIN HFA) inhaler 2 puff, 2 puff, Inhalation, Q4H PRN, Gerhard Rinaldi MD    ceFAZolin (ANCEF) IVPB (premix) 2,000 mg, 2,000 mg, Intravenous, Q8H, Munir Colby MD, Last Rate: 100 mL/hr at 06/21/18 2110, 2,000 mg at 06/21/18 2110    diphenhydrAMINE (BENADRYL) tablet 50 mg, 50 mg, Oral, Q6H PRN, MD Paul Hussein  diphenoxylate-atropine (LOMOTIL) 2 5-0 025 mg per tablet 1 tablet, 1 tablet, Oral, 4x Daily PRN, Gerhard Rinaldi MD    docusate sodium (COLACE) capsule 100 mg, 100 mg, Oral, BID, Munir Colby MD, 100 mg at 06/21/18 1750    enoxaparin (LOVENOX) subcutaneous injection 40 mg, 40 mg, Subcutaneous, Q24H [facility], Munir Colby MD    fluticasone [facility]) 50 mcg/act nasal spray 1 spray, 1 spray, Each Nare, Daily, Gerhard Rinaldi MD, 1 spray at 06/21/18 1750    lactated ringers infusion, 100 mL/hr, Intravenous, Continuous, Munir Colby MD, Last Rate: 100 mL/hr at 06/21/18 1617, 100 mL/hr at 06/21/18 1617    loratadine (CLARITIN) tablet 10 mg, 10 mg, Oral, Daily, Munir Colby MD, 10 mg at 06/21/18 2108    morphine (PF) 4 mg/mL injection 3 mg, 3 mg, Intravenous, Q1H PRN, Ifeoma Lopez MD, 3 mg at 06/21/18 2108    [facility]ron [facility]) injection 4 mg, 4 mg, Intravenous, Q6H PRN, Ifeoma Lopez MD    oxyCODONE-acetaminophen (PERCOCET) 5-325 mg per tablet 2 tablet, 2 tablet, Oral, Q4H PRN, Ifeoma Lopez MD, 2 tablet at 06/21/18 1750    [START ON 6/22/2018] pantoprazole (PROTONIX) EC tablet 20 mg, 20 mg, Oral, Early Morning, Reba Colby MD    pneumococcal 13-valent conjugate vaccine (PREVNAR-13) IM injection 0 5 mL, 0 5 mL, Intramuscular, Prior to discharge, Alisson Horne MD    prochlorperazine (COMPAZINE) tablet 10 mg, 10 mg, Oral, Q6H PRN, Ifeoma Lopez MD    promethazine [facility]) tablet 25 mg, 25 mg, Oral, Q6H PRN, Ifeoma Lopez MD      Intake/Output Summary (Last 24 hours) at 06/21/18 2136  Last data filed at 06/21/18 2121   Gross per 24 hour   Intake             4755 ml   Output             3305 ml   Net             1450 ml

## 2018-06-22 NOTE — POST OP PROGRESS NOTES
Progress Note -Surgery SYDNEY Sanchez 41 y o  female MRN: [id number]  Unit/Bed#: -01 Encounter: [id number]    ASSESSMENT/PLAN:  Problem List     * (Principal)Malignant neoplasm of upper-outer quadrant of right breast in female, estrogen receptor positive ([facility] [address] )   POD #1 s/p B mastectomy with implant reconstruction  Doing well with no complaints   · heplock   · Drain care -nursing to review with patient   · DC home      VTE Pharmacologic Prophylaxis: Sequential compression device (Venodyne)  and Enoxaparin (Lovenox)    Subjective/Objective     Subjective:  No complaints or events overnight    Objective/Physical Exam: Blood pressure 101/56, pulse 63, temperature 98 7 °F (37 1 °C), temperature source Oral, resp  rate 18, height 5' 3 5" (1 613 m), weight 71 7 kg (158 lb), last menstrual period 03/01/2018, SpO2 100 %  ,Body mass index is 27 55 kg/m²      General appearance: alert and oriented, in no acute distress  Heart: regular rate and rhythm, S1, S2 normal, no murmur, click, rub or gallop   CHest, B breasts soft, with no ecchymosis,  KRUPA drains in place B, incisons C/D/I  Lungs: clear to auscultation bilaterally  Abdomen: soft, non-tender; bowel sounds normal; no masses,  no organomegaly  Neurological: normal without focal findings      Current Facility-Administered Medications:     albuterol (PROVENTIL HFA,VENTOLIN HFA) inhaler 2 puff, 2 puff, Inhalation, Q4H PRN, Susanna Mccullough MD    ceFAZolin (ANCEF) IVPB (premix) 2,000 mg, 2,000 mg, Intravenous, Q8H, Munir Colby MD, Last Rate: 100 mL/hr at 06/22/18 0334, 2,000 mg at 06/22/18 0334    diphenhydrAMINE (BENADRYL) tablet 50 mg, 50 mg, Oral, Q6H PRN, Susanna Mccullough MD    diphenoxylate-atropine (LOMOTIL) 2 5-0 025 mg per tablet 1 tablet, 1 tablet, Oral, 4x Daily PRN, Susanna Mccullough MD    docusate sodium (COLACE) capsule 100 mg, 100 mg, Oral, BID, Munir Colby MD, 100 mg at 06/21/18 1750    enoxaparin (LOVENOX) subcutaneous injection 40 mg, 40 mg, Subcutaneous, Q24H [facility], Katelin Vasquez MD    fluticasone [facility]) 50 mcg/act nasal spray 1 spray, 1 spray, Each Nare, Daily, Munir Colby MD, 1 spray at 06/21/18 1750    lactated ringers infusion, 100 mL/hr, Intravenous, Continuous, Munir Colby MD, Last Rate: 100 mL/hr at 06/22/18 0301, 100 mL/hr at 06/22/18 0301    loratadine (CLARITIN) tablet 10 mg, 10 mg, Oral, Daily, Munir Colby MD, 10 mg at 06/21/18 2108    morphine (PF) 4 mg/mL injection 3 mg, 3 mg, Intravenous, Q1H PRN, Katelin Vasquez MD, 3 mg at 06/22/18 0631    ondansetron [facility]) injection 4 mg, 4 mg, Intravenous, Q6H PRN, Katelin Vasquez MD    oxyCODONE-acetaminophen (PERCOCET) 5-325 mg per tablet 2 tablet, 2 tablet, Oral, Q4H PRN, Katelin Vasquez MD, 2 tablet at 06/22/18 0453    pantoprazole (PROTONIX) EC tablet 20 mg, 20 mg, Oral, Early Morning, Munir Colby MD, 20 mg at 06/22/18 0631    pneumococcal 13-valent conjugate vaccine (PREVNAR-13) IM injection 0 5 mL, 0 5 mL, Intramuscular, Prior to discharge, Amy Wright MD    prochlorperazine (COMPAZINE) tablet 10 mg, 10 mg, Oral, Q6H PRN, Katelin Vasquez MD    promethazine (PHENERGAN) tablet 25 mg, 25 mg, Oral, Q6H PRN, Katelin Vasquez MD

## 2018-06-25 NOTE — CASE MANAGEMENT
Notification of Discharge  This is a Notification of Discharge from our facility [address]  Please be advised that this patient has been discharge from our facility  Below you will find the admission and discharge date and time including the patients disposition  PRESENTATION DATE: 6/21/2018  6:37 AM  IP ADMISSION DATE: 6/21/18 1433  DISCHARGE DATE: 6/22/2018  2:32 PM  DISPOSITION: Home/Self Care    [address] [facility] in the [facility] by Octav[facility] De Luna for 2017  Network Utilization Review Department  Phone: [phone]; Fax [phone]  ATTENTION: The Network Utilization Review Department is now centralized for our 7 Facilities  Make a note that we have a new phone and fax numbers for our Department  Please call with any questions or concerns to [phone] and carefully follow the prompts so that you are directed to the right person  All voicemails are confidential  Fax any determinations, approvals, denials, and requests for initial or continue stay review clinical to [phone]  Due to HIGH CALL volume, it would be easier if you could please send faxed requests to expedite your requests and in part, help us provide discharge notifications faster        Reference #[id number]

## 2018-06-27 RX ORDER — SODIUM CHLORIDE 9 MG/ML
20 INJECTION, SOLUTION INTRAVENOUS CONTINUOUS
Status: DISCONTINUED | OUTPATIENT
Start: 2018-06-28 | End: 2018-07-01 | Stop reason: HOSPADM

## 2018-06-28 ENCOUNTER — HOSPITAL ENCOUNTER (OUTPATIENT)
Dept: INFUSION CENTER | Facility: CLINIC | Age: 38
Discharge: HOME | End: 2018-06-28
Payer: COMMERCIAL

## 2018-06-28 VITALS
WEIGHT: 160 LBS | RESPIRATION RATE: 18 BRPM | SYSTOLIC BLOOD PRESSURE: 102 MMHG | TEMPERATURE: 98.4 F | DIASTOLIC BLOOD PRESSURE: 62 MMHG | BODY MASS INDEX: 27.9 KG/M2 | HEART RATE: 89 BPM | OXYGEN SATURATION: 98 %

## 2018-06-28 PROCEDURE — 96413 CHEMO IV INFUSION 1 HR: CPT

## 2018-06-28 RX ADMIN — SODIUM CHLORIDE 20 ML/HR: 0.9 INJECTION, SOLUTION INTRAVENOUS at 14:23

## 2018-06-28 RX ADMIN — TRASTUZUMAB 450 MG: 150 INJECTION, POWDER, LYOPHILIZED, FOR SOLUTION INTRAVENOUS at 14:56

## 2018-06-28 RX ADMIN — HEPARIN 300 UNITS: 100 SYRINGE at 15:36

## 2018-06-28 NOTE — PROGRESS NOTES
Patient here for herceptin and is doing well 1 week post double mastectomy  Called and spoke to Vinicio Ngo regarding labs with parameters for herceptin and she will send order that labs are not required

## 2018-06-28 NOTE — PROGRESS NOTES
Patient tolerated herceptin without incident and discharged  She offers no c/o and will RTO  7/19/18 for next dosing  Again no labs required for this treatment  Still waiting on order for that clarification

## 2018-06-28 NOTE — PLAN OF CARE
Problem: Potential for Falls  Goal: Patient will remain free of falls  INTERVENTIONS:  - Assess patient frequently for physical needs  -  Identify cognitive and physical deficits and behaviors that affect risk of falls    -  Equality fall precautions as indicated by assessment   - Educate patient/family on patient safety including physical limitations  - Instruct patient to call for assistance with activity based on assessment  - Modify environment to reduce risk of injury  - Consider OT/PT consult to assist with strengthening/mobility   Outcome: Progressing      Problem: INFECTION - ADULT  Goal: Absence or prevention of progression during hospitalization  INTERVENTIONS:  - Assess and monitor for signs and symptoms of infection  - Monitor lab/diagnostic results  - Monitor all insertion sites, i e  indwelling lines, tubes, and drains  - Monitor endotracheal (as able) and nasal secretions for changes in amount and color  - Equality appropriate cooling/warming therapies per order  - Administer medications as ordered  - Instruct and encourage patient and family to use good hand hygiene technique  - Identify and instruct in appropriate isolation precautions for identified infection/condition  Outcome: Progressing

## 2018-07-09 ENCOUNTER — OFFICE VISIT (OUTPATIENT)
Dept: SURGICAL ONCOLOGY | Facility: CLINIC | Age: 38
End: 2018-07-09

## 2018-07-09 VITALS
TEMPERATURE: 98.4 F | RESPIRATION RATE: 14 BRPM | DIASTOLIC BLOOD PRESSURE: 76 MMHG | WEIGHT: 158.5 LBS | HEART RATE: 58 BPM | BODY MASS INDEX: 27.06 KG/M2 | SYSTOLIC BLOOD PRESSURE: 150 MMHG | HEIGHT: 64 IN

## 2018-07-09 DIAGNOSIS — C50.411 MALIGNANT NEOPLASM OF UPPER-OUTER QUADRANT OF RIGHT BREAST IN FEMALE, ESTROGEN RECEPTOR POSITIVE (HCC): Primary | ICD-10-CM

## 2018-07-09 DIAGNOSIS — Z17.0 MALIGNANT NEOPLASM OF UPPER-OUTER QUADRANT OF RIGHT BREAST IN FEMALE, ESTROGEN RECEPTOR POSITIVE (HCC): Primary | ICD-10-CM

## 2018-07-09 PROCEDURE — 99024 POSTOP FOLLOW-UP VISIT: CPT | Performed by: SURGERY

## 2018-07-09 PROCEDURE — 1111F DSCHRG MED/CURRENT MED MERGE: CPT | Performed by: SURGERY

## 2018-07-09 RX ORDER — OXYCODONE HYDROCHLORIDE AND ACETAMINOPHEN 5; 325 MG/1; MG/1
TABLET ORAL
COMMUNITY
Start: 2018-07-05 | End: 2018-09-24 | Stop reason: ALTCHOICE

## 2018-07-09 NOTE — LETTER
July 9, 2018     Yonny Mccarty MD  [address]  [address] [address]    Patient: Lobo Garrison   YOB: 1980   Date of Visit: 7/9/2018       Dear Dr Sarah Seaman:    Thank you for referring Paty Hicks to me for evaluation  Below are my notes for this consultation  If you have questions, please do not hesitate to call me  I look forward to following your patient along with you  Sincerely,        Ismael Buck MD        CC: No Recipients  Ismael Buck MD  7/9/2018  4:41 PM  Sign at close encounter     Surgical Oncology Breast Post-Op       [address] [place] [address]  1980  [id number]  [address] [facility]  [facility] [address] [address]    Chief Complaint:   Dakota Zacarias is seen for a post-operative visit of her recent bilateral breast surgery  Oncology History:        Malignant neoplasm of upper-outer quadrant of right breast in female, estrogen receptor positive ([address] )    1/19/2018 Initial Diagnosis     [facility]   Malignant neoplasm of upper-outer quadrant of right breast in female, estrogen receptor positive ([address] ), Her 2 positive           1/29/2018 Genetic Testing     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  2/2018 -  Chemotherapy     neoadjuvant chemotherapy with [facility] with pertuzumab  Dr Rochelle Loja         6/21/2018 Surgery     Right MRM  Invasive mucinous carcinoma  3 4 cm geronimo  Grade 1  3/16 lymph nodes  Stage     Left simple mastectomy  Benign breast  Immediate reconstruction Dr Strong Coil:   Assessment/Plan    Patient tolerated her surgery quite well  She had a direct implant  She complains of some right arm pain and possibly swelling  We will have her see physical therapy    She had 3 micro Mets in her lymph nodes the other 13 lymph nodes were negative  We will have her see Radiation Oncology as well  She has a follow-up appoint with Dr Galilea Diez  I will see her back in 3 months    History of Present Illness:   See above    Interval History:   See above    Review of Systems:   Review of Systems    Past Medical History:     Patient Active Problem List   Diagnosis    Asthma    Other constipation    Cigarette nicotine dependence without complication    Macular atrophy, retinal    Seasonal allergies    Malignant neoplasm of upper-outer quadrant of right breast in female, estrogen receptor positive ([facility] [address] )     Past Medical History:   Diagnosis Date    Asthma     allergy excaberated    Breast cancer ([facility] [address] ) 2018    GERD (gastroesophageal reflux disease)     Seasonal allergies      Past Surgical History:   Procedure Laterality Date    BREAST RECONSTRUCTION Bilateral 2018    Procedure: RECONSTRUCTION BREAST W/ IMPLANT;  Surgeon: Lulú Rincon MD;  [facility];  Service: Plastics     SECTION      PILONIDAL CYST EXCISION      resection    HI INSJ TUNNELED CTR VAD W/SUBQ PORT AGE 5 YR/> Left 2018    Procedure: INSERTION VENOUS PORT ( PORT-A-CATH) IR;  Surgeon: Mann Gross DO;  [facility];  Service: Interventional Radiology    HI MASTECTOMY, MODIFIED RADICAL Right 2018    Procedure: BREAST MODIFIED RADICAL MASTECTOMY;  Surgeon: Melody Mccord MD;  [facility];  Service: Surgical Oncology    HI MASTECTOMY, SIMPLE, COMPLETE Left 2018    Procedure: BREAST SIMPLE MASTECTOMY;  Surgeon: Melody Mccord MD;  [facility];  Service: Surgical Oncology    TUBAL LIGATION      WISDOM TOOTH EXTRACTION       Family History   Problem Relation Age of Onset    Diabetes Mother     Hypertension Mother     Rosacea Father     Allergies Father         seasonal    Liver cancer Paternal Grandfather     Breast cancer Family      Social History     Social History    Marital status: /Civil Union     Spouse name: N/A    Number of children: N/A    Years of education: N/A     Occupational History    Not on file       Social History Main Topics    Smoking status: Former Smoker     Quit date: 1/26/2015    Smokeless tobacco: Never Used    Alcohol use Yes      Comment: rare    Drug use: No    Sexual activity: Yes     Partners: Male     Birth control/ protection: Other     Other Topics Concern    Not on file     Social History Narrative    Drinks coffee           Current Outpatient Prescriptions:     albuterol (VENTOLIN HFA) 90 mcg/act inhaler, Inhale 2 puffs every 4 (four) hours as needed, Disp: , Rfl:     cephalexin (KEFLEX) 500 mg capsule, , Disp: , Rfl:     diphenoxylate-atropine (LOMOTIL) 2 5-0 025 mg per tablet, Take 1 tablet by mouth 4 (four) times a day as needed for diarrhea, Disp: 60 tablet, Rfl: 2    docusate sodium (COLACE) 100 mg capsule, Take 1 capsule (100 mg total) by mouth 2 (two) times a day, Disp: 10 capsule, Rfl: 0    levocetirizine (XYZAL) 5 MG tablet, Take 1 tablet by mouth daily, Disp: , Rfl:     LORazepam (ATIVAN) 1 mg tablet, Take 1 tablet (1 mg total) by mouth daily at bedtime, Disp: 30 tablet, Rfl: 0    Multiple Vitamins-Minerals (HAIR SKIN AND NAILS FORMULA PO), Take 1 tablet by mouth daily, Disp: , Rfl:     mupirocin (BACTROBAN) 2 % ointment, , Disp: , Rfl:     omeprazole (PriLOSEC) 20 mg delayed release capsule, Take 20 mg by mouth, Disp: , Rfl:     ondansetron (ZOFRAN) 4 mg tablet, Take 1 tablet (4 mg total) by mouth every 6 (six) hours as needed for nausea or vomiting, Disp: 30 tablet, Rfl: 1    oxyCODONE-acetaminophen (PERCOCET) 5-325 mg per tablet, , Disp: , Rfl:     prochlorperazine (COMPAZINE) 10 mg tablet, Take 1 tablet (10 mg total) by mouth every 6 (six) hours as needed for nausea or vomiting, Disp: 90 tablet, Rfl: 0    lidocaine-prilocaine (EMLA) cream, Apply topically as needed for mild pain, Disp: 30 g, Rfl: 0    mometasone (NASONEX) 50 mcg/act nasal spray, 2 sprays into each nostril daily, Disp: , Rfl:   Allergies   Allergen Reactions    Trichophyton Other (See Comments)     Pt does not know reaction     Bee Pollen     Cat Hair Extract Sneezing    Dust Mite Extract Sneezing    Molds & Smuts Sneezing    Pollen Extract Sneezing    Tree Extract Sneezing       Physical Exams:     Vitals:    07/09/18 1603   BP: 150/76   Pulse: 58   Resp: 14   Temp: 98 4 °F (36 9 °C)     Physical Exam   Pulmonary/Chest:   Bilateral breast show no evidence of infection  Cannot appreciate clinical evidence of lymphedema in the right arm though it is somewhat sore  She has hyperesthesias along the right humeral region  Results:       Labs:       Discussion/Summary:   I have recommended the patient see physical therapy  I explained that the hyperesthesias should resolve over the next 3 months  She is agreeable to seeing physical therapy

## 2018-07-09 NOTE — PROGRESS NOTES
Surgical Oncology Breast Post-Op       [address]  [facility]  [address]  1980  [id number]  [address] [facility],[address]  [facility] [place]  [address] [address]    Chief Complaint:   Burak Chan is seen for a post-operative visit of her recent bilateral breast surgery  Oncology History:        Malignant neoplasm of upper-outer quadrant of right breast in female, estrogen receptor positive ([address] )    1/19/2018 Initial Diagnosis     Ultrasound guided core biopsy of right breast mass at [facility]   Malignant neoplasm of upper-outer quadrant of right breast in female, estrogen receptor positive ([address] ), Her 2 positive           1/29/2018 Genetic Testing     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  2/2018 -  Chemotherapy     neoadjuvant chemotherapy with [address] with pertuzumab  Dr Jennifer Mcghee         6/21/2018 Surgery     Right MRM  Invasive mucinous carcinoma  3 4 cm geronimo  Grade 1  3/16 lymph nodes  Stage     Left simple mastectomy  Benign breast  Immediate reconstruction Dr Julissa Felder:   Assessment/Plan    Patient tolerated her surgery quite well  She had a direct implant  She complains of some right arm pain and possibly swelling  We will have her see physical therapy  She had 3 micro Mets in her lymph nodes the other 13 lymph nodes were negative  We will have her see Radiation Oncology as well  She has a follow-up appoint with Dr Jennifer Mcghee  I will see her back in 3 months    History of Present Illness:   See above    Interval History:   See above    Review of Systems:   Review of Systems    Past Medical History:     Patient Active Problem List   Diagnosis    Asthma    Other constipation    Cigarette nicotine dependence without complication    Macular atrophy, retinal    Seasonal allergies    Malignant neoplasm of upper-outer quadrant of right breast in female, estrogen receptor positive ([facility] )     Past Medical History:   Diagnosis Date    Asthma     allergy excaberated    Breast cancer ([facility] [address] [facility] ) 2018    GERD (gastroesophageal reflux disease)     Seasonal allergies      Past Surgical History:   Procedure Laterality Date    BREAST RECONSTRUCTION Bilateral 2018    Procedure: RECONSTRUCTION BREAST W/ IMPLANT;  Surgeon: Katelin Vasquez MD;  [facility];  Service: Plastics     SECTION      PILONIDAL CYST EXCISION      resection    SC INSJ TUNNELED CTR VAD W/SUBQ PORT AGE 5 YR/> Left 2018    Procedure: INSERTION VENOUS PORT ( PORT-A-CATH) IR;  Surgeon: Ted Campos DO;  [facility];  Service: Interventional Radiology    SC MASTECTOMY, MODIFIED RADICAL Right 2018    Procedure: BREAST MODIFIED RADICAL MASTECTOMY;  Surgeon: Amy Wright MD;  [facility];  Service: Surgical Oncology    SC MASTECTOMY, SIMPLE, COMPLETE Left 2018    Procedure: BREAST SIMPLE MASTECTOMY;  Surgeon: Amy Wright MD;  [facility];  Service: Surgical Oncology    TUBAL LIGATION      WISDOM TOOTH EXTRACTION       Family History   Problem Relation Age of Onset    Diabetes Mother     Hypertension Mother     Rosacea Father     Allergies Father         seasonal    Liver cancer Paternal Grandfather     Breast cancer Family      Social History     Social History    Marital status: /Civil Union     Spouse name: N/A    Number of children: N/A    Years of education: N/A     Occupational History    Not on file       Social History Main Topics    Smoking status: Former Smoker     Quit date: 2015    Smokeless tobacco: Never Used    Alcohol use Yes      Comment: rare    Drug use: No    Sexual activity: Yes     Partners: Male     Birth control/ protection: Other     Other Topics Concern    Not on file     Social History Narrative    Drinks coffee           Current Outpatient Prescriptions:     albuterol (VENTOLIN HFA) 90 mcg/act inhaler, Inhale 2 puffs every 4 (four) hours as needed, Disp: , Rfl:     cephalexin (KEFLEX) 500 mg capsule, , Disp: , Rfl:     diphenoxylate-atropine (LOMOTIL) 2 5-0 025 mg per tablet, Take 1 tablet by mouth 4 (four) times a day as needed for diarrhea, Disp: 60 tablet, Rfl: 2    docusate sodium (COLACE) 100 mg capsule, Take 1 capsule (100 mg total) by mouth 2 (two) times a day, Disp: 10 capsule, Rfl: 0    levocetirizine (XYZAL) 5 MG tablet, Take 1 tablet by mouth daily, Disp: , Rfl:     LORazepam (ATIVAN) 1 mg tablet, Take 1 tablet (1 mg total) by mouth daily at bedtime, Disp: 30 tablet, Rfl: 0    Multiple Vitamins-Minerals (HAIR SKIN AND NAILS FORMULA PO), Take 1 tablet by mouth daily, Disp: , Rfl:     mupirocin (BACTROBAN) 2 % ointment, , Disp: , Rfl:     omeprazole (PriLOSEC) 20 mg delayed release capsule, Take 20 mg by mouth, Disp: , Rfl:     ondansetron (ZOFRAN) 4 mg tablet, Take 1 tablet (4 mg total) by mouth every 6 (six) hours as needed for nausea or vomiting, Disp: 30 tablet, Rfl: 1    oxyCODONE-acetaminophen (PERCOCET) 5-325 mg per tablet, , Disp: , Rfl:     prochlorperazine (COMPAZINE) 10 mg tablet, Take 1 tablet (10 mg total) by mouth every 6 (six) hours as needed for nausea or vomiting, Disp: 90 tablet, Rfl: 0    lidocaine-prilocaine (EMLA) cream, Apply topically as needed for mild pain, Disp: 30 g, Rfl: 0    mometasone (NASONEX) 50 mcg/act nasal spray, 2 sprays into each nostril daily, Disp: , Rfl:   Allergies   Allergen Reactions    Trichophyton Other (See Comments)     Pt does not know reaction     Bee Pollen     Cat Hair Extract Sneezing    Dust Mite Extract Sneezing    Molds & Smuts Sneezing    Pollen Extract Sneezing    Tree Extract Sneezing       Physical Exams:     Vitals:    07/09/18 1603   BP: 150/76   Pulse: 58   Resp: 14   Temp: 98 4 °F (36 9 °C)     Physical Exam   Pulmonary/Chest:   Bilateral breast show no evidence of infection  Cannot appreciate clinical evidence of lymphedema in the right arm though it is somewhat sore  She has hyperesthesias along the right humeral region  Results:       Labs:       Discussion/Summary:   I have recommended the patient see physical therapy  I explained that the hyperesthesias should resolve over the next 3 months  She is agreeable to seeing physical therapy

## 2018-07-10 DIAGNOSIS — F41.1 GENERALIZED ANXIETY DISORDER: Primary | ICD-10-CM

## 2018-07-10 RX ORDER — LORAZEPAM 1 MG/1
1 TABLET ORAL
Qty: 30 TABLET | Refills: 0 | Status: SHIPPED | OUTPATIENT
Start: 2018-07-10 | End: 2018-08-13 | Stop reason: SDUPTHER

## 2018-07-17 ENCOUNTER — RADIATION ONCOLOGY CONSULT (OUTPATIENT)
Dept: RADIATION ONCOLOGY | Facility: HOSPITAL | Age: 38
End: 2018-07-17
Attending: RADIOLOGY
Payer: COMMERCIAL

## 2018-07-17 ENCOUNTER — CLINICAL SUPPORT (OUTPATIENT)
Dept: RADIATION ONCOLOGY | Facility: HOSPITAL | Age: 38
End: 2018-07-17
Payer: COMMERCIAL

## 2018-07-17 VITALS
BODY MASS INDEX: 26.73 KG/M2 | WEIGHT: 156.6 LBS | OXYGEN SATURATION: 99 % | RESPIRATION RATE: 14 BRPM | HEART RATE: 94 BPM | HEIGHT: 64 IN | SYSTOLIC BLOOD PRESSURE: 106 MMHG | DIASTOLIC BLOOD PRESSURE: 70 MMHG | TEMPERATURE: 98.7 F

## 2018-07-17 DIAGNOSIS — Z17.0 MALIGNANT NEOPLASM OF UPPER-OUTER QUADRANT OF RIGHT BREAST IN FEMALE, ESTROGEN RECEPTOR POSITIVE (HCC): Primary | ICD-10-CM

## 2018-07-17 DIAGNOSIS — C50.411 MALIGNANT NEOPLASM OF UPPER-OUTER QUADRANT OF RIGHT BREAST IN FEMALE, ESTROGEN RECEPTOR POSITIVE (HCC): Primary | ICD-10-CM

## 2018-07-17 PROCEDURE — 99215 OFFICE O/P EST HI 40 MIN: CPT | Performed by: RADIOLOGY

## 2018-07-17 RX ORDER — LEVOFLOXACIN 750 MG/1
1 TABLET ORAL DAILY
Status: ON HOLD | COMMUNITY
Start: 2018-07-16 | End: 2018-09-19 | Stop reason: ALTCHOICE

## 2018-07-17 RX ORDER — DOXYCYCLINE HYCLATE 100 MG/1
1 CAPSULE ORAL 2 TIMES DAILY
COMMUNITY
Start: 2018-07-16 | End: 2019-01-29

## 2018-07-17 NOTE — PROGRESS NOTES
Jaja Sanchez  1980  Ms Sanchez is a 41 y o  female    Chief Complaint   Patient presents with    Consult   1/19/18 Breast mass, right, biopsy 10:00, 12 cm from the nipple: Mucinous carcinoma, 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, moderately differentiated  1 3 cm  Small focus suspicious for lymphovascular invasion  In-situ component not identified  1/27/18 Bilateral breast MRI: Extensive disease throughout the upper outer right breast   2nd   prominent mass is noted in the anterior right breast  Numerous other foci of suspicious enhancement including metastatic right axillary lymphadenopathy  No evidence of left breast disease  BiRad:6     2/1/18 Consult Dr Jovita Wilson: 79-year-old premenopausal woman who initially noticed right breast lump when she was 8 months pregnant  She delivered 1st baby in June 2017  After the delivery, she noticed right breast lump to be slowly enlarging  She brought this to medical attention  Mammography was quite abnormal    Locally advanced right breast cancer  She has quite large palpable right axillary adenopathy  This was mucinous histology, grade 2, ER 30% positive, MT 2% positive, HER2 3+ disease  Plan TCH P, consistent of Taxotere, carboplatin, trastuzumab and pertuzumab x 6 cycles  2/9/18 CT C/A/P: Enhancing right breast mass in keeping with provided history of breast cancer measuring 5 2 x 4 7 cm  Extensive metastatic right axillary adenopathy the largest nodes measuring 2 4 x 2 2 cm and 2 3 x 3 0 cm  No metastatic disease in the abdomen or pelvis  Left thyroid nodule measuring 1 3 cm  Incidental discovery of one or more thyroid nodule(s) measuring > 1 cm but without suspicious features  2/9/18 Bone Scan: No evidence of osseous metastasis  2/2018 Neoadjuvant chemotherapy with [address] with pertuzumab     5/29/18 Dr Jovita Wilson F/U: Currently on chemo with excellent tolerance  Clinically, she has partial response   Last dose of TCH and pertuzumab in June 1, 2018, then bilateral mastectomy on June 21  I recommended her to continue with trastuzumab monotherapy every 3 weeks  5/31/18 Bilateral Breast MRI: Mild interval reduction in size of the large right   upper outer quadrant mass containing the central biopsy clip  There has been significant response regard to marked decrease in degree of enhancement with only minimal residual enhancement remaining  This finding is also seen through the extensive multifocal disease previously demonstrated extending from the mass out to the nipple, these areas are no longer well appreciated due to the marked interval reduction in enhancement  Right axillary adenopathy, decreased compared to prior  6/21/18 Left breast simple mastectomy: Benign  Right modified radical mastectomy: Mucinous carcinoma  NG 1 of 3  Rare single residual carcinoma cells remain within a mass of nearly entirely acellular mucin; carcinoma cells are < 1 mm in size and are present within mucin pools measuring 3 4 x 2 8 x 2 8 cm  Margins are uninvolved  Acellular mucin approaches the UOQ subcutaneous margin by < 1 mm  Three of three lymph nodes from tail of Sanchez are positive (3/3) for metastatic carcinoma, largest dimension of cancer cells = 0 4 millimeters, without extranodal spread; tumor within lymph nodes is mostly acellular mucin pools measuring up to 1 7 cm diameter  Right axillary regional lymphadenectomy: Thirteen lymph nodes are negative (0/13) for metastatic carcinoma  At least Stage IB - ypT1mi, ypN1mi, G1  Repeat HER2 testing indicated since initial Her2 is 3+/positive & mastectomy tumor is G1  Bilateral reconstruction with implants  7/9/18 FU Dr Belen Alvarado: C/o right arm pain and possibly swelling  Recommend physical therapy & Radiation Oncology as well  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 she had pregnancy leave, works form home    Due next week for Dr Maria Manuel       [facility] Staging  Malignant neoplasm of upper-outer quadrant of right breast in female, estrogen receptor positive ([facility] )  Staging form: Breast, AJCC 8th Edition  - Clinical stage from 2018: Stage IB (cT2(3), cN1, cM0, G2, ER: Positive, PA: Positive, HER2: Positive) - Signed by Damon Mock MD on 2018         Malignant neoplasm of upper-outer quadrant of right breast in female, estrogen receptor positive ([facility] [address] )    2018 Initial Diagnosis     U/Sguided core biopsy of right breast mass at [facility]  Malignant neoplasm of UOQ of right breast, estrogen receptor positive ([facility] [address] ), Her 2 positive  2018 Genetic Testing     Likely pathogenic variant was identified in the PALB2 gene  2018 - 2018 Chemotherapy     Neoadjuvant chemotherapy with TCH with pertuzumab  Dr Maradiaga Daft  2018 Surgery     Right MRM  Invasive mucinous carcinoma  3 4 cm geronimo  Grade 1  3/16 lymph nodes  Stage Stage IB - ypT1mi, ypN1mi, G1  Left simple mastectomy  Benign breast     Immediate reconstruction Dr Perales Expose           Chemotherapy     trastuzumab monotherapy every 3 weeks  Begins 18              Clinical Trial: Not eligible   Screening  Tobacco  Current tobacco user: no  If yes, brief counseling provided: No    Hypertension  Hypertension screening performed: yes  Normotensive:  yes  If no, referred to PCP: no    Depression Screening  Screened for depression using PHQ-2: yes    Screened for depression using PHQ-9:  yes  Screening positive or negative:  positive  If score >4, was any of the following actions taken? Additional evaluation for depression, suicide risk assesment, referral to PCP or psychiatry, medication started:  Yes gave her counselor card    [address] [place] [address] for Patients >65 years  Advanced Care Planning Discussed:  no  Patient named surrogate decision maker or care plan in chart: n/a    OB/GYN History:, Had tubal ligation  LMP 2018   None since chemo      Health Maintenance   Topic Date Due    HIV SCREENING  1980    INFLUENZA VACCINE  2018    Depression Screening PHQ-9  2019    PAP SMEAR  01/15/2020    DTaP,Tdap,and Td Vaccines (2 - Td) 2027       Patient Active Problem List   Diagnosis    Asthma    Other constipation    Cigarette nicotine dependence without complication    Macular atrophy, retinal    Seasonal allergies    Malignant neoplasm of upper-outer quadrant of right breast in female, estrogen receptor positive ([facility] [address] )     Past Medical History:   Diagnosis Date    Asthma     allergy excaberated    Breast cancer ([facility] [address] ) 2018    GERD (gastroesophageal reflux disease)     Seasonal allergies      Past Surgical History:   Procedure Laterality Date    BREAST RECONSTRUCTION Bilateral 2018    Procedure: RECONSTRUCTION BREAST W/ IMPLANT;  Surgeon: Jostin Kulkarni MD;  [facility];  Service: Plastics     SECTION      PILONIDAL CYST EXCISION      resection    MO INSJ TUNNELED CTR VAD W/SUBQ PORT AGE 5 YR/> Left 2018    Procedure: INSERTION VENOUS PORT ( PORT-A-CATH) IR;  Surgeon: Akin Peace DO;  [facility];  Service: Interventional Radiology    MO MASTECTOMY, MODIFIED RADICAL Right 2018    Procedure: BREAST MODIFIED RADICAL MASTECTOMY;  Surgeon: Lillian Flores MD;  [facility];  Service: Surgical Oncology    MO MASTECTOMY, SIMPLE, COMPLETE Left 2018    Procedure: BREAST SIMPLE MASTECTOMY;  Surgeon: Lillian Flores MD;  [facility];  Service: Surgical Oncology    TUBAL LIGATION      WISDOM TOOTH EXTRACTION       Family History   Problem Relation Age of Onset    Diabetes Mother     Hypertension Mother     Rosacea Father     Allergies Father         seasonal    Liver cancer Paternal Grandfather     Breast cancer Family      Social History     Social History    Marital status: /Civil Union     Spouse name: N/A    Number of children: N/A    Years of education: N/A     Occupational History    Not on file       Social History Main Topics    Smoking status: Former Smoker     Quit date: 1/26/2015    Smokeless tobacco: Never Used    Alcohol use Yes      Comment: rare    Drug use: No    Sexual activity: Yes     Partners: Male     Birth control/ protection: Other     Other Topics Concern    Not on file     Social History Narrative    Drinks coffee           Current Outpatient Prescriptions:     albuterol (VENTOLIN HFA) 90 mcg/act inhaler, Inhale 2 puffs every 4 (four) hours as needed, Disp: , Rfl:     cephalexin (KEFLEX) 500 mg capsule, , Disp: , Rfl:     diphenoxylate-atropine (LOMOTIL) 2 5-0 025 mg per tablet, Take 1 tablet by mouth 4 (four) times a day as needed for diarrhea, Disp: 60 tablet, Rfl: 2    docusate sodium (COLACE) 100 mg capsule, Take 1 capsule (100 mg total) by mouth 2 (two) times a day, Disp: 10 capsule, Rfl: 0    doxycycline hyclate (VIBRAMYCIN) 100 mg capsule, Take 1 capsule by mouth 2 (two) times a day, Disp: , Rfl:     levocetirizine (XYZAL) 5 MG tablet, Take 1 tablet by mouth daily, Disp: , Rfl:     levofloxacin (LEVAQUIN) 750 mg tablet, Take 1 tablet by mouth daily, Disp: , Rfl:     lidocaine-prilocaine (EMLA) cream, Apply topically as needed for mild pain, Disp: 30 g, Rfl: 0    LORazepam (ATIVAN) 1 mg tablet, Take 1 tablet (1 mg total) by mouth daily at bedtime, Disp: 30 tablet, Rfl: 0    mometasone (NASONEX) 50 mcg/act nasal spray, 2 sprays into each nostril daily, Disp: , Rfl:     mupirocin (BACTROBAN) 2 % ointment, , Disp: , Rfl:     omeprazole (PriLOSEC) 20 mg delayed release capsule, Take 20 mg by mouth, Disp: , Rfl:     ondansetron (ZOFRAN) 4 mg tablet, Take 1 tablet (4 mg total) by mouth every 6 (six) hours as needed for nausea or vomiting, Disp: 30 tablet, Rfl: 1    oxyCODONE-acetaminophen (PERCOCET) 5-325 mg per tablet, , Disp: , Rfl:     prochlorperazine (COMPAZINE) 10 mg tablet, Take 1 tablet (10 mg total) by mouth every 6 (six) hours as needed for nausea or vomiting, Disp: 90 tablet, Rfl: 0    Allergies   Allergen Reactions    Trichophyton Other (See Comments)     Pt does not know reaction     Bee Pollen     Cat Hair Extract Sneezing    Dust Mite Extract Sneezing    Molds & Smuts Sneezing    Pollen Extract Sneezing    Tree Extract Sneezing       Review of Systems:  Review of Systems   Constitutional: Positive for appetite change (slightly better after chemo ended) and fatigue (5/10)  HENT: Negative  Eyes: Negative  Respiratory: Negative  Cardiovascular: Negative  Gastrointestinal: Positive for constipation (uses stool softners)  Endocrine: Negative  Genitourinary: Negative  Musculoskeletal: Negative  Skin:        Rt lateral chest wall drain site was removed yesterday  Very sore tender  Surgical incisions healing well   Allergic/Immunologic: Positive for environmental allergies  Neurological:        Has had a little neuropathy in feet  Worse pain in the tricep area after surgery   Hematological: Negative  Psychiatric/Behavioral: Negative  Vitals:    07/17/18 1300   BP: 106/70   BP Location: Left arm   Pulse: 94   Resp: 14   Temp: 98 7 °F (37 1 °C)   SpO2: 99%   Weight: 71 kg (156 lb 9 6 oz)   Height: 5' 3 5" (1 613 m)            Imaging:No results found  Teaching   RT to breast

## 2018-07-17 NOTE — PROGRESS NOTES
Consultation - Radiation Oncology     [facility]:[id number] : 1980  Encounter: [id number]  Patient Information: Rony Sanchez      CHIEF COMPLAINT  Chief Complaint   Patient presents with    Consult     Cancer Staging  Malignant neoplasm of upper-outer quadrant of right breast in female, estrogen receptor positive ([facility] [address] )  Staging form: Breast, AJCC 8th Edition  - Clinical stage from 2018: Stage IB (cT2(3), cN1, cM0, G2, ER: Positive, SD: Positive, HER2: Positive) - Signed by Juhi Chi MD on 2018           History of Present Illness   Rony Sanchez is a 41y o  year old female who presents with right breast carcinoma  18 Breast mass, right, biopsy 10:00, 12 cm from the nipple: Mucinous carcinoma, 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, moderately differentiated  1 3 cm  Small focus suspicious for lymphovascular invasion  In-situ component not identified      18 Bilateral breast MRI: Extensive disease throughout the upper outer right breast   2nd   prominent mass is noted in the anterior right breast  Numerous other foci of suspicious enhancement including metastatic right axillary lymphadenopathy   No evidence of left breast disease  BiRad:6      18 Consult Dr Kelly Love: 81-year-old premenopausal woman who initially noticed right breast lump when she was 8 months pregnant   She delivered 1st baby/son in 2017 with a    After the delivery, she noticed right breast lump to be slowly enlarging  Betty Fiddler brought this to medical attention  Mammography was quite abnormal    Locally advanced right breast cancer  Betty Fiddler has quite large palpable right axillary adenopathy  This was mucinous histology, grade 2, ER 30% positive, SD 2% positive, HER2 3+ disease  Plan [facility] P, consistent of Taxotere, carboplatin, trastuzumab and pertuzumab x 6 cycles       18 CT C/A/P: Enhancing right breast mass in keeping with provided history of breast cancer measuring 5 2 x 4 7 cm  Extensive metastatic right axillary adenopathy the largest nodes measuring 2 4 x 2 2 cm and 2 3 x 3 0 cm  No metastatic disease in the abdomen or pelvis  Left thyroid nodule measuring 1 3 cm  Incidental discovery of one or more thyroid nodule(s) measuring > 1 cm but without suspicious features       2/9/18 Bone Scan: No evidence of osseous metastasis      2/2018 Neoadjuvant chemotherapy with [facility] with pertuzumab      5/29/18 Dr Jennifer Mcghee F/U: Currently on chemo with excellent tolerance  Clinically, she has partial response  Last dose of TCH and pertuzumab in June 1, 2018, then bilateral mastectomy on June 21   I recommended her to continue with trastuzumab monotherapy every 3 weeks        5/31/18 Bilateral Breast MRI: Mild interval reduction in size of the large right   upper outer quadrant mass containing the central biopsy clip  There has been significant response regard to marked decrease in degree of enhancement with only minimal residual enhancement remaining  This finding is also seen through the extensive multifocal disease previously demonstrated extending from the mass out to the nipple, these areas are no longer well appreciated due to the marked interval reduction in enhancement  Right axillary adenopathy, decreased compared to prior      6/21/18 Left breast simple mastectomy: Benign  Right modified radical mastectomy: Mucinous carcinoma  NG 1 of 3  Rare single residual carcinoma cells remain within a mass of nearly entirely acellular mucin; carcinoma cells are < 1 mm in size and are present within mucin pools measuring 3 4 x 2 8 x 2 8 cm  Margins are uninvolved  Acellular mucin approaches the UOQ subcutaneous margin by < 1 mm   Three of three lymph nodes from tail of Sanchez are positive (3/3) for metastatic carcinoma, largest dimension of cancer cells = 0 4 millimeters, without extranodal spread; tumor within lymph nodes is mostly acellular mucin pools measuring up to 1 7 cm diameter    Right axillary regional lymphadenectomy: Thirteen lymph nodes are negative (0/13) for metastatic carcinoma  At least Stage IB - ypT1mi, ypN1mi, G1  Repeat HER2 testing indicated since initial Her2 is 3+/positive & mastectomy tumor is G1  Bilateral reconstruction with implants      18  Dr Sariah Noland: Aníbal Miller right arm pain and possibly swelling   Recommend physical therapy & Radiation Oncology as well  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 because she had pregnancy leave, works form home  She went on medical leave/short-term Disability 2018  She went on short-term  She has some right arm decreased range of motion with discomfort at times  She is receiving physical therapy which is increasing her range of motion  She had her drains removed yesterday  She is undergoing tissue expansion of her implants  She has not had her menstrual cycle since 2018  She is also status post tubal ligation  She has no family history of any breast cancer in her mother but her maternal grandmother in had breast cancer at 48 and then  at the age of 61 from brain metastasis  Her paternal aunt had breast cancer  Her father has no history of any cancer  She smoked tobacco but quit 5 years ago  Due next week for Dr Hayden Plascencia     Historical Information      Malignant neoplasm of upper-outer quadrant of right breast in female, estrogen receptor positive ([facility] [address] )    2018 Initial Diagnosis     U/Sguided core biopsy of right breast mass at [facility]  Malignant neoplasm of UOQ of right breast, estrogen receptor positive ([facility] [address] ), Her 2 positive  2018 Genetic Testing     Likely pathogenic variant was identified in the PALB2 gene  2018 - 2018 Chemotherapy     Neoadjuvant chemotherapy with TCH with pertuzumab  Dr Vitaliy Bray  2018 Surgery     Right MRM  Invasive mucinous carcinoma  3 4 cm geronimo  Grade 1  3/16 lymph nodes  Stage Stage IB - ypT1mi, ypN1mi, G1      Left simple mastectomy  Benign breast     Immediate reconstruction Dr Lea Beauchamp           Chemotherapy     trastuzumab monotherapy every 3 weeks    Begins 18                Past Medical History:   Diagnosis Date    Asthma     allergy excaberated    Breast cancer ([address] ) 2018    GERD (gastroesophageal reflux disease)     Seasonal allergies      Past Surgical History:   Procedure Laterality Date    BREAST RECONSTRUCTION Bilateral 2018    Procedure: RECONSTRUCTION BREAST W/ IMPLANT;  Surgeon: Tim Rhodes MD;  [facility];  Service: Plastics     SECTION      PILONIDAL CYST EXCISION      resection    AZ INSJ TUNNELED CTR VAD W/SUBQ PORT AGE 5 YR/> Left 2018    Procedure: INSERTION VENOUS PORT ( PORT-A-CATH) IR;  Surgeon: Oj Lennon DO;  [facility];  Service: Interventional Radiology    AZ MASTECTOMY, MODIFIED RADICAL Right 2018    Procedure: BREAST MODIFIED RADICAL MASTECTOMY;  Surgeon: Ruthann Gutierrez MD;  [facility];  Service: Surgical Oncology    AZ MASTECTOMY, SIMPLE, COMPLETE Left 2018    Procedure: BREAST SIMPLE MASTECTOMY;  Surgeon: Ruthann Gutierrez MD;  [facility];  Service: Surgical Oncology    TUBAL LIGATION      WISDOM TOOTH EXTRACTION         Family History   Problem Relation Age of Onset    Diabetes Mother     Hypertension Mother     Rosacea Father     Allergies Father         seasonal    Liver cancer Paternal Grandfather     Breast cancer Family        Social History   History   Alcohol Use    Yes     Comment: rare     History   Drug Use No     History   Smoking Status    Former Smoker    Quit date: 2015   Smokeless Tobacco    Never Used       Meds/Allergies     Current Outpatient Prescriptions:     albuterol (VENTOLIN HFA) 90 mcg/act inhaler, Inhale 2 puffs every 4 (four) hours as needed, Disp: , Rfl:     cephalexin (KEFLEX) 500 mg capsule, , Disp: , Rfl:     diphenoxylate-atropine (LOMOTIL) 2 5-0 025 mg per tablet, Take 1 tablet by mouth 4 (four) times a day as needed for diarrhea, Disp: 60 tablet, Rfl: 2    docusate sodium (COLACE) 100 mg capsule, Take 1 capsule (100 mg total) by mouth 2 (two) times a day, Disp: 10 capsule, Rfl: 0    doxycycline hyclate (VIBRAMYCIN) 100 mg capsule, Take 1 capsule by mouth 2 (two) times a day, Disp: , Rfl:     levocetirizine (XYZAL) 5 MG tablet, Take 1 tablet by mouth daily, Disp: , Rfl:     levofloxacin (LEVAQUIN) 750 mg tablet, Take 1 tablet by mouth daily, Disp: , Rfl:     lidocaine-prilocaine (EMLA) cream, Apply topically as needed for mild pain, Disp: 30 g, Rfl: 0    LORazepam (ATIVAN) 1 mg tablet, Take 1 tablet (1 mg total) by mouth daily at bedtime, Disp: 30 tablet, Rfl: 0    mometasone (NASONEX) 50 mcg/act nasal spray, 2 sprays into each nostril daily, Disp: , Rfl:     mupirocin (BACTROBAN) 2 % ointment, , Disp: , Rfl:     omeprazole (PriLOSEC) 20 mg delayed release capsule, Take 20 mg by mouth, Disp: , Rfl:     ondansetron (ZOFRAN) 4 mg tablet, Take 1 tablet (4 mg total) by mouth every 6 (six) hours as needed for nausea or vomiting, Disp: 30 tablet, Rfl: 1    oxyCODONE-acetaminophen (PERCOCET) 5-325 mg per tablet, , Disp: , Rfl:     prochlorperazine (COMPAZINE) 10 mg tablet, Take 1 tablet (10 mg total) by mouth every 6 (six) hours as needed for nausea or vomiting, Disp: 90 tablet, Rfl: 0  Allergies   Allergen Reactions    Trichophyton Other (See Comments)     Pt does not know reaction     Bee Pollen     Cat Hair Extract Sneezing    Dust Mite Extract Sneezing    Molds & Smuts Sneezing    Pollen Extract Sneezing    Tree Extract Sneezing       Review of Systems   Constitutional: Positive for appetite change (slightly better after chemo ended) and fatigue (5/10)  HENT: Negative  Eyes: Negative  Respiratory: Negative  Cardiovascular: Negative  Gastrointestinal: Positive for constipation (uses stool softners)  Endocrine: Negative  Genitourinary: Negative  Musculoskeletal: Negative  Skin:        Rt lateral chest wall drain site was removed yesterday  Very sore tender  Surgical incisions healing well   Allergic/Immunologic: Positive for environmental allergies  Neurological:        Has had a little neuropathy in feet  Worse pain in the tricep area after surgery   Hematological: Negative  Psychiatric/Behavioral: Negative        OBJECTIVE:   /70 (BP Location: Left arm)   Pulse 94   Temp 98 7 °F (37 1 °C)   Resp 14   Ht 5' 3 5" (1 613 m)   Wt 71 kg (156 lb 9 6 oz)   SpO2 99%   BMI 27 31 kg/m²   Pain Assessment:  0  Performance Status: ECOG/Zubrod/WHO: 1 - Symptomatic but completely ambulatory    Physical Exam   Constitutional: She is oriented to person, place, and time  She appears well-developed and well-nourished  No distress  HENT:   Head: Normocephalic and atraumatic  Mouth/Throat: No oropharyngeal exudate  Eyes: Conjunctivae and EOM are normal  Pupils are equal, round, and reactive to light  No scleral icterus  Neck: Normal range of motion  Neck supple  No tracheal deviation present  No thyromegaly present  Cardiovascular: Normal rate, regular rhythm and normal heart sounds  Pulmonary/Chest: Effort normal and breath sounds normal  No respiratory distress  She has no wheezes  She has no rales  She exhibits no tenderness  Right breast exhibits no skin change  Left breast exhibits no skin change  Bilateral chest wall mastectomy scars are well healed without any nodularity  Abdominal: Soft  Bowel sounds are normal  She exhibits no distension and no mass  There is no tenderness  Musculoskeletal: Normal range of motion  She exhibits no edema or tenderness  Lymphadenopathy:     She has no cervical adenopathy  She has no axillary adenopathy  Right: No supraclavicular adenopathy present  Left: No supraclavicular adenopathy present  Neurological: She is alert and oriented to person, place, and time   No cranial nerve deficit  Coordination normal    Skin: Skin is warm and dry  No rash noted  She is not diaphoretic  No erythema  No pallor  Psychiatric: She has a normal mood and affect  Her behavior is normal  Judgment and thought content normal    Nursing note and vitals reviewed  RESULTS  Lab Results    Chemistry        Component Value Date/Time     06/12/2018 1612    K 3 2 (L) 06/12/2018 1612     06/12/2018 1612    CO2 27 06/12/2018 1612    BUN 12 06/12/2018 1612    BUN 16 01/27/2018 1121    CREATININE 0 94 06/12/2018 1612        Component Value Date/Time    CALCIUM 8 4 06/12/2018 1612    ALKPHOS 84 06/12/2018 1612    AST 24 06/12/2018 1612    ALT 33 06/12/2018 1612    BILITOT 0 20 06/12/2018 1612            Lab Results   Component Value Date    WBC 2 09 (L) 06/12/2018    HGB 9 8 (L) 06/12/2018    HCT 29 4 (L) 06/12/2018    MCV 99 (H) 06/12/2018     06/12/2018       Imaging Studies  No results found  Pathology:See Above      ASSESSMENT  1  Malignant neoplasm of upper-outer quadrant of right breast in female, estrogen receptor positive ([facility] [address] )  Radiation Simulation Treatment     Cancer Staging  Malignant neoplasm of upper-outer quadrant of right breast in female, estrogen receptor positive ([facility] [address] )  Staging form: Breast, AJCC 8th Edition  - Clinical stage from 1/26/2018: Stage IB (cT2(3), cN1, cM0, G2, ER: Positive, MS: Positive, HER2: Positive) - Signed by Ismael Buck MD on 1/26/2018        PLAN/DISCUSSION  Orders Placed This Encounter   Procedures   [id number] Sanchez is a 41y o  year old female with a locally advanced right breast carcinoma  She presented with a palpable right breast mass as well as axillary lymphadenopathy  Her right breast measured 5 2 x 4 7 cm on CT scan along with 2 right axillary lymph nodes measuring 2 4 and 2 3 cm  There was no evident metastatic disease to the lungs or the abdomen or liver    Bone scan was without any evidence of metastatic disease  Recommendations were made for neoadjuvant chemotherapy with TCH and pertuzumab  She then had repeat imaging with an MRI of the breast that revealed reduction in size of her large right upper outer quadrant breast mass along with significant decrease in the degree of enhancement  There was reduction in the right axillary lymphadenopathy  She had surgery June 21, 2018 with a right modified radical mastectomy along with a left breast prophylactic mastectomy and reconstruction  Her preoperative stage would have been stage III and now post neoadjuvant chemotherapy she has stage IB disease  She just had her drains removed yesterday  She has appointment next week with Dr Arsalan Rios   Once she has healed from surgery and her tissue expansion is complete, we would recommend postmastectomy radiation therapy because initially she had a large 5 2 cm tumor with at least 2 positive axillary lymph nodes and pathologically 3/16 lymph nodes were positive  Postmastectomy radiation therapy has been shown to reduce the chance of local recurrence as well as improve survival   We discussed rationale for treatment including the acute side effects and the potential chronic complications  She agrees to proceed with radiation treatment  She will return for simulation once she has healed and tissue expansion is completed  We recommend treatment to the reconstructed right chest wall, supraclavicular, and axillary regions over 6 weeks  Her disease is hormone receptor positive and she will be seeing Dr Anshu Hebert to discuss hormonal therapy  Jesus Heck MD  3/67/0425,6:78 PM      Portions of the record may have been created with voice recognition software   Occasional wrong word or "sound a like" substitutions may have occurred due to the inherent limitations of voice recognition software   Read the chart carefully and recognize, using context, where substitutions have occurred

## 2018-07-17 NOTE — PROGRESS NOTES
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, moderately differentiated  1 3 cm  Small focus suspicious for lymphovascular invasion  In-situ component not identified  1/27/18 Bilateral breast MRI: Extensive disease throughout the upper outer right breast   2nd   prominent mass is noted in the anterior right breast  Numerous other foci of suspicious enhancement including metastatic right axillary lymphadenopathy  No evidence of left breast disease  BiRad:6     2/1/18 Consult Dr Bina Dunbar: 51-year-old premenopausal woman who initially noticed right breast lump when she was 8 months pregnant  She delivered 1st baby in June 2017  After the delivery, she noticed right breast lump to be slowly enlarging  She brought this to medical attention  Mammography was quite abnormal    Locally advanced right breast cancer  She has quite large palpable right axillary adenopathy  This was mucinous histology, grade 2, ER 30% positive, IA 2% positive, HER2 3+ disease  Plan [facility] P, consistent of Taxotere, carboplatin, trastuzumab and pertuzumab x 6 cycles  2/9/18 CT C/A/P: Enhancing right breast mass in keeping with provided history of breast cancer measuring 5 2 x 4 7 cm  Extensive metastatic right axillary adenopathy the largest nodes measuring 2 4 x 2 2 cm and 2 3 x 3 0 cm  No metastatic disease in the abdomen or pelvis  Left thyroid nodule measuring 1 3 cm  Incidental discovery of one or more thyroid nodule(s) measuring > 1 cm but without suspicious features  2/9/18 Bone Scan: No evidence of osseous metastasis  2/2018 Neoadjuvant chemotherapy with [address] with pertuzumab     5/29/18 Dr Kaleb Shay F/U: Currently on chemo with excellent tolerance  Clinically, she has partial response  Last dose of TCH and pertuzumab in June 1, 2018, then bilateral mastectomy on June 21  I recommended her to continue with trastuzumab monotherapy every 3 weeks  5/31/18 Bilateral Breast MRI: Mild interval reduction in size of the large right   upper outer quadrant mass containing the central biopsy clip  There has been significant response regard to marked decrease in degree of enhancement with only minimal residual enhancement remaining  This finding is also seen through the extensive multifocal disease previously demonstrated extending from the mass out to the nipple, these areas are no longer well appreciated due to the marked interval reduction in enhancement  Right axillary adenopathy, decreased compared to prior  6/21/18 Left breast simple mastectomy: Benign  Right modified radical mastectomy: Mucinous carcinoma  NG 1 of 3  Rare single residual carcinoma cells remain within a mass of nearly entirely acellular mucin; carcinoma cells are < 1 mm in size and are present within mucin pools measuring 3 4 x 2 8 x 2 8 cm  Margins are uninvolved  Acellular mucin approaches the UOQ subcutaneous margin by < 1 mm  Three of three lymph nodes from tail of Sanchez are positive (3/3) for metastatic carcinoma, largest dimension of cancer cells = 0 4 millimeters, without extranodal spread; tumor within lymph nodes is mostly acellular mucin pools measuring up to 1 7 cm diameter  Right axillary regional lymphadenectomy: Thirteen lymph nodes are negative (0/13) for metastatic carcinoma  At least Stage IB - ypT1mi, ypN1mi, G1  Repeat HER2 testing indicated since initial Her2 is 3+/positive & mastectomy tumor is G1  Bilateral reconstruction with implants      7/9/18 FU Dr Belen Alvarado: Fabiene Bones right arm pain and possibly swelling  Recommend physical therapy & Radiation Oncology as well  Malignant neoplasm of upper-outer quadrant of right breast in female, estrogen receptor positive ([facility] [address] )    2018 Initial Diagnosis     U/Sguided core biopsy of right breast mass at [facility]  Malignant neoplasm of UOQ of right breast, estrogen receptor positive ([facility] [address] ), Her 2 positive  2018 Genetic Testing     Likely pathogenic variant was identified in the PALB2 gene  2018 - 2018 Chemotherapy     Neoadjuvant chemotherapy with TCH with pertuzumab  Dr Faye Ramires  2018 Surgery     Right MRM  Invasive mucinous carcinoma  3 4 cm geronimo  Grade 1  3/16 lymph nodes  Stage Stage IB - ypT1mi, ypN1mi, G1  Left simple mastectomy  Benign breast     Immediate reconstruction Dr Macy Leo           Chemotherapy     trastuzumab monotherapy every 3 weeks                  Interval History:***    GYN History:***    Patient Active Problem List   Diagnosis    Asthma    Other constipation    Cigarette nicotine dependence without complication    Macular atrophy, retinal    Seasonal allergies    Malignant neoplasm of upper-outer quadrant of right breast in female, estrogen receptor positive ([facility] [address] )     Past Medical History:   Diagnosis Date    Asthma     allergy excaberated    Breast cancer ([facility] [address] ) 2018    GERD (gastroesophageal reflux disease)     Seasonal allergies      Past Surgical History:   Procedure Laterality Date    BREAST RECONSTRUCTION Bilateral 2018    Procedure: RECONSTRUCTION BREAST W/ IMPLANT;  Surgeon: Gerhard Rinaldi MD;  [facility];  Service: Plastics     SECTION      PILONIDAL CYST EXCISION      resection    [place] INSJ TUNNELED CTR VAD W/SUBQ PORT AGE 5 YR/> Left 2018    Procedure: INSERTION VENOUS PORT ( PORT-A-CATH) IR;  Surgeon: Sheryl Grace DO;  [facility];  Service: Interventional Radiology    [place] MASTECTOMY, MODIFIED RADICAL Right 2018 Procedure: BREAST MODIFIED RADICAL MASTECTOMY;  Surgeon: Pablo Banks MD;  [facility];  Service: Surgical Oncology    [place] MASTECTOMY, SIMPLE, COMPLETE Left 6/21/2018    Procedure: BREAST SIMPLE MASTECTOMY;  Surgeon: Pablo Banks MD;  [facility];  Service: Surgical Oncology    TUBAL LIGATION      WISDOM TOOTH EXTRACTION       Family History   Problem Relation Age of Onset    Diabetes Mother     Hypertension Mother     Rosacea Father     Allergies Father         seasonal    Liver cancer Paternal Grandfather     Breast cancer Family      Social History     Social History    Marital status: /Civil Union     Spouse name: N/A    Number of children: N/A    Years of education: N/A     Occupational History    Not on file       Social History Main Topics    Smoking status: Former Smoker     Quit date: 1/26/2015    Smokeless tobacco: Never Used    Alcohol use Yes      Comment: rare    Drug use: No    Sexual activity: Yes     Partners: Male     Birth control/ protection: Other     Other Topics Concern    Not on file     Social History Narrative    Drinks coffee           Current Outpatient Prescriptions:     albuterol (VENTOLIN HFA) 90 mcg/act inhaler, Inhale 2 puffs every 4 (four) hours as needed, Disp: , Rfl:     cephalexin (KEFLEX) 500 mg capsule, , Disp: , Rfl:     diphenoxylate-atropine (LOMOTIL) 2 5-0 025 mg per tablet, Take 1 tablet by mouth 4 (four) times a day as needed for diarrhea, Disp: 60 tablet, Rfl: 2    docusate sodium (COLACE) 100 mg capsule, Take 1 capsule (100 mg total) by mouth 2 (two) times a day, Disp: 10 capsule, Rfl: 0    doxycycline hyclate (VIBRAMYCIN) 100 mg capsule, Take 1 capsule by mouth 2 (two) times a day, Disp: , Rfl:     levocetirizine (XYZAL) 5 MG tablet, Take 1 tablet by mouth daily, Disp: , Rfl:     levofloxacin (LEVAQUIN) 750 mg tablet, Take 1 tablet by mouth daily, Disp: , Rfl:     lidocaine-prilocaine (EMLA) cream, Apply topically as needed for mild pain, Disp: 30 g, Rfl: 0    LORazepam (ATIVAN) 1 mg tablet, Take 1 tablet (1 mg total) by mouth daily at bedtime, Disp: 30 tablet, Rfl: 0    mometasone (NASONEX) 50 mcg/act nasal spray, 2 sprays into each nostril daily, Disp: , Rfl:     mupirocin (BACTROBAN) 2 % ointment, , Disp: , Rfl:     omeprazole (PriLOSEC) 20 mg delayed release capsule, Take 20 mg by mouth, Disp: , Rfl:     ondansetron (ZOFRAN) 4 mg tablet, Take 1 tablet (4 mg total) by mouth every 6 (six) hours as needed for nausea or vomiting, Disp: 30 tablet, Rfl: 1    oxyCODONE-acetaminophen (PERCOCET) 5-325 mg per tablet, , Disp: , Rfl:     prochlorperazine (COMPAZINE) 10 mg tablet, Take 1 tablet (10 mg total) by mouth every 6 (six) hours as needed for nausea or vomiting, Disp: 90 tablet, Rfl: 0  Allergies   Allergen Reactions    Trichophyton Other (See Comments)     Pt does not know reaction     Bee Pollen     Cat Hair Extract Sneezing    Dust Mite Extract Sneezing    Molds & Smuts Sneezing    Pollen Extract Sneezing    Tree Extract Sneezing       Review of Systems:  Review of Systems    Vitals:    07/17/18 1300   BP: 106/70   BP Location: Left arm   Pulse: 94   Resp: 14   Temp: 98 7 °F (37 1 °C)   SpO2: 99%   Weight: 71 kg (156 lb 9 6 oz)   Height: 5' 3 5" (1 613 m)       Imaging:No results found      Teaching:***

## 2018-07-18 RX ORDER — SODIUM CHLORIDE 9 MG/ML
20 INJECTION, SOLUTION INTRAVENOUS CONTINUOUS
Status: DISCONTINUED | OUTPATIENT
Start: 2018-07-19 | End: 2018-07-22 | Stop reason: HOSPADM

## 2018-07-19 ENCOUNTER — HOSPITAL ENCOUNTER (OUTPATIENT)
Dept: INFUSION CENTER | Facility: CLINIC | Age: 38
Discharge: HOME | End: 2018-07-19
Payer: COMMERCIAL

## 2018-07-19 VITALS
RESPIRATION RATE: 18 BRPM | DIASTOLIC BLOOD PRESSURE: 72 MMHG | HEART RATE: 86 BPM | TEMPERATURE: 98.9 F | SYSTOLIC BLOOD PRESSURE: 124 MMHG | WEIGHT: 155.42 LBS | BODY MASS INDEX: 27.1 KG/M2

## 2018-07-19 PROCEDURE — 96413 CHEMO IV INFUSION 1 HR: CPT

## 2018-07-19 RX ADMIN — SODIUM CHLORIDE 20 ML/HR: 0.9 INJECTION, SOLUTION INTRAVENOUS at 14:44

## 2018-07-19 RX ADMIN — TRASTUZUMAB 450 MG: 150 INJECTION, POWDER, LYOPHILIZED, FOR SOLUTION INTRAVENOUS at 14:57

## 2018-07-19 RX ADMIN — Medication 300 UNITS: at 15:31

## 2018-07-25 ENCOUNTER — OFFICE VISIT (OUTPATIENT)
Dept: HEMATOLOGY ONCOLOGY | Facility: CLINIC | Age: 38
End: 2018-07-25
Payer: COMMERCIAL

## 2018-07-25 VITALS
BODY MASS INDEX: 26.63 KG/M2 | DIASTOLIC BLOOD PRESSURE: 62 MMHG | RESPIRATION RATE: 16 BRPM | TEMPERATURE: 99.8 F | WEIGHT: 156 LBS | HEART RATE: 82 BPM | HEIGHT: 64 IN | SYSTOLIC BLOOD PRESSURE: 92 MMHG | OXYGEN SATURATION: 97 %

## 2018-07-25 DIAGNOSIS — Z17.0 MALIGNANT NEOPLASM OF UPPER-OUTER QUADRANT OF RIGHT BREAST IN FEMALE, ESTROGEN RECEPTOR POSITIVE (HCC): Primary | ICD-10-CM

## 2018-07-25 DIAGNOSIS — C50.411 MALIGNANT NEOPLASM OF UPPER-OUTER QUADRANT OF RIGHT BREAST IN FEMALE, ESTROGEN RECEPTOR POSITIVE (HCC): Primary | ICD-10-CM

## 2018-07-25 PROCEDURE — 99215 OFFICE O/P EST HI 40 MIN: CPT | Performed by: INTERNAL MEDICINE

## 2018-07-25 RX ORDER — TAMOXIFEN CITRATE 20 MG/1
20 TABLET ORAL DAILY
Qty: 90 TABLET | Refills: 1 | Status: SHIPPED | OUTPATIENT
Start: 2018-07-25 | End: 2019-05-15 | Stop reason: SDUPTHER

## 2018-07-25 NOTE — PROGRESS NOTES
Hematology / Oncology Outpatient Follow Up Note    David Sanchez 41 y o  female QET:[birth year] BETSY:[id number]         Date:  7/25/2018    Assessment / Plan:  A 42-year-old premenopausal woman with locally advanced right breast cancer   She has PALB-2 probable pathogenic mutation  Ishan Ortega has quite large palpable right breast mass and axillary adenopathy  This was mucinous histology, grade 2, ER 30% positive, AR 2% positive, HER2 3+ disease      She underwent neoadjuvant chemotherapy with TCH with pertuzumab x6 cycle, resulting in good clinical partial response  She underwent right mastectomy and axillary lymph node dissection as well as left prophylactic mastectomy, resulting in MCKAYLA  She has   less than 1 mm of residual mucinous carcinoma as well as 3 positive lymph nodes which has micrometastasis  This is consistent with pathological good partial response  I recommended her to continue with trastuzumab of 6 milligram/kilogram every 3 weeks until January 24, 2019  We also discussed adjuvant hormonal therapy  I recommended her to start tamoxifen 20 mg daily  Side effects of tamoxifen was thoroughly discussed, including but not limited to hot flashes, small chance of DVT, stroke and endometrial cancer  She understood and wished to proceed  She is going to have postmastectomy radiation therapy which I agree  I will see her again in 3 months  She is going to have echocardiogram next month for cardiac monitoring  She is in agreement with my recommendations    Subjective:      HPI:  A 42-year-old premenopausal woman who initially noticed right breast lump when she was 8 months pregnant   She delivered 1st baby in June 2017   After the delivery, she noticed right breast lump to be slowly enlarging   She brought this to medical attention   She underwent mammography which was quite abnormal   Biopsy from right breast at 10:00 position 12 cm from nipple showed mucinous carcinoma, grade 2   This was ER 30 to 40% positive, NE 2 to 3% positive, her 2 3+ disease   She was seen by Dr Ryan Matthews who ordered MRI which showed extensive abnormality in her right breast including 3 5 cm of right breast mass as well as 4 cm of axillary adenopathy  She was referred to me to discuss neoadjuvant chemotherapy   She went to [facility] where she was recommended to have [address] P  she presents today with her mother to discuss treatment options  Cheyanne Melendez is somewhat anxious   Otherwise, she feels well   She denied any bone pain   Her weight is stable   She has no respiratory symptoms   Her performance status is normal  She does not have significant past medical history   Her paternal aunt had breast cancer at age of 62   Her 2 paternal grandfather's sister had breast cancer in their 46s   She underwent genetic testing of which result is pending at this time            Interval History:   A 45-year-old premenopausal woman with locally advanced right breast cancer  Cheyanne Melendez has quite large palpable right breast mass and axillary adenopathy  This was mucinous histology, grade 2, ER 30% positive, NE 2% positive, HER2 3+ disease  She was treated with neoadjuvant chemotherapy with [address] with pertuzumab with excellent tolerance x6 cycle which was completed in June 2018  She had clinical good partial response      Subsequently, she underwent mastectomy as well as prophylactic left mastectomy  Right mastectomy specimen showed less than 1 mm of residual mucinous carcinoma in the mucinous pole as well as 3/16 positive lymph nodes with tumor deposit less than 0 4 mm  She presents today to have further discussion regarding adjuvant therapy  She is currently on trastuzumab monotherapy every 3 weeks  She is going to have postmastectomy radiation therapy  She feels well  She has still limited range of motion in the right shoulder  Otherwise, she feels fine  She has no respiratory symptoms  She denied bone pain   Her performance status is normal     Objective:      Primary Diagnosis:     1  Locally advanced right breast cancer with invasive mucinous histology, grade 2, ER 30% positive, ND 2% positive, her 2 3+ disease   Diagnosed in January 2018  2  PALB2 probable pathogenic mutation      Cancer Staging:  Malignant neoplasm of upper-outer quadrant of right breast in female, estrogen receptor positive ([facility] [address] )    Staging form: Breast, AJCC 8th Edition    - Clinical stage from 1/26/2018: Stage IB (cT2(3), cN1, cM0, G2, ER: Positive, ND: Positive, HER2: Positive) - Signed by Maribel Newton MD on 1/26/2018        Previous Hematologic/ Oncologic Treatment:       Neoadjuvant chemotherapy with [employer] with pertuzumab x6 cycle, completed in June 2018      Current Hematologic/ Oncologic Treatment:       1  Adjuvant trastuzumab monotherapy since late June 2018  2   Adjuvant hormonal therapy with tamoxifen to be started in July 2018      Disease Status:      Clinical partial response  Pathologically good partial response  MCKAYLA status post right mastectomy with axillary lymph node dissection and prophylactic left mastectomy      Test Results:     Pathology:     Biopsy from right breast showed invasive mucinous carcinoma, grade 2, ER 30 to 40% positive, ND 2 to 3% positive, HER2 3+ disease  Mastectomy specimen showed less than 1 mm residual mucinous carcinoma within mucin pool  3/16 axillary lymph nodes were positive for metastatic disease with largest dimension measuring 0 4 mm without extranodal extension        Radiology:     MRI of the breast showed extensive abnormality in her right breast including 3 5 cm of mass at 10:00 position as well as 4 cm of axillary adenopathy  CT scan of chest abdomen pelvis in February 2017 showed large right breast mass as well as right axillary adenopathy   No evidence of distant metastasis  Bone scan showed no evidence of osseous metastasis    Echocardiogram showed ejection fraction 60% in May 2018      Laboratory:     See below     Physical Exam:        General Appearance:    Alert, oriented          Eyes:    PERRL   Ears:    Normal external ear canals, both ears   Nose:   Nares normal, septum midline   Throat:   Mucosa moist  Pharynx without injection  Neck:   Supple         Lungs:     Clear to auscultation bilaterally   Chest Wall:    No tenderness or deformity    Heart:    Regular rate and rhythm         Abdomen:     Soft, non-tender, bowel sounds +, no organomegaly               Extremities:   Extremities no cyanosis or edema         Skin:   no rash or icterus  Lymph nodes:   Cervical, supraclavicular, and axillary nodes normal   Neurologic:   CNII-XII intact, normal strength, sensation and reflexes     Throughout             Breast exam: 2 5-3 cm of mass at outer upper quadrant of the right breast    her previous right axillary adenopathy is hardly palpable  ROS: Review of Systems        Imaging: No results found  Labs:   Lab Results   Component Value Date    WBC 2 09 (L) 06/12/2018    HGB 9 8 (L) 06/12/2018    HCT 29 4 (L) 06/12/2018    MCV 99 (H) 06/12/2018     06/12/2018     Lab Results   Component Value Date     06/12/2018    K 3 2 (L) 06/12/2018     06/12/2018    CO2 27 06/12/2018    ANIONGAP 10 06/12/2018    BUN 12 06/12/2018    CREATININE 0 94 06/12/2018    GLUCOSE 99 06/12/2018    GLUF 101 (H) 02/13/2018    CALCIUM 8 4 06/12/2018    AST 24 06/12/2018    ALT 33 06/12/2018    ALKPHOS 84 06/12/2018    PROT 7 8 06/12/2018    BILITOT 0 20 06/12/2018    EGFR 78 06/12/2018         Current Medications: Reviewed  Allergies: Reviewed  PMH/FH/SH:  Reviewed      Vital Sign:    There is no height or weight on file to calculate BSA      Wt Readings from Last 3 Encounters:   07/19/18 70 5 kg (155 lb 6 8 oz)   07/17/18 71 kg (156 lb 9 6 oz)   07/09/18 71 9 kg (158 lb 8 oz)        Temp Readings from Last 3 Encounters:   07/19/18 98 9 °F (37 2 °C) (Temporal)   07/17/18 98 7 °F (37 1 °C)   07/09/18 98 4 °F (36 9 °C) (Oral)        BP Readings from Last 3 Encounters:   07/19/18 124/72   07/17/18 106/70   07/09/18 150/76         Pulse Readings from Last 3 Encounters:   07/19/18 86   07/17/18 94   07/09/18 58     @LASTSAO2(3)@

## 2018-07-31 ENCOUNTER — EVALUATION (OUTPATIENT)
Dept: PHYSICAL THERAPY | Facility: CLINIC | Age: 38
End: 2018-07-31
Payer: COMMERCIAL

## 2018-07-31 DIAGNOSIS — C50.411 MALIGNANT NEOPLASM OF UPPER-OUTER QUADRANT OF RIGHT BREAST IN FEMALE, ESTROGEN RECEPTOR POSITIVE (HCC): ICD-10-CM

## 2018-07-31 DIAGNOSIS — G89.28 POST-MASTECTOMY PAIN SYNDROME: Primary | ICD-10-CM

## 2018-07-31 DIAGNOSIS — Z17.0 MALIGNANT NEOPLASM OF UPPER-OUTER QUADRANT OF RIGHT BREAST IN FEMALE, ESTROGEN RECEPTOR POSITIVE (HCC): ICD-10-CM

## 2018-07-31 PROCEDURE — G8991 OTHER PT/OT GOAL STATUS: HCPCS | Performed by: PHYSICAL THERAPIST

## 2018-07-31 PROCEDURE — G8990 OTHER PT/OT CURRENT STATUS: HCPCS | Performed by: PHYSICAL THERAPIST

## 2018-07-31 PROCEDURE — 97110 THERAPEUTIC EXERCISES: CPT | Performed by: PHYSICAL THERAPIST

## 2018-07-31 PROCEDURE — 97162 PT EVAL MOD COMPLEX 30 MIN: CPT | Performed by: PHYSICAL THERAPIST

## 2018-07-31 NOTE — PROGRESS NOTES
Daily Note     Today's date: 2018  Patient name: Janee Trejo  : 1980  MRN: [id number]  Referring provider: Aria Boss MD  Dx:   Encounter Diagnoses   Name Primary?  Malignant neoplasm of upper-outer quadrant of right breast in female, estrogen receptor positive ([facility] [address] )     Post-mastectomy pain syndrome Yes       Start Time: 1015  Stop Time: 1100  Total time in clinic (min): 45 minutes    Subjective: See IE      Objective: See treatment diary below      Assessment: Tolerated session well        Plan: Continue with current plan    Precautions: BCA    Daily Treatment Diary       Manuals          PrOM                                                   Exercise Diary          Ghazal          Table slides          Wall Climbs                                                                                                                                                                                              Modalities

## 2018-07-31 NOTE — PROGRESS NOTES
PT Evaluation     Today's date: 2018  Patient name: Sydnee Barker  : 1980  MRN: [id number]  Referring provider: Jose Dietz MD  Dx:   Encounter Diagnosis     ICD-10-CM    1  Malignant neoplasm of upper-outer quadrant of right breast in female, estrogen receptor positive ([facility] [address] ) C50 [id number] Ambulatory referral to Physical Therapy    [id number]                   Assessment  Impairments: abnormal or restricted ROM, difficulty understanding, impaired physical strength, lacks appropriate home exercise program, scapular dyskinesis, poor posture  and poor body mechanics  Functional limitations: lifting, reaching, carryingPatient presents with symptom irritability yes  Assessment details: Sydnee Barker is a 41 y o  female with diagnosis of R UE post mastectomy pain  She presents with decreased ROM, strength and function  She will benefit from skilled physical therapy for  compression sleeve of 20-30 mmHG for lymphedema prevention, A/AA/PROM and strengthening exercises for the lymphatic system  She has been educated in the signs and symptoms of lymphedema and in it's prevention  She has been given a home exercise program and is in agreement with the plan of care    Thank you for your referral     Barriers to therapy: Initiating radiation therapy   Understanding of Dx/Px/POC: excellent  Goals  ST-4 weeks    Independent with home exercise program  Patient will be independent with donning and doffing compression garments  Patient will increase ROM by 25%  Patient will increase Strength by 25%  Patient will reduce pain by 25%  Patient will improve FOTO score by 25%  Patient will be able to sleep through the night  Patient able to don and doff coat/clothing  Patient is able to don and doff bra  Patient able to perform all IADLS independently    LT-8 weeks    Patient will abolish pain  Patient will abolish edema  Patient will have full ROM  Patient will have full strength  Patient will return to work  Patient will return to exercising on her own  Patient will obtain full FOTO score  Patient will be able to reach to an overhead shelf    Plan  Referral necessary: No  Other planned modality interventions: compression garment 15-20 mmHG  Planned therapy interventions: manual therapy, neuromuscular re-education, strengthening, stretching, therapeutic activities, therapeutic exercise, functional ROM exercises, home exercise program and graded exercise  Frequency: 2x week  Duration in visits: 20  Duration in weeks: 20  Plan of Care beginning date: 2018  Plan of Care expiration date: 2018  Treatment plan discussed with: patient        Subjective Evaluation    History of Present Illness  Date of surgery: 2018  Mechanism of injury: surgery  Mechanism of injury: Patient reports that she was diagnosed with R BCA in 2018  She has had neoadjuvent chemotherapy and a  bilateral mastectomy with immediate reconstruction  She is having herceptin x 1 year  She did have an axillary dissection also  She is scheduled to begin radiation therapy in the near future  CC:  She complains of R UE pain and decreased ROM   Function:  She is not working at this time due to recent surgery  She is a nurse  with [employer]  She has a one year old child  She is unable to lift her child  She is restricted by plastic surgery to no lifting, pushing or pulling  She would like to return to exercising at the gym  She is unable to reach overhead  Pain does wake her up due to positioning  Recurrent probem    Quality of life: excellent    Pain  Current pain ratin  At best pain ratin  At worst pain ratin  Quality: burning and dull ache  Relieving factors: rest, relaxation and medications  Aggravating factors: overhead activity and lifting    Hand dominance: right          Objective     Observations     Additional Observation Details  Posture:   Forward head, round shoulders R>L  Well healing R axillary incision noted    Lymphedema MMTs                                              RIGHT LEFT    palm                                 19 5           18 5        wrist                      15 4           16  Wrist + 10 cm                      23              22 8   Wrist + 16 cm           26              25 5  Elbow                       24 5           24  Wrist + 32                      29 3           29   Wrist + 40           33              32        Cervical/Thoracic Screen   Cervical range of motion within normal limits    Active Range of Motion   Left Shoulder   Flexion: 155 degrees   Abduction: 146 degrees     Right Shoulder   Flexion: 120 degrees   Abduction: 118 degrees   External rotation 0°: 60 degrees     Passive Range of Motion     Right Shoulder   Flexion: 150 degrees     Strength/Myotome Testing     Left Shoulder   Normal muscle strength    Right Shoulder     Planes of Motion   Flexion: WFL   Extension: WFL   Abduction: WFL   External rotation at 0°: [facility]

## 2018-08-02 ENCOUNTER — OFFICE VISIT (OUTPATIENT)
Dept: PHYSICAL THERAPY | Facility: CLINIC | Age: 38
End: 2018-08-02
Payer: COMMERCIAL

## 2018-08-02 DIAGNOSIS — G89.28 POST-MASTECTOMY PAIN SYNDROME: Primary | ICD-10-CM

## 2018-08-02 PROCEDURE — 97110 THERAPEUTIC EXERCISES: CPT | Performed by: PHYSICAL THERAPIST

## 2018-08-02 PROCEDURE — 97140 MANUAL THERAPY 1/> REGIONS: CPT | Performed by: PHYSICAL THERAPIST

## 2018-08-02 NOTE — PROGRESS NOTES
Daily Note     Today's date: 2018  Patient name: Negin Donohue  : 1980  MRN: [id number]  Referring provider: Jan Apple MD  Dx:   Encounter Diagnosis     ICD-10-CM    1  Post-mastectomy pain syndrome G89 28                   Subjective: Patient reports that she was sore in the axilla and chest area       Objective: See treatment diary below  Manuals         PrOM - flex/abd/er  10'                                                 Exercise Diary          Ghazal  5'        Table slides  10 x :10        Wall Climbs  5 x :10                                                                                                                                                                                            Modalities                                    Assessment: Tolerated treatment well  Patient would benefit from continued PT  Unbilled x 10'      Plan: Continue per plan of care

## 2018-08-07 ENCOUNTER — APPOINTMENT (OUTPATIENT)
Dept: RADIATION ONCOLOGY | Facility: HOSPITAL | Age: 38
End: 2018-08-07
Attending: RADIOLOGY
Payer: COMMERCIAL

## 2018-08-07 PROCEDURE — 77334 RADIATION TREATMENT AID(S): CPT | Performed by: RADIOLOGY

## 2018-08-07 PROCEDURE — 77290 THER RAD SIMULAJ FIELD CPLX: CPT | Performed by: RADIOLOGY

## 2018-08-08 ENCOUNTER — OFFICE VISIT (OUTPATIENT)
Dept: PHYSICAL THERAPY | Facility: CLINIC | Age: 38
End: 2018-08-08
Payer: COMMERCIAL

## 2018-08-08 DIAGNOSIS — C50.411 MALIGNANT NEOPLASM OF UPPER-OUTER QUADRANT OF RIGHT BREAST IN FEMALE, ESTROGEN RECEPTOR POSITIVE (HCC): ICD-10-CM

## 2018-08-08 DIAGNOSIS — Z17.0 MALIGNANT NEOPLASM OF UPPER-OUTER QUADRANT OF RIGHT BREAST IN FEMALE, ESTROGEN RECEPTOR POSITIVE (HCC): ICD-10-CM

## 2018-08-08 DIAGNOSIS — G89.28 POST-MASTECTOMY PAIN SYNDROME: Primary | ICD-10-CM

## 2018-08-08 PROCEDURE — 97140 MANUAL THERAPY 1/> REGIONS: CPT

## 2018-08-08 PROCEDURE — 97110 THERAPEUTIC EXERCISES: CPT

## 2018-08-08 PROCEDURE — 97112 NEUROMUSCULAR REEDUCATION: CPT

## 2018-08-08 RX ORDER — SODIUM CHLORIDE 9 MG/ML
20 INJECTION, SOLUTION INTRAVENOUS CONTINUOUS
Status: DISCONTINUED | OUTPATIENT
Start: 2018-08-09 | End: 2018-08-12 | Stop reason: HOSPADM

## 2018-08-08 NOTE — PROGRESS NOTES
Daily Note     Today's date: 2018  Patient name: Liliam December  : 1980  MRN: [id number]  Referring provider: Shorty Bridges MD  Dx:   Encounter Diagnosis     ICD-10-CM    1  Post-mastectomy pain syndrome G89 28    2  Malignant neoplasm of upper-outer quadrant of right breast in female, estrogen receptor positive ([facility] [address] ) C50 411     Z17 0                   Subjective: Patient states that she has noticed an improvement with her ROM and will begin radiation  for 6 weeks  Objective: See treatment diary below  Manuals        PrOM - flex/abd/er  10'                                                 Exercise Diary          Ghazal  5' 5 min        Table slides  10 x :10 :10x10       Wall Climbs  5 x :10 :05x10       ER wall stretch   :20 x5                                                                                                                                                                                 Modalities                                Assessment: Tolerated treatment well  Patient exhibited good technique with therapeutic exercises      Plan: Continue per plan of care

## 2018-08-09 ENCOUNTER — HOSPITAL ENCOUNTER (OUTPATIENT)
Dept: INFUSION CENTER | Facility: CLINIC | Age: 38
Discharge: HOME | End: 2018-08-09
Payer: COMMERCIAL

## 2018-08-09 VITALS
SYSTOLIC BLOOD PRESSURE: 100 MMHG | RESPIRATION RATE: 14 BRPM | BODY MASS INDEX: 27.11 KG/M2 | OXYGEN SATURATION: 99 % | HEIGHT: 63 IN | DIASTOLIC BLOOD PRESSURE: 64 MMHG | WEIGHT: 153 LBS | HEART RATE: 92 BPM | TEMPERATURE: 98 F

## 2018-08-09 PROCEDURE — 96413 CHEMO IV INFUSION 1 HR: CPT

## 2018-08-09 RX ADMIN — TRASTUZUMAB 425 MG: 150 INJECTION, POWDER, LYOPHILIZED, FOR SOLUTION INTRAVENOUS at 12:08

## 2018-08-09 RX ADMIN — SODIUM CHLORIDE 20 ML/HR: 0.9 INJECTION, SOLUTION INTRAVENOUS at 11:23

## 2018-08-09 RX ADMIN — HEPARIN 300 UNITS: 100 SYRINGE at 12:58

## 2018-08-09 NOTE — PROGRESS NOTES
Pt tolerated chemotherapy infusion well, new order received to infuse pts' Herceptin over 30 minutes as previously ordered for prior doses    Order clarified with Fly Porter   Pt declined avs

## 2018-08-13 ENCOUNTER — OFFICE VISIT (OUTPATIENT)
Dept: PHYSICAL THERAPY | Facility: CLINIC | Age: 38
End: 2018-08-13
Payer: COMMERCIAL

## 2018-08-13 DIAGNOSIS — G89.28 POST-MASTECTOMY PAIN SYNDROME: Primary | ICD-10-CM

## 2018-08-13 DIAGNOSIS — F41.1 GENERALIZED ANXIETY DISORDER: Primary | ICD-10-CM

## 2018-08-13 PROCEDURE — 97140 MANUAL THERAPY 1/> REGIONS: CPT | Performed by: PHYSICAL THERAPIST

## 2018-08-13 PROCEDURE — 97112 NEUROMUSCULAR REEDUCATION: CPT | Performed by: PHYSICAL THERAPIST

## 2018-08-13 PROCEDURE — 97110 THERAPEUTIC EXERCISES: CPT | Performed by: PHYSICAL THERAPIST

## 2018-08-13 RX ORDER — LORAZEPAM 1 MG/1
1 TABLET ORAL
Qty: 30 TABLET | Refills: 0 | Status: SHIPPED | OUTPATIENT
Start: 2018-08-13 | End: 2018-09-14 | Stop reason: SDUPTHER

## 2018-08-13 NOTE — PROGRESS NOTES
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                   Subjective: Patient reports that she has increased soreness       Objective: See treatment diary below    Objective: See treatment diary below  Manuals       PrOM - flex/abd/er  10'  10'                                               Exercise Diary          Ghazal  5' 5 min  5'      Table slides  10 x :10 :10x10 10 x :10      Wall Climbs  5 x :10 :05x10 10 x :10      ER wall stretch   :20 x5 5 x :20                                                                                                                                                                                Modalities                                Assessment: Tolerated treatment well  Patient would benefit from continued PT      Plan: Continue per plan of care

## 2018-08-16 ENCOUNTER — OFFICE VISIT (OUTPATIENT)
Dept: PHYSICAL THERAPY | Facility: CLINIC | Age: 38
End: 2018-08-16
Payer: COMMERCIAL

## 2018-08-16 DIAGNOSIS — G89.28 POST-MASTECTOMY PAIN SYNDROME: Primary | ICD-10-CM

## 2018-08-16 PROCEDURE — 97112 NEUROMUSCULAR REEDUCATION: CPT | Performed by: PHYSICAL THERAPIST

## 2018-08-16 PROCEDURE — 97110 THERAPEUTIC EXERCISES: CPT | Performed by: PHYSICAL THERAPIST

## 2018-08-16 PROCEDURE — 97140 MANUAL THERAPY 1/> REGIONS: CPT | Performed by: PHYSICAL THERAPIST

## 2018-08-16 NOTE — PROGRESS NOTES
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                   Subjective: Patient reports that she had sutures on her R breast where she had a spitting stitch  She notes pain in the R tricep region  Her radiation is delayed due to sutures  Objective: See treatment diary below  Precautions:BCA  Manuals      PrOM - flex/abd/er  10'  10' 10'                                              Exercise Diary          Ghazal  5' 5 min  5' 5'     Table slides  10 x :10 :10x10 10 x :10 10 x :10     Wall Climbs  5 x :10 :05x10 10 x :10 10 x :10     ER wall stretch   :20 x5 5 x :20 5 x :20                                                                                                                                                                               Modalities                                    Assessment: Tolerated treatment well  Patient would benefit from continued PT      Plan: Continue per plan of care

## 2018-08-20 DIAGNOSIS — C50.411 MALIGNANT NEOPLASM OF UPPER-OUTER QUADRANT OF RIGHT BREAST IN FEMALE, ESTROGEN RECEPTOR POSITIVE (HCC): Primary | ICD-10-CM

## 2018-08-20 DIAGNOSIS — Z17.0 MALIGNANT NEOPLASM OF UPPER-OUTER QUADRANT OF RIGHT BREAST IN FEMALE, ESTROGEN RECEPTOR POSITIVE (HCC): Primary | ICD-10-CM

## 2018-08-21 ENCOUNTER — HOSPITAL ENCOUNTER (OUTPATIENT)
Dept: NON INVASIVE DIAGNOSTICS | Facility: HOSPITAL | Age: 38
Discharge: HOME | End: 2018-08-21
Attending: INTERNAL MEDICINE
Payer: COMMERCIAL

## 2018-08-21 DIAGNOSIS — Z17.0 MALIGNANT NEOPLASM OF UPPER-OUTER QUADRANT OF RIGHT BREAST IN FEMALE, ESTROGEN RECEPTOR POSITIVE (HCC): ICD-10-CM

## 2018-08-21 DIAGNOSIS — C50.411 MALIGNANT NEOPLASM OF UPPER-OUTER QUADRANT OF RIGHT BREAST IN FEMALE, ESTROGEN RECEPTOR POSITIVE (HCC): ICD-10-CM

## 2018-08-21 PROCEDURE — 93325 DOPPLER ECHO COLOR FLOW MAPG: CPT | Performed by: INTERNAL MEDICINE

## 2018-08-21 PROCEDURE — 93308 TTE F-UP OR LMTD: CPT

## 2018-08-21 PROCEDURE — 93308 TTE F-UP OR LMTD: CPT | Performed by: INTERNAL MEDICINE

## 2018-08-21 PROCEDURE — 93321 DOPPLER ECHO F-UP/LMTD STD: CPT | Performed by: INTERNAL MEDICINE

## 2018-08-22 ENCOUNTER — OFFICE VISIT (OUTPATIENT)
Dept: PHYSICAL THERAPY | Facility: CLINIC | Age: 38
End: 2018-08-22
Payer: COMMERCIAL

## 2018-08-22 DIAGNOSIS — Z17.0 MALIGNANT NEOPLASM OF UPPER-OUTER QUADRANT OF RIGHT BREAST IN FEMALE, ESTROGEN RECEPTOR POSITIVE (HCC): ICD-10-CM

## 2018-08-22 DIAGNOSIS — C50.411 MALIGNANT NEOPLASM OF UPPER-OUTER QUADRANT OF RIGHT BREAST IN FEMALE, ESTROGEN RECEPTOR POSITIVE (HCC): ICD-10-CM

## 2018-08-22 DIAGNOSIS — G89.28 POST-MASTECTOMY PAIN SYNDROME: Primary | ICD-10-CM

## 2018-08-22 PROCEDURE — 97140 MANUAL THERAPY 1/> REGIONS: CPT

## 2018-08-22 PROCEDURE — G8991 OTHER PT/OT GOAL STATUS: HCPCS | Performed by: PHYSICAL THERAPIST

## 2018-08-22 PROCEDURE — 97110 THERAPEUTIC EXERCISES: CPT

## 2018-08-22 PROCEDURE — G8990 OTHER PT/OT CURRENT STATUS: HCPCS | Performed by: PHYSICAL THERAPIST

## 2018-08-22 PROCEDURE — 97112 NEUROMUSCULAR REEDUCATION: CPT

## 2018-08-22 NOTE — PROGRESS NOTES
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    2  Malignant neoplasm of upper-outer quadrant of right breast in female, estrogen receptor positive ([facility] [address] ) C50 411     Z17 0        Start Time: 1405  Stop Time: 1445  Total time in clinic (min): 40 minutes    Subjective: Patient reports that she is wearing a compression garment and that she may begin radiation after Labor day  Objective: See treatment diary below  Precautions:BCA  Manuals     PrOM - flex/abd/er  10'  10' 10' 10'                                             Exercise Diary          Ghazal  5' 5 min  5' 5' 5'    Table slides  10 x :10 :10x10 10 x :10 10 x :10 10x10"    Wall Climbs  5 x :10 :05x10 10 x :10 10 x :10 10x10"    ER wall stretch   :20 x5 5 x :20 5 x :20 3x30"                                                                                                                                                                              Modalities                                    Assessment: Tolerated treatment well  Patient would benefit from continued PT  Pt was not limited by pain during PROM  Pt able to get to level 23 during wall climbs  Plan: Continue per plan of care

## 2018-08-29 RX ORDER — SODIUM CHLORIDE 9 MG/ML
20 INJECTION, SOLUTION INTRAVENOUS CONTINUOUS
Status: DISCONTINUED | OUTPATIENT
Start: 2018-08-30 | End: 2018-09-02 | Stop reason: HOSPADM

## 2018-08-30 ENCOUNTER — APPOINTMENT (OUTPATIENT)
Dept: PHYSICAL THERAPY | Facility: CLINIC | Age: 38
End: 2018-08-30
Payer: COMMERCIAL

## 2018-08-30 ENCOUNTER — HOSPITAL ENCOUNTER (OUTPATIENT)
Dept: INFUSION CENTER | Facility: CLINIC | Age: 38
Discharge: HOME | End: 2018-08-30
Payer: COMMERCIAL

## 2018-08-30 VITALS
TEMPERATURE: 98 F | HEART RATE: 73 BPM | OXYGEN SATURATION: 95 % | HEIGHT: 64 IN | WEIGHT: 154 LBS | BODY MASS INDEX: 26.29 KG/M2 | DIASTOLIC BLOOD PRESSURE: 72 MMHG | SYSTOLIC BLOOD PRESSURE: 108 MMHG | RESPIRATION RATE: 18 BRPM

## 2018-08-30 PROCEDURE — 96413 CHEMO IV INFUSION 1 HR: CPT

## 2018-08-30 RX ADMIN — SODIUM CHLORIDE 20 ML/HR: 0.9 INJECTION, SOLUTION INTRAVENOUS at 10:20

## 2018-08-30 RX ADMIN — TRASTUZUMAB 425 MG: 150 INJECTION, POWDER, LYOPHILIZED, FOR SOLUTION INTRAVENOUS at 11:15

## 2018-08-30 RX ADMIN — Medication 300 UNITS: at 11:55

## 2018-08-30 NOTE — PROGRESS NOTES
Patient to [facility] for Herceptin: Offers no complaints at present time: MUGA Scan ( 08/21/18 ) reviewed: EF - 60%: Left PAC accessed without difficulty: Good blood return noted

## 2018-08-31 PROCEDURE — 77334 RADIATION TREATMENT AID(S): CPT | Performed by: RADIOLOGY

## 2018-08-31 PROCEDURE — 77295 3-D RADIOTHERAPY PLAN: CPT | Performed by: RADIOLOGY

## 2018-08-31 PROCEDURE — 77300 RADIATION THERAPY DOSE PLAN: CPT | Performed by: RADIOLOGY

## 2018-09-04 ENCOUNTER — APPOINTMENT (OUTPATIENT)
Dept: PHYSICAL THERAPY | Facility: CLINIC | Age: 38
End: 2018-09-04
Payer: COMMERCIAL

## 2018-09-10 ENCOUNTER — APPOINTMENT (OUTPATIENT)
Dept: PHYSICAL THERAPY | Facility: CLINIC | Age: 38
End: 2018-09-10
Payer: COMMERCIAL

## 2018-09-13 ENCOUNTER — OFFICE VISIT (OUTPATIENT)
Dept: PHYSICAL THERAPY | Facility: CLINIC | Age: 38
End: 2018-09-13
Payer: COMMERCIAL

## 2018-09-13 ENCOUNTER — APPOINTMENT (OUTPATIENT)
Dept: RADIATION ONCOLOGY | Facility: HOSPITAL | Age: 38
End: 2018-09-13
Attending: RADIOLOGY
Payer: COMMERCIAL

## 2018-09-13 DIAGNOSIS — G89.28 POST-MASTECTOMY PAIN SYNDROME: Primary | ICD-10-CM

## 2018-09-13 DIAGNOSIS — F41.1 GENERALIZED ANXIETY DISORDER: ICD-10-CM

## 2018-09-13 PROCEDURE — G8990 OTHER PT/OT CURRENT STATUS: HCPCS | Performed by: PHYSICAL THERAPIST

## 2018-09-13 PROCEDURE — 97140 MANUAL THERAPY 1/> REGIONS: CPT | Performed by: PHYSICAL THERAPIST

## 2018-09-13 PROCEDURE — 77280 THER RAD SIMULAJ FIELD SMPL: CPT | Performed by: RADIOLOGY

## 2018-09-13 PROCEDURE — 97110 THERAPEUTIC EXERCISES: CPT | Performed by: PHYSICAL THERAPIST

## 2018-09-13 PROCEDURE — G8991 OTHER PT/OT GOAL STATUS: HCPCS | Performed by: PHYSICAL THERAPIST

## 2018-09-13 RX ORDER — LORAZEPAM 1 MG/1
1 TABLET ORAL
Qty: 30 TABLET | Refills: 0 | Status: CANCELLED | OUTPATIENT
Start: 2018-09-13 | End: 2018-10-13

## 2018-09-13 NOTE — PROGRESS NOTES
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                   Subjective: Patient reports that she had to have more stitching of her incision and was told not to perform any exercises for 2 weeks  She also indicates that she is beginning radiation  Objective: See treatment diary below    Subjective: Patient reports that she is wearing a compression garment and that she may begin radiation after Labor day  Objective: See treatment diary below  Precautions:BCA  Manuals    PrOM - flex/abd/er  10'  10' 10' 10'                                             Exercise Diary          Ghazal  5' 5 min  5' 5' 5' 5'   Table slides  10 x :10 :10x10 10 x :10 10 x :10 10x10" 10 x :10   Wall Climbs  5 x :10 :05x10 10 x :10 10 x :10 10x10" 10 x :10   ER wall stretch   :20 x5 5 x :20 5 x :20 3x30" 3x30"                                                                                                                                                                             Modalities                                      Assessment: Tolerated treatment well  Patient would benefit from continued PT      Plan: Patient to return as needed during or after radiation therapy

## 2018-09-14 DIAGNOSIS — F41.1 GENERALIZED ANXIETY DISORDER: Primary | ICD-10-CM

## 2018-09-14 RX ORDER — LORAZEPAM 1 MG/1
1 TABLET ORAL
Qty: 30 TABLET | Refills: 0 | Status: SHIPPED | OUTPATIENT
Start: 2018-09-14 | End: 2018-10-16 | Stop reason: SDUPTHER

## 2018-09-19 ENCOUNTER — ANESTHESIA EVENT (OUTPATIENT)
Dept: PERIOP | Facility: HOSPITAL | Age: 38
End: 2018-09-19
Payer: COMMERCIAL

## 2018-09-19 ENCOUNTER — HOSPITAL ENCOUNTER (OUTPATIENT)
Facility: HOSPITAL | Age: 38
Setting detail: OUTPATIENT SURGERY
Discharge: HOME | End: 2018-09-19
Attending: PLASTIC SURGERY | Admitting: PLASTIC SURGERY
Payer: COMMERCIAL

## 2018-09-19 ENCOUNTER — ANESTHESIA (OUTPATIENT)
Dept: PERIOP | Facility: HOSPITAL | Age: 38
End: 2018-09-19
Payer: COMMERCIAL

## 2018-09-19 VITALS
HEART RATE: 72 BPM | BODY MASS INDEX: 26.29 KG/M2 | OXYGEN SATURATION: 100 % | DIASTOLIC BLOOD PRESSURE: 62 MMHG | HEIGHT: 64 IN | SYSTOLIC BLOOD PRESSURE: 111 MMHG | WEIGHT: 154 LBS | TEMPERATURE: 97.8 F | RESPIRATION RATE: 18 BRPM

## 2018-09-19 LAB — EXT PREGNANCY TEST URINE: NEGATIVE

## 2018-09-19 PROCEDURE — 81025 URINE PREGNANCY TEST: CPT | Performed by: ANESTHESIOLOGY

## 2018-09-19 RX ORDER — ONDANSETRON 2 MG/ML
4 INJECTION INTRAMUSCULAR; INTRAVENOUS ONCE AS NEEDED
Status: DISCONTINUED | OUTPATIENT
Start: 2018-09-19 | End: 2018-09-19 | Stop reason: HOSPADM

## 2018-09-19 RX ORDER — FENTANYL CITRATE/PF 50 MCG/ML
25 SYRINGE (ML) INJECTION
Status: DISCONTINUED | OUTPATIENT
Start: 2018-09-19 | End: 2018-09-19 | Stop reason: HOSPADM

## 2018-09-19 RX ORDER — MAGNESIUM HYDROXIDE 1200 MG/15ML
LIQUID ORAL AS NEEDED
Status: DISCONTINUED | OUTPATIENT
Start: 2018-09-19 | End: 2018-09-19 | Stop reason: HOSPADM

## 2018-09-19 RX ORDER — SODIUM CHLORIDE 9 MG/ML
20 INJECTION, SOLUTION INTRAVENOUS CONTINUOUS
Status: DISCONTINUED | OUTPATIENT
Start: 2018-09-20 | End: 2018-09-23 | Stop reason: HOSPADM

## 2018-09-19 RX ORDER — SCOLOPAMINE TRANSDERMAL SYSTEM 1 MG/1
1 PATCH, EXTENDED RELEASE TRANSDERMAL ONCE
Status: DISCONTINUED | OUTPATIENT
Start: 2018-09-19 | End: 2018-09-19 | Stop reason: HOSPADM

## 2018-09-19 RX ORDER — FENTANYL CITRATE 50 UG/ML
INJECTION, SOLUTION INTRAMUSCULAR; INTRAVENOUS AS NEEDED
Status: DISCONTINUED | OUTPATIENT
Start: 2018-09-19 | End: 2018-09-19 | Stop reason: SURG

## 2018-09-19 RX ORDER — BUPIVACAINE HYDROCHLORIDE AND EPINEPHRINE 2.5; 5 MG/ML; UG/ML
INJECTION, SOLUTION EPIDURAL; INFILTRATION; INTRACAUDAL; PERINEURAL AS NEEDED
Status: DISCONTINUED | OUTPATIENT
Start: 2018-09-19 | End: 2018-09-19 | Stop reason: HOSPADM

## 2018-09-19 RX ORDER — MIDAZOLAM HYDROCHLORIDE 1 MG/ML
INJECTION INTRAMUSCULAR; INTRAVENOUS AS NEEDED
Status: DISCONTINUED | OUTPATIENT
Start: 2018-09-19 | End: 2018-09-19 | Stop reason: SURG

## 2018-09-19 RX ORDER — PROPOFOL 10 MG/ML
INJECTION, EMULSION INTRAVENOUS AS NEEDED
Status: DISCONTINUED | OUTPATIENT
Start: 2018-09-19 | End: 2018-09-19 | Stop reason: SURG

## 2018-09-19 RX ORDER — LIDOCAINE HYDROCHLORIDE 10 MG/ML
INJECTION, SOLUTION INFILTRATION; PERINEURAL AS NEEDED
Status: DISCONTINUED | OUTPATIENT
Start: 2018-09-19 | End: 2018-09-19 | Stop reason: SURG

## 2018-09-19 RX ORDER — SODIUM CHLORIDE 9 MG/ML
125 INJECTION, SOLUTION INTRAVENOUS CONTINUOUS
Status: DISCONTINUED | OUTPATIENT
Start: 2018-09-19 | End: 2018-09-19 | Stop reason: HOSPADM

## 2018-09-19 RX ORDER — ONDANSETRON 2 MG/ML
4 INJECTION INTRAMUSCULAR; INTRAVENOUS EVERY 8 HOURS PRN
Status: DISCONTINUED | OUTPATIENT
Start: 2018-09-19 | End: 2018-09-19 | Stop reason: HOSPADM

## 2018-09-19 RX ORDER — HYDROCODONE BITARTRATE AND ACETAMINOPHEN 5; 325 MG/1; MG/1
1 TABLET ORAL EVERY 4 HOURS PRN
Status: DISCONTINUED | OUTPATIENT
Start: 2018-09-19 | End: 2018-09-19 | Stop reason: HOSPADM

## 2018-09-19 RX ORDER — ONDANSETRON 2 MG/ML
INJECTION INTRAMUSCULAR; INTRAVENOUS AS NEEDED
Status: DISCONTINUED | OUTPATIENT
Start: 2018-09-19 | End: 2018-09-19 | Stop reason: SURG

## 2018-09-19 RX ORDER — MEPERIDINE HYDROCHLORIDE 50 MG/ML
12.5 INJECTION INTRAMUSCULAR; INTRAVENOUS; SUBCUTANEOUS AS NEEDED
Status: DISCONTINUED | OUTPATIENT
Start: 2018-09-19 | End: 2018-09-19 | Stop reason: HOSPADM

## 2018-09-19 RX ADMIN — HYDROCODONE BITARTRATE AND ACETAMINOPHEN 1 TABLET: 5; 325 TABLET ORAL at 17:14

## 2018-09-19 RX ADMIN — PROPOFOL 200 MG: 10 INJECTION, EMULSION INTRAVENOUS at 15:23

## 2018-09-19 RX ADMIN — CEFAZOLIN SODIUM 2000 MG: 1 SOLUTION INTRAVENOUS at 15:07

## 2018-09-19 RX ADMIN — FENTANYL CITRATE 25 MCG: 50 INJECTION, SOLUTION INTRAMUSCULAR; INTRAVENOUS at 16:45

## 2018-09-19 RX ADMIN — FENTANYL CITRATE 50 MCG: 50 INJECTION, SOLUTION INTRAMUSCULAR; INTRAVENOUS at 15:23

## 2018-09-19 RX ADMIN — LIDOCAINE HYDROCHLORIDE 60 MG: 10 INJECTION, SOLUTION INFILTRATION; PERINEURAL at 15:23

## 2018-09-19 RX ADMIN — FENTANYL CITRATE 25 MCG: 50 INJECTION, SOLUTION INTRAMUSCULAR; INTRAVENOUS at 16:51

## 2018-09-19 RX ADMIN — SODIUM CHLORIDE 125 ML/HR: 0.9 INJECTION, SOLUTION INTRAVENOUS at 14:56

## 2018-09-19 RX ADMIN — ONDANSETRON HYDROCHLORIDE 4 MG: 2 INJECTION, SOLUTION INTRAVENOUS at 15:44

## 2018-09-19 RX ADMIN — SODIUM CHLORIDE: 0.9 INJECTION, SOLUTION INTRAVENOUS at 15:49

## 2018-09-19 RX ADMIN — MIDAZOLAM 2 MG: 1 INJECTION INTRAMUSCULAR; INTRAVENOUS at 15:07

## 2018-09-19 RX ADMIN — SCOPOLAMINE 1 PATCH: 1 PATCH, EXTENDED RELEASE TRANSDERMAL at 14:49

## 2018-09-19 NOTE — DISCHARGE INSTRUCTIONS
1 [facility], [address], [address], [facility], [address] [address] [phone]/[id number] / [email or web address]       No heavy lifting >20 pounds    Do not raise hands above head    Consider using button up shirts so you don't have to raise your hands above your head to put the shirt on    No ice or heating pack to chest    Ok to shower    No bathing    Do not lay on stomach    No smoking    No pushing or pulling    Call the office for an appointment in 5-10 days - [phone]

## 2018-09-19 NOTE — OP NOTE
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]     * Disproportion of reconstructed breast [N65 1]    Procedure(s) (LRB):  REVISION BREAST RECON W/WOUND CLOSURE (Bilateral)    Specimen(s):  * No specimens in log *    Estimated Blood Loss:   Minimal    Drains:  Closed/Suction Drain Left Breast Bulb 15 Fr  (Active)   Number of days: 90       Closed/Suction Drain Right Breast Bulb 15 Fr  (Active)   Number of days: 90       Anesthesia Type:   IV Sedation with Anesthesia    Operative Indications:  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]  Disproportion of reconstructed breast [N65 1]      Operative Findings:      Complications:   None    Procedure and Technique:  The patient was brought to the operative room placed supine on the operating room table  Time-out procedure was performed SCDs were applied and IV antibiotics were given  After adequate anesthesia and the chest was prepped and draped using standard surgical technique  Local field block with 0 25% Marcaine with epinephrine was injected into both breasts  Attention was turned to the right breast   3 mm margins were designed around the area of mild inflammation  There was no purulent drainage or visible sinus  This pattern was included into an elliptical pattern of skin to superimposed tip previous scar  Dissection was carried in a full-thickness fashion and there was no connection to any deeper tissue that was immediately visible  There was some poorly vascularized area of flex HD of less than 1 centimeter squared  This was sharply excised    The capsule was reapproximating 4-0 PDS suture in interrupted technique the deep dermis was reapproximated sing 4-0 PDS suture in interrupted technique the superficial skin was closed using 4-0 strata fix suture running subcuticular technique  Attention was turned to the left breast   3 mm margins were designed around the area of mild inflammation  There was no purulent drainage or visible sinus  This pattern was included into an elliptical pattern of skin to superimposed tip previous scar  Dissection was carried in a full-thickness fashion and there was no connection to any deeper tissue that was immediately visible  There was some poorly vascularized area of flex HD of less than 1 centimeter squared  This was sharply excised  The capsule was reapproximating 4-0 PDS suture in interrupted technique the deep dermis was reapproximated sing 4-0 PDS suture in interrupted technique the superficial skin was closed using 4-0 strata fix suture running subcuticular technique  The wounds were cleaned and dried skin glue was applied     I was present for the entire procedure and A qualified resident physician was not available    Patient Disposition:  hemodynamically stable and extubated and stable    SIGNATURE: Jose Ochoa MD  DATE: September 19, 2018  TIME: 4:08 PM

## 2018-09-19 NOTE — ANESTHESIA PREPROCEDURE EVALUATION
Review of Systems/Medical History  Patient summary reviewed  Chart reviewed  History of anesthetic complications (woke up very anxious after last procedure)     Cardiovascular  Negative cardio ROS Exercise tolerance (METS): >4,     Pulmonary  Asthma , well controlled/ stable Last rescue: < 1 month ago Asthma type of rescue: PRN inhaler,        GI/Hepatic    GERD well controlled,        Negative  ROS        Endo/Other    Obesity    GYN    Breast cancer Right mastectomy, left mastectomy and axillary node dissection  Comment: Tubal ligation, preg neg     Hematology  Negative hematology ROS      Musculoskeletal  Negative musculoskeletal ROS        Neurology  Negative neurology ROS      Psychology   Negative psychology ROS              Physical Exam    Airway    Mallampati score: II  TM Distance: >3 FB  Neck ROM: full     Dental   No notable dental hx     Cardiovascular  Comment: Negative ROS, Rhythm: regular, Rate: normal,     Pulmonary  Breath sounds clear to auscultation,     Other Findings        Anesthesia Plan  ASA Score- 2     Anesthesia Type- general with ASA Monitors  Additional Monitors:   Airway Plan: ETT  Comment: Scop patch ordered  Plan Factors-Patient not instructed to abstain from smoking on day of procedure  Patient did not smoke on day of surgery  Induction- intravenous  Postoperative Plan- Plan for postoperative opioid use  Informed Consent- Anesthetic plan and risks discussed with patient

## 2018-09-19 NOTE — ANESTHESIA POSTPROCEDURE EVALUATION
Post-Op Assessment Note      CV Status:  Stable    Mental Status:  Alert and awake    Hydration Status:  Euvolemic    PONV Controlled:  Controlled    Airway Patency:  Patent    Post Op Vitals Reviewed: Yes          Staff: Anesthesiologist           /69 (09/19/18 1626)    Temp      Pulse 66 (09/19/18 1626)   Resp 19 (09/19/18 1626)    SpO2 100 % (09/19/18 1626)

## 2018-09-20 ENCOUNTER — HOSPITAL ENCOUNTER (OUTPATIENT)
Dept: INFUSION CENTER | Facility: CLINIC | Age: 38
Discharge: HOME | End: 2018-09-20
Payer: COMMERCIAL

## 2018-09-20 VITALS
WEIGHT: 158 LBS | TEMPERATURE: 97.6 F | OXYGEN SATURATION: 98 % | RESPIRATION RATE: 14 BRPM | HEART RATE: 62 BPM | BODY MASS INDEX: 27.12 KG/M2 | SYSTOLIC BLOOD PRESSURE: 108 MMHG | DIASTOLIC BLOOD PRESSURE: 70 MMHG

## 2018-09-20 PROCEDURE — 96413 CHEMO IV INFUSION 1 HR: CPT

## 2018-09-20 RX ADMIN — TRASTUZUMAB 425 MG: 150 INJECTION, POWDER, LYOPHILIZED, FOR SOLUTION INTRAVENOUS at 14:42

## 2018-09-20 RX ADMIN — SODIUM CHLORIDE 20 ML/HR: 0.9 INJECTION, SOLUTION INTRAVENOUS at 14:20

## 2018-09-20 RX ADMIN — HEPARIN 300 UNITS: 100 SYRINGE at 15:22

## 2018-09-20 NOTE — PROGRESS NOTES
Patient tolerated treatment today without complications  Verified patient s next appointment   Declined AVS

## 2018-09-24 ENCOUNTER — OFFICE VISIT (OUTPATIENT)
Dept: FAMILY MEDICINE CLINIC | Facility: CLINIC | Age: 38
End: 2018-09-24
Payer: COMMERCIAL

## 2018-09-24 VITALS
DIASTOLIC BLOOD PRESSURE: 78 MMHG | BODY MASS INDEX: 26.73 KG/M2 | OXYGEN SATURATION: 98 % | WEIGHT: 156.6 LBS | HEIGHT: 64 IN | HEART RATE: 88 BPM | TEMPERATURE: 97.5 F | RESPIRATION RATE: 18 BRPM | SYSTOLIC BLOOD PRESSURE: 106 MMHG

## 2018-09-24 DIAGNOSIS — C50.411 MALIGNANT NEOPLASM OF UPPER-OUTER QUADRANT OF RIGHT BREAST IN FEMALE, ESTROGEN RECEPTOR POSITIVE (HCC): Primary | ICD-10-CM

## 2018-09-24 DIAGNOSIS — J45.20 MILD INTERMITTENT ASTHMA WITHOUT COMPLICATION: ICD-10-CM

## 2018-09-24 DIAGNOSIS — Z17.0 MALIGNANT NEOPLASM OF UPPER-OUTER QUADRANT OF RIGHT BREAST IN FEMALE, ESTROGEN RECEPTOR POSITIVE (HCC): Primary | ICD-10-CM

## 2018-09-24 DIAGNOSIS — F41.9 ANXIETY: ICD-10-CM

## 2018-09-24 DIAGNOSIS — Z23 NEED FOR IMMUNIZATION AGAINST INFLUENZA: ICD-10-CM

## 2018-09-24 PROCEDURE — 90471 IMMUNIZATION ADMIN: CPT

## 2018-09-24 PROCEDURE — 90682 RIV4 VACC RECOMBINANT DNA IM: CPT

## 2018-09-24 PROCEDURE — 99214 OFFICE O/P EST MOD 30 MIN: CPT | Performed by: FAMILY MEDICINE

## 2018-09-24 PROCEDURE — 3008F BODY MASS INDEX DOCD: CPT | Performed by: FAMILY MEDICINE

## 2018-09-24 NOTE — PROGRESS NOTES
Assessment/Plan:         Diagnoses and all orders for this visit:    Malignant neoplasm of upper-outer quadrant of right breast in female, estrogen receptor positive ([facility] [address] )    Anxiety    Mild intermittent asthma without complication    Need for immunization against influenza  -     influenza vaccine, 0751-2111, quadrivalent, recombinant, PF, 0 5 mL, for patients 18-49 yr with comorbidities (FLUBLOK)      1) Breast Cancer: waiting for 6 weeks of radiation until she is healed up from the surgery   2) Anxiety: Ativan PRN   3) Asthma: stable  No recent attack  Ventolin PRN       Subjective:      Patient ID: Jd Lomeli is a 41 y o  female  Had breasts surgery recently   Doing well  Starting radiation when she is healed up from the surgery   Anxiety   Presents for follow-up visit  Symptoms include irritability and nervous/anxious behavior  Patient reports no chest pain, compulsions, confusion, decreased concentration, depressed mood, dizziness, dry mouth, excessive worry, feeling of choking, hyperventilation, impotence, insomnia, malaise, muscle tension, nausea, palpitations, panic, restlessness, shortness of breath or suicidal ideas  The quality of sleep is good  Her past medical history is significant for asthma  Compliance with medications is %  Asthma   There is no chest tightness, cough, difficulty breathing or shortness of breath  This is a chronic problem  The problem occurs rarely  Pertinent negatives include no appetite change, chest pain, heartburn, postnasal drip, rhinorrhea, sneezing or weight loss  Her symptoms are alleviated by anxiolytics  Her past medical history is significant for asthma  There is no history of bronchiectasis, bronchitis, COPD, emphysema or pneumonia         The following portions of the patient's history were reviewed and updated as appropriate: allergies, current medications, past family history, past medical history, past social history, past surgical history and problem list     Review of Systems   Constitutional: Positive for irritability  Negative for appetite change and weight loss  HENT: Negative  Negative for postnasal drip, rhinorrhea and sneezing  Eyes: Negative  Respiratory: Negative for cough and shortness of breath  Cardiovascular: Negative for chest pain and palpitations  Gastrointestinal: Negative for heartburn and nausea  Endocrine: Negative  Genitourinary: Negative for impotence  Musculoskeletal: Negative  Allergic/Immunologic: Negative  Neurological: Negative for dizziness  Hematological: Negative  Psychiatric/Behavioral: Negative for confusion, decreased concentration and suicidal ideas  The patient is nervous/anxious  The patient does not have insomnia                Past Medical History:   Diagnosis Date    Asthma     allergy excaberated    Breast cancer ([facility] [address] ) 2018    GERD (gastroesophageal reflux disease)     History of adverse reaction to anesthesia     Pt reports waking up severly anxious    Seasonal allergies      Past Surgical History:   Procedure Laterality Date    BREAST RECONSTRUCTION Bilateral 2018    Procedure: RECONSTRUCTION BREAST W/ IMPLANT;  Surgeon: Sixto Bernal MD;  Location: [facility];  Service: Plastics     SECTION      PILONIDAL CYST EXCISION      resection    AR INSJ TUNNELED CTR VAD W/SUBQ PORT AGE 5 YR/> Left 2018    Procedure: INSERTION VENOUS PORT ( PORT-A-CATH) IR;  Surgeon: Ferny Diez DO;  Location: [facility];  Service: Interventional Radiology    AR MASTECTOMY, MODIFIED RADICAL Right 2018    Procedure: BREAST MODIFIED RADICAL MASTECTOMY;  Surgeon: Misty Leahy MD;  [facility]: [facility];  Service: Surgical Oncology    AR MASTECTOMY, SIMPLE, COMPLETE Left 2018    Procedure: BREAST SIMPLE MASTECTOMY;  Surgeon: Misty Leahy MD;  [facility]: [facility];  Service: Surgical Oncology    AR REVISE BREAST RECONSTRUCTION Bilateral 2018    Procedure: Ying Sr BREAST RECON W/WOUND CLOSURE;  Surgeon: Rosario Hernandez MD;  Location: [facility];  Service: Plastics    TUBAL LIGATION      WISDOM TOOTH EXTRACTION       Social History     Social History    Marital status: /Civil Union     Spouse name: N/A    Number of children: N/A    Years of education: N/A     Occupational History    Not on file       Social History Main Topics    Smoking status: Former Smoker     Quit date: 1/26/2015    Smokeless tobacco: Never Used    Alcohol use Yes      Comment: rare    Drug use: No    Sexual activity: Yes     Partners: Male     Birth control/ protection: Other     Other Topics Concern    Not on file     Social History Narrative    Drinks coffee         Allergies   Allergen Reactions    Trichophyton Other (See Comments)     Pt does not know reaction     Bee Pollen     Cat Hair Extract Sneezing    Dust Mite Extract Sneezing    Molds & Smuts Sneezing    Pollen Extract Sneezing    Tree Extract Sneezing         Family History   Problem Relation Age of Onset    Diabetes Mother     Hypertension Mother     Rosacea Father     Allergies Father         seasonal    Liver cancer Paternal Grandfather     Breast cancer Family            Current Outpatient Prescriptions:     albuterol (VENTOLIN HFA) 90 mcg/act inhaler, Inhale 2 puffs every 4 (four) hours as needed, Disp: , Rfl:     doxycycline hyclate (VIBRAMYCIN) 100 mg capsule, Take 1 capsule by mouth 2 (two) times a day, Disp: , Rfl:     levocetirizine (XYZAL) 5 MG tablet, Take 1 tablet by mouth daily, Disp: , Rfl:     lidocaine-prilocaine (EMLA) cream, Apply topically as needed for mild pain, Disp: 30 g, Rfl: 0    LORazepam (ATIVAN) 1 mg tablet, Take 1 tablet (1 mg total) by mouth daily at bedtime for 30 days, Disp: 30 tablet, Rfl: 0    mometasone (NASONEX) 50 mcg/act nasal spray, 2 sprays into each nostril daily, Disp: , Rfl:     omeprazole (PriLOSEC) 20 mg delayed release capsule, Take 20 mg by mouth, Disp: , Rfl:   tamoxifen (NOLVADEX) 20 mg tablet, Take 1 tablet (20 mg total) by mouth daily, Disp: 90 tablet, Rfl: 1    mupirocin (BACTROBAN) 2 % ointment, , Disp: , Rfl:       Objective:      /78 (BP Location: Left arm, Patient Position: Sitting, Cuff Size: Standard)   Pulse 88   Temp 97 5 °F (36 4 °C)   Resp 18   Ht 5' 4" (1 626 m)   Wt 71 kg (156 lb 9 6 oz)   SpO2 98%   BMI 26 88 kg/m²          Physical Exam   Constitutional: She is oriented to person, place, and time  She appears well-developed and well-nourished  Eyes: Pupils are equal, round, and reactive to light  Cardiovascular: Normal rate and regular rhythm  Pulmonary/Chest: Effort normal and breath sounds normal    Neurological: She is alert and oriented to person, place, and time  Skin: No rash noted  No erythema  Psychiatric: She has a normal mood and affect   Her behavior is normal

## 2018-09-27 NOTE — PROGRESS NOTES
PT Discharge    Patient has been attending physical therapy for post mastectomy pain syndrome  She made excellent progress in improving her sx and is now beginning radiation therapy  She is discontinuing physical therapy at this time and will return as needed    Thank you for your referral

## 2018-10-09 RX ORDER — SODIUM CHLORIDE 9 MG/ML
20 INJECTION, SOLUTION INTRAVENOUS CONTINUOUS
Status: DISCONTINUED | OUTPATIENT
Start: 2018-10-11 | End: 2018-10-14 | Stop reason: HOSPADM

## 2018-10-11 ENCOUNTER — HOSPITAL ENCOUNTER (OUTPATIENT)
Dept: INFUSION CENTER | Facility: CLINIC | Age: 38
Discharge: HOME | End: 2018-10-11
Payer: COMMERCIAL

## 2018-10-11 VITALS
BODY MASS INDEX: 26.26 KG/M2 | SYSTOLIC BLOOD PRESSURE: 102 MMHG | WEIGHT: 153 LBS | DIASTOLIC BLOOD PRESSURE: 70 MMHG | TEMPERATURE: 97.7 F | OXYGEN SATURATION: 99 % | RESPIRATION RATE: 16 BRPM | HEART RATE: 72 BPM

## 2018-10-11 PROCEDURE — 96413 CHEMO IV INFUSION 1 HR: CPT

## 2018-10-11 RX ADMIN — TRASTUZUMAB 426 MG: 150 INJECTION, POWDER, LYOPHILIZED, FOR SOLUTION INTRAVENOUS at 15:01

## 2018-10-11 RX ADMIN — Medication 300 UNITS: at 15:35

## 2018-10-11 RX ADMIN — SODIUM CHLORIDE 20 ML/HR: 0.9 INJECTION, SOLUTION INTRAVENOUS at 14:30

## 2018-10-11 NOTE — PROGRESS NOTES
Pt here for Herceptin for Breast Cancer tx  Vitals stable; labs within parameters for treatment  Callbell within reach; will continue to monitor

## 2018-10-12 ENCOUNTER — APPOINTMENT (OUTPATIENT)
Dept: RADIATION ONCOLOGY | Facility: HOSPITAL | Age: 38
End: 2018-10-12
Attending: RADIOLOGY
Payer: COMMERCIAL

## 2018-10-16 DIAGNOSIS — F41.1 GENERALIZED ANXIETY DISORDER: Primary | ICD-10-CM

## 2018-10-16 RX ORDER — LORAZEPAM 1 MG/1
1 TABLET ORAL
Qty: 30 TABLET | Refills: 0 | Status: SHIPPED | OUTPATIENT
Start: 2018-10-16 | End: 2018-11-18 | Stop reason: SDUPTHER

## 2018-10-17 ENCOUNTER — OFFICE VISIT (OUTPATIENT)
Dept: SURGICAL ONCOLOGY | Facility: CLINIC | Age: 38
End: 2018-10-17
Payer: COMMERCIAL

## 2018-10-17 VITALS
WEIGHT: 156.5 LBS | HEIGHT: 64 IN | BODY MASS INDEX: 26.72 KG/M2 | TEMPERATURE: 98 F | RESPIRATION RATE: 18 BRPM | DIASTOLIC BLOOD PRESSURE: 70 MMHG | HEART RATE: 82 BPM | SYSTOLIC BLOOD PRESSURE: 112 MMHG

## 2018-10-17 DIAGNOSIS — Z17.0 MALIGNANT NEOPLASM OF UPPER-OUTER QUADRANT OF RIGHT BREAST IN FEMALE, ESTROGEN RECEPTOR POSITIVE (HCC): Primary | ICD-10-CM

## 2018-10-17 DIAGNOSIS — C50.411 MALIGNANT NEOPLASM OF UPPER-OUTER QUADRANT OF RIGHT BREAST IN FEMALE, ESTROGEN RECEPTOR POSITIVE (HCC): Primary | ICD-10-CM

## 2018-10-17 PROCEDURE — 99214 OFFICE O/P EST MOD 30 MIN: CPT | Performed by: SURGERY

## 2018-10-17 NOTE — PROGRESS NOTES
Surgical Oncology Follow Up       [address] [facility],[address]  [facility]  [address] [place] [address] [address]  1980  [id number]  [address] [facility],[address] [place] [address]  [facility]  [address] [place] [id number]    Chief Complaint   Patient presents with    Breast Cancer     Pt is here for a three month follow up          Assessment & Plan:   Melissa presents for a three-month follow-up visit  She has no complaints with the exception of having reconstructive issues which sounds like she has had hypersensitive reactions to the sutures  She has been back for revision and since that time her wounds are healing significantly better  She has still not had radiation therapy though that should be forthcoming  Clinical examination shows no evidence of local regional or distant recurrence disease  I will see her back in 6 months  Cancer History:        Malignant neoplasm of upper-outer quadrant of right breast in female, estrogen receptor positive ([address] )    1/19/2018 Initial Diagnosis     U/Sguided core biopsy of right breast mass at [facility]  Malignant neoplasm of UOQ of right breast, estrogen receptor positive ([address] ), Her 2 positive  1/29/2018 Genetic Testing     Likely pathogenic variant was identified in the PALB2 gene  2/2018 - 6/1/2018 Chemotherapy     Neoadjuvant chemotherapy with TCH with pertuzumab  Dr Liz Coy  6/21/2018 Surgery     Right MRM  Invasive mucinous carcinoma  3 4 cm geronimo  Grade 1  3/16 lymph nodes  Stage Stage IB - ypT1mi, ypN1mi, G1  Left simple mastectomy  Benign breast     Immediate reconstruction Dr Gerhard Wyman           Chemotherapy     trastuzumab monotherapy every 3 weeks  Begins 7/19/18                Interval History:   See above    Review of Systems:   Review of Systems   Constitutional: Negative for activity change, appetite change and fatigue  HENT: Negative      Eyes: Negative  Respiratory: Negative for cough, shortness of breath and wheezing  Cardiovascular: Negative for chest pain and leg swelling  Gastrointestinal: Negative  Endocrine: Negative  Genitourinary: Negative  Musculoskeletal:        No new changes or complaints of bone pain   Skin: Negative  Allergic/Immunologic: Negative  Neurological: Negative  Hematological: Negative  Psychiatric/Behavioral: Negative          Past Medical History     Patient Active Problem List   Diagnosis    Asthma    Other constipation    Cigarette nicotine dependence without complication    Macular atrophy, retinal    Seasonal allergies    Malignant neoplasm of upper-outer quadrant of right breast in female, estrogen receptor positive ([facility] )    Anxiety     Past Medical History:   Diagnosis Date    Asthma     allergy excaberated    Breast cancer ([facility] ) 2018    GERD (gastroesophageal reflux disease)     History of adverse reaction to anesthesia     Pt reports waking up severly anxious    Seasonal allergies      Past Surgical History:   Procedure Laterality Date    BREAST RECONSTRUCTION Bilateral 2018    Procedure: RECONSTRUCTION BREAST W/ IMPLANT;  Surgeon: Bettye Mahan MD;  [facility];  Service: Plastics     SECTION      PILONIDAL CYST EXCISION      resection    CO INSJ TUNNELED CTR VAD W/SUBQ PORT AGE 5 YR/> Left 2018    Procedure: INSERTION VENOUS PORT ( PORT-A-CATH) IR;  Surgeon: Bulah Kanner, DO;  [facility];  Service: Interventional Radiology    CO MASTECTOMY, MODIFIED RADICAL Right 2018    Procedure: BREAST MODIFIED RADICAL MASTECTOMY;  Surgeon: Memo Carrera MD;  [facility];  Service: Surgical Oncology    CO MASTECTOMY, SIMPLE, COMPLETE Left 2018    Procedure: BREAST SIMPLE MASTECTOMY;  Surgeon: Memo Carrera MD;  [facility];  Service: Surgical Oncology    CO REVISE BREAST RECONSTRUCTION Bilateral 2018    Procedure: REVISION BREAST RECON W/WOUND CLOSURE;  Surgeon: Adrianna Ferrera MD;  [facility];  Service: Plastics    TUBAL LIGATION      WISDOM TOOTH EXTRACTION       Family History   Problem Relation Age of Onset    Diabetes Mother     Hypertension Mother     Rosacea Father     Allergies Father         seasonal    Liver cancer Paternal Grandfather     Breast cancer Family      Social History     Social History    Marital status: /Civil Union     Spouse name: N/A    Number of children: N/A    Years of education: N/A     Occupational History    Not on file       Social History Main Topics    Smoking status: Former Smoker     Quit date: 1/26/2015    Smokeless tobacco: Never Used    Alcohol use Yes      Comment: rare    Drug use: No    Sexual activity: Yes     Partners: Male     Birth control/ protection: Other     Other Topics Concern    Not on file     Social History Narrative    Drinks coffee           Current Outpatient Prescriptions:     albuterol (VENTOLIN HFA) 90 mcg/act inhaler, Inhale 2 puffs every 4 (four) hours as needed, Disp: , Rfl:     doxycycline hyclate (VIBRAMYCIN) 100 mg capsule, Take 1 capsule by mouth 2 (two) times a day, Disp: , Rfl:     levocetirizine (XYZAL) 5 MG tablet, Take 1 tablet by mouth daily, Disp: , Rfl:     lidocaine-prilocaine (EMLA) cream, Apply topically as needed for mild pain, Disp: 30 g, Rfl: 0    LORazepam (ATIVAN) 1 mg tablet, Take 1 tablet (1 mg total) by mouth daily at bedtime for 30 days, Disp: 30 tablet, Rfl: 0    mometasone (NASONEX) 50 mcg/act nasal spray, 2 sprays into each nostril daily, Disp: , Rfl:     mupirocin (BACTROBAN) 2 % ointment, , Disp: , Rfl:     omeprazole (PriLOSEC) 20 mg delayed release capsule, Take 20 mg by mouth, Disp: , Rfl:     tamoxifen (NOLVADEX) 20 mg tablet, Take 1 tablet (20 mg total) by mouth daily, Disp: 90 tablet, Rfl: 1  Allergies   Allergen Reactions    Trichophyton Other (See Comments)     Pt does not know reaction  Bee Pollen     Cat Hair Extract Sneezing    Dust Mite Extract Sneezing    Molds & Smuts Sneezing    Pollen Extract Sneezing    Tree Extract Sneezing       Physical Exam:     Vitals:    10/17/18 1542   BP: 112/70   Pulse: 82   Resp: 18   Temp: 98 °F (36 7 °C)     Physical Exam   Constitutional: She is oriented to person, place, and time  She appears well-developed and well-nourished  HENT:   Head: Normocephalic and atraumatic  Mouth/Throat: Oropharynx is clear and moist    Eyes: Pupils are equal, round, and reactive to light  EOM are normal    Neck: Normal range of motion  Neck supple  No JVD present  No tracheal deviation present  No thyromegaly present  Cardiovascular: Normal rate, regular rhythm, normal heart sounds and intact distal pulses  Exam reveals no gallop and no friction rub  No murmur heard  Pulmonary/Chest: Effort normal and breath sounds normal  No respiratory distress  She has no wheezes  She has no rales  Examination of both reconstructed breast demonstrate that her wounds are healing well all things considered  There is no evidence of active infection  There are no dominant masses no signs of local regional recurrence disease  Abdominal: Soft  She exhibits no distension and no mass  There is no hepatomegaly  There is no tenderness  There is no rebound and no guarding  Musculoskeletal: Normal range of motion  She exhibits no edema or tenderness  Lymphadenopathy:     She has no cervical adenopathy  Neurological: She is alert and oriented to person, place, and time  No cranial nerve deficit  Skin: Skin is warm and dry  No rash noted  No erythema  Psychiatric: She has a normal mood and affect  Her behavior is normal    Vitals reviewed  Results/discussion:   Clinical exam shows no evidence of local regional distant recurrence disease  We will see her back in 6 months  All questions were answered to the patient's satisfaction    She will contact us should she appreciate any changes in her breast that her worrisome or have any unexpected signs or symptoms  Advance Care Planning/Advance Directives:  I discussed the disease status, treatment plans and follow-up with the patient

## 2018-10-17 NOTE — LETTER
October 17, 2018     Olesya Osorio MD  [address]  [address] [facility] [address]    Patient: Ariadna Flores   YOB: 1980   Date of Visit: 10/17/2018       Dear Dr Zain Almaraz:    Thank you for referring Daniel Choudhury to me for evaluation  Below are my notes for this consultation  If you have questions, please do not hesitate to call me  I look forward to following your patient along with you  Sincerely,        Terrance Irwin MD        CC: No Recipients  Terrance Irwin MD  10/17/2018  4:03 PM  Sign at close encounter     Surgical Oncology Follow Up       [address] [place] [address] [address] [place] [address]  1980  [id number]  [address] [facility],6[address]  [facility] [address] [address]    Chief Complaint   Patient presents with    Breast Cancer     Pt is here for a three month follow up          Assessment & Plan:   Jesus Jain presents for a three-month follow-up visit  She has no complaints with the exception of having reconstructive issues which sounds like she has had hypersensitive reactions to the sutures  She has been back for revision and since that time her wounds are healing significantly better  She has still not had radiation therapy though that should be forthcoming  Clinical examination shows no evidence of local regional or distant recurrence disease  I will see her back in 6 months  Cancer History:        Malignant neoplasm of upper-outer quadrant of right breast in female, estrogen receptor positive ([address] )    1/19/2018 Initial Diagnosis     U/Sguided core biopsy of right breast mass at [facility]  Malignant neoplasm of UOQ of right breast, estrogen receptor positive ([address] ), Her 2 positive  1/29/2018 Genetic Testing     Likely pathogenic variant was identified in the PALB2 gene           2/2018 - 6/1/2018 Chemotherapy     Neoadjuvant chemotherapy with [address] with pertuzumab  Dr Gustavo Malone  2018 Surgery     Right MRM  Invasive mucinous carcinoma  3 4 cm geronimo  Grade 1  3/16 lymph nodes  Stage Stage IB - ypT1mi, ypN1mi, G1  Left simple mastectomy  Benign breast     Immediate reconstruction Dr Feroz Smith           Chemotherapy     trastuzumab monotherapy every 3 weeks  Begins 18                Interval History:   See above    Review of Systems:   Review of Systems   Constitutional: Negative for activity change, appetite change and fatigue  HENT: Negative  Eyes: Negative  Respiratory: Negative for cough, shortness of breath and wheezing  Cardiovascular: Negative for chest pain and leg swelling  Gastrointestinal: Negative  Endocrine: Negative  Genitourinary: Negative  Musculoskeletal:        No new changes or complaints of bone pain   Skin: Negative  Allergic/Immunologic: Negative  Neurological: Negative  Hematological: Negative  Psychiatric/Behavioral: Negative          Past Medical History     Patient Active Problem List   Diagnosis    Asthma    Other constipation    Cigarette nicotine dependence without complication    Macular atrophy, retinal    Seasonal allergies    Malignant neoplasm of upper-outer quadrant of right breast in female, estrogen receptor positive ([facility] )    Anxiety     Past Medical History:   Diagnosis Date    Asthma     allergy excaberated    Breast cancer ([facility] [facility] ) 2018    GERD (gastroesophageal reflux disease)     History of adverse reaction to anesthesia     Pt reports waking up severly anxious    Seasonal allergies      Past Surgical History:   Procedure Laterality Date    BREAST RECONSTRUCTION Bilateral 2018    Procedure: RECONSTRUCTION BREAST W/ IMPLANT;  Surgeon: Bettye Mahan MD;  [facility];  Service: Plastics     SECTION      PILONIDAL CYST EXCISION      resection    OR INSJ TUNNELED CTR VAD W/SUBQ PORT AGE 5 YR/> Left 2018 Procedure: INSERTION VENOUS PORT ( PORT-A-CATH) IR;  Surgeon: Jenni Hill DO;  [facility];  Service: Interventional Radiology    [place] MASTECTOMY, MODIFIED RADICAL Right 6/21/2018    Procedure: BREAST MODIFIED RADICAL MASTECTOMY;  Surgeon: Alfonzo Bermudez MD;  [facility];  Service: Surgical Oncology    NJ MASTECTOMY, SIMPLE, COMPLETE Left 6/21/2018    Procedure: BREAST SIMPLE MASTECTOMY;  Surgeon: Alfonzo Bermudez MD;  [facility];  Service: Surgical Oncology    NJ REVISE BREAST RECONSTRUCTION Bilateral 9/19/2018    Procedure: REVISION BREAST RECON Arvis Glennie CLOSURE;  Surgeon: Shell Adame MD;  [facility];  Service: Plastics    TUBAL LIGATION      WISDOM TOOTH EXTRACTION       Family History   Problem Relation Age of Onset    Diabetes Mother     Hypertension Mother     Rosacea Father     Allergies Father         seasonal    Liver cancer Paternal Grandfather     Breast cancer Family      Social History     Social History    Marital status: /Civil Union     Spouse name: N/A    Number of children: N/A    Years of education: N/A     Occupational History    Not on file       Social History Main Topics    Smoking status: Former Smoker     Quit date: 1/26/2015    Smokeless tobacco: Never Used    Alcohol use Yes      Comment: rare    Drug use: No    Sexual activity: Yes     Partners: Male     Birth control/ protection: Other     Other Topics Concern    Not on file     Social History Narrative    Drinks coffee           Current Outpatient Prescriptions:     albuterol (VENTOLIN HFA) 90 mcg/act inhaler, Inhale 2 puffs every 4 (four) hours as needed, Disp: , Rfl:     doxycycline hyclate (VIBRAMYCIN) 100 mg capsule, Take 1 capsule by mouth 2 (two) times a day, Disp: , Rfl:     levocetirizine (XYZAL) 5 MG tablet, Take 1 tablet by mouth daily, Disp: , Rfl:     lidocaine-prilocaine (EMLA) cream, Apply topically as needed for mild pain, Disp: 30 g, Rfl: 0    LORazepam (ATIVAN) 1 mg tablet, Take 1 tablet (1 mg total) by mouth daily at bedtime for 30 days, Disp: 30 tablet, Rfl: 0    mometasone (NASONEX) 50 mcg/act nasal spray, 2 sprays into each nostril daily, Disp: , Rfl:     mupirocin (BACTROBAN) 2 % ointment, , Disp: , Rfl:     omeprazole (PriLOSEC) 20 mg delayed release capsule, Take 20 mg by mouth, Disp: , Rfl:     tamoxifen (NOLVADEX) 20 mg tablet, Take 1 tablet (20 mg total) by mouth daily, Disp: 90 tablet, Rfl: 1  Allergies   Allergen Reactions    Trichophyton Other (See Comments)     Pt does not know reaction     Bee Pollen     Cat Hair Extract Sneezing    Dust Mite Extract Sneezing    Molds & Smuts Sneezing    Pollen Extract Sneezing    Tree Extract Sneezing       Physical Exam:     Vitals:    10/17/18 1542   BP: 112/70   Pulse: 82   Resp: 18   Temp: 98 °F (36 7 °C)     Physical Exam   Constitutional: She is oriented to person, place, and time  She appears well-developed and well-nourished  HENT:   Head: Normocephalic and atraumatic  Mouth/Throat: Oropharynx is clear and moist    Eyes: Pupils are equal, round, and reactive to light  EOM are normal    Neck: Normal range of motion  Neck supple  No JVD present  No tracheal deviation present  No thyromegaly present  Cardiovascular: Normal rate, regular rhythm, normal heart sounds and intact distal pulses  Exam reveals no gallop and no friction rub  No murmur heard  Pulmonary/Chest: Effort normal and breath sounds normal  No respiratory distress  She has no wheezes  She has no rales  Examination of both reconstructed breast demonstrate that her wounds are healing well all things considered  There is no evidence of active infection  There are no dominant masses no signs of local regional recurrence disease  Abdominal: Soft  She exhibits no distension and no mass  There is no hepatomegaly  There is no tenderness  There is no rebound and no guarding  Musculoskeletal: Normal range of motion   She exhibits no edema or tenderness  Lymphadenopathy:     She has no cervical adenopathy  Neurological: She is alert and oriented to person, place, and time  No cranial nerve deficit  Skin: Skin is warm and dry  No rash noted  No erythema  Psychiatric: She has a normal mood and affect  Her behavior is normal    Vitals reviewed  Results/discussion:   Clinical exam shows no evidence of local regional distant recurrence disease  We will see her back in 6 months  All questions were answered to the patient's satisfaction  She will contact us should she appreciate any changes in her breast that her worrisome or have any unexpected signs or symptoms  Advance Care Planning/Advance Directives:  I discussed the disease status, treatment plans and follow-up with the patient

## 2018-10-23 ENCOUNTER — CLINICAL SUPPORT (OUTPATIENT)
Dept: RADIATION ONCOLOGY | Facility: HOSPITAL | Age: 38
End: 2018-10-23
Payer: COMMERCIAL

## 2018-10-23 ENCOUNTER — APPOINTMENT (OUTPATIENT)
Dept: RADIATION ONCOLOGY | Facility: HOSPITAL | Age: 38
End: 2018-10-23
Attending: RADIOLOGY
Payer: COMMERCIAL

## 2018-10-23 VITALS
BODY MASS INDEX: 27.22 KG/M2 | SYSTOLIC BLOOD PRESSURE: 100 MMHG | DIASTOLIC BLOOD PRESSURE: 62 MMHG | HEART RATE: 70 BPM | RESPIRATION RATE: 14 BRPM | TEMPERATURE: 97.9 F | WEIGHT: 158.6 LBS

## 2018-10-23 DIAGNOSIS — C50.411 MALIGNANT NEOPLASM OF UPPER-OUTER QUADRANT OF RIGHT BREAST IN FEMALE, ESTROGEN RECEPTOR NEGATIVE (HCC): Primary | ICD-10-CM

## 2018-10-23 DIAGNOSIS — C50.411 MALIGNANT NEOPLASM OF UPPER-OUTER QUADRANT OF RIGHT BREAST IN FEMALE, ESTROGEN RECEPTOR POSITIVE (HCC): Primary | ICD-10-CM

## 2018-10-23 DIAGNOSIS — Z17.0 MALIGNANT NEOPLASM OF UPPER-OUTER QUADRANT OF RIGHT BREAST IN FEMALE, ESTROGEN RECEPTOR POSITIVE (HCC): Primary | ICD-10-CM

## 2018-10-23 DIAGNOSIS — Z17.1 MALIGNANT NEOPLASM OF UPPER-OUTER QUADRANT OF RIGHT BREAST IN FEMALE, ESTROGEN RECEPTOR NEGATIVE (HCC): Primary | ICD-10-CM

## 2018-10-23 PROCEDURE — 77290 THER RAD SIMULAJ FIELD CPLX: CPT | Performed by: RADIOLOGY

## 2018-10-23 PROCEDURE — 99215 OFFICE O/P EST HI 40 MIN: CPT | Performed by: RADIOLOGY

## 2018-10-23 NOTE — PROGRESS NOTES
Jackie Sanchez  1980   Ms Sanchez is a 41 y o  female       Chief Complaint   Patient presents with    Follow-up       Cancer Staging  Malignant neoplasm of upper-outer quadrant of right breast in female, estrogen receptor positive ([facility] [address] )  Staging form: Breast, AJCC 8th Edition  - Clinical stage from 1/26/2018: Stage IB (cT2(3), cN1, cM0, G2, ER: Positive, GA: Positive, HER2: Positive) - Signed by Zainab Goodman MD on 1/26/2018      Oncology History    P        Malignant neoplasm of upper-outer quadrant of right breast in female, estrogen receptor positive ([facility] [address] )    1/19/2018 Initial Diagnosis     U/Sguided core biopsy of right breast mass at [facility]  Malignant neoplasm of UOQ of right breast, estrogen receptor positive ([facility] [address] ), Her 2 positive  1/29/2018 Genetic Testing     Likely pathogenic variant was identified in the PALB2 gene  2/2018 - 6/1/2018 Chemotherapy     Neoadjuvant chemotherapy with TCH with pertuzumab  Dr Arun Luna  6/21/2018 Surgery     Right MRM  Invasive mucinous carcinoma  3 4 cm geronimo  Grade 1  3/16 lymph nodes  Stage Stage IB - ypT1mi, ypN1mi, G1  Left simple mastectomy  Benign breast     Immediate reconstruction Dr Liza Contreras           Chemotherapy     trastuzumab monotherapy every 3 weeks  Begins 7/19/18            Radiation     Plan to begin 10/23/18            Clinical Trial: no        Interval History resents for limited consult prior to [facility]  Initial consult 7/17/18  SIM held due to post surgical pain and inability to lift arm  Patient now cleared for RT      7/25/18 Arun Luna:  I recommended her to continue with trastuzumab of 6 milligram/kilogram every 3 weeks until January 24, 2019  We also discussed adjuvant hormonal therapy  I recommended her to start tamoxifen 20 mg daily  10/17/18 Zandra Cooks:   Heather Cantu presents for a three-month follow-up visit    She has no complaints with the exception of having reconstructive issues which sounds like she has had hypersensitive reactions to the sutures  She has been back for revision and since that time her wounds are healing significantly better  She has still not had radiation therapy though that should be forthcoming  Clinical examination shows no evidence of local regional or distant recurrence disease  I will see her back in 6 months  Screening  Tobacco  Current tobacco user: no  If yes, brief counseling provided: NA    Hypertension  Hypertension screening performed: yes  Normotensive:  yes  If no, referred to PCP: n/a    Depression Screening  Screened for depression using PHQ-2: yes    Screened for depression using PHQ-9:  no  Screening positive or negative:  negative  If score >4, was any of the following actions taken?    Additional evaluation for depression, suicide risk assesment, referral to PCP or psychiatry, medication started:  n/a    Advanced Care Planning for Patients >65 years  Advanced Care Planning Discussed:  no  Patient named surrogate decision maker or care plan in chart: n/a    [unfilled]  Health Maintenance   Topic Date Due    Pneumococcal PPSV23 Highest Risk Adult (1 of 3 - PCV13) 05/04/2001    Depression Screening PHQ  07/31/2019    PAP SMEAR  01/15/2020    DTaP,Tdap,and Td Vaccines (2 - Td) 05/03/2027    INFLUENZA VACCINE  Completed       Patient Active Problem List   Diagnosis    Asthma    Other constipation    Cigarette nicotine dependence without complication    Macular atrophy, retinal    Seasonal allergies    Malignant neoplasm of upper-outer quadrant of right breast in female, estrogen receptor positive ([address] )    Anxiety     Past Medical History:   Diagnosis Date    Asthma     allergy excaberated    Breast cancer ([address] ) 01/25/2018    GERD (gastroesophageal reflux disease)     History of adverse reaction to anesthesia     Pt reports waking up severly anxious    Seasonal allergies      Past Surgical History:   Procedure Laterality Date    BREAST RECONSTRUCTION Bilateral 2018    Procedure: RECONSTRUCTION BREAST W/ IMPLANT;  Surgeon: Eric Kim MD;  [facility];  Service: Plastics     SECTION      PILONIDAL CYST EXCISION      resection    GA INSJ TUNNELED CTR VAD W/SUBQ PORT AGE 5 YR/> Left 2018    Procedure: INSERTION VENOUS PORT ( PORT-A-CATH) IR;  Surgeon: Lexie Parekh DO;  [facility];  Service: Interventional Radiology    GA MASTECTOMY, MODIFIED RADICAL Right 2018    Procedure: BREAST MODIFIED RADICAL MASTECTOMY;  Surgeon: Hannah Hartmann MD;  [facility];  Service: Surgical Oncology    GA MASTECTOMY, SIMPLE, COMPLETE Left 2018    Procedure: BREAST SIMPLE MASTECTOMY;  Surgeon: Hannah Hartmann MD;  [facility];  Service: Surgical Oncology    GA REVISE BREAST RECONSTRUCTION Bilateral 2018    Procedure: REVISION BREAST RECON Sima Cristhian CLOSURE;  Surgeon: Eric Kim MD;  [facility];  Service: Plastics    TUBAL LIGATION      WISDOM TOOTH EXTRACTION       Family History   Problem Relation Age of Onset    Diabetes Mother     Hypertension Mother     Rosacea Father     Allergies Father         seasonal    Liver cancer Paternal Grandfather     Breast cancer Family      Social History     Social History    Marital status: /Civil Union     Spouse name: N/A    Number of children: N/A    Years of education: N/A     Occupational History    Not on file       Social History Main Topics    Smoking status: Former Smoker     Quit date: 2015    Smokeless tobacco: Never Used    Alcohol use Yes      Comment: rare    Drug use: No    Sexual activity: Yes     Partners: Male     Birth control/ protection: Other     Other Topics Concern    Not on file     Social History Narrative    Drinks coffee           Current Outpatient Prescriptions:     albuterol (VENTOLIN HFA) 90 mcg/act inhaler, Inhale 2 puffs every 4 (four) hours as needed, Disp: , Rfl:     levocetirizine (XYZAL) 5 MG tablet, Take 1 tablet by mouth daily, Disp: , Rfl:     lidocaine-prilocaine (EMLA) cream, Apply topically as needed for mild pain, Disp: 30 g, Rfl: 0    LORazepam (ATIVAN) 1 mg tablet, Take 1 tablet (1 mg total) by mouth daily at bedtime for 30 days, Disp: 30 tablet, Rfl: 0    mometasone (NASONEX) 50 mcg/act nasal spray, 2 sprays into each nostril daily, Disp: , Rfl:     omeprazole (PriLOSEC) 20 mg delayed release capsule, Take 20 mg by mouth, Disp: , Rfl:     tamoxifen (NOLVADEX) 20 mg tablet, Take 1 tablet (20 mg total) by mouth daily, Disp: 90 tablet, Rfl: 1    doxycycline hyclate (VIBRAMYCIN) 100 mg capsule, Take 1 capsule by mouth 2 (two) times a day, Disp: , Rfl:     mupirocin (BACTROBAN) 2 % ointment, , Disp: , Rfl:   Allergies   Allergen Reactions    Trichophyton Other (See Comments)     Pt does not know reaction     Bee Pollen     Cat Hair Extract Sneezing    Dust Mite Extract Sneezing    Molds & Smuts Sneezing    Pollen Extract Sneezing    Tree Extract Sneezing       Review of Systems:  Review of Systems   Constitutional: Negative  HENT: Positive for congestion  Eyes: Negative  Respiratory: Negative  Cardiovascular: Negative  Gastrointestinal: Positive for constipation  Endocrine: Negative  Genitourinary: Negative  Musculoskeletal:        Right arm pain continues but improving  Very slight swelling  Skin: Negative  Allergic/Immunologic: Positive for environmental allergies  Neurological: Positive for numbness (right arm)  Hematological: Negative  Psychiatric/Behavioral: Negative  Vitals:    10/23/18 1341   BP: 100/62   Pulse: 70   Resp: 14   Temp: 97 9 °F (36 6 °C)   Weight: 71 9 kg (158 lb 9 6 oz)       Pain Score: 0-No pain (02 99)    Imaging:No results found      Teaching

## 2018-10-26 PROCEDURE — 77280 THER RAD SIMULAJ FIELD SMPL: CPT | Performed by: RADIOLOGY

## 2018-10-26 PROCEDURE — 77295 3-D RADIOTHERAPY PLAN: CPT | Performed by: RADIOLOGY

## 2018-10-26 PROCEDURE — 77334 RADIATION TREATMENT AID(S): CPT | Performed by: RADIOLOGY

## 2018-10-26 PROCEDURE — 77300 RADIATION THERAPY DOSE PLAN: CPT | Performed by: RADIOLOGY

## 2018-10-27 NOTE — PROGRESS NOTES
Follow-up - Radiation Oncology   Tracy Sanchez 1980 41 y o  female [id number]      History of Present Illness   Cancer Staging  Malignant neoplasm of upper-outer quadrant of right breast in female, estrogen receptor positive ([facility] [address] )  Staging form: Breast, AJCC 8th Edition  - Clinical stage from 1/26/2018: Stage IB (cT2(3), cN1, cM0, G2, ER: Positive, MT: Positive, HER2: Positive) - Signed by Allen Velez MD on 1/26/2018      Tracy Sanchez is a 41y o  year old female with a locally advanced right breast carcinoma  She presented with a palpable right breast mass as well as axillary lymphadenopathy  Her right breast measured 5 2 x 4 7 cm on CT scan along with 2 right axillary lymph nodes measuring 2 4 and 2 3 cm  There was no evident metastatic disease to the lungs or the abdomen or liver  Bone scan was without any evidence of metastatic disease  Recommendations were made for neoadjuvant chemotherapy with TCH and pertuzumab  She then had repeat imaging with an MRI of the breast that revealed reduction in size of her large right upper outer quadrant breast mass along with significant decrease in the degree of enhancement  There was reduction in the right axillary lymphadenopathy  She had surgery June 21, 2018 with a right modified radical mastectomy along with a left breast prophylactic mastectomy and reconstruction  Her preoperative stage would have been stage III and now post neoadjuvant chemotherapy she has stage IB disease  She was seen for consultation July 17, 2018 and we recommended postmastectomy radiation therapy because initially she had a large 5 2 cm tumor with at least 2 positive axillary lymph nodes and pathologically 3/16 lymph nodes were positive    Postmastectomy radiation therapy has been shown to reduce the chance of local recurrence as well as improve survival   Her disease is hormone receptor positive and she saw Dr Betzaida Solis who recommended tamoxifen that was started August 1, 2018  She had simulation performed in August 2018 for treatment to the reconstructed right chest wall, supraclavicular, and axillary regions over 6 weeks  She presents for limited consult prior to re-simulation today       Interval History:    7/25/18 [place]: Ezra Montez recommended her to continue with trastuzumab of 6 milligram/kilogram every 3 weeks until January 24, 2019   We also discussed adjuvant hormonal therapy   I recommended her to start tamoxifen 20 mg daily     Tamoxifen started on August 1, 2018 per patient       Her initial simulation was held due to post surgical pain and inability to lift right arm  She received physical therapy with improvement in her right arm mobility such that she could have simulation performed in August   Her radiation treatments were not started because she had difficulty with her bilateral reconstructed chest wall incisions that opened/would not heal     09/19/18 Dr Saadia Sherman performed revision of her bilateral reconstructed chest wall incisions/wound closure  10/8-9/18 - Patient reported that she had small area of opening of her surgical incisions on October 8, 2018 and she decided on her own to stop taking tamoxifen October 9, 2018 because she thought this may be interfering with her wound healing  She now remains off tamoxifen  10/16/18 Dr Shawn Ingram follow-up visit and her incisions were all healed such that she was cleared to start radiation therapy  10/17/18 Kathy Bautista:   Melissa presents for a three-month follow-up visit  Wilfrido Ryan has no complaints with the exception of having reconstructive issues which sounds like she has had hypersensitive reactions to the sutures   She has been back for revision and since that time her wounds are healing significantly better  Wilfrido Ryan has still not had radiation therapy though that should be forthcoming  Mariela Cadet examination shows no evidence of local regional or distant recurrence disease   I will see her back in 6 months      She is seen today for re-evaluation and simulation  She reports her surgical incisions are completely healed and she denies any pain in the chest wall region bilaterally  She is having less pain in the right arm/axillary area and  continues to perform stretching exercises/therapy at home  She is reluctant to go back on tamoxifen therapy until after she completes her radiation therapy  Clinical Trial: no     Screening  Tobacco  Current tobacco user: no  If yes, brief counseling provided: NA     Hypertension  Hypertension screening performed: yes  Normotensive:  yes  If no, referred to PCP: n/a     Depression Screening  Screened for depression using PHQ-2: yes     Screened for depression using PHQ-9:  no  Screening positive or negative:  negative  If score >4, was any of the following actions taken? Additional evaluation for depression, suicide risk assesment, referral to PCP or psychiatry, medication started:  n/a     Advanced Care Planning for Patients >65 years  Advanced Care Planning Discussed:  no  Patient named surrogate decision maker or care plan in chart: n/a    Historical Information   Oncology History    P        Malignant neoplasm of upper-outer quadrant of right breast in female, estrogen receptor positive ([facility] [address] )    1/19/2018 Initial Diagnosis     U/Sguided core biopsy of right breast mass at [facility]  Malignant neoplasm of UOQ of right breast, estrogen receptor positive ([facility] [address] ), Her 2 positive  1/29/2018 Genetic Testing     Likely pathogenic variant was identified in the PALB2 gene  2/2018 - 6/1/2018 Chemotherapy     Neoadjuvant chemotherapy with TCH with pertuzumab  Dr Ross Bernheim  6/21/2018 Surgery     Right MRM  Invasive mucinous carcinoma  3 4 cm geronimo  Grade 1  3/16 lymph nodes  Stage Stage IB - ypT1mi, ypN1mi, G1  Left simple mastectomy  Benign breast     Immediate reconstruction Dr Candice Burks           Chemotherapy     trastuzumab monotherapy every 3 weeks    Begins 7/19/18            Radiation Plan to begin 10/23/18            Past Medical History:   Diagnosis Date    Asthma     allergy excaberated    Breast cancer ([facility] [address] ) 2018    GERD (gastroesophageal reflux disease)     History of adverse reaction to anesthesia     Pt reports waking up severly anxious    Seasonal allergies      Past Surgical History:   Procedure Laterality Date    BREAST RECONSTRUCTION Bilateral 2018    Procedure: RECONSTRUCTION BREAST W/ IMPLANT;  Surgeon: Mirella Page MD;  [facility];  Service: Plastics     SECTION      PILONIDAL CYST EXCISION      resection    MN INSJ TUNNELED CTR VAD W/SUBQ PORT AGE 5 YR/> Left 2018    Procedure: INSERTION VENOUS PORT ( PORT-A-CATH) IR;  Surgeon: Paul Sheppard DO;  [facility];  Service: Interventional Radiology    MN MASTECTOMY, MODIFIED RADICAL Right 2018    Procedure: BREAST MODIFIED RADICAL MASTECTOMY;  Surgeon: Lisette Sy MD;  [facility];  Service: Surgical Oncology    MN MASTECTOMY, SIMPLE, COMPLETE Left 2018    Procedure: BREAST SIMPLE MASTECTOMY;  Surgeon: Lisette Sy MD;  [facility];  Service: Surgical Oncology    MN REVISE BREAST RECONSTRUCTION Bilateral 2018    Procedure: REVISION BREAST RECON W/WOUND CLOSURE;  Surgeon: Mirella Page MD;  [facility];  Service: Plastics    TUBAL LIGATION      WISDOM TOOTH EXTRACTION         Social History   History   Alcohol Use    Yes     Comment: rare     History   Drug Use No     History   Smoking Status    Former Smoker    Quit date: 2015   Smokeless Tobacco    Never Used         Meds/Allergies     Current Outpatient Prescriptions:     albuterol (VENTOLIN HFA) 90 mcg/act inhaler, Inhale 2 puffs every 4 (four) hours as needed, Disp: , Rfl:     levocetirizine (XYZAL) 5 MG tablet, Take 1 tablet by mouth daily, Disp: , Rfl:     lidocaine-prilocaine (EMLA) cream, Apply topically as needed for mild pain, Disp: 30 g, Rfl: 0    LORazepam (ATIVAN) 1 mg tablet, Take 1 tablet (1 mg total) by mouth daily at bedtime for 30 days, Disp: 30 tablet, Rfl: 0    mometasone (NASONEX) 50 mcg/act nasal spray, 2 sprays into each nostril daily, Disp: , Rfl:     omeprazole (PriLOSEC) 20 mg delayed release capsule, Take 20 mg by mouth, Disp: , Rfl:     tamoxifen (NOLVADEX) 20 mg tablet, Take 1 tablet (20 mg total) by mouth daily, Disp: 90 tablet, Rfl: 1    doxycycline hyclate (VIBRAMYCIN) 100 mg capsule, Take 1 capsule by mouth 2 (two) times a day, Disp: , Rfl:     mupirocin (BACTROBAN) 2 % ointment, , Disp: , Rfl:   Allergies   Allergen Reactions    Trichophyton Other (See Comments)     Pt does not know reaction     Bee Pollen     Cat Hair Extract Sneezing    Dust Mite Extract Sneezing    Molds & Smuts Sneezing    Pollen Extract Sneezing    Tree Extract Sneezing         Review of Systems   Constitutional: Negative  HENT: Positive for congestion  Eyes: Negative  Respiratory: Negative  Cardiovascular: Negative  Gastrointestinal: Positive for constipation  Endocrine: Negative  Genitourinary: Negative  Musculoskeletal:        Right arm pain continues but improving  Very slight swelling  Skin: Negative  Allergic/Immunologic: Positive for environmental allergies  Neurological: Positive for numbness (right arm)  Hematological: Negative  Psychiatric/Behavioral: Negative  OBJECTIVE:   /62   Pulse 70   Temp 97 9 °F (36 6 °C)   Resp 14   Wt 71 9 kg (158 lb 9 6 oz)   LMP 02/13/2018 (Approximate)   BMI 27 22 kg/m²   Pain Assessment:  4  ECOG/Zubrod/WHO: 1 - Symptomatic but completely ambulatory    Physical Exam   Constitutional: She is oriented to person, place, and time  She appears well-developed and well-nourished  No distress  HENT:   Head: Normocephalic and atraumatic  Mouth/Throat: No oropharyngeal exudate  Eyes: Pupils are equal, round, and reactive to light  Conjunctivae and EOM are normal  No scleral icterus  Neck: Normal range of motion  Neck supple  No tracheal deviation present  No thyromegaly present  Cardiovascular: Normal rate, regular rhythm and normal heart sounds  Pulmonary/Chest: Effort normal and breath sounds normal  No respiratory distress  She has no wheezes  She has no rales  She exhibits no mass, no tenderness and no edema  Bilateral reconstructed chest wall mastectomy scars are well healed without any nodularity and no erythema  Bilateral permanent breast prosthesis are intact  Abdominal: Soft  Bowel sounds are normal  She exhibits no distension and no mass  There is no tenderness  Musculoskeletal: Normal range of motion  She exhibits no edema or tenderness  Lymphadenopathy:     She has no cervical adenopathy  She has no axillary adenopathy  Right: No supraclavicular adenopathy present  Left: No supraclavicular adenopathy present  Neurological: She is alert and oriented to person, place, and time  No cranial nerve deficit  Coordination normal    Skin: Skin is warm and dry  No rash noted  She is not diaphoretic  No erythema  No pallor  Psychiatric: She has a normal mood and affect  Her behavior is normal  Judgment and thought content normal    Nursing note and vitals reviewed  RESULTS    Lab Results: No results found for this or any previous visit (from the past 672 hour(s))  Imaging Studies:No results found  Assessment/Plan:  No orders of the defined types were placed in this encounter  Ankita Sanchez is a 41y o  year old female with a locally advanced right breast carcinoma  She presented with a palpable right breast mass as well as axillary lymphadenopathy  Her right breast measured 5 2 x 4 7 cm on CT scan along with 2 right axillary lymph nodes measuring 2 4 and 2 3 cm  There was no evident metastatic disease to the lungs or the abdomen or liver  Bone scan was without any evidence of metastatic disease    Recommendations were made for neoadjuvant chemotherapy with TCH and pertuzumab  She then had repeat imaging with an MRI of the breast that revealed reduction in size of her large right upper outer quadrant breast mass along with significant decrease in the degree of enhancement  There was reduction in the right axillary lymphadenopathy  She had surgery June 21, 2018 with a right modified radical mastectomy along with a left breast prophylactic mastectomy and reconstruction  Her preoperative stage would have been stage III and now post neoadjuvant chemotherapy she has stage IB disease  She was seen for consultation July 17, 2018 and we recommended postmastectomy radiation therapy because initially she had a large 5 2 cm tumor with at least 2 positive axillary lymph nodes and pathologically 3/16 lymph nodes were positive  Postmastectomy radiation therapy has been shown to reduce the chance of local recurrence as well as improve survival  Her disease is hormone receptor positive and she saw Dr Arun Luna who recommended tamoxifen that was started August 1, 2018  She had simulation performed in August 2018 for treatment to the reconstructed right chest wall, supraclavicular, and axillary regions over 6 weeks  She presents for limited consult prior to re-simulation today  She was not able to start radiation therapy due to difficulty with her bilateral reconstructed chest wall incisions and she required revision of the incisions/wound closure on September 19, 2018  Her incisions are now completely healed and she is cleared to proceed with radiation therapy  We discussed rationale for treatment including the acute side effects and the potential chronic complications  She agrees to proceed with radiation treatment to the reconstructed right chest wall, supraclavicular, and axillary regions over 6 weeks     Her disease is hormone receptor positive and she will be seeing Dr Arun Luna for follow-up October 30, 2018 to discuss tamoxifen therapy which she stop taking on October 9, 2018 because she thinks this was interfering with her wound healing  She will start radiation therapy next week      Suresh Emanuel MD  10/23/2018, 2:00 PM    Portions of the record may have been created with voice recognition software   Occasional wrong word or "sound a like" substitutions may have occurred due to the inherent limitations of voice recognition software   Read the chart carefully and recognize, using context, where substitutions have occurred

## 2018-10-29 DIAGNOSIS — C50.411 MALIGNANT NEOPLASM OF UPPER-OUTER QUADRANT OF RIGHT BREAST IN FEMALE, ESTROGEN RECEPTOR POSITIVE (HCC): Primary | ICD-10-CM

## 2018-10-29 DIAGNOSIS — Z17.0 MALIGNANT NEOPLASM OF UPPER-OUTER QUADRANT OF RIGHT BREAST IN FEMALE, ESTROGEN RECEPTOR POSITIVE (HCC): Primary | ICD-10-CM

## 2018-10-30 ENCOUNTER — OFFICE VISIT (OUTPATIENT)
Dept: HEMATOLOGY ONCOLOGY | Facility: CLINIC | Age: 38
End: 2018-10-30
Payer: COMMERCIAL

## 2018-10-30 VITALS
HEIGHT: 64 IN | OXYGEN SATURATION: 99 % | HEART RATE: 86 BPM | BODY MASS INDEX: 26.8 KG/M2 | TEMPERATURE: 97.3 F | RESPIRATION RATE: 16 BRPM | SYSTOLIC BLOOD PRESSURE: 118 MMHG | WEIGHT: 157 LBS | DIASTOLIC BLOOD PRESSURE: 90 MMHG

## 2018-10-30 DIAGNOSIS — Z17.0 MALIGNANT NEOPLASM OF UPPER-OUTER QUADRANT OF RIGHT BREAST IN FEMALE, ESTROGEN RECEPTOR POSITIVE (HCC): Primary | ICD-10-CM

## 2018-10-30 DIAGNOSIS — C50.411 MALIGNANT NEOPLASM OF UPPER-OUTER QUADRANT OF RIGHT BREAST IN FEMALE, ESTROGEN RECEPTOR POSITIVE (HCC): Primary | ICD-10-CM

## 2018-10-30 PROCEDURE — 99214 OFFICE O/P EST MOD 30 MIN: CPT | Performed by: INTERNAL MEDICINE

## 2018-10-30 NOTE — PROGRESS NOTES
Hematology / Oncology Outpatient Follow Up Note    Bubba Sanchez 41 y o  female VJD:[id number]         Date:  10/30/2018    Assessment / Plan:  A 51-year-old premenopausal woman with locally advanced right breast cancer   She has PALB-2 probable pathogenic mutation  Willie Mansfield has quite large palpable right breast mass and axillary adenopathy  This was mucinous histology, grade 2, ER 30% positive, IN 2% positive, HER2 3+ disease  She underwent neoadjuvant chemotherapy with [address] with pertuzumab x6 cycle, resulting in good clinical partial response  She underwent right mastectomy and axillary lymph node dissection as well as left prophylactic mastectomy, resulting in MCKAYLA  She had less than 1 mm of residual mucinous carcinoma as well as 3 positive lymph nodes which has micrometastasis  This is consistent with pathological good partial response  She is currently on trastuzumab monotherapy with no cardiac toxicity  Clinically, she has no evidence recurrent disease  I am going to obtain echocardiogram in 2018 for cardiac monitoring  She will continue with trastuzumab every 3 weeks until 2018 to complete 1 year of treatment  She is going to start postmastectomy radiation therapy soon  Once she finished radiation therapy, she is going to restart tamoxifen 20 mg daily  She is in agreement with my recommendation  I will see her again in 3 months for follow-up        Subjective:      HPI:  A 51-year-old premenopausal woman who initially noticed right breast lump when she was 8 months pregnant   She delivered 1st baby in 2017  Andree Dye the delivery, she noticed right breast lump to be slowly enlarging   She brought this to medical attention   She underwent mammography which was quite abnormal   Biopsy from right breast at 10:00 position 12 cm from nipple showed mucinous carcinoma, grade 2   This was ER 30 to 40% positive, IN 2 to 3% positive, her 2 3+ disease   She was seen by   Marshall Waite who ordered MRI which showed extensive abnormality in her right breast including 3 5 cm of right breast mass as well as 4 cm of axillary adenopathy  She was referred to me to discuss neoadjuvant chemotherapy   She went to [facility] where she was recommended to have [address] P  she presents today with her mother to discuss treatment options  Ras Almaraz is somewhat anxious   Otherwise, she feels well   She denied any bone pain   Her weight is stable   She has no respiratory symptoms   Her performance status is normal  She does not have significant past medical history   Her paternal aunt had breast cancer at age of 62   Her 2 paternal grandfather's sister had breast cancer in their 46s   She underwent genetic testing of which result is pending at this time            Interval History:   A 57-year-old premenopausal woman with locally advanced right breast cancer  Ras Almaraz has quite large palpable right breast mass and axillary adenopathy  This was mucinous histology, grade 2, ER 30% positive, KS 2% positive, HER2 3+ disease  She was treated with neoadjuvant chemotherapy with [address] with pertuzumab with excellent tolerance x6 cycle which was completed in June 2018  She had clinical good partial response  Subsequently, she underwent mastectomy as well as prophylactic left mastectomy  Right mastectomy specimen showed less than 1 mm of residual mucinous carcinoma in the mucinous pole as well as 3/16 positive lymph nodes with tumor deposit less than 0 4 mm  She is currently on trastuzumab monotherapy every 3 weeks with no cardiac toxicity  We previously discussed adjuvant hormonal therapy with tamoxifen which she was on for 2 months  She discontinued tamoxifen because of slow wound healing further reconstruction  Her wound healed finally  She is going to start postmastectomy radiation therapy soon  She feels well  She has no complaint of pain  She has no menstrual cycle yet  She denied any respiratory symptoms  Her weight is stable  She has normal performance status                                                        Objective:      Primary Diagnosis:     1  Locally advanced right breast cancer with invasive mucinous histology, grade 2, ER 30% positive, WA 2% positive, her 2 3+ disease   Diagnosed in January 2018  2  PALB2 probable pathogenic mutation      Cancer Staging:  Malignant neoplasm of upper-outer quadrant of right breast in female, estrogen receptor positive ([facility] [address] )    Staging form: Breast, AJCC 8th Edition    - Clinical stage from 1/26/2018: Stage IB (cT2(3), cN1, cM0, G2, ER: Positive, WA: Positive, HER2: Positive) - Signed by Suki Gordon MD on 1/26/2018        Previous Hematologic/ Oncologic Treatment:       Neoadjuvant chemotherapy with [employer] with pertuzumab x6 cycle, completed in June 2018      Current Hematologic/ Oncologic Treatment:       1  Adjuvant trastuzumab monotherapy since late June 2018  2   Adjuvant hormonal therapy to be started after the radiation therapy is completed      Disease Status:      Clinical partial response  Pathologically good partial response  MCKAYLA status post right mastectomy with axillary lymph node dissection and prophylactic left mastectomy      Test Results:     Pathology:     Biopsy from right breast showed invasive mucinous carcinoma, grade 2, ER 30 to 40% positive, WA 2 to 3% positive, HER2 3+ disease      Mastectomy specimen showed less than 1 mm residual mucinous carcinoma within mucin pool  3/16 axillary lymph nodes were positive for metastatic disease with largest dimension measuring 0 4 mm without extranodal extension         Radiology:     Echocardiogram in August 2018 showed ejection fraction 60%     Laboratory:     See below     Physical Exam:        General Appearance:    Alert, oriented          Eyes:    PERRL   Ears:    Normal external ear canals, both ears   Nose:   Nares normal, septum midline   Throat:   Mucosa moist  Pharynx without injection  Neck:   Supple         Lungs:     Clear to auscultation bilaterally   Chest Wall:    No tenderness or deformity    Heart:    Regular rate and rhythm         Abdomen:     Soft, non-tender, bowel sounds +, no organomegaly               Extremities:   Extremities no cyanosis or edema         Skin:   no rash or icterus  Lymph nodes:   Cervical, supraclavicular, and axillary nodes normal   Neurologic:   CNII-XII intact, normal strength, sensation and reflexes     Throughout             Breast exam:  Status post bilateral mastectomy with reconstruction  No palpable abnormality in either reconstructed breast            ROS: Review of Systems   All other systems reviewed and are negative  Imaging: No results found  Labs:   Lab Results   Component Value Date    WBC 2 09 (L) 06/12/2018    HGB 9 8 (L) 06/12/2018    HCT 29 4 (L) 06/12/2018    MCV 99 (H) 06/12/2018     06/12/2018     Lab Results   Component Value Date     06/12/2018    K 3 2 (L) 06/12/2018     06/12/2018    CO2 27 06/12/2018    BUN 12 06/12/2018    CREATININE 0 94 06/12/2018    GLUF 101 (H) 02/13/2018    CALCIUM 8 4 06/12/2018    AST 24 06/12/2018    ALT 33 06/12/2018    ALKPHOS 84 06/12/2018    EGFR 78 06/12/2018         Current Medications: Reviewed  Allergies: Reviewed  PMH/FH/SH:  Reviewed      Vital Sign:    Body surface area is 1 76 meters squared      Wt Readings from Last 3 Encounters:   10/30/18 71 2 kg (157 lb)   10/23/18 71 9 kg (158 lb 9 6 oz)   10/17/18 71 kg (156 lb 8 oz)        Temp Readings from Last 3 Encounters:   10/30/18 (!) 97 3 °F (36 3 °C) (Tympanic)   10/23/18 97 9 °F (36 6 °C)   10/17/18 98 °F (36 7 °C)        BP Readings from Last 3 Encounters:   10/30/18 118/90   10/23/18 100/62   10/17/18 112/70         Pulse Readings from Last 3 Encounters:   10/30/18 86   10/23/18 70   10/17/18 82     @LASTSAO2(3)@

## 2018-10-31 RX ORDER — SODIUM CHLORIDE 9 MG/ML
20 INJECTION, SOLUTION INTRAVENOUS CONTINUOUS
Status: DISCONTINUED | OUTPATIENT
Start: 2018-11-01 | End: 2018-11-04 | Stop reason: HOSPADM

## 2018-11-01 ENCOUNTER — HOSPITAL ENCOUNTER (OUTPATIENT)
Dept: INFUSION CENTER | Facility: CLINIC | Age: 38
Discharge: HOME | End: 2018-11-01
Payer: COMMERCIAL

## 2018-11-01 ENCOUNTER — APPOINTMENT (OUTPATIENT)
Dept: RADIATION ONCOLOGY | Facility: HOSPITAL | Age: 38
End: 2018-11-01
Attending: RADIOLOGY
Payer: COMMERCIAL

## 2018-11-01 VITALS
TEMPERATURE: 98.3 F | DIASTOLIC BLOOD PRESSURE: 68 MMHG | HEART RATE: 69 BPM | RESPIRATION RATE: 18 BRPM | WEIGHT: 158 LBS | BODY MASS INDEX: 27.12 KG/M2 | SYSTOLIC BLOOD PRESSURE: 126 MMHG

## 2018-11-01 PROCEDURE — 77387 GUIDANCE FOR RADJ TX DLVR: CPT | Performed by: RADIOLOGY

## 2018-11-01 PROCEDURE — 77280 THER RAD SIMULAJ FIELD SMPL: CPT | Performed by: RADIOLOGY

## 2018-11-01 PROCEDURE — 77417 THER RADIOLOGY PORT IMAGE(S): CPT | Performed by: RADIOLOGY

## 2018-11-01 PROCEDURE — 96413 CHEMO IV INFUSION 1 HR: CPT

## 2018-11-01 PROCEDURE — 77412 RADIATION TX DELIVERY LVL 3: CPT | Performed by: RADIOLOGY

## 2018-11-01 RX ADMIN — TRASTUZUMAB 426 MG: 150 INJECTION, POWDER, LYOPHILIZED, FOR SOLUTION INTRAVENOUS at 15:41

## 2018-11-01 RX ADMIN — SODIUM CHLORIDE 20 ML/HR: 0.9 INJECTION, SOLUTION INTRAVENOUS at 15:20

## 2018-11-01 RX ADMIN — HEPARIN 300 UNITS: 100 SYRINGE at 16:15

## 2018-11-02 PROCEDURE — 77412 RADIATION TX DELIVERY LVL 3: CPT | Performed by: RADIOLOGY

## 2018-11-02 PROCEDURE — 77387 GUIDANCE FOR RADJ TX DLVR: CPT | Performed by: RADIOLOGY

## 2018-11-05 PROCEDURE — 77387 GUIDANCE FOR RADJ TX DLVR: CPT | Performed by: RADIOLOGY

## 2018-11-05 PROCEDURE — 77412 RADIATION TX DELIVERY LVL 3: CPT | Performed by: RADIOLOGY

## 2018-11-05 PROCEDURE — 77331 SPECIAL RADIATION DOSIMETRY: CPT | Performed by: RADIOLOGY

## 2018-11-06 PROCEDURE — 77387 GUIDANCE FOR RADJ TX DLVR: CPT | Performed by: RADIOLOGY

## 2018-11-06 PROCEDURE — 77412 RADIATION TX DELIVERY LVL 3: CPT | Performed by: RADIOLOGY

## 2018-11-07 PROCEDURE — 77417 THER RADIOLOGY PORT IMAGE(S): CPT | Performed by: RADIOLOGY

## 2018-11-07 PROCEDURE — 77387 GUIDANCE FOR RADJ TX DLVR: CPT | Performed by: RADIOLOGY

## 2018-11-07 PROCEDURE — 77336 RADIATION PHYSICS CONSULT: CPT | Performed by: RADIOLOGY

## 2018-11-07 PROCEDURE — 77412 RADIATION TX DELIVERY LVL 3: CPT | Performed by: RADIOLOGY

## 2018-11-08 ENCOUNTER — APPOINTMENT (OUTPATIENT)
Dept: LAB | Facility: CLINIC | Age: 38
End: 2018-11-08
Payer: COMMERCIAL

## 2018-11-08 LAB
BASOPHILS # BLD AUTO: 0.03 THOUSANDS/ΜL (ref 0–0.1)
BASOPHILS NFR BLD AUTO: 1 % (ref 0–1)
EOSINOPHIL # BLD AUTO: 0.01 THOUSAND/ΜL (ref 0–0.61)
EOSINOPHIL NFR BLD AUTO: 0 % (ref 0–6)
ERYTHROCYTE [DISTWIDTH] IN BLOOD BY AUTOMATED COUNT: 14.5 % (ref 11.6–15.1)
HCT VFR BLD AUTO: 35 % (ref 34.8–46.1)
HGB BLD-MCNC: 11.5 G/DL (ref 11.5–15.4)
IMM GRANULOCYTES # BLD AUTO: 0.05 THOUSAND/UL (ref 0–0.2)
IMM GRANULOCYTES NFR BLD AUTO: 1 % (ref 0–2)
LYMPHOCYTES # BLD AUTO: 0.52 THOUSANDS/ΜL (ref 0.6–4.47)
LYMPHOCYTES NFR BLD AUTO: 13 % (ref 14–44)
MCH RBC QN AUTO: 32 PG (ref 26.8–34.3)
MCHC RBC AUTO-ENTMCNC: 32.9 G/DL (ref 31.4–37.4)
MCV RBC AUTO: 98 FL (ref 82–98)
MONOCYTES # BLD AUTO: 0.53 THOUSAND/ΜL (ref 0.17–1.22)
MONOCYTES NFR BLD AUTO: 13 % (ref 4–12)
NEUTROPHILS # BLD AUTO: 2.82 THOUSANDS/ΜL (ref 1.85–7.62)
NEUTS SEG NFR BLD AUTO: 72 % (ref 43–75)
NRBC BLD AUTO-RTO: 0 /100 WBCS
PLATELET # BLD AUTO: 187 THOUSANDS/UL (ref 149–390)
PMV BLD AUTO: 9.2 FL (ref 8.9–12.7)
RBC # BLD AUTO: 3.59 MILLION/UL (ref 3.81–5.12)
WBC # BLD AUTO: 3.96 THOUSAND/UL (ref 4.31–10.16)

## 2018-11-08 PROCEDURE — 85025 COMPLETE CBC W/AUTO DIFF WBC: CPT

## 2018-11-08 PROCEDURE — 36415 COLL VENOUS BLD VENIPUNCTURE: CPT

## 2018-11-08 PROCEDURE — 77412 RADIATION TX DELIVERY LVL 3: CPT | Performed by: RADIOLOGY

## 2018-11-08 PROCEDURE — 77387 GUIDANCE FOR RADJ TX DLVR: CPT | Performed by: RADIOLOGY

## 2018-11-09 PROCEDURE — 77412 RADIATION TX DELIVERY LVL 3: CPT | Performed by: RADIOLOGY

## 2018-11-09 PROCEDURE — 77387 GUIDANCE FOR RADJ TX DLVR: CPT | Performed by: RADIOLOGY

## 2018-11-12 PROCEDURE — 77412 RADIATION TX DELIVERY LVL 3: CPT | Performed by: RADIOLOGY

## 2018-11-12 PROCEDURE — 77387 GUIDANCE FOR RADJ TX DLVR: CPT | Performed by: RADIOLOGY

## 2018-11-13 PROCEDURE — 77387 GUIDANCE FOR RADJ TX DLVR: CPT | Performed by: RADIOLOGY

## 2018-11-13 PROCEDURE — 77412 RADIATION TX DELIVERY LVL 3: CPT | Performed by: RADIOLOGY

## 2018-11-14 PROCEDURE — 77336 RADIATION PHYSICS CONSULT: CPT | Performed by: RADIOLOGY

## 2018-11-14 PROCEDURE — 77387 GUIDANCE FOR RADJ TX DLVR: CPT | Performed by: RADIOLOGY

## 2018-11-14 PROCEDURE — 77412 RADIATION TX DELIVERY LVL 3: CPT | Performed by: RADIOLOGY

## 2018-11-15 PROCEDURE — 77412 RADIATION TX DELIVERY LVL 3: CPT | Performed by: RADIOLOGY

## 2018-11-15 PROCEDURE — 77417 THER RADIOLOGY PORT IMAGE(S): CPT | Performed by: RADIOLOGY

## 2018-11-15 PROCEDURE — 77387 GUIDANCE FOR RADJ TX DLVR: CPT | Performed by: RADIOLOGY

## 2018-11-16 PROCEDURE — 77387 GUIDANCE FOR RADJ TX DLVR: CPT | Performed by: RADIOLOGY

## 2018-11-16 PROCEDURE — 77412 RADIATION TX DELIVERY LVL 3: CPT | Performed by: RADIOLOGY

## 2018-11-18 DIAGNOSIS — F41.1 GENERALIZED ANXIETY DISORDER: ICD-10-CM

## 2018-11-19 PROCEDURE — 77412 RADIATION TX DELIVERY LVL 3: CPT | Performed by: RADIOLOGY

## 2018-11-19 PROCEDURE — 77387 GUIDANCE FOR RADJ TX DLVR: CPT | Performed by: RADIOLOGY

## 2018-11-19 RX ORDER — LORAZEPAM 1 MG/1
1 TABLET ORAL
Qty: 30 TABLET | Refills: 0 | Status: SHIPPED | OUTPATIENT
Start: 2018-11-19 | End: 2018-12-21 | Stop reason: SDUPTHER

## 2018-11-20 PROCEDURE — 77412 RADIATION TX DELIVERY LVL 3: CPT | Performed by: RADIOLOGY

## 2018-11-20 PROCEDURE — 77387 GUIDANCE FOR RADJ TX DLVR: CPT | Performed by: RADIOLOGY

## 2018-11-20 RX ORDER — SODIUM CHLORIDE 9 MG/ML
20 INJECTION, SOLUTION INTRAVENOUS CONTINUOUS
Status: DISCONTINUED | OUTPATIENT
Start: 2018-11-21 | End: 2018-11-24 | Stop reason: HOSPADM

## 2018-11-21 ENCOUNTER — HOSPITAL ENCOUNTER (OUTPATIENT)
Dept: INFUSION CENTER | Facility: CLINIC | Age: 38
Discharge: HOME | End: 2018-11-21
Payer: COMMERCIAL

## 2018-11-21 VITALS
HEART RATE: 73 BPM | DIASTOLIC BLOOD PRESSURE: 64 MMHG | BODY MASS INDEX: 27.95 KG/M2 | RESPIRATION RATE: 18 BRPM | SYSTOLIC BLOOD PRESSURE: 100 MMHG | TEMPERATURE: 97.5 F | OXYGEN SATURATION: 97 % | WEIGHT: 162.83 LBS

## 2018-11-21 PROCEDURE — 77412 RADIATION TX DELIVERY LVL 3: CPT | Performed by: RADIOLOGY

## 2018-11-21 PROCEDURE — 96413 CHEMO IV INFUSION 1 HR: CPT

## 2018-11-21 PROCEDURE — 77387 GUIDANCE FOR RADJ TX DLVR: CPT | Performed by: RADIOLOGY

## 2018-11-21 PROCEDURE — 77336 RADIATION PHYSICS CONSULT: CPT | Performed by: RADIOLOGY

## 2018-11-21 RX ADMIN — TRASTUZUMAB 450 MG: 150 INJECTION, POWDER, LYOPHILIZED, FOR SOLUTION INTRAVENOUS at 15:22

## 2018-11-21 RX ADMIN — Medication 300 UNITS: at 15:55

## 2018-11-21 RX ADMIN — SODIUM CHLORIDE 20 ML/HR: 0.9 INJECTION, SOLUTION INTRAVENOUS at 15:00

## 2018-11-23 PROCEDURE — 77387 GUIDANCE FOR RADJ TX DLVR: CPT | Performed by: RADIOLOGY

## 2018-11-23 PROCEDURE — 77412 RADIATION TX DELIVERY LVL 3: CPT | Performed by: RADIOLOGY

## 2018-11-26 PROCEDURE — 77412 RADIATION TX DELIVERY LVL 3: CPT | Performed by: RADIOLOGY

## 2018-11-26 PROCEDURE — 77387 GUIDANCE FOR RADJ TX DLVR: CPT | Performed by: RADIOLOGY

## 2018-11-26 PROCEDURE — 77417 THER RADIOLOGY PORT IMAGE(S): CPT | Performed by: RADIOLOGY

## 2018-11-27 PROCEDURE — 77387 GUIDANCE FOR RADJ TX DLVR: CPT | Performed by: RADIOLOGY

## 2018-11-27 PROCEDURE — 77412 RADIATION TX DELIVERY LVL 3: CPT | Performed by: RADIOLOGY

## 2018-11-28 ENCOUNTER — APPOINTMENT (OUTPATIENT)
Dept: LAB | Facility: CLINIC | Age: 38
End: 2018-11-28
Payer: COMMERCIAL

## 2018-11-28 PROCEDURE — 77412 RADIATION TX DELIVERY LVL 3: CPT | Performed by: RADIOLOGY

## 2018-11-28 PROCEDURE — 77387 GUIDANCE FOR RADJ TX DLVR: CPT | Performed by: RADIOLOGY

## 2018-11-29 PROCEDURE — 77412 RADIATION TX DELIVERY LVL 3: CPT | Performed by: RADIOLOGY

## 2018-11-29 PROCEDURE — 77336 RADIATION PHYSICS CONSULT: CPT | Performed by: RADIOLOGY

## 2018-11-29 PROCEDURE — 77387 GUIDANCE FOR RADJ TX DLVR: CPT | Performed by: RADIOLOGY

## 2018-12-03 ENCOUNTER — RADIATION THERAPY TREATMENT (OUTPATIENT)
Dept: RADIATION ONCOLOGY | Facility: HOSPITAL | Age: 38
End: 2018-12-03
Attending: RADIOLOGY
Payer: COMMERCIAL

## 2018-12-03 PROCEDURE — 77387 GUIDANCE FOR RADJ TX DLVR: CPT | Performed by: RADIOLOGY

## 2018-12-03 PROCEDURE — 77412 RADIATION TX DELIVERY LVL 3: CPT | Performed by: RADIOLOGY

## 2018-12-04 PROCEDURE — 77412 RADIATION TX DELIVERY LVL 3: CPT | Performed by: RADIOLOGY

## 2018-12-04 PROCEDURE — 77321 SPECIAL TELETX PORT PLAN: CPT | Performed by: RADIOLOGY

## 2018-12-04 PROCEDURE — 77334 RADIATION TREATMENT AID(S): CPT | Performed by: RADIOLOGY

## 2018-12-04 PROCEDURE — 77387 GUIDANCE FOR RADJ TX DLVR: CPT | Performed by: RADIOLOGY

## 2018-12-05 PROCEDURE — 77412 RADIATION TX DELIVERY LVL 3: CPT | Performed by: RADIOLOGY

## 2018-12-05 PROCEDURE — 77417 THER RADIOLOGY PORT IMAGE(S): CPT | Performed by: RADIOLOGY

## 2018-12-05 PROCEDURE — 77387 GUIDANCE FOR RADJ TX DLVR: CPT | Performed by: RADIOLOGY

## 2018-12-06 PROCEDURE — 77387 GUIDANCE FOR RADJ TX DLVR: CPT | Performed by: RADIOLOGY

## 2018-12-06 PROCEDURE — 77412 RADIATION TX DELIVERY LVL 3: CPT | Performed by: RADIOLOGY

## 2018-12-07 ENCOUNTER — HOSPITAL ENCOUNTER (OUTPATIENT)
Dept: NON INVASIVE DIAGNOSTICS | Facility: CLINIC | Age: 38
Discharge: HOME | End: 2018-12-07
Payer: COMMERCIAL

## 2018-12-07 DIAGNOSIS — C50.411 MALIGNANT NEOPLASM OF UPPER-OUTER QUADRANT OF RIGHT BREAST IN FEMALE, ESTROGEN RECEPTOR POSITIVE (HCC): ICD-10-CM

## 2018-12-07 DIAGNOSIS — Z17.0 MALIGNANT NEOPLASM OF UPPER-OUTER QUADRANT OF RIGHT BREAST IN FEMALE, ESTROGEN RECEPTOR POSITIVE (HCC): ICD-10-CM

## 2018-12-07 PROCEDURE — 77412 RADIATION TX DELIVERY LVL 3: CPT | Performed by: RADIOLOGY

## 2018-12-07 PROCEDURE — 77387 GUIDANCE FOR RADJ TX DLVR: CPT | Performed by: RADIOLOGY

## 2018-12-07 PROCEDURE — 77336 RADIATION PHYSICS CONSULT: CPT | Performed by: RADIOLOGY

## 2018-12-07 PROCEDURE — 93306 TTE W/DOPPLER COMPLETE: CPT | Performed by: INTERNAL MEDICINE

## 2018-12-07 PROCEDURE — 93306 TTE W/DOPPLER COMPLETE: CPT

## 2018-12-10 PROCEDURE — 77412 RADIATION TX DELIVERY LVL 3: CPT | Performed by: RADIOLOGY

## 2018-12-10 PROCEDURE — 77280 THER RAD SIMULAJ FIELD SMPL: CPT | Performed by: RADIOLOGY

## 2018-12-11 PROCEDURE — 77412 RADIATION TX DELIVERY LVL 3: CPT | Performed by: RADIOLOGY

## 2018-12-11 PROCEDURE — 77331 SPECIAL RADIATION DOSIMETRY: CPT | Performed by: RADIOLOGY

## 2018-12-12 PROCEDURE — 77412 RADIATION TX DELIVERY LVL 3: CPT | Performed by: RADIOLOGY

## 2018-12-12 RX ORDER — SODIUM CHLORIDE 9 MG/ML
20 INJECTION, SOLUTION INTRAVENOUS CONTINUOUS
Status: DISCONTINUED | OUTPATIENT
Start: 2018-12-13 | End: 2018-12-16 | Stop reason: HOSPADM

## 2018-12-13 ENCOUNTER — HOSPITAL ENCOUNTER (OUTPATIENT)
Dept: INFUSION CENTER | Facility: CLINIC | Age: 38
Discharge: HOME | End: 2018-12-13
Payer: COMMERCIAL

## 2018-12-13 VITALS
HEART RATE: 76 BPM | DIASTOLIC BLOOD PRESSURE: 59 MMHG | RESPIRATION RATE: 18 BRPM | SYSTOLIC BLOOD PRESSURE: 115 MMHG | WEIGHT: 163.5 LBS | TEMPERATURE: 98 F | BODY MASS INDEX: 28.06 KG/M2

## 2018-12-13 PROCEDURE — 96413 CHEMO IV INFUSION 1 HR: CPT

## 2018-12-13 PROCEDURE — 77412 RADIATION TX DELIVERY LVL 3: CPT | Performed by: RADIOLOGY

## 2018-12-13 RX ADMIN — HEPARIN 300 UNITS: 100 SYRINGE at 16:00

## 2018-12-13 RX ADMIN — SODIUM CHLORIDE 20 ML/HR: 0.9 INJECTION, SOLUTION INTRAVENOUS at 14:50

## 2018-12-13 RX ADMIN — TRASTUZUMAB 450 MG: 150 INJECTION, POWDER, LYOPHILIZED, FOR SOLUTION INTRAVENOUS at 15:22

## 2018-12-14 PROCEDURE — 77336 RADIATION PHYSICS CONSULT: CPT | Performed by: RADIOLOGY

## 2018-12-14 PROCEDURE — 77412 RADIATION TX DELIVERY LVL 3: CPT | Performed by: RADIOLOGY

## 2018-12-21 DIAGNOSIS — F41.1 GENERALIZED ANXIETY DISORDER: ICD-10-CM

## 2018-12-21 RX ORDER — LORAZEPAM 1 MG/1
1 TABLET ORAL
Qty: 30 TABLET | Refills: 0 | Status: SHIPPED | OUTPATIENT
Start: 2018-12-21 | End: 2019-02-19 | Stop reason: SDUPTHER

## 2018-12-26 DIAGNOSIS — F41.1 GENERALIZED ANXIETY DISORDER: ICD-10-CM

## 2018-12-26 RX ORDER — LORAZEPAM 1 MG/1
1 TABLET ORAL
Qty: 30 TABLET | Refills: 0 | Status: SHIPPED | OUTPATIENT
Start: 2018-12-26 | End: 2019-03-20 | Stop reason: SDUPTHER

## 2019-01-02 RX ORDER — SODIUM CHLORIDE 9 MG/ML
20 INJECTION, SOLUTION INTRAVENOUS CONTINUOUS
Status: DISCONTINUED | OUTPATIENT
Start: 2019-01-03 | End: 2019-01-06 | Stop reason: HOSPADM

## 2019-01-03 ENCOUNTER — HOSPITAL ENCOUNTER (OUTPATIENT)
Dept: INFUSION CENTER | Facility: CLINIC | Age: 39
Discharge: HOME | End: 2019-01-03
Payer: COMMERCIAL

## 2019-01-03 VITALS
RESPIRATION RATE: 20 BRPM | TEMPERATURE: 98 F | HEART RATE: 79 BPM | BODY MASS INDEX: 27.98 KG/M2 | DIASTOLIC BLOOD PRESSURE: 66 MMHG | WEIGHT: 163 LBS | OXYGEN SATURATION: 98 % | SYSTOLIC BLOOD PRESSURE: 100 MMHG

## 2019-01-03 PROCEDURE — 96413 CHEMO IV INFUSION 1 HR: CPT

## 2019-01-03 RX ADMIN — SODIUM CHLORIDE 20 ML/HR: 0.9 INJECTION, SOLUTION INTRAVENOUS at 14:31

## 2019-01-03 RX ADMIN — TRASTUZUMAB 450 MG: 150 INJECTION, POWDER, LYOPHILIZED, FOR SOLUTION INTRAVENOUS at 15:05

## 2019-01-03 NOTE — PROGRESS NOTES
Patient tolerated treatment well and was discharged post, she offers no c/o and will RTO 1/24/19 for next and final dosing as previously scheduled

## 2019-01-03 NOTE — PLAN OF CARE
Problem: Potential for Falls  Goal: Patient will remain free of falls  INTERVENTIONS:  - Assess patient frequently for physical needs  -  Identify cognitive and physical deficits and behaviors that affect risk of falls    -  Premier fall precautions as indicated by assessment   - Educate patient/family on patient safety including physical limitations  - Instruct patient to call for assistance with activity based on assessment  - Modify environment to reduce risk of injury  - Consider OT/PT consult to assist with strengthening/mobility   Outcome: Progressing      Problem: PAIN - ADULT  Goal: Verbalizes/displays adequate comfort level or baseline comfort level  Interventions:  - Encourage patient to monitor pain and request assistance  - Assess pain using appropriate pain scale  - Administer analgesics based on type and severity of pain and evaluate response  - Implement non-pharmacological measures as appropriate and evaluate response  - Consider cultural and social influences on pain and pain management  - Notify physician/advanced practitioner if interventions unsuccessful or patient reports new pain  Outcome: Progressing      Problem: INFECTION - ADULT  Goal: Absence or prevention of progression during hospitalization  INTERVENTIONS:  - Assess and monitor for signs and symptoms of infection  - Monitor lab/diagnostic results  - Monitor all insertion sites, i e  indwelling lines, tubes, and drains  - Monitor endotracheal (as able) and nasal secretions for changes in amount and color  - Premier appropriate cooling/warming therapies per order  - Administer medications as ordered  - Instruct and encourage patient and family to use good hand hygiene technique  - Identify and instruct in appropriate isolation precautions for identified infection/condition  Outcome: Progressing

## 2019-01-22 ENCOUNTER — RADIATION ONCOLOGY FOLLOW-UP (OUTPATIENT)
Dept: RADIATION ONCOLOGY | Facility: HOSPITAL | Age: 39
End: 2019-01-22
Attending: RADIOLOGY
Payer: COMMERCIAL

## 2019-01-22 ENCOUNTER — CLINICAL SUPPORT (OUTPATIENT)
Dept: RADIATION ONCOLOGY | Facility: HOSPITAL | Age: 39
End: 2019-01-22

## 2019-01-22 VITALS
RESPIRATION RATE: 12 BRPM | SYSTOLIC BLOOD PRESSURE: 105 MMHG | DIASTOLIC BLOOD PRESSURE: 80 MMHG | BODY MASS INDEX: 27.77 KG/M2 | WEIGHT: 161.8 LBS | HEART RATE: 76 BPM | TEMPERATURE: 98.1 F

## 2019-01-22 DIAGNOSIS — C50.411 MALIGNANT NEOPLASM OF UPPER-OUTER QUADRANT OF RIGHT BREAST IN FEMALE, ESTROGEN RECEPTOR POSITIVE (HCC): Primary | ICD-10-CM

## 2019-01-22 DIAGNOSIS — Z17.0 MALIGNANT NEOPLASM OF UPPER-OUTER QUADRANT OF RIGHT BREAST IN FEMALE, ESTROGEN RECEPTOR POSITIVE (HCC): Primary | ICD-10-CM

## 2019-01-22 PROCEDURE — 99215 OFFICE O/P EST HI 40 MIN: CPT | Performed by: RADIOLOGY

## 2019-01-22 NOTE — PROGRESS NOTES
Follow-up - Radiation Oncology   Andres Sanchez 1980 41 y o  female [id number]      History of Present Illness   Cancer Staging  Malignant neoplasm of upper-outer quadrant of right breast in female, estrogen receptor positive ([facility] [address] )  Staging form: Breast, AJCC 8th Edition  - Clinical stage from 1/26/2018: Stage IB (cT2(3), cN1, cM0, G2, ER: Positive, UT: Positive, HER2: Positive) - Signed by Merary Tariq MD on 1/26/2018      Andres Sanchez is a 41y o  year old female with a locally advanced right breast carcinoma   She presented with a palpable right breast mass as well as axillary lymphadenopathy   Her right breast measured 5 2 x 4 7 cm on CT scan along with 2 right axillary lymph nodes measuring 2 4 and 2 3 cm   There was no evident metastatic disease to the lungs or the abdomen or liver   Bone scan was without any evidence of metastatic disease   Recommendations were made for neoadjuvant chemotherapy with [employer] and pertuzumab   She then had repeat imaging with an MRI of the breast that revealed reduction in size of her large right upper outer quadrant breast mass along with significant decrease in the degree of enhancement   There was reduction in the right axillary lymphadenopathy   She had surgery June 21, 2018 with a right modified radical mastectomy along with a left breast prophylactic mastectomy and reconstruction   Her preoperative stage would have been stage III and now post neoadjuvant chemotherapy she has stage IB disease   She was seen for consultation July 17, 2018 and we recommended postmastectomy radiation therapy because initially she had a large 5 2 cm tumor with at least 2 positive axillary lymph nodes and pathologically 3/16 lymph nodes were positive   Postmastectomy radiation therapy has been shown to reduce the chance of local recurrence as well as improve survival   Her disease is hormone receptor positive and she saw Dr Darvin Sweet who recommended tamoxifen that was started August 1, 2018  She had simulation performed in August 2018 for treatment to the reconstructed right chest wall, supraclavicular, and axillary regions over 6 weeks  She had difficulty with healing of her bilateral reconstructed chest wall incisions such that she required revision of the bilateral incision/wound closure on September 19, 2018 with Dr Dejuan Chen   She had stopped taking her tamoxifen on October 9, 2018 because she thought this was interfering with her wound healing  Her incisions closed such that she was able to undergo re-simulation October 23, 2018 and then complete radiation therapy to the left supraclavicular, axillary, and reconstructed chest wall regions on December 14, 2018        Interval History: She presents for 1 month follow up today  Her last trastuzumab this week 1/24/19  No follow up with Dr Meaghan Nieves surgeon scheduled yet  She reports she is feeling much better with improved energy level  She resume working January 7, 2019  Her skin is completely healed and she denies any erythema nor nodularity  She does apply moisturizer and massage the treated left reconstructed chest wall daily  She resumed taking tamoxifen last night       Alexandra Plummer 1/29/19  Mary Lou Benedict 4/17/19    Clinical Trial: no    Screening  Tobacco  Current tobacco user: no  If yes, brief counseling provided: NA     Hypertension  Hypertension screening performed: yes  Normotensive:  yes  If no, referred to PCP: n/a     Depression Screening  Screened for depression using PHQ-2: yes     Screened for depression using PHQ-9:  no  Screening positive or negative:  negative  If score >4, was any of the following actions taken?    Additional evaluation for depression, suicide risk assesment, referral to PCP or psychiatry, medication started:  n/a     Advanced Care Planning for Patients >65 years  Advanced Care Planning Discussed:  yes  Patient named surrogate decision maker or care plan in chart: yes    Historical Information Malignant neoplasm of upper-outer quadrant of right breast in female, estrogen receptor positive ([address] )    1/19/2018 Initial Diagnosis     U/Sguided core biopsy of right breast mass at [facility]  Malignant neoplasm of UOQ of right breast, estrogen receptor positive ([address] ), Her 2 positive  1/29/2018 Genetic Testing     Likely pathogenic variant was identified in the PALB2 gene  2/2018 - 6/1/2018 Chemotherapy     Neoadjuvant chemotherapy with TCH with pertuzumab  Dr Elisa Cardenas  6/21/2018 Surgery     Right MRM  Invasive mucinous carcinoma  3 4 cm geronimo  Grade 1  3/16 lymph nodes  Stage Stage IB - ypT1mi, ypN1mi, G1  Left simple mastectomy  Benign breast     Immediate reconstruction Dr Joycie Nyhan           Chemotherapy     trastuzumab monotherapy every 3 weeks  Begins 7/19/18 11/1/2018 - 12/14/2018 Radiation     Course: C1    Plan ID Energy Fractions Dose per Fraction (cGy) Dose Correction (cGy) Total Dose Delivered (cGy) Elapsed Days   LAT R CW bst 6E 5 / 5 200 0 1,000 4   MED R CW bst 6E 5 / 5 200 0 1,000 4   New R PAB2 6X 25 / 25 47 0 1,175 36   New R Sclav2 6X 25 / 25 200 0 5,000 36   TmqOJBcr34_44 6X 12 / 12 200 0 2,400 34   IdjEzeMyt00_8 10X 12 / 12 200 0 2,400 34   KvsFjeCC06_44 10X 13 / 13 200 0 2,600 36   NewR CW 10_30 6X 13 / 13 200 0 2,600 36      Treatment dates:  C1: 11/1/2018 - 12/14/2018 1/22/2019 -  Hormone Therapy     Tamoxifen - Elisa Cardenas  Started in summer,  Stopped due to incomplete healing of incision   Restarted 1/22/19            Past Medical History:   Diagnosis Date    Asthma     allergy excaberated    Breast cancer ([facility] [address] ) 01/25/2018    GERD (gastroesophageal reflux disease)     History of adverse reaction to anesthesia     Pt reports waking up severly anxious    Seasonal allergies      Past Surgical History:   Procedure Laterality Date    BREAST RECONSTRUCTION Bilateral 6/21/2018    Procedure: RECONSTRUCTION BREAST W/ IMPLANT;  Surgeon: Mariia Fernandes MD; [facility]: [facility];  Service: Plastics     SECTION      PILONIDAL CYST EXCISION      resection    SC INSJ TUNNELED CTR VAD W/SUBQ PORT AGE 5 YR/> Left 2018    Procedure: INSERTION VENOUS PORT ( PORT-A-CATH) IR;  Surgeon: Macario Watt DO;  [facility];  Service: Interventional Radiology    SC MASTECTOMY, MODIFIED RADICAL Right 2018    Procedure: BREAST MODIFIED RADICAL MASTECTOMY;  Surgeon: Mat Fuentes MD;  [facility];  Service: Surgical Oncology    SC MASTECTOMY, SIMPLE, COMPLETE Left 2018    Procedure: BREAST SIMPLE MASTECTOMY;  Surgeon: Mat Fuentes MD;  [facility];  Service: Surgical Oncology    SC REVISE BREAST RECONSTRUCTION Bilateral 2018    Procedure: REVISION BREAST RECON W/WOUND CLOSURE;  Surgeon: Luisa Gorman MD;  [facility]: [facility];  Service: Plastics    TUBAL LIGATION      WISDOM TOOTH EXTRACTION         Social History   History   Alcohol Use    Yes     Comment: rare     History   Drug Use No     History   Smoking Status    Former Smoker    Quit date: 2015   Smokeless Tobacco    Never Used     Meds/Allergies     Current Outpatient Prescriptions:     albuterol (VENTOLIN HFA) 90 mcg/act inhaler, Inhale 2 puffs every 4 (four) hours as needed, Disp: , Rfl:     levocetirizine (XYZAL) 5 MG tablet, Take 1 tablet by mouth daily, Disp: , Rfl:     lidocaine-prilocaine (EMLA) cream, Apply topically as needed for mild pain, Disp: 30 g, Rfl: 0    LORazepam (ATIVAN) 1 mg tablet, TAKE 1 TABLET (1 MG TOTAL) BY MOUTH DAILY AT BEDTIME FOR 30 DAYS, Disp: 30 tablet, Rfl: 0    mometasone (NASONEX) 50 mcg/act nasal spray, 2 sprays into each nostril daily, Disp: , Rfl:     omeprazole (PriLOSEC) 20 mg delayed release capsule, Take 20 mg by mouth, Disp: , Rfl:     tamoxifen (NOLVADEX) 20 mg tablet, Take 1 tablet (20 mg total) by mouth daily, Disp: 90 tablet, Rfl: 1    doxycycline hyclate (VIBRAMYCIN) 100 mg capsule, Take 1 capsule by mouth 2 (two) times a day, Disp: , Rfl:     LORazepam (ATIVAN) 1 mg tablet, TAKE 1 TABLET (1 MG TOTAL) BY MOUTH DAILY AT BEDTIME FOR 30 DAYS, Disp: 30 tablet, Rfl: 0    mupirocin (BACTROBAN) 2 % ointment, , Disp: , Rfl:   Allergies   Allergen Reactions    Trichophyton Other (See Comments)     Pt does not know reaction     Bee Pollen     Cat Hair Extract Sneezing    Dust Mite Extract Sneezing    Molds & Smuts Sneezing    Pollen Extract Sneezing    Tree Extract Sneezing     Review of Systems   Constitutional: Positive for activity change and fatigue (Improving)  HENT: Negative  Eyes: Negative  Respiratory: Negative  Cardiovascular: Negative  Gastrointestinal: Negative  Endocrine: Negative  Genitourinary: Negative  Musculoskeletal: Negative  Lymphadema in right arm  Numbness/tingling upper right arm  Skin: Negative  Allergic/Immunologic: Negative  Neurological: Negative  Hematological: Negative  Psychiatric/Behavioral: Negative        OBJECTIVE:   /80   Pulse 76   Temp 98 1 °F (36 7 °C)   Resp 12   Wt 73 4 kg (161 lb 12 8 oz)   BMI 27 77 kg/m²   Pain Assessment:  0  ECOG/Zubrod/WHO: 1 - Symptomatic but completely ambulatory    Physical Exam   Constitutional: She is oriented to person, place, and time  She appears well-developed and well-nourished  No distress  HENT:   Head: Normocephalic and atraumatic  Mouth/Throat: No oropharyngeal exudate  Eyes: Pupils are equal, round, and reactive to light  Conjunctivae and EOM are normal  No scleral icterus  Neck: Normal range of motion  Neck supple  No tracheal deviation present  No thyromegaly present  Cardiovascular: Normal rate, regular rhythm and normal heart sounds  Pulmonary/Chest: Effort normal and breath sounds normal  No respiratory distress  She has no wheezes  She has no rales  She exhibits no tenderness     Bilateral reconstructed chest wall mastectomy scars are well healed without any nodularity and no erythema  Bilateral permanent breast prosthesis are intact  Abdominal: Soft  Bowel sounds are normal  She exhibits no distension and no mass  There is no tenderness  There is no rebound and no guarding  Musculoskeletal: Normal range of motion  She exhibits no edema or tenderness  Lymphadenopathy:     She has no cervical adenopathy  She has no axillary adenopathy  Right: No supraclavicular adenopathy present  Left: No supraclavicular adenopathy present  Neurological: She is alert and oriented to person, place, and time  No cranial nerve deficit  Coordination normal    Skin: Skin is warm and dry  No rash noted  She is not diaphoretic  No erythema  No pallor  Psychiatric: She has a normal mood and affect  Her behavior is normal  Judgment and thought content normal    Nursing note and vitals reviewed  RESULTS    Lab Results: No results found for this or any previous visit (from the past 672 hour(s))  Imaging Studies:No results found  Assessment/Plan:  No orders of the defined types were placed in this encounter         Ronn Sanchez is a 41y o  year old female with a locally advanced right breast carcinoma   She presented with a palpable right breast mass as well as axillary lymphadenopathy   Her right breast measured 5 2 x 4 7 cm on CT scan along with 2 right axillary lymph nodes measuring 2 4 and 2 3 cm   There was no evident metastatic disease to the lungs or the abdomen or liver   Bone scan was without any evidence of metastatic disease   Recommendations were made for neoadjuvant chemotherapy with TCH and pertuzumab   She then had repeat imaging with an MRI of the breast that revealed reduction in size of her large right upper outer quadrant breast mass along with significant decrease in the degree of enhancement   There was reduction in the right axillary lymphadenopathy   She had surgery June 21, 2018 with a right modified radical mastectomy along with a left breast prophylactic mastectomy and reconstruction  Tory Carranza preoperative stage would have been stage III and now post neoadjuvant chemotherapy she has stage IB disease       She was seen for consultation July 17, 2018 and we recommended postmastectomy radiation therapy because initially she had a large 5 2 cm tumor with at least 2 positive axillary lymph nodes and pathologically 3/16 lymph nodes were positive   Postmastectomy radiation therapy has been shown to reduce the chance of local recurrence as well as improve survival   Her disease is hormone receptor positive and she saw Dr Liz Coy who recommended tamoxifen that was started August 1, 2018  She had simulation performed in August 2018 for treatment to the reconstructed right chest wall, supraclavicular, and axillary regions over 6 weeks  She had difficulty with healing of her bilateral reconstructed chest wall incisions such that she required revision of the bilateral incision/wound closure on September 19, 2018 with Dr Gerhard Wyman   She had stopped taking her tamoxifen on October 9, 2018 because she thought this was interfering with her wound healing  Her incisions closed such that she was able to undergo re-simulation October 23, 2018 and then complete radiation therapy to the left supraclavicular, axillary, and reconstructed chest wall regions on December 14, 2018  She returns today for follow-up examination  She has recovered well from radiation therapy  Her skin is completely healed and she is applying moisturizer daily  She resumed taking tamoxifen last night  She will see Dr Liz Coy for follow-up January 29, 2019  She has an appointment Dr Mari Becker on April 17, 2019  She will return here for follow-up in 6 months      Marion Meade MD  1/22/2019,3:57 PM    Portions of the record may have been created with voice recognition software   Occasional wrong word or "sound a like" substitutions may have occurred due to the inherent limitations of voice recognition software   Read the chart carefully and recognize, using context, where substitutions have occurred

## 2019-01-23 RX ORDER — SODIUM CHLORIDE 9 MG/ML
20 INJECTION, SOLUTION INTRAVENOUS CONTINUOUS
Status: DISCONTINUED | OUTPATIENT
Start: 2019-01-24 | End: 2019-01-27 | Stop reason: HOSPADM

## 2019-01-24 ENCOUNTER — HOSPITAL ENCOUNTER (OUTPATIENT)
Dept: INFUSION CENTER | Facility: CLINIC | Age: 39
Discharge: HOME | End: 2019-01-24
Payer: COMMERCIAL

## 2019-01-24 VITALS
RESPIRATION RATE: 18 BRPM | DIASTOLIC BLOOD PRESSURE: 64 MMHG | WEIGHT: 163.5 LBS | OXYGEN SATURATION: 98 % | SYSTOLIC BLOOD PRESSURE: 102 MMHG | HEART RATE: 66 BPM | BODY MASS INDEX: 28.97 KG/M2 | HEIGHT: 63 IN | TEMPERATURE: 97.6 F

## 2019-01-24 PROCEDURE — 96413 CHEMO IV INFUSION 1 HR: CPT

## 2019-01-24 RX ADMIN — SODIUM CHLORIDE 20 ML/HR: 0.9 INJECTION, SOLUTION INTRAVENOUS at 14:35

## 2019-01-24 RX ADMIN — TRASTUZUMAB 450 MG: 150 INJECTION, POWDER, LYOPHILIZED, FOR SOLUTION INTRAVENOUS at 15:01

## 2019-01-24 NOTE — PLAN OF CARE
Problem: Potential for Falls  Goal: Patient will remain free of falls  INTERVENTIONS:  - Assess patient frequently for physical needs  -  Identify cognitive and physical deficits and behaviors that affect risk of falls    -  [place] fall precautions as indicated by assessment   - Educate patient/family on patient safety including physical limitations  - Instruct patient to call for assistance with activity based on assessment  - Modify environment to reduce risk of injury  - Consider OT/PT consult to assist with strengthening/mobility   Outcome: Progressing

## 2019-01-29 ENCOUNTER — OFFICE VISIT (OUTPATIENT)
Dept: HEMATOLOGY ONCOLOGY | Facility: CLINIC | Age: 39
End: 2019-01-29
Payer: COMMERCIAL

## 2019-01-29 VITALS
RESPIRATION RATE: 16 BRPM | WEIGHT: 165 LBS | TEMPERATURE: 97.4 F | OXYGEN SATURATION: 97 % | BODY MASS INDEX: 29.23 KG/M2 | HEIGHT: 63 IN | DIASTOLIC BLOOD PRESSURE: 70 MMHG | SYSTOLIC BLOOD PRESSURE: 102 MMHG | HEART RATE: 69 BPM

## 2019-01-29 DIAGNOSIS — Z17.0 MALIGNANT NEOPLASM OF UPPER-OUTER QUADRANT OF RIGHT BREAST IN FEMALE, ESTROGEN RECEPTOR POSITIVE (HCC): Primary | ICD-10-CM

## 2019-01-29 DIAGNOSIS — C50.411 MALIGNANT NEOPLASM OF UPPER-OUTER QUADRANT OF RIGHT BREAST IN FEMALE, ESTROGEN RECEPTOR POSITIVE (HCC): Primary | ICD-10-CM

## 2019-01-29 PROCEDURE — 99214 OFFICE O/P EST MOD 30 MIN: CPT | Performed by: INTERNAL MEDICINE

## 2019-01-29 NOTE — PROGRESS NOTES
Hematology / Oncology Outpatient Follow Up Note    Manjeet Sanchez 41 y o  female UYO:[id number] CKR:[id number]         Date:  1/29/2019    Assessment / Plan:  A 51-year-old premenopausal woman with locally advanced right breast cancer   She has PALB-2 probable pathogenic mutation   She had quite large palpable right breast mass and axillary adenopathy  This was mucinous histology, grade 2, ER 30% positive, CA 2% positive, HER2 3+ disease  She underwent neoadjuvant chemotherapy with [address] with pertuzumab x6 cycle, resulting in good clinical partial response   She underwent right mastectomy and axillary lymph node dissection as well as left prophylactic mastectomy, resulting in MCKAYLA   She had less than 1 mm of residual mucinous carcinoma as well as 3 positive lymph nodes which has micrometastasis   This is consistent with pathological good partial response  She completed adjuvant trastuzumab monotherapy without cardiac toxicity  She is currently on adjuvant hormonal therapy with tamoxifen 20 mg daily with no side effects  Clinically, she has no evidence recurrent disease  I recommended her to continue with tamoxifen 20 mg daily  I am going to ask interventional radiologist to remove the Port-A-Cath  She is in agreement with my recommendation    I will see her again in 6 months for routine follow-up                                     Subjective:      HPI:  A 51-year-old premenopausal woman who initially noticed right breast lump when she was 8 months pregnant   She delivered 1st baby in June 2017  Agustín Jonathan the delivery, she noticed right breast lump to be slowly enlarging   She brought this to medical attention   She underwent mammography which was quite abnormal   Biopsy from right breast at 10:00 position 12 cm from nipple showed mucinous carcinoma, grade 2   This was ER 30 to 40% positive, CA 2 to 3% positive, her 2 3+ disease   She was seen by Dr Patrica Treviño who ordered MRI which showed extensive abnormality in her right breast including 3 5 cm of right breast mass as well as 4 cm of axillary adenopathy  She was referred to me to discuss neoadjuvant chemotherapy   She went to [facility] where she was recommended to have [address] P  she presents today with her mother to discuss treatment options  Bandar Oseguera is somewhat anxious   Otherwise, she feels well   She denied any bone pain   Her weight is stable   She has no respiratory symptoms   Her performance status is normal  She does not have significant past medical history   Her paternal aunt had breast cancer at age of 62   Her 2 paternal grandfather's sister had breast cancer in their 46s   She underwent genetic testing of which result is pending at this time            Interval History:   A 61-year-old premenopausal woman with locally advanced right breast cancer  Bandar Oseguera has quite large palpable right breast mass and axillary adenopathy  This was mucinous histology, grade 2, ER 30% positive, HI 2% positive, HER2 3+ disease   She was treated with neoadjuvant chemotherapy with TCH with pertuzumab with excellent tolerance x6 cycle which was completed in June 2018   She had clinical good partial response  Subsequently, she underwent mastectomy as well as prophylactic left mastectomy   Right mastectomy specimen showed less than 1 mm of residual mucinous carcinoma as well as 3/16 positive lymph nodes with tumor deposit less than 0 4 mm  She recently completed adjuvant trastuzumab monotherapy with no cardiac toxicity  She presents today for follow-up  She finished radiation therapy in December 2018 with significant skin toxicity which has healed  She feels well  She has no complaint of pain  She denied any respiratory symptoms  She has very minimal hot flashes  She recently started tamoxifen 20 mg daily  Her performance status is normal                                                                                 Objective:      Primary Diagnosis:     1   Locally advanced right breast cancer with invasive mucinous histology, grade 2, ER 30% positive, AR 2% positive, her 2 3+ disease   Diagnosed in January 2018  2  PALB2 probable pathogenic mutation      Cancer Staging:  Malignant neoplasm of upper-outer quadrant of right breast in female, estrogen receptor positive ([facility] [address] )    Staging form: Breast, AJCC 8th Edition    - Clinical stage from 1/26/2018: Stage IB (cT2(3), cN1, cM0, G2, ER: Positive, AR: Positive, HER2: Positive) - Signed by Memo Carrera MD on 1/26/2018        Previous Hematologic/ Oncologic Treatment:       1  Neoadjuvant chemotherapy with [address] with pertuzumab x6 cycle, completed in June 2018  2    Adjuvant trastuzumab monotherapy from June 2018 through January 2019      Current Hematologic/ Oncologic Treatment:       1   Adjuvant hormonal therapy with tamoxifen since January 2019      Disease Status:      Clinical partial response  Pathologically good partial response  MCKAYLA status post right mastectomy with axillary lymph node dissection and prophylactic left mastectomy      Test Results:     Pathology:     Biopsy from right breast showed invasive mucinous carcinoma, grade 2, ER 30 to 40% positive, AR 2 to 3% positive, HER2 3+ disease      Mastectomy specimen showed less than 1 mm residual mucinous carcinoma within mucin pool   3/16 axillary lymph nodes were positive for metastatic disease with largest dimension measuring 0 4 mm without extranodal extension         Radiology:     Echocardiogram in December 2018 showed ejection fraction 60%     Laboratory:     See below     Physical Exam:        General Appearance:    Alert, oriented          Eyes:    PERRL   Ears:    Normal external ear canals, both ears   Nose:   Nares normal, septum midline   Throat:   Mucosa moist  Pharynx without injection      Neck:   Supple         Lungs:     Clear to auscultation bilaterally   Chest Wall:    No tenderness or deformity    Heart:    Regular rate and rhythm         Abdomen:     Soft, non-tender, bowel sounds +, no organomegaly               Extremities:   Extremities no cyanosis or edema         Skin:   no rash or icterus  Lymph nodes:   Cervical, supraclavicular, and axillary nodes normal   Neurologic:   CNII-XII intact, normal strength, sensation and reflexes     Throughout             Breast exam:  Status post bilateral mastectomy with reconstruction  No palpable abnormality in either reconstructed breast            ROS: Review of Systems   All other systems reviewed and are negative  Imaging: No results found  Labs:   Lab Results   Component Value Date    WBC 4 39 11/28/2018    HGB 12 0 11/28/2018    HCT 36 3 11/28/2018    MCV 99 (H) 11/28/2018     11/28/2018     Lab Results   Component Value Date    K 3 2 (L) 06/12/2018     06/12/2018    CO2 27 06/12/2018    BUN 12 06/12/2018    CREATININE 0 94 06/12/2018    GLUF 101 (H) 02/13/2018    CALCIUM 8 4 06/12/2018    AST 24 06/12/2018    ALT 33 06/12/2018    ALKPHOS 84 06/12/2018    EGFR 78 06/12/2018         Current Medications: Reviewed  Allergies: Reviewed  PMH/FH/SH:  Reviewed      Vital Sign:    Body surface area is 1 78 meters squared      Wt Readings from Last 3 Encounters:   01/29/19 74 8 kg (165 lb)   01/24/19 74 2 kg (163 lb 8 oz)   01/22/19 73 4 kg (161 lb 12 8 oz)        Temp Readings from Last 3 Encounters:   01/29/19 (!) 97 4 °F (36 3 °C) (Oral)   01/24/19 97 6 °F (36 4 °C) (Oral)   01/22/19 98 1 °F (36 7 °C)        BP Readings from Last 3 Encounters:   01/29/19 102/70   01/24/19 102/64   01/22/19 105/80         Pulse Readings from Last 3 Encounters:   01/29/19 69   01/24/19 66   01/22/19 76     @LASTSAO2(3)@

## 2019-01-30 ENCOUNTER — TELEPHONE (OUTPATIENT)
Dept: HEMATOLOGY ONCOLOGY | Facility: CLINIC | Age: 39
End: 2019-01-30

## 2019-01-30 NOTE — TELEPHONE ENCOUNTER
Port removal scheduled for 2 26 19 at [facility] consult will be the week before  Left vm for pt with the info

## 2019-02-11 ENCOUNTER — ANESTHESIA EVENT (OUTPATIENT)
Dept: PERIOP | Facility: AMBULARY SURGERY CENTER | Age: 39
End: 2019-02-11

## 2019-02-12 ENCOUNTER — TELEPHONE (OUTPATIENT)
Dept: HEMATOLOGY ONCOLOGY | Facility: CLINIC | Age: 39
End: 2019-02-12

## 2019-02-12 NOTE — TELEPHONE ENCOUNTER
Keanu Terrazas called and needs a port removal can only corine [place]ev[place] and wants Friday only    Please call her at [phone]

## 2019-02-13 NOTE — TELEPHONE ENCOUNTER
Scheduled for Friday, 2/22/19 @ 9:30am in [facility]  S/w patient and she is aware of this appt

## 2019-02-18 ENCOUNTER — TELEPHONE (OUTPATIENT)
Dept: RADIOLOGY | Facility: HOSPITAL | Age: 39
End: 2019-02-18

## 2019-02-18 RX ORDER — SODIUM CHLORIDE 9 MG/ML
75 INJECTION, SOLUTION INTRAVENOUS CONTINUOUS
Status: CANCELLED | OUTPATIENT
Start: 2019-02-18

## 2019-02-19 DIAGNOSIS — F41.1 GENERALIZED ANXIETY DISORDER: ICD-10-CM

## 2019-02-19 RX ORDER — LORAZEPAM 1 MG/1
1 TABLET ORAL
Qty: 30 TABLET | Refills: 0 | Status: SHIPPED | OUTPATIENT
Start: 2019-02-19 | End: 2019-03-25 | Stop reason: SDUPTHER

## 2019-02-22 ENCOUNTER — HOSPITAL ENCOUNTER (OUTPATIENT)
Dept: RADIOLOGY | Facility: HOSPITAL | Age: 39
Discharge: HOME | End: 2019-02-22
Attending: INTERNAL MEDICINE | Admitting: RADIOLOGY
Payer: COMMERCIAL

## 2019-02-22 VITALS
BODY MASS INDEX: 29.06 KG/M2 | SYSTOLIC BLOOD PRESSURE: 100 MMHG | RESPIRATION RATE: 16 BRPM | DIASTOLIC BLOOD PRESSURE: 55 MMHG | WEIGHT: 164 LBS | HEART RATE: 78 BPM | HEIGHT: 63 IN | TEMPERATURE: 97.8 F | OXYGEN SATURATION: 99 %

## 2019-02-22 DIAGNOSIS — C50.411 MALIGNANT NEOPLASM OF UPPER-OUTER QUADRANT OF RIGHT BREAST IN FEMALE, ESTROGEN RECEPTOR POSITIVE (HCC): ICD-10-CM

## 2019-02-22 DIAGNOSIS — Z17.0 MALIGNANT NEOPLASM OF UPPER-OUTER QUADRANT OF RIGHT BREAST IN FEMALE, ESTROGEN RECEPTOR POSITIVE (HCC): ICD-10-CM

## 2019-02-22 PROCEDURE — 36590 REMOVAL TUNNELED CV CATH: CPT

## 2019-02-22 PROCEDURE — 36590 REMOVAL TUNNELED CV CATH: CPT | Performed by: RADIOLOGY

## 2019-02-22 PROCEDURE — 99152 MOD SED SAME PHYS/QHP 5/>YRS: CPT | Performed by: RADIOLOGY

## 2019-02-22 RX ORDER — FENTANYL CITRATE 50 UG/ML
INJECTION, SOLUTION INTRAMUSCULAR; INTRAVENOUS CODE/TRAUMA/SEDATION MEDICATION
Status: COMPLETED | OUTPATIENT
Start: 2019-02-22 | End: 2019-02-22

## 2019-02-22 RX ORDER — MIDAZOLAM HYDROCHLORIDE 1 MG/ML
INJECTION INTRAMUSCULAR; INTRAVENOUS CODE/TRAUMA/SEDATION MEDICATION
Status: COMPLETED | OUTPATIENT
Start: 2019-02-22 | End: 2019-02-22

## 2019-02-22 RX ORDER — SODIUM CHLORIDE 9 MG/ML
75 INJECTION, SOLUTION INTRAVENOUS CONTINUOUS
Status: DISCONTINUED | OUTPATIENT
Start: 2019-02-22 | End: 2019-02-23 | Stop reason: HOSPADM

## 2019-02-22 RX ADMIN — MIDAZOLAM HYDROCHLORIDE 1 MG: 1 INJECTION, SOLUTION INTRAMUSCULAR; INTRAVENOUS at 09:35

## 2019-02-22 RX ADMIN — SODIUM CHLORIDE 75 ML/HR: 0.9 INJECTION, SOLUTION INTRAVENOUS at 10:13

## 2019-02-22 RX ADMIN — FENTANYL CITRATE 50 MCG: 50 INJECTION INTRAMUSCULAR; INTRAVENOUS at 09:35

## 2019-02-22 RX ADMIN — SODIUM CHLORIDE 75 ML/HR: 0.9 INJECTION, SOLUTION INTRAVENOUS at 09:11

## 2019-02-22 RX ADMIN — MIDAZOLAM HYDROCHLORIDE 1 MG: 1 INJECTION, SOLUTION INTRAMUSCULAR; INTRAVENOUS at 09:40

## 2019-02-22 RX ADMIN — FENTANYL CITRATE 50 MCG: 50 INJECTION INTRAMUSCULAR; INTRAVENOUS at 09:40

## 2019-02-22 NOTE — DISCHARGE INSTRUCTIONS
Implanted Venous Access Port Removal    WHAT YOU NEED TO KNOW:   An implanted venous access port is a device used to give treatments and take blood  It may also be called a central venous access device (CVAD)  The port is a small container that is placed under your skin, usually in your upper chest  A port can also be placed in your arm or abdomen  The port is attached to a catheter that enters a large vein  DISCHARGE INSTRUCTIONS:   Resume your normal diet  Small sips of flat soda will help with mild nausea  Prevent an infection:     Wash your hands often  Use soap and water  Clean your hands before and  after you care for your incision  Check your skin for infection every day  Look for redness, swelling, or fluid oozing from the incision site  Dressing may come off in 24 hours  Medical glue will peel off on its own in 5 to 10 days  You may shower 24 hours after procedure  Follow up with your healthcare provider as directed  Write down your questions so you remember to ask them during your visits  Activity:  You may return to your daily activities when the area heals  Contact Interventional Radiology at [phone] Amirah PATIENTS: Contact Interventional Radiology at [phone]) Jami Soares PATIENTS: Contact Interventional Radiology at [phone]) if:     You have a fever  You have persistent nausea  Your inciscion site is red, swollen, or draining pus  You have questions or concerns about your condition or care  Seek care immediately or call 911 if:  Blood soaks through your bandage  The skin over or around your incision breaks open  Your heart is jumping or fluttering  You have a headache, blurred vision, and feel confused  You have pain in your arm, neck, shoulder, or chest     You have trouble breathing that is getting worse over time

## 2019-02-22 NOTE — H&P
IR H&P    HPI:  41year old female with history of right breast cancer s/p chemotherapy returns for port removal     PMH:  Right breast cancer  GERD    PSH:  Bilateral mastectomy w/ reconstruction    Pilonidal cyst resection    Physical exam:  Gen: NAD  Left chest: Port in place    Coags ok    A/P:  41year old female with history of right breast cancer s/p chemotherapy no longer requires port      - Port removal

## 2019-02-22 NOTE — SEDATION DOCUMENTATION
Pt had a left port removed in IR with Dr Reagan Graft without complication  Pt tolerated well  Site sutured and is covered with gauze and tape   Report given bedside at SPU

## 2019-02-22 NOTE — BRIEF OP NOTE (RAD/CATH)
IR PORT REMOVAL  Procedure Note    PATIENT NAME: Odell Sanchez  : 1980  MRN: [id number]     Pre-op Diagnosis:   1  Malignant neoplasm of upper-outer quadrant of right breast in female, estrogen receptor positive ([facility] [address] )      Post-op Diagnosis:   1   Malignant neoplasm of upper-outer quadrant of right breast in female, estrogen receptor positive [facility])        Surgeon:   Trevor Ceballos MD    Estimated Blood Loss: 1 cc    Findings: Successful removal of left chest port    Specimens: None    Complications:  None    Anesthesia: Conscious sedation and Local    Trevor Ceballos MD     Date: 2019  Time: 10:01 AM

## 2019-02-26 ENCOUNTER — ANESTHESIA (OUTPATIENT)
Dept: PERIOP | Facility: AMBULARY SURGERY CENTER | Age: 39
End: 2019-02-26

## 2019-02-26 NOTE — ANESTHESIA PREPROCEDURE EVALUATION
Review of Systems/Medical History  Patient summary reviewed  Chart reviewed  History of anesthetic complications (woke up very anxious after last procedure)     Cardiovascular  Negative cardio ROS Exercise tolerance (METS): >4,     Pulmonary  Asthma , well controlled/ stable Last rescue: < 1 month ago Asthma type of rescue: PRN inhaler,        GI/Hepatic    GERD well controlled,        Negative  ROS        Endo/Other    Obesity    GYN    Breast cancer Right mastectomy, left mastectomy and axillary node dissection  Comment: Tubal ligation, preg neg     Hematology  Negative hematology ROS      Musculoskeletal  Negative musculoskeletal ROS        Neurology  Negative neurology ROS      Psychology   Anxiety,              Physical Exam    Airway    Mallampati score: II  TM Distance: >3 FB  Neck ROM: full     Dental   No notable dental hx     Cardiovascular  Comment: Negative ROS, Rhythm: regular, Rate: normal,     Pulmonary  Breath sounds clear to auscultation,     Other Findings        Anesthesia Plan  ASA Score- 2     Anesthesia Type- IV sedation with anesthesia with ASA Monitors  Additional Monitors:   Airway Plan:         Plan Factors-Patient not instructed to abstain from smoking on day of procedure  Patient did not smoke on day of surgery  Induction- intravenous  Postoperative Plan- Plan for postoperative opioid use  Planned trial extubation    Informed Consent- Anesthetic plan and risks discussed with patient  I personally reviewed this patient with the CRNA  Discussed and agreed on the Anesthesia Plan with the MARKO Khoury

## 2019-03-21 ENCOUNTER — OFFICE VISIT (OUTPATIENT)
Dept: FAMILY MEDICINE CLINIC | Facility: CLINIC | Age: 39
End: 2019-03-21
Payer: COMMERCIAL

## 2019-03-21 VITALS
BODY MASS INDEX: 28.53 KG/M2 | HEART RATE: 80 BPM | TEMPERATURE: 97.5 F | OXYGEN SATURATION: 99 % | HEIGHT: 63 IN | RESPIRATION RATE: 15 BRPM | WEIGHT: 161 LBS | SYSTOLIC BLOOD PRESSURE: 122 MMHG | DIASTOLIC BLOOD PRESSURE: 72 MMHG

## 2019-03-21 DIAGNOSIS — Z00.00 ANNUAL PHYSICAL EXAM: Primary | ICD-10-CM

## 2019-03-21 PROCEDURE — 99395 PREV VISIT EST AGE 18-39: CPT | Performed by: FAMILY MEDICINE

## 2019-03-21 NOTE — PATIENT INSTRUCTIONS

## 2019-03-21 NOTE — PROGRESS NOTES
ADULT ANNUAL PHYSICAL  [facility] - Annelise SAUCEDO [facility]    NAME: Pennelope Kayser Dhuyvetters  AGE: 41 y o  SEX: female  : 1980     DATE: 3/21/2019     Assessment and Plan:     Problem List Items Addressed This Visit     None      Visit Diagnoses     Annual physical exam    -  Primary    Relevant Orders    Lipid Panel with Direct LDL reflex    Comprehensive metabolic panel          Health maintenance and preventative care screenings were discussed with patient today  Appropriate education was printed on patient's after visit summary  · Discussed risks/benefits of screening for cervical cancer, high cholesterol and diabetes  Patient is up-to-date with their preventive screenings  · Immunizations were reviewed: patient is up-to-date with her immunizations  Counseling:  Dental Health: discussed importance of regular tooth brushing, flossing, and dental visits  BMI Counseling: Body mass index is 28 52 kg/m²  Discussed the patient's BMI with her  The BMI is above average  BMI counseling and education was provided to the patient  Nutrition recommendations include decreasing overall calorie intake, 3-5 servings of fruits/vegetables daily, consuming healthier snacks and moderation in carbohydrate intake  Exercise recommendations include moderate aerobic physical activity for 150 minutes/week  Sexual health: discussed sexually transmitted diseases, partner selection, use of condoms, avoidance of unintended pregnancy, and contraceptive alternatives  · Alcohol/drug use: discussed moderation in alcohol intake and avoidance of illicit drug use  No follow-ups on file  Chief Complaint:     Chief Complaint   Patient presents with    Physical Exam      History of Present Illness:     Adult Annual Physical   Patient here for a comprehensive physical exam  The patient reports no problems  Diet and Physical Activity  · Diet/Nutrition: well balanced diet     · Weight concerns: patient is overweight (BMI 25 0-29 9)  · Exercise: no formal exercise  Depression Screening  PHQ-9 Depression Screening    PHQ-9:    Frequency of the following problems over the past two weeks:            General Health  · Sleep: sleeps well and gets 7-8 hours of sleep on average  · Hearing: normal - bilateral   · Vision: most recent eye exam <1 year ago and wears glasses  · Dental: regular dental visits and brushes teeth twice daily  /GYN Health  · Last menstrual period: no periods   · Contraceptive method: tubal ligation   · History of STDs?: no      Review of Systems:     Review of Systems   Constitutional: Negative  HENT: Negative  Eyes: Negative  Respiratory: Negative  Cardiovascular: Negative  Gastrointestinal: Negative  Endocrine: Negative  Genitourinary: Negative  Musculoskeletal: Negative  Skin: Negative  Allergic/Immunologic: Negative  Neurological: Negative  Hematological: Negative  Psychiatric/Behavioral: Negative         Past Medical History:     Past Medical History:   Diagnosis Date    Asthma     allergy excaberated    Breast cancer ([facility] [address] ) 2018    GERD (gastroesophageal reflux disease)     History of adverse reaction to anesthesia     Pt reports waking up severly anxious    Seasonal allergies       Past Surgical History:     Past Surgical History:   Procedure Laterality Date    BREAST RECONSTRUCTION Bilateral 2018    Procedure: RECONSTRUCTION BREAST W/ IMPLANT;  Surgeon: Adrianna Ferrera MD;  [facility];  Service: Plastics     SECTION      IR PORT REMOVAL  2019    PILONIDAL CYST EXCISION      resection    IN INSJ TUNNELED CTR VAD W/SUBQ PORT AGE 5 YR/> Left 2018    Procedure: INSERTION VENOUS PORT ( PORT-A-CATH) IR;  Surgeon: More Atkinson DO;  [facility];  Service: Interventional Radiology    IN MASTECTOMY, MODIFIED RADICAL Right 2018    Procedure: BREAST MODIFIED RADICAL MASTECTOMY;  Surgeon: Lisette Sy MD;  [facility];  Service: Surgical Oncology    DE MASTECTOMY, SIMPLE, COMPLETE Left 2018    Procedure: BREAST SIMPLE MASTECTOMY;  Surgeon: Lisette Sy MD;  [facility];  Service: Surgical Oncology    DE REVISE BREAST RECONSTRUCTION Bilateral 2018    Procedure: REVISION BREAST RECON Delonte Reasoner CLOSURE;  Surgeon: Mirella Page MD;  [facility];  Service: Plastics    TUBAL LIGATION      WISDOM TOOTH EXTRACTION        Social History:     Social History     Socioeconomic History    Marital status: /Civil Union     Spouse name: None    Number of children: None    Years of education: None    Highest education level: None   Occupational History    None   Social Needs    Financial resource strain: None    Food insecurity:     Worry: None     Inability: None    Transportation needs:     Medical: None     Non-medical: None   Tobacco Use    Smoking status: Former Smoker     Last attempt to quit: 2015     Years since quittin 1    Smokeless tobacco: Never Used   Substance and Sexual Activity    Alcohol use: Yes     Comment: rare    Drug use: No    Sexual activity: Yes     Partners: Male     Birth control/protection: Other   Lifestyle    Physical activity:     Days per week: None     Minutes per session: None    Stress: None   Relationships    Social connections:     Talks on phone: None     Gets together: None     Attends Yazdanism service: None     Active member of club or organization: None     Attends meetings of clubs or organizations: None     Relationship status: None    Intimate partner violence:     Fear of current or ex partner: None     Emotionally abused: None     Physically abused: None     Forced sexual activity: None   Other Topics Concern    None   Social History Narrative    Drinks coffee      Family History:     Family History   Problem Relation Age of Onset    Diabetes Mother     Hypertension Mother     Rosacea Father    Allan Rick Allergies Father         seasonal    Liver cancer Paternal Grandfather     Breast cancer Family       Current Medications:     Current Outpatient Medications   Medication Sig Dispense Refill    albuterol (VENTOLIN HFA) 90 mcg/act inhaler Inhale 2 puffs every 4 (four) hours as needed      levocetirizine (XYZAL) 5 MG tablet Take 1 tablet by mouth daily      LORazepam (ATIVAN) 1 mg tablet Take 1 tablet (1 mg total) by mouth daily at bedtime for 30 days 30 tablet 0    mometasone (NASONEX) 50 mcg/act nasal spray 2 sprays into each nostril daily      omeprazole (PriLOSEC) 20 mg delayed release capsule Take 20 mg by mouth      tamoxifen (NOLVADEX) 20 mg tablet Take 1 tablet (20 mg total) by mouth daily 90 tablet 1     No current facility-administered medications for this visit  Allergies: Allergies   Allergen Reactions    Trichophyton Other (See Comments)     AKA Fungi - Pt does not know reaction     Bee Pollen     Cat Hair Extract Sneezing    Dust Mite Extract Sneezing    Molds & Smuts Sneezing    Pollen Extract Sneezing    Tree Extract Sneezing      Objective:     /72 (BP Location: Left arm, Patient Position: Sitting, Cuff Size: Standard)   Pulse 80   Temp 97 5 °F (36 4 °C)   Resp 15   Ht 5' 3" (1 6 m)   Wt 73 kg (161 lb)   SpO2 99%   BMI 28 52 kg/m²     Physical Exam   Constitutional: She is oriented to person, place, and time  She appears well-developed and well-nourished  HENT:   Head: Normocephalic and atraumatic  Eyes: Pupils are equal, round, and reactive to light  EOM are normal    Neck: Normal range of motion  Neck supple  Cardiovascular: Normal rate and regular rhythm  Pulmonary/Chest: Effort normal and breath sounds normal  No respiratory distress  She has no wheezes  Abdominal: Soft  Bowel sounds are normal    Musculoskeletal: Normal range of motion  She exhibits no edema, tenderness or deformity     Neurological: She is alert and oriented to person, place, and time    Skin: Skin is warm  Psychiatric: She has a normal mood and affect  Her behavior is normal         Health Maintenance:     Health Maintenance   Topic Date Due    BMI: Followup Plan  05/04/2000    Pneumococcal PPSV23 Highest Risk Adult (1 of 3 - PCV13) 05/04/2001    PAP SMEAR  01/15/2020    Depression Screening PHQ  01/22/2020    BMI: Adult  03/21/2020    DTaP,Tdap,and Td Vaccines (3 - Td) 05/03/2027    INFLUENZA VACCINE  Completed    HEPATITIS B VACCINES  Aged Out     Immunization History   Administered Date(s) Administered    INFLUENZA 09/07/2017    Influenza Quadrivalent Preservative Free 3 years and older IM 09/07/2017    Influenza, recombinant, quadrivalent,injectable, preservative free 09/24/2018    Tdap 09/10/2015, 05/03/2017       Zeyad Shields MD  [facility] BMI Counseling: Body mass index is 28 52 kg/m²  Discussed the patient's BMI with her  The BMI is above average  BMI counseling and education was provided to the patient  Nutrition recommendations include decreasing overall calorie intake, reducing fast food intake, consuming healthier snacks, moderation in carbohydrate intake and increasing intake of lean protein  Exercise recommendations include moderate aerobic physical activity for 150 minutes/week

## 2019-03-25 DIAGNOSIS — F41.1 GENERALIZED ANXIETY DISORDER: ICD-10-CM

## 2019-03-26 RX ORDER — LORAZEPAM 1 MG/1
1 TABLET ORAL
Qty: 30 TABLET | Refills: 0 | Status: SHIPPED | OUTPATIENT
Start: 2019-03-26 | End: 2019-04-22 | Stop reason: SDUPTHER

## 2019-04-16 PROBLEM — Z79.810 USE OF TAMOXIFEN (NOLVADEX): Status: ACTIVE | Noted: 2019-04-16

## 2019-04-17 ENCOUNTER — OFFICE VISIT (OUTPATIENT)
Dept: SURGICAL ONCOLOGY | Facility: CLINIC | Age: 39
End: 2019-04-17
Payer: COMMERCIAL

## 2019-04-17 ENCOUNTER — TELEPHONE (OUTPATIENT)
Dept: HEMATOLOGY ONCOLOGY | Facility: CLINIC | Age: 39
End: 2019-04-17

## 2019-04-17 VITALS
SYSTOLIC BLOOD PRESSURE: 140 MMHG | HEART RATE: 68 BPM | RESPIRATION RATE: 16 BRPM | WEIGHT: 162 LBS | DIASTOLIC BLOOD PRESSURE: 80 MMHG | HEIGHT: 63 IN | BODY MASS INDEX: 28.7 KG/M2 | TEMPERATURE: 97.1 F

## 2019-04-17 DIAGNOSIS — Z17.0 MALIGNANT NEOPLASM OF UPPER-OUTER QUADRANT OF RIGHT BREAST IN FEMALE, ESTROGEN RECEPTOR POSITIVE (HCC): Primary | ICD-10-CM

## 2019-04-17 DIAGNOSIS — C50.411 MALIGNANT NEOPLASM OF UPPER-OUTER QUADRANT OF RIGHT BREAST IN FEMALE, ESTROGEN RECEPTOR POSITIVE (HCC): Primary | ICD-10-CM

## 2019-04-17 DIAGNOSIS — Z79.810 USE OF TAMOXIFEN (NOLVADEX): ICD-10-CM

## 2019-04-17 PROCEDURE — 99214 OFFICE O/P EST MOD 30 MIN: CPT | Performed by: SURGERY

## 2019-04-18 ENCOUNTER — TELEPHONE (OUTPATIENT)
Dept: FAMILY MEDICINE CLINIC | Facility: CLINIC | Age: 39
End: 2019-04-18

## 2019-04-18 DIAGNOSIS — F41.9 ANXIETY: Primary | ICD-10-CM

## 2019-04-18 RX ORDER — VENLAFAXINE HYDROCHLORIDE 37.5 MG/1
37.5 TABLET, EXTENDED RELEASE ORAL
Qty: 30 TABLET | Refills: 5 | Status: SHIPPED | OUTPATIENT
Start: 2019-04-18 | End: 2019-08-20

## 2019-04-22 DIAGNOSIS — F41.1 GENERALIZED ANXIETY DISORDER: ICD-10-CM

## 2019-04-23 RX ORDER — LORAZEPAM 1 MG/1
1 TABLET ORAL
Qty: 30 TABLET | Refills: 0 | Status: SHIPPED | OUTPATIENT
Start: 2019-04-23 | End: 2019-05-23 | Stop reason: SDUPTHER

## 2019-05-15 ENCOUNTER — TELEPHONE (OUTPATIENT)
Dept: HEMATOLOGY ONCOLOGY | Facility: CLINIC | Age: 39
End: 2019-05-15

## 2019-05-15 DIAGNOSIS — C50.411 MALIGNANT NEOPLASM OF UPPER-OUTER QUADRANT OF RIGHT BREAST IN FEMALE, ESTROGEN RECEPTOR POSITIVE (HCC): ICD-10-CM

## 2019-05-15 DIAGNOSIS — Z17.0 MALIGNANT NEOPLASM OF UPPER-OUTER QUADRANT OF RIGHT BREAST IN FEMALE, ESTROGEN RECEPTOR POSITIVE (HCC): ICD-10-CM

## 2019-05-15 RX ORDER — TAMOXIFEN CITRATE 20 MG/1
20 TABLET ORAL DAILY
Qty: 30 TABLET | Refills: 0 | Status: SHIPPED | OUTPATIENT
Start: 2019-05-15 | End: 2019-06-12 | Stop reason: SDUPTHER

## 2019-05-22 ENCOUNTER — TELEPHONE (OUTPATIENT)
Dept: FAMILY MEDICINE CLINIC | Facility: CLINIC | Age: 39
End: 2019-05-22

## 2019-05-23 DIAGNOSIS — F41.1 GENERALIZED ANXIETY DISORDER: ICD-10-CM

## 2019-05-24 RX ORDER — LORAZEPAM 1 MG/1
1 TABLET ORAL
Qty: 30 TABLET | Refills: 0 | Status: SHIPPED | OUTPATIENT
Start: 2019-05-24 | End: 2019-06-23 | Stop reason: SDUPTHER

## 2019-06-12 DIAGNOSIS — Z17.0 MALIGNANT NEOPLASM OF UPPER-OUTER QUADRANT OF RIGHT BREAST IN FEMALE, ESTROGEN RECEPTOR POSITIVE (HCC): ICD-10-CM

## 2019-06-12 DIAGNOSIS — C50.411 MALIGNANT NEOPLASM OF UPPER-OUTER QUADRANT OF RIGHT BREAST IN FEMALE, ESTROGEN RECEPTOR POSITIVE (HCC): ICD-10-CM

## 2019-06-12 RX ORDER — TAMOXIFEN CITRATE 20 MG/1
TABLET ORAL
Qty: 30 TABLET | Refills: 0 | Status: SHIPPED | OUTPATIENT
Start: 2019-06-12 | End: 2019-07-15 | Stop reason: SDUPTHER

## 2019-06-23 DIAGNOSIS — F41.1 GENERALIZED ANXIETY DISORDER: ICD-10-CM

## 2019-06-24 RX ORDER — LORAZEPAM 1 MG/1
1 TABLET ORAL
Qty: 30 TABLET | Refills: 0 | Status: SHIPPED | OUTPATIENT
Start: 2019-06-24 | End: 2019-07-23 | Stop reason: SDUPTHER

## 2019-07-15 DIAGNOSIS — Z17.0 MALIGNANT NEOPLASM OF UPPER-OUTER QUADRANT OF RIGHT BREAST IN FEMALE, ESTROGEN RECEPTOR POSITIVE (HCC): ICD-10-CM

## 2019-07-15 DIAGNOSIS — C50.411 MALIGNANT NEOPLASM OF UPPER-OUTER QUADRANT OF RIGHT BREAST IN FEMALE, ESTROGEN RECEPTOR POSITIVE (HCC): ICD-10-CM

## 2019-07-16 RX ORDER — TAMOXIFEN CITRATE 20 MG/1
TABLET ORAL
Qty: 30 TABLET | Refills: 0 | Status: SHIPPED | OUTPATIENT
Start: 2019-07-16 | End: 2019-08-07 | Stop reason: SDUPTHER

## 2019-07-23 DIAGNOSIS — F41.1 GENERALIZED ANXIETY DISORDER: ICD-10-CM

## 2019-07-24 RX ORDER — LORAZEPAM 1 MG/1
1 TABLET ORAL
Qty: 90 TABLET | Refills: 0 | Status: SHIPPED | OUTPATIENT
Start: 2019-07-24 | End: 2019-10-25 | Stop reason: SDUPTHER

## 2019-07-26 ENCOUNTER — TELEPHONE (OUTPATIENT)
Dept: HEMATOLOGY ONCOLOGY | Facility: CLINIC | Age: 39
End: 2019-07-26

## 2019-07-26 NOTE — TELEPHONE ENCOUNTER
Patient  called stating that she will be on vacation the week of her 7/30 follow up appt with Dr Abiola Ann  She stated that after 4pm at the [facility] is best for her  I checked throughout September but nothing was available   Best call back [phone]

## 2019-07-26 NOTE — TELEPHONE ENCOUNTER
Called patient and left a message stating that the soonest appointment for the [place] location would be in mid September  Stated that if patient is willing to come to our [place] location then I could most likely get her in sooner  Stated that we are closing the office soon and that she could give me a call back on Monday morning  If I do not hear from patient I can contact her Monday as well

## 2019-08-07 DIAGNOSIS — Z17.0 MALIGNANT NEOPLASM OF UPPER-OUTER QUADRANT OF RIGHT BREAST IN FEMALE, ESTROGEN RECEPTOR POSITIVE (HCC): ICD-10-CM

## 2019-08-07 DIAGNOSIS — C50.411 MALIGNANT NEOPLASM OF UPPER-OUTER QUADRANT OF RIGHT BREAST IN FEMALE, ESTROGEN RECEPTOR POSITIVE (HCC): ICD-10-CM

## 2019-08-08 RX ORDER — TAMOXIFEN CITRATE 20 MG/1
TABLET ORAL
Qty: 30 TABLET | Refills: 0 | Status: SHIPPED | OUTPATIENT
Start: 2019-08-08 | End: 2019-08-21

## 2019-08-11 DIAGNOSIS — Z17.0 MALIGNANT NEOPLASM OF UPPER-OUTER QUADRANT OF RIGHT BREAST IN FEMALE, ESTROGEN RECEPTOR POSITIVE (HCC): ICD-10-CM

## 2019-08-11 DIAGNOSIS — C50.411 MALIGNANT NEOPLASM OF UPPER-OUTER QUADRANT OF RIGHT BREAST IN FEMALE, ESTROGEN RECEPTOR POSITIVE (HCC): ICD-10-CM

## 2019-08-12 RX ORDER — TAMOXIFEN CITRATE 20 MG/1
TABLET ORAL
Qty: 30 TABLET | Refills: 0 | Status: SHIPPED | OUTPATIENT
Start: 2019-08-12 | End: 2019-08-21

## 2019-08-20 ENCOUNTER — CLINICAL SUPPORT (OUTPATIENT)
Dept: RADIATION ONCOLOGY | Facility: HOSPITAL | Age: 39
End: 2019-08-20
Attending: RADIOLOGY
Payer: COMMERCIAL

## 2019-08-20 VITALS
TEMPERATURE: 98.9 F | WEIGHT: 161.4 LBS | RESPIRATION RATE: 16 BRPM | HEART RATE: 70 BPM | DIASTOLIC BLOOD PRESSURE: 70 MMHG | SYSTOLIC BLOOD PRESSURE: 102 MMHG | OXYGEN SATURATION: 99 % | HEIGHT: 63 IN | BODY MASS INDEX: 28.6 KG/M2

## 2019-08-20 DIAGNOSIS — C50.411 MALIGNANT NEOPLASM OF UPPER-OUTER QUADRANT OF RIGHT BREAST IN FEMALE, ESTROGEN RECEPTOR POSITIVE (HCC): Primary | ICD-10-CM

## 2019-08-20 DIAGNOSIS — Z17.0 MALIGNANT NEOPLASM OF UPPER-OUTER QUADRANT OF RIGHT BREAST IN FEMALE, ESTROGEN RECEPTOR POSITIVE (HCC): Primary | ICD-10-CM

## 2019-08-20 PROCEDURE — 99211 OFF/OP EST MAY X REQ PHY/QHP: CPT | Performed by: RADIOLOGY

## 2019-08-20 NOTE — PROGRESS NOTES
Herberth Graceuyyolanda 1980 is a 42 y o  female     Follow up visit     Patient returns today for routine scheduled follow-up after completing a course of postmastectomy radiation therapy 12/14/18  Last seen 1/22/19 1/29/19 F/U with Dr Keren Allan:  Clinically MCKAYLA  Continue with tamoxifen 20 mg daily  2/22/19 IR port removal     4/17/19 F/U with Dr Rucker Soraya:  Clinically MCKALYA       8/21/19 F/U with Dr Keren Allan  Malignant neoplasm of upper-outer quadrant of right breast in female, estrogen receptor positive ([facility] [address] )    1/19/2018 Initial Diagnosis     US guided core biopsy of right breast mass at [facility]  Malignant neoplasm of UOQ of right breast,   ER positive  HER2 positive      1/29/2018 Genetic Testing     Likely pathogenic variant was identified in the PALB2 gene  2/2018 - 6/1/2018 Chemotherapy     Neoadjuvant chemotherapy with TCH with pertuzumab  Dr Keren Allan  6/21/2018 Surgery     Right MRM  Invasive mucinous carcinoma  3 4 cm geronimo  Grade 1  3/16 lymph nodes  Stage IB - ypT1mi, ypN1mi, G1  Left simple mastectomy  Benign breast     Immediate reconstruction Dr Sung German       7/19/2018 - 1/2019 Chemotherapy     trastuzumab monotherapy every 3 weeks          11/1/2018 - 12/14/2018 Radiation     Course: C1    Plan ID Energy Fractions Dose per Fraction (cGy) Dose Correction (cGy) Total Dose Delivered (cGy) Elapsed Days   LAT R CW bst 6E 5 / 5 200 0 1,000 4   MED R CW bst 6E 5 / 5 200 0 1,000 4   New R PAB2 6X 25 / 25 47 0 1,175 36   New R Sclav2 6X 25 / 25 200 0 5,000 36   HvlOAYoi72_06 6X 12 / 12 200 0 2,400 34   QidMoqIwj82_1 10X 12 / 12 200 0 2,400 34   YwiQflMQ89_00 10X 13 / 13 200 0 2,600 36   NewR CW 10_30 6X 13 / 13 200 0 2,600 36      Treatment dates:  C1: 11/1/2018 - 12/14/2018 1/22/2019 -  Hormone Therapy     Tamoxifen  With Dr Keren Allan         Clinical Trial: no    Health Maintenance   Topic Date Due    Pneumococcal Vaccine: Pediatrics (0 to 5 Years) and At-Risk Patients (6 to 59 Years) (1 of 3 - PCV13) 1988    INFLUENZA VACCINE  2019    PAP SMEAR  01/15/2020    Depression Screening PHQ  2020    BMI: Followup Plan  2020    BMI: Adult  2020    DTaP,Tdap,and Td Vaccines (3 - Td) 2027    Pneumococcal Vaccine: 65+ Years (1 of 2 - PCV13) 2047    HEPATITIS B VACCINES  Aged Out       Patient Active Problem List   Diagnosis    Asthma    Other constipation    Cigarette nicotine dependence without complication    Macular atrophy, retinal    Seasonal allergies    Malignant neoplasm of upper-outer quadrant of right breast in female, estrogen receptor positive ([facility] [address] )    Anxiety    Use of tamoxifen (Nolvadex)     Past Medical History:   Diagnosis Date    Asthma     allergy excaberated    Breast cancer ([facility] [address] ) 2018    GERD (gastroesophageal reflux disease)     History of adverse reaction to anesthesia     Pt reports waking up severly anxious    Seasonal allergies      Past Surgical History:   Procedure Laterality Date    BREAST RECONSTRUCTION Bilateral 2018    Procedure: RECONSTRUCTION BREAST W/ IMPLANT;  Surgeon: Harrison Mckeon MD;  [facility];  Service: Plastics     SECTION      IR PORT REMOVAL  2019    PILONIDAL CYST EXCISION      resection    MT INSJ TUNNELED CTR VAD W/SUBQ PORT AGE 5 YR/> Left 2018    Procedure: INSERTION VENOUS PORT ( PORT-A-CATH) IR;  Surgeon: Ayse Anaya DO;  [facility];  Service: Interventional Radiology    MT MASTECTOMY, MODIFIED RADICAL Right 2018    Procedure: BREAST MODIFIED RADICAL MASTECTOMY;  Surgeon: Paige Holloway MD;  [facility];  Service: Surgical Oncology    MT MASTECTOMY, SIMPLE, COMPLETE Left 2018    Procedure: BREAST SIMPLE MASTECTOMY;  Surgeon: Paige Holloway MD;  [facility];  Service: Surgical Oncology    MT REVISE BREAST RECONSTRUCTION Bilateral 2018    Procedure: REVISION BREAST RECON W/WOUND CLOSURE;  Surgeon: Tonia Treviño Daniele Freeman MD;  Location: [facility] OR;  Service: Plastics    TUBAL LIGATION      WISDOM TOOTH EXTRACTION       Family History   Problem Relation Age of Onset    Diabetes Mother     Hypertension Mother     Rosacea Father     Allergies Father         seasonal    Liver cancer Paternal Grandfather     Breast cancer Family      Social History     Socioeconomic History    Marital status: /Civil Union     Spouse name: Not on file    Number of children: Not on file    Years of education: Not on file    Highest education level: Not on file   Occupational History    Not on file   Social Needs    Financial resource strain: Not on file    Food insecurity:     Worry: Not on file     Inability: Not on file    Transportation needs:     Medical: Not on file     Non-medical: Not on file   Tobacco Use    Smoking status: Former Smoker     Last attempt to quit: 2015     Years since quittin 5    Smokeless tobacco: Never Used   Substance and Sexual Activity    Alcohol use: Yes     Comment: rare    Drug use: No    Sexual activity: Yes     Partners: Male     Birth control/protection: Other   Lifestyle    Physical activity:     Days per week: Not on file     Minutes per session: Not on file    Stress: Not on file   Relationships    Social connections:     Talks on phone: Not on file     Gets together: Not on file     Attends Yarsani service: Not on file     Active member of club or organization: Not on file     Attends meetings of clubs or organizations: Not on file     Relationship status: Not on file    Intimate partner violence:     Fear of current or ex partner: Not on file     Emotionally abused: Not on file     Physically abused: Not on file     Forced sexual activity: Not on file   Other Topics Concern    Not on file   Social History Narrative    Drinks coffee       Current Outpatient Medications:     albuterol (VENTOLIN HFA) 90 mcg/act inhaler, Inhale 2 puffs every 4 (four) hours as needed, Disp: , Rfl:     levocetirizine (XYZAL) 5 MG tablet, Take 1 tablet by mouth daily, Disp: , Rfl:     LORazepam (ATIVAN) 1 mg tablet, TAKE 1 TABLET (1 MG TOTAL) BY MOUTH DAILY AT BEDTIME FOR 30 DAYS, Disp: 90 tablet, Rfl: 0    mometasone (NASONEX) 50 mcg/act nasal spray, 2 sprays into each nostril as needed , Disp: , Rfl:     omeprazole (PriLOSEC) 20 mg delayed release capsule, Take 20 mg by mouth daily , Disp: , Rfl:     tamoxifen (NOLVADEX) 20 mg tablet, TAKE 1 TABLET BY MOUTH EVERY DAY, Disp: 30 tablet, Rfl: 0    tamoxifen (NOLVADEX) 20 mg tablet, TAKE 1 TABLET BY MOUTH EVERY DAY, Disp: 30 tablet, Rfl: 0  Allergies   Allergen Reactions    Trichophyton Other (See Comments)     AKA Fungi - Pt does not know reaction     Cat Hair Extract Sneezing    Dust Mite Extract Sneezing    Molds & Smuts Sneezing    Pollen Extract Sneezing    Tree Extract Sneezing       Review of Systems:  Review of Systems   Constitutional: Positive for fatigue (Improved)  HENT: Negative  Eyes: Negative  Respiratory: Negative  Cardiovascular: Negative  Gastrointestinal: Negative  Endocrine: Negative  Hot flashes   Genitourinary: Negative  Musculoskeletal: Negative  Lymphadema in right arm, wears compression sleeve at night  Numbness/tingling upper right arm  Skin: Negative  Allergic/Immunologic: Negative  Neurological: Negative  Hematological: Negative  Psychiatric/Behavioral: Negative  Vitals:    08/20/19 1551   BP: 102/70   Pulse: 70   Resp: 16   Temp: 98 9 °F (37 2 °C)   SpO2: 99%   Weight: 73 2 kg (161 lb 6 4 oz)   Height: 5' 3" (1 6 m)        Pain assessment:0    Pain Score: 0-No pain    No results found for: PSA:    Imaging:No results found

## 2019-08-20 NOTE — PROGRESS NOTES
Follow-up - Radiation Oncology   Sandra Leetingtaylor Sanchez 1980 42 y o  female [id number]      History of Present Illness   Cancer Staging  Malignant neoplasm of upper-outer quadrant of right breast in female, estrogen receptor positive ([facility] [address] )  Staging form: Breast, AJCC 8th Edition  - Clinical stage from 1/26/2018: Stage IB (cT2(3), cN1, cM0, G2, ER: Positive, ME: Positive, HER2: Positive) - Signed by Fortunato Mathis MD on 1/26/2018  Laterality: Right  Tumor size (mm): 35  Multiple tumors: Yes  Number of tumors: 3  Method of lymph node assessment: Other  Nuclear grade: G3  Histologic grading system: 3 grade system      Kari Alston is a 42y o  year old female who returns today for routine scheduled follow-up after completing a course of postmastectomy radiation therapy 12/14/18  Last seen 1/22/19  Interval History:  1/29/19 F/U with Dr Lulu Quezada:  Clinically MCKAYLA  Continue with tamoxifen 20 mg daily       2/22/19 IR port removal      4/17/19 F/U with Dr Ryan Matthews:  Clinically MCKAYLA  She reports she is feeling well and working full-time from home  She does have hot flashes daily from tamoxifen  She can sleep well at night  She does have some right upper extremity lymphedema and this is controlled with her wearing a sleeve daily at night but she has difficulty wearing this during the day  She denies any chest wall nodularity nor masses bilaterally     8/21/19 F/U with Dr Lulu Quezada  Historical Information      Malignant neoplasm of upper-outer quadrant of right breast in female, estrogen receptor positive ([facility] [address] )    1/19/2018 Initial Diagnosis     US guided core biopsy of right breast mass at [facility]  Malignant neoplasm of UOQ of right breast,   ER positive  HER2 positive      1/29/2018 Genetic Testing     Likely pathogenic variant was identified in the PALB2 gene  2/2018 - 6/1/2018 Chemotherapy     Neoadjuvant chemotherapy with TCH with pertuzumab  Dr Lulu Quezada        6/21/2018 Surgery     Right MRM  Invasive mucinous carcinoma  3 4 cm geronimo  Grade 1  3/16 lymph nodes  Stage IB - ypT1mi, ypN1mi, G1  Left simple mastectomy  Benign breast     Immediate reconstruction Dr Hayden Lyn       2018 - 2019 Chemotherapy     trastuzumab monotherapy every 3 weeks          2018 - 2018 Radiation     Course: C1    Plan ID Energy Fractions Dose per Fraction (cGy) Dose Correction (cGy) Total Dose Delivered (cGy) Elapsed Days   LAT R CW bst 6E 5 / 5 200 0 1,000 4   MED R CW bst 6E 5 / 5 200 0 1,000 4   New R PAB2 6X 25 /  47 0 1,175 36   New R Sclav2 6X 25 /  200 0 5,000 36   LieICAcc66_15 6X 12 / 12 200 0 2,400 34   AflVmcHgf97_1 10X 12 / 12 200 0 2,400 34   MjpGdfXO69_30 10X 13 / 13 200 0 2,600 36   NewR CW 10_30 6X 13 / 13 200 0 2,600 36      Treatment dates:  C1: 2018 - 2018 -  Hormone Therapy     Tamoxifen  With Dr Rufus Cranker         Past Medical History:   Diagnosis Date    Asthma     allergy excaberated    Breast cancer ([facility] [address] ) 2018    GERD (gastroesophageal reflux disease)     History of adverse reaction to anesthesia     Pt reports waking up severly anxious    Seasonal allergies      Past Surgical History:   Procedure Laterality Date    BREAST RECONSTRUCTION Bilateral 2018    Procedure: RECONSTRUCTION BREAST W/ IMPLANT;  Surgeon: Victorino Kevin MD;  [facility];  Service: Plastics     SECTION      IR PORT REMOVAL  2019    PILONIDAL CYST EXCISION      resection    KS INSJ TUNNELED CTR VAD W/SUBQ PORT AGE 5 YR/> Left 2018    Procedure: INSERTION VENOUS PORT ( PORT-A-CATH) IR;  Surgeon: Kei Gottlieb DO;  [facility];  Service: Interventional Radiology    KS MASTECTOMY, MODIFIED RADICAL Right 2018    Procedure: BREAST MODIFIED RADICAL MASTECTOMY;  Surgeon: Danny Vance MD;  [facility];  Service: Surgical Oncology    KS MASTECTOMY, SIMPLE, COMPLETE Left 2018    Procedure: BREAST SIMPLE MASTECTOMY; Surgeon: Vy Lake MD;  [facility];  Service: Surgical Oncology    DE REVISE BREAST RECONSTRUCTION Bilateral 2018    Procedure: REVISION BREAST RECON W/WOUND CLOSURE;  Surgeon: Vonda Juan MD;  [facility];  Service: Plastics    TUBAL LIGATION      WISDOM TOOTH EXTRACTION         Social History   Social History     Substance and Sexual Activity   Alcohol Use Yes    Comment: rare     Social History     Substance and Sexual Activity   Drug Use No     Social History     Tobacco Use   Smoking Status Former Smoker    Last attempt to quit: 2015    Years since quittin 5   Smokeless Tobacco Never Used     Meds/Allergies     Current Outpatient Medications:     albuterol (VENTOLIN HFA) 90 mcg/act inhaler, Inhale 2 puffs every 4 (four) hours as needed, Disp: , Rfl:     levocetirizine (XYZAL) 5 MG tablet, Take 1 tablet by mouth daily, Disp: , Rfl:     LORazepam (ATIVAN) 1 mg tablet, TAKE 1 TABLET (1 MG TOTAL) BY MOUTH DAILY AT BEDTIME FOR 30 DAYS, Disp: 90 tablet, Rfl: 0    mometasone (NASONEX) 50 mcg/act nasal spray, 2 sprays into each nostril as needed , Disp: , Rfl:     omeprazole (PriLOSEC) 20 mg delayed release capsule, Take 20 mg by mouth daily , Disp: , Rfl:     tamoxifen (NOLVADEX) 20 mg tablet, TAKE 1 TABLET BY MOUTH EVERY DAY, Disp: 30 tablet, Rfl: 0    tamoxifen (NOLVADEX) 20 mg tablet, TAKE 1 TABLET BY MOUTH EVERY DAY, Disp: 30 tablet, Rfl: 0  Allergies   Allergen Reactions    Trichophyton Other (See Comments)     AKA Fungi - Pt does not know reaction     Cat Hair Extract Sneezing    Dust Mite Extract Sneezing    Molds & Smuts Sneezing    Pollen Extract Sneezing    Tree Extract Sneezing     Review of Systems   Constitutional: Positive for fatigue (Improved)  HENT: Negative  Eyes: Negative  Respiratory: Negative  Cardiovascular: Negative  Gastrointestinal: Negative  Endocrine: Negative  Hot flashes   Genitourinary: Negative  Musculoskeletal: Negative  Lymphadema in right arm, wears compression sleeve at night  Numbness/tingling upper right arm  Skin: Negative  Allergic/Immunologic: Negative  Neurological: Negative  Hematological: Negative  Psychiatric/Behavioral: Negative         OBJECTIVE:   /70   Pulse 70   Temp 98 9 °F (37 2 °C)   Resp 16   Ht 5' 3" (1 6 m)   Wt 73 2 kg (161 lb 6 4 oz)   SpO2 99%   BMI 28 59 kg/m²   Pain Assessment:  0  ECOG/Zubrod/WHO: 1 - Symptomatic but completely ambulatory    Physical Exam  Constitutional: She is oriented to person, place, and time  She appears well-developed and well-nourished  No distress  HENT:   Head: Normocephalic and atraumatic  Mouth/Throat: No oropharyngeal exudate  Eyes: Pupils are equal, round, and reactive to light  Conjunctivae and EOM are normal  No scleral icterus  Neck: Normal range of motion  Neck supple  No tracheal deviation present  No thyromegaly present  Cardiovascular: Normal rate, regular rhythm and normal heart sounds  Pulmonary/Chest: Effort normal and breath sounds normal  No respiratory distress  She has no wheezes  She has no rales  She exhibits no tenderness  Bilateral reconstructed chest wall mastectomy scars are well healed without any nodularity and no erythema  Bilateral permanent breast prosthesis are intact  Abdominal: Soft  Bowel sounds are normal  She exhibits no distension and no mass  There is no tenderness  There is no rebound and no guarding  Musculoskeletal: Normal range of motion  She exhibits no edema or tenderness  There is some mild edema of the right upper extremity  Lymphadenopathy:     She has no cervical adenopathy  She has no axillary adenopathy  Right: No supraclavicular adenopathy present  Left: No supraclavicular adenopathy present  Neurological: She is alert and oriented to person, place, and time  No cranial nerve deficit   Coordination normal    Skin: Skin is warm and dry  No rash noted  She is not diaphoretic  No erythema  No pallor  Psychiatric: She has a normal mood and affect  Her behavior is normal  Judgment and thought content normal    Nursing note and vitals reviewed  RESULTS    Lab Results: No results found for this or any previous visit (from the past 672 hour(s))  Imaging Studies:No results found  Assessment/Plan:  No orders of the defined types were placed in this encounter         Meagan Sanchez is a 42y o  year old female with a locally advanced right breast carcinoma   She presented with a palpable right breast mass as well as axillary lymphadenopathy   Her right breast measured 5 2 x 4 7 cm on CT scan along with 2 right axillary lymph nodes measuring 2 4 and 2 3 cm   There was no evident metastatic disease to the lungs or the abdomen or liver   Bone scan was without any evidence of metastatic disease   Recommendations were made for neoadjuvant chemotherapy with TCH and pertuzumab   She then had repeat imaging with an MRI of the breast that revealed reduction in size of her large right upper outer quadrant breast mass along with significant decrease in the degree of enhancement   There was reduction in the right axillary lymphadenopathy   She had surgery June 21, 2018 with a right modified radical mastectomy along with a left breast prophylactic mastectomy and reconstruction  Tanya Second preoperative stage would have been stage III and now post neoadjuvant chemotherapy she has stage IB disease        She was seen for consultation July 17, 2018 and we recommended postmastectomy radiation therapy because initially she had a large 5 2 cm tumor with at least 2 positive axillary lymph nodes and pathologically 3/16 lymph nodes were positive   Postmastectomy radiation therapy has been shown to reduce the chance of local recurrence as well as improve survival   Her disease is hormone receptor positive and she saw Dr Cindy Abraham who recommended tamoxifen that was started August 1, 2018  She had simulation performed in August 2018 for treatment to the reconstructed right chest wall, supraclavicular, and axillary regions over 6 weeks  She had difficulty with healing of her bilateral reconstructed chest wall incisions such that she required revision of the bilateral incision/wound closure on September 19, 2018 with Dr Juan Doshi   She had stopped taking her tamoxifen on October 9, 2018 because she thought this was interfering with her wound healing  Her incisions closed such that she was able to undergo re-simulation October 23, 2018 and then complete radiation therapy to the left supraclavicular, axillary, and reconstructed chest wall regions on December 14, 2018       She returns today for follow-up examination  She is doing well and has no clinical evidence of any recurrent disease  Her reconstructed chest wall skin is without any nodularity and she is applying moisturizer daily  She is tolerating tamoxifen well except for some hot flashes  She has some right upper extremity lymphedema controlled with a compressive sleeve  She will see Dr Taz Castrejon for follow-up today  She has an appointment Dr Jean Bowles on November 7, 2019  She will return here for follow-up in 6 months  Carlie Opitz, MD  8/20/2019,4:10 PM    Portions of the record may have been created with voice recognition software   Occasional wrong word or "sound a like" substitutions may have occurred due to the inherent limitations of voice recognition software   Read the chart carefully and recognize, using context, where substitutions have occurred

## 2019-08-21 ENCOUNTER — OFFICE VISIT (OUTPATIENT)
Dept: HEMATOLOGY ONCOLOGY | Facility: CLINIC | Age: 39
End: 2019-08-21
Payer: COMMERCIAL

## 2019-08-21 VITALS
HEART RATE: 68 BPM | TEMPERATURE: 98.4 F | OXYGEN SATURATION: 99 % | BODY MASS INDEX: 28.53 KG/M2 | RESPIRATION RATE: 18 BRPM | WEIGHT: 161 LBS | HEIGHT: 63 IN | SYSTOLIC BLOOD PRESSURE: 104 MMHG | DIASTOLIC BLOOD PRESSURE: 80 MMHG

## 2019-08-21 DIAGNOSIS — C50.411 MALIGNANT NEOPLASM OF UPPER-OUTER QUADRANT OF RIGHT BREAST IN FEMALE, ESTROGEN RECEPTOR POSITIVE (HCC): Primary | ICD-10-CM

## 2019-08-21 DIAGNOSIS — Z17.0 MALIGNANT NEOPLASM OF UPPER-OUTER QUADRANT OF RIGHT BREAST IN FEMALE, ESTROGEN RECEPTOR POSITIVE (HCC): Primary | ICD-10-CM

## 2019-08-21 PROCEDURE — 99214 OFFICE O/P EST MOD 30 MIN: CPT | Performed by: INTERNAL MEDICINE

## 2019-08-21 RX ORDER — TAMOXIFEN CITRATE 20 MG/1
20 TABLET ORAL DAILY
Qty: 90 TABLET | Refills: 1 | Status: SHIPPED | OUTPATIENT
Start: 2019-08-21 | End: 2019-12-18 | Stop reason: SDUPTHER

## 2019-08-21 NOTE — PROGRESS NOTES
Hematology / Oncology Outpatient Follow Up Note    Rohan Sanchez 42 y o  female KXU:[birth year] JWX:[id number]         Date:  8/21/2019    Assessment / Plan:  A 61-year-old premenopausal woman with locally advanced right breast cancer   She has PALB-2 probable pathogenic mutation   She had quite large palpable right breast mass and axillary adenopathy  This was mucinous histology, grade 2, ER 30% positive, UT 2% positive, HER2 3+ disease  She underwent neoadjuvant chemotherapy with [address] with pertuzumab x6 cycle, resulting in good clinical partial response   She underwent right mastectomy and axillary lymph node dissection as well as left prophylactic mastectomy, resulting in MCKAYLA   She had less than 1 mm of residual mucinous carcinoma as well as 3 positive lymph nodes which has micrometastasis   This is consistent with pathological good partial response  She completed adjuvant trastuzumab monotherapy without cardiac toxicity  She is currently on adjuvant hormonal therapy with tamoxifen 20 mg daily with minimal side effect  Clinically, she has no evidence recurrent disease  I recommended her to continue with tamoxifen 20 mg daily  She is in agreement with my recommendation  I will see her again in 6 months for routine follow-up         Subjective:      HPI:  A 61-year-old premenopausal woman who initially noticed right breast lump when she was 8 months pregnant   She delivered 1st baby in June 2017  Foreign Dural the delivery, she noticed right breast lump to be slowly enlarging   She brought this to medical attention   She underwent mammography which was quite abnormal   Biopsy from right breast at 10:00 position 12 cm from nipple showed mucinous carcinoma, grade 2   This was ER 30 to 40% positive, UT 2 to 3% positive, her 2 3+ disease   She was seen by Dr Felicia Irene who ordered MRI which showed extensive abnormality in her right breast including 3 5 cm of right breast mass as well as 4 cm of axillary adenopathy   She was referred to me to discuss neoadjuvant chemotherapy   She went to [facility] where she was recommended to have [address] P  she presents today with her mother to discuss treatment options  Rina Chavez is somewhat anxious   Otherwise, she feels well   She denied any bone pain   Her weight is stable   She has no respiratory symptoms   Her performance status is normal  She does not have significant past medical history   Her paternal aunt had breast cancer at age of 62   Her 2 paternal grandfather's sister had breast cancer in their 46s   She underwent genetic testing of which result is pending at this time            Interval History:   A 68-year-old premenopausal woman with locally advanced right breast cancer  Rina Chavez has quite large palpable right breast mass and axillary adenopathy  This was mucinous histology, grade 2, ER 30% positive, MO 2% positive, HER2 3+ disease   She was treated with neoadjuvant chemotherapy with TCH with pertuzumab with excellent tolerance x6 cycle which was completed in June 2018   She had clinical good partial response  Subsequently, she underwent mastectomy as well as prophylactic left mastectomy   Right mastectomy specimen showed less than 1 mm of residual mucinous carcinoma as well as 3/16 positive lymph nodes with tumor deposit less than 0 4 mm  She also completed adjuvant trastuzumab monotherapy with no cardiac toxicity  She presents today for follow-up  She has no menstrual cycle since the neoadjuvant chemotherapy  She continued to have moderate hot flashes  Otherwise, she feels well  She has no respiratory symptoms such as cough, sputum production or shortness of breath  She has no complaint of pain  Her weight is stable  Her performance status is normal       Objective:      Primary Diagnosis:     1  Locally advanced right breast cancer with invasive mucinous histology, grade 2, ER 30% positive, MO 2% positive, her 2 3+ disease   Diagnosed in January 2018  2   PALB2 probable pathogenic mutation      Cancer Staging:  Malignant neoplasm of upper-outer quadrant of right breast in female, estrogen receptor positive ([facility] [address] )    Staging form: Breast, AJCC 8th Edition    - Clinical stage from 1/26/2018: Stage IB (cT2(3), cN1, cM0, G2, ER: Positive, NV: Positive, HER2: Positive) - Signed by Rikki Ontiveros MD on 1/26/2018        Previous Hematologic/ Oncologic Treatment:       1  Neoadjuvant chemotherapy with [address] with pertuzumab x6 cycle, completed in June 2018  2    Adjuvant trastuzumab monotherapy from June 2018 through January 2019      Current Hematologic/ Oncologic Treatment:       1   Adjuvant hormonal therapy with tamoxifen since January 2019      Disease Status:      Clinical partial response  Pathologically good partial response  MCKAYLA status post right mastectomy with axillary lymph node dissection and prophylactic left mastectomy      Test Results:     Pathology:     Biopsy from right breast showed invasive mucinous carcinoma, grade 2, ER 30 to 40% positive, NV 2 to 3% positive, HER2 3+ disease      Mastectomy specimen showed less than 1 mm residual mucinous carcinoma within mucin pool   3/16 axillary lymph nodes were positive for metastatic disease with largest dimension measuring 0 4 mm without extranodal extension         Radiology:     Echocardiogram in December 2018 showed ejection fraction 60%     Laboratory:     See below     Physical Exam:        General Appearance:    Alert, oriented          Eyes:    PERRL   Ears:    Normal external ear canals, both ears   Nose:   Nares normal, septum midline   Throat:   Mucosa moist  Pharynx without injection  Neck:   Supple         Lungs:     Clear to auscultation bilaterally   Chest Wall:    No tenderness or deformity    Heart:    Regular rate and rhythm         Abdomen:     Soft, non-tender, bowel sounds +, no organomegaly               Extremities:   Extremities no cyanosis or edema         Skin:   no rash or icterus      Lymph nodes:   Cervical, supraclavicular, and axillary nodes normal   Neurologic:   CNII-XII intact, normal strength, sensation and reflexes     Throughout             Breast exam:  Status post bilateral mastectomy with reconstruction   No palpable abnormality in either reconstructed breast            ROS: Review of Systems   Constitutional:        Hot flashes   All other systems reviewed and are negative  Imaging: No results found  Labs:   Lab Results   Component Value Date    WBC 4 39 11/28/2018    HGB 12 0 11/28/2018    HCT 36 3 11/28/2018    MCV 99 (H) 11/28/2018     11/28/2018     Lab Results   Component Value Date    K 3 2 (L) 06/12/2018     06/12/2018    CO2 27 06/12/2018    BUN 12 06/12/2018    CREATININE 0 94 06/12/2018    GLUF 101 (H) 02/13/2018    CALCIUM 8 4 06/12/2018    AST 24 06/12/2018    ALT 33 06/12/2018    ALKPHOS 84 06/12/2018    EGFR 78 06/12/2018       Current Medications: Reviewed  Allergies: Reviewed  PMH/FH/SH:  Reviewed      Vital Sign:    There is no height or weight on file to calculate BSA      Wt Readings from Last 3 Encounters:   08/20/19 73 2 kg (161 lb 6 4 oz)   04/17/19 73 5 kg (162 lb)   03/21/19 73 kg (161 lb)        Temp Readings from Last 3 Encounters:   08/20/19 98 9 °F (37 2 °C)   04/17/19 (!) 97 1 °F (36 2 °C) (Temporal)   03/21/19 97 5 °F (36 4 °C)        BP Readings from Last 3 Encounters:   08/20/19 102/70   04/17/19 140/80   03/21/19 122/72         Pulse Readings from Last 3 Encounters:   08/20/19 70   04/17/19 68   03/21/19 80     @LASTSAO2(3)@

## 2019-10-25 DIAGNOSIS — F41.1 GENERALIZED ANXIETY DISORDER: ICD-10-CM

## 2019-10-25 RX ORDER — LORAZEPAM 1 MG/1
1 TABLET ORAL
Qty: 90 TABLET | Refills: 0 | Status: SHIPPED | OUTPATIENT
Start: 2019-10-25 | End: 2019-12-26 | Stop reason: SDUPTHER

## 2019-11-07 ENCOUNTER — OFFICE VISIT (OUTPATIENT)
Dept: SURGICAL ONCOLOGY | Facility: CLINIC | Age: 39
End: 2019-11-07
Payer: COMMERCIAL

## 2019-11-07 VITALS
WEIGHT: 165 LBS | SYSTOLIC BLOOD PRESSURE: 100 MMHG | HEART RATE: 86 BPM | RESPIRATION RATE: 16 BRPM | BODY MASS INDEX: 29.23 KG/M2 | TEMPERATURE: 97.4 F | DIASTOLIC BLOOD PRESSURE: 60 MMHG | HEIGHT: 63 IN

## 2019-11-07 DIAGNOSIS — Z17.0 MALIGNANT NEOPLASM OF UPPER-OUTER QUADRANT OF RIGHT BREAST IN FEMALE, ESTROGEN RECEPTOR POSITIVE (HCC): Primary | ICD-10-CM

## 2019-11-07 DIAGNOSIS — C50.411 MALIGNANT NEOPLASM OF UPPER-OUTER QUADRANT OF RIGHT BREAST IN FEMALE, ESTROGEN RECEPTOR POSITIVE (HCC): Primary | ICD-10-CM

## 2019-11-07 DIAGNOSIS — Z79.810 USE OF TAMOXIFEN (NOLVADEX): ICD-10-CM

## 2019-11-07 DIAGNOSIS — I89.0 LYMPHEDEMA: ICD-10-CM

## 2019-11-07 PROCEDURE — 99214 OFFICE O/P EST MOD 30 MIN: CPT | Performed by: NURSE PRACTITIONER

## 2019-11-07 NOTE — PROGRESS NOTES
Surgical Oncology Follow Up       [address] [facility],[address]  [facility] [place]  [address] [facility] [address]    Lissette Sanchez  1980  [id number]  [address] [facility],[address] [place] [address]  [facility] [place]  [address] [facility] [id number]    Chief Complaint   Patient presents with    Breast Cancer     Pt is here for 6 month f/u       Assessment/Plan:  1  Malignant neoplasm of upper-outer quadrant of right breast in female, estrogen receptor positive (HCC)  - Compression sleeve  - Gradient pressure gauntlet  - 6 mo f/u visit    2  Use of tamoxifen (Nolvadex)  - Continue use per medical oncology    3  Lymphedema  - Compression sleeve  - Gradient pressure gauntlet      Discussion/Summary:  Patient is a 42-year-old female who presents today for six-month follow-up visit for right breast cancer diagnosed in January of 2018  Her pathology revealed invasive mucinous carcinoma, ER 30-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, NJ 2-3 percent, her 2 positive  She underwent genetic testing which revealed a likely pathogenic variant to the PALB2 gene  She underwent neoadjuvant chemotherapy and then underwent a right modified radical mastectomy and a left simple mastectomy with Dr Angela Edwards  She did have 3/16 positive lymph nodes  She completed Herceptin monotherapy and radiation therapy  She is currently taking tamoxifen  She has no new complaints today and there are no concerns on today's exam   I encouraged her to attend our oncology dietitian led nutrition classes and offered a referral to medical weight management secondary to her complain of weight gain while taking tamoxifen  I instructed her to call if she is interested in this referral   Otherwise, we will plan to see the patient back in 6 months or sooner if the need arises  She was instructed to call with any new concerns or symptoms prior to that time  All of her questions were answered today      History of Present Illness: Malignant neoplasm of upper-outer quadrant of right breast in female, estrogen receptor positive ([address] )    1/19/2018 Initial Diagnosis     US guided core biopsy of right breast mass at [facility]  Malignant neoplasm of UOQ of right breast,   ER positive  HER2 positive      1/29/2018 Genetic Testing     Likely pathogenic variant was identified in the PALB2 gene  2/2018 - 6/1/2018 Chemotherapy     Neoadjuvant chemotherapy with TCH with pertuzumab  Dr Zach Mason  6/21/2018 Surgery     Right MRM  Invasive mucinous carcinoma  3 4 cm geronimo  Grade 1  3/16 lymph nodes  Stage IB - ypT1mi, ypN1mi, G1  Left simple mastectomy  Benign breast     Immediate reconstruction Dr Raymundo Estrella       7/19/2018 - 1/2019 Chemotherapy     trastuzumab monotherapy every 3 weeks  11/1/2018 - 12/14/2018 Radiation     Course: C1    Plan ID Energy Fractions Dose per Fraction (cGy) Dose Correction (cGy) Total Dose Delivered (cGy) Elapsed Days   LAT R CW bst 6E 5 / 5 200 0 1,000 4   MED R CW bst 6E 5 / 5 200 0 1,000 4   New R PAB2 6X 25 / 25 47 0 1,175 36   New R Sclav2 6X 25 / 25 200 0 5,000 36   [id number] 6X 12 / 12 200 0 2,400 34   JncBklMve65_7 10X 12 / 12 200 0 2,400 34   BwzFwrFK05_16 10X 13 / 13 200 0 2,600 36   NewR CW 10_30 6X 13 / 13 200 0 2,600 36      Treatment dates:  C1: 11/1/2018 - 12/14/2018 1/22/2019 -  Hormone Therapy     Tamoxifen  With Dr Zach Mason          -Interval History:  Patient presents today for six-month follow-up visit for right breast cancer diagnosed in 2018  She notices no changes on self-exam   Denies headaches, back pain, bone pain, cough, shortness of breath, abdominal pain  She does report chronic bilateral foot pain which started after chemotherapy and weight gain which she attributes to tamoxifen use  Review of Systems:  Review of Systems   Constitutional: Negative for activity change, appetite change, chills, fatigue, fever and unexpected weight change     HENT: Negative for trouble swallowing  Eyes: Negative for pain, redness and visual disturbance  Respiratory: Negative for cough, shortness of breath and wheezing  Cardiovascular: Negative for chest pain, palpitations and leg swelling  Gastrointestinal: Negative for abdominal pain, constipation, diarrhea, nausea and vomiting  Endocrine: Negative for cold intolerance and heat intolerance  Musculoskeletal: Positive for arthralgias (bilat foot pain)  Negative for back pain, gait problem and myalgias  Skin: Negative for color change and rash  Neurological: Negative for dizziness, syncope, light-headedness, numbness and headaches  Hematological: Negative for adenopathy  Psychiatric/Behavioral: Negative for agitation and confusion  All other systems reviewed and are negative        Patient Active Problem List   Diagnosis    Asthma    Other constipation    Cigarette nicotine dependence without complication    Macular atrophy, retinal    Seasonal allergies    Malignant neoplasm of upper-outer quadrant of right breast in female, estrogen receptor positive ([facility] [address] )    Anxiety    Use of tamoxifen (Nolvadex)     Past Medical History:   Diagnosis Date    Asthma     allergy excaberated    Breast cancer ([facility] [address] ) 2018    GERD (gastroesophageal reflux disease)     History of adverse reaction to anesthesia     Pt reports waking up severly anxious    Seasonal allergies      Past Surgical History:   Procedure Laterality Date    BREAST RECONSTRUCTION Bilateral 2018    Procedure: RECONSTRUCTION BREAST W/ IMPLANT;  Surgeon: Kingsley Marc MD;  [facility];  Service: Plastics     SECTION      IR PORT REMOVAL  2019    PILONIDAL CYST EXCISION      resection    IN INSJ TUNNELED CTR VAD W/SUBQ PORT AGE 5 YR/> Left 2018    Procedure: INSERTION VENOUS PORT ( PORT-A-CATH) IR;  Surgeon: Tyler Alexander DO;  [facility];  Service: Interventional Radiology    IN MASTECTOMY, MODIFIED RADICAL Right 2018    Procedure: BREAST MODIFIED RADICAL MASTECTOMY;  Surgeon: Zenia Hubbard MD;  [facility];  Service: Surgical Oncology    MN MASTECTOMY, SIMPLE, COMPLETE Left 2018    Procedure: BREAST SIMPLE MASTECTOMY;  Surgeon: Zenia Hubbard MD;  [facility];  Service: Surgical Oncology    MN REVISE BREAST RECONSTRUCTION Bilateral 2018    Procedure: REVISION BREAST RECON Crissie Fausto CLOSURE;  Surgeon: Cassandra Nunez MD;  [facility];  Service: Plastics    TUBAL LIGATION      WISDOM TOOTH EXTRACTION       Family History   Problem Relation Age of Onset    Diabetes Mother     Hypertension Mother     Rosacea Father     Allergies Father         seasonal    Liver cancer Paternal Grandfather     Breast cancer Family      Social History     Socioeconomic History    Marital status: /Civil Union     Spouse name: Not on file    Number of children: Not on file    Years of education: Not on file    Highest education level: Not on file   Occupational History    Not on file   Social Needs    Financial resource strain: Not on file    Food insecurity:     Worry: Not on file     Inability: Not on file    Transportation needs:     Medical: Not on file     Non-medical: Not on file   Tobacco Use    Smoking status: Former Smoker     Last attempt to quit: 2015     Years since quittin 7    Smokeless tobacco: Never Used   Substance and Sexual Activity    Alcohol use: Yes     Comment: rare    Drug use: No    Sexual activity: Yes     Partners: Male     Birth control/protection: Other   Lifestyle    Physical activity:     Days per week: Not on file     Minutes per session: Not on file    Stress: Not on file   Relationships    Social connections:     Talks on phone: Not on file     Gets together: Not on file     Attends Baptism service: Not on file     Active member of club or organization: Not on file     Attends meetings of clubs or organizations: Not on file     Relationship status: Not on file    Intimate partner violence:     Fear of current or ex partner: Not on file     Emotionally abused: Not on file     Physically abused: Not on file     Forced sexual activity: Not on file   Other Topics Concern    Not on file   Social History Narrative    Drinks coffee       Current Outpatient Medications:     albuterol (VENTOLIN HFA) 90 mcg/act inhaler, Inhale 2 puffs every 4 (four) hours as needed, Disp: , Rfl:     levocetirizine (XYZAL) 5 MG tablet, Take 1 tablet by mouth daily, Disp: , Rfl:     LORazepam (ATIVAN) 1 mg tablet, Take 1 tablet (1 mg total) by mouth daily at bedtime, Disp: 90 tablet, Rfl: 0    mometasone (NASONEX) 50 mcg/act nasal spray, 2 sprays into each nostril as needed , Disp: , Rfl:     omeprazole (PriLOSEC) 20 mg delayed release capsule, Take 20 mg by mouth daily , Disp: , Rfl:     tamoxifen (NOLVADEX) 20 mg tablet, Take 1 tablet (20 mg total) by mouth daily, Disp: 90 tablet, Rfl: 1  Allergies   Allergen Reactions    Trichophyton Other (See Comments)     AKA Fungi - Pt does not know reaction     Cat Hair Extract Sneezing    Dust Mite Extract Sneezing    Molds & Smuts Sneezing    Pollen Extract Sneezing    Tree Extract Sneezing     Vitals:    11/07/19 1548   BP: 100/60   Pulse: 86   Resp: 16   Temp: (!) 97 4 °F (36 3 °C)       Physical Exam   Constitutional: She is oriented to person, place, and time  Vital signs are normal  She appears well-developed and well-nourished  No distress  HENT:   Head: Normocephalic and atraumatic  Neck: Normal range of motion  Cardiovascular: Normal rate, regular rhythm and normal heart sounds  Pulmonary/Chest: Effort normal and breath sounds normal    Bilateral reconstructed breasts with implants present  Scarring noted right breast s/p RT  There is presumed scar tissue noted in the middle of the left breast along incision- stable per patient who reports wound healing issues at this location   There is no bilateral supraclavicular or axillary lymphadenopathy noted  There are no skin changes or skin lesions  No obvious right arm lymphedema  Abdominal: Soft  Normal appearance  She exhibits no mass  There is no hepatosplenomegaly  There is no tenderness  Musculoskeletal: Normal range of motion  Lymphadenopathy:     She has no axillary adenopathy  Right: No supraclavicular adenopathy present  Left: No supraclavicular adenopathy present  Neurological: She is alert and oriented to person, place, and time  Skin: Skin is warm, dry and intact  No rash noted  She is not diaphoretic  Psychiatric: She has a normal mood and affect  Her speech is normal    Vitals reviewed  Advance Care Planning/Advance Directives:  Discussed disease status, cancer treatment plans and/or cancer treatment goals with the patient

## 2019-11-18 ENCOUNTER — OFFICE VISIT (OUTPATIENT)
Dept: OBGYN CLINIC | Facility: CLINIC | Age: 39
End: 2019-11-18
Payer: COMMERCIAL

## 2019-11-18 VITALS
DIASTOLIC BLOOD PRESSURE: 72 MMHG | BODY MASS INDEX: 29.41 KG/M2 | SYSTOLIC BLOOD PRESSURE: 118 MMHG | WEIGHT: 166 LBS | HEIGHT: 63 IN

## 2019-11-18 DIAGNOSIS — Z01.419 ENCOUNTER FOR WELL WOMAN EXAM WITH ROUTINE GYNECOLOGICAL EXAM: Primary | ICD-10-CM

## 2019-11-18 PROCEDURE — 99385 PREV VISIT NEW AGE 18-39: CPT | Performed by: OBSTETRICS & GYNECOLOGY

## 2019-11-18 NOTE — PROGRESS NOTES
ASSESSMENT & PLAN: Fredi Ospina is a 42 y o  Trudi Chin with normal gynecologic exam     1   Routine well woman exam done today  2    Pap and HPV:Pap with HPV was not done today  Current ASCCP Guidelines reviewed  Last Pap 1/27/18 :  ASCUS with NEGATIVE high risk HPV  Repeat pap with cotesting due in 3 years, 2021    3   The patient declined STD testing  4  The patient is sexually active  She is s/p tubal ligation for contraception  5  The following were reviewed in today's visit: STD testing, family planning choices, adequate intake of calcium and vitamin D, exercise and healthy diet  6  Patient to return to office in 12 months for annual gyn exam      All questions have been answered to her satisfaction  CC:  Annual Gynecologic Examination    HPI: Fredi Ospina is a 42 y o  Trudi Chin who presents for annual gynecologic examination  She has the following concerns:    - hx of breast cancer, s/p bilateral mastectomy (L benign, R with invasive carcinoma and lymph node resection in R axilla)  S/p reconstruction  Following with Dr Zenaida Gant in Heme/Onc, Dr Fiorella Maradiaga for Surgical Oncology  Currently taking tamoxifen daily and finished radiation treatment with Dr Tony Bernard  - recently restarted menses and reports heavy menstrual bleeding  Will continue to monitor  Discussed that tamoxifen may cause her menses to be different than previously, possibly heavier  Discussed that with the ER/MT positive nature of her breast cancer would not recommend hormonal medications for bleeding control  Patient expresses understanding and states that she does not want any hormonal medication at this time and her bleeding is manageable  Health Maintenance:    She exercises 2 days per week  She wears her seatbelt routinely  She does not perform regular monthly self breast exams  She feels safe at home  Patients does follow a healthy well balanced diet        Past Medical History:   Diagnosis Date    Asthma allergy excaberated    Breast cancer ([facility] [address] ) 2018    GERD (gastroesophageal reflux disease)     Gestational diabetes     History of adverse reaction to anesthesia     Pt reports waking up severly anxious    Seasonal allergies        Past Surgical History:   Procedure Laterality Date    BREAST RECONSTRUCTION Bilateral 2018    Procedure: RECONSTRUCTION BREAST W/ IMPLANT;  Surgeon: Shirley Peralta MD;  [facility];  Service: Plastics     SECTION      IR PORT REMOVAL  2019    PILONIDAL CYST EXCISION      resection    CO INSJ TUNNELED CTR VAD W/SUBQ PORT AGE 5 YR/> Left 2018    Procedure: INSERTION VENOUS PORT ( PORT-A-CATH) IR;  Surgeon: Leah Vazquez DO;  [facility];  Service: Interventional Radiology    CO MASTECTOMY, MODIFIED RADICAL Right 2018    Procedure: BREAST MODIFIED RADICAL MASTECTOMY;  Surgeon: Jamaica Santiago MD;  [facility];  Service: Surgical Oncology    CO MASTECTOMY, SIMPLE, COMPLETE Left 2018    Procedure: BREAST SIMPLE MASTECTOMY;  Surgeon: Jamaica Santiago MD;  [facility];  Service: Surgical Oncology    CO REVISE BREAST RECONSTRUCTION Bilateral 2018    Procedure: REVISION BREAST RECON Lucie Bozman CLOSURE;  Surgeon: Shirley Peralta MD;  [facility];  Service: Plastics    TUBAL LIGATION      WISDOM TOOTH EXTRACTION         Past OB/Gyn History:  Period Cycle (Days): (n/a irregular with chemo therapy )  Period Duration (Days): 5 to 7 days   Period Pattern: (!) Irregular  Menstrual Flow: Heavy  Menstrual Control: Tampon, Hospital pad, Maxi pad  Dysmenorrhea: (!) Mild  Dysmenorrhea Symptoms: CrampingPatient's last menstrual period was 2019 (approximate)    Menstrual History:  OB History        1    Para   1    Term   1            AB   0    Living   1       SAB        TAB        Ectopic        Multiple        Live Births   1           Obstetric Comments   Menarche 8            Menarche age: 8yo  Patient's last menstrual period was 2019 (approximate)  Period Cycle (Days): (n/a irregular with chemo therapy )  Period Duration (Days): 5 to 7 days   Period Pattern: (!) Irregular  Menstrual Flow: Heavy  Menstrual Control: Tampon, [facility] pad, Maxi pad  Dysmenorrhea: (!) Mild  Dysmenorrhea Symptoms: Cramping    History of sexually transmitted infection No  Patient is currently sexually active  heterosexual Birth control: s/p tubal ligation  Last Pap 18 :  ASCUS with NEGATIVE high risk HPV  Repeat pap with cotesting due in 3 years,        Family History   Problem Relation Age of Onset    Diabetes Mother     Hypertension Mother     Rosacea Father     Allergies Father         seasonal    Liver cancer Paternal Grandfather     No Known Problems Brother     No Known Problems Son     Breast cancer Maternal Grandmother     Brain cancer Maternal Grandmother     No Known Problems Maternal Grandfather     Diabetes Paternal Grandmother     Kidney failure Paternal Grandmother     Hypertension Paternal Grandmother     No Known Problems Brother     Breast cancer Paternal Aunt        Social History:  Social History     Socioeconomic History    Marital status: /Civil Union     Spouse name: Not on file    Number of children: Not on file    Years of education: Not on file    Highest education level: Not on file   Occupational History    Not on file   Social Needs    Financial resource strain: Not on file    Food insecurity:     Worry: Not on file     Inability: Not on file    Transportation needs:     Medical: Not on file     Non-medical: Not on file   Tobacco Use    Smoking status: Former Smoker     Last attempt to quit: 2015     Years since quittin 8    Smokeless tobacco: Never Used   Substance and Sexual Activity    Alcohol use: Yes     Comment: rare    Drug use: Never    Sexual activity: Yes     Partners: Male     Birth control/protection: Female Sterilization   Lifestyle    Physical activity:     Days per week: Not on file     Minutes per session: Not on file    Stress: Not on file   Relationships    Social connections:     Talks on phone: Not on file     Gets together: Not on file     Attends Nondenominational service: Not on file     Active member of club or organization: Not on file     Attends meetings of clubs or organizations: Not on file     Relationship status: Not on file    Intimate partner violence:     Fear of current or ex partner: Not on file     Emotionally abused: Not on file     Physically abused: Not on file     Forced sexual activity: Not on file   Other Topics Concern    Not on file   Social History Narrative    Drinks coffee     Presently lives with  and son, Ganesh Ramires  Patient is   Patient is currently employed  Allergies   Allergen Reactions    Trichophyton Other (See Comments)     AKA Fungi - Pt does not know reaction     Cat Hair Extract Sneezing    Dust Mite Extract Sneezing    Molds & Smuts Sneezing    Pollen Extract Sneezing    Tree Extract Sneezing       Current Outpatient Medications:     albuterol (VENTOLIN HFA) 90 mcg/act inhaler, Inhale 2 puffs every 4 (four) hours as needed, Disp: , Rfl:     levocetirizine (XYZAL) 5 MG tablet, Take 1 tablet by mouth daily, Disp: , Rfl:     LORazepam (ATIVAN) 1 mg tablet, Take 1 tablet (1 mg total) by mouth daily at bedtime, Disp: 90 tablet, Rfl: 0    mometasone (NASONEX) 50 mcg/act nasal spray, 2 sprays into each nostril as needed , Disp: , Rfl:     omeprazole (PriLOSEC) 20 mg delayed release capsule, Take 20 mg by mouth daily , Disp: , Rfl:     tamoxifen (NOLVADEX) 20 mg tablet, Take 1 tablet (20 mg total) by mouth daily, Disp: 90 tablet, Rfl: 1    Review of Systems:  Review of Systems    Physical Exam:  /72   Ht 5' 3" (1 6 m)   Wt 75 3 kg (166 lb)   LMP 11/04/2019 (Approximate) Comment: n/a irregular with chemo therapy   Breastfeeding?  No   BMI 29 41 kg/m²    Physical Exam Constitutional: She is oriented to person, place, and time  She appears well-developed and well-nourished  No distress  Genitourinary: Uterus normal  There is no rash, tenderness or lesion on the right labia  There is no rash, tenderness or lesion on the left labia  Vagina exhibits rugosity  No erythema, tenderness or bleeding in the vagina  No vaginal discharge found  Right adnexum does not display mass, does not display tenderness and does not display fullness  Left adnexum does not display mass, does not display tenderness and does not display fullness  Cervix exhibits pinkness  Cervix does not exhibit motion tenderness, lesion, discharge, friability or polyp  Uterus is mobile and anteverted  Uterus is not enlarged, tender or exhibiting a mass  HENT:   Head: Normocephalic and atraumatic  Neck: Normal range of motion  Cardiovascular: Normal rate  Pulmonary/Chest: Effort normal  No respiratory distress  Right breast exhibits no mass and no tenderness  Left breast exhibits no mass and no tenderness  Bilateral breast implants visualized and palpated  Nipple surgically absent  No abnormalities, skin retractions or dimpling visualized  Scarring from breast reconstruction healing well  No masses palpated in bilateral axilla  Abdominal: Soft  She exhibits no mass  There is no tenderness  There is no guarding  Musculoskeletal: Normal range of motion  She exhibits no edema  Neurological: She is alert and oriented to person, place, and time  Skin: Skin is warm and dry  She is not diaphoretic  No erythema  No pallor  Psychiatric: She has a normal mood and affect  Her behavior is normal  Judgment and thought content normal    Nursing note and vitals reviewed

## 2019-12-18 DIAGNOSIS — C50.411 MALIGNANT NEOPLASM OF UPPER-OUTER QUADRANT OF RIGHT BREAST IN FEMALE, ESTROGEN RECEPTOR POSITIVE (HCC): Primary | ICD-10-CM

## 2019-12-18 DIAGNOSIS — Z17.0 MALIGNANT NEOPLASM OF UPPER-OUTER QUADRANT OF RIGHT BREAST IN FEMALE, ESTROGEN RECEPTOR POSITIVE (HCC): Primary | ICD-10-CM

## 2019-12-18 RX ORDER — TAMOXIFEN CITRATE 20 MG/1
20 TABLET ORAL DAILY
Qty: 90 TABLET | Refills: 1 | Status: SHIPPED | OUTPATIENT
Start: 2019-12-18 | End: 2019-12-20 | Stop reason: SDUPTHER

## 2019-12-18 NOTE — TELEPHONE ENCOUNTER
Call transferred from DOYLE Ann from patient asking for refill on Tamoxifen 20 mg (90 day supply)  Please send to pharm on file  Wants refill sent ASAP as she will be changing carriers and doesn't see Dr Garima Sinclair till March 2020

## 2019-12-20 DIAGNOSIS — C50.411 MALIGNANT NEOPLASM OF UPPER-OUTER QUADRANT OF RIGHT BREAST IN FEMALE, ESTROGEN RECEPTOR POSITIVE (HCC): ICD-10-CM

## 2019-12-20 DIAGNOSIS — Z17.0 MALIGNANT NEOPLASM OF UPPER-OUTER QUADRANT OF RIGHT BREAST IN FEMALE, ESTROGEN RECEPTOR POSITIVE (HCC): ICD-10-CM

## 2019-12-20 RX ORDER — TAMOXIFEN CITRATE 20 MG/1
20 TABLET ORAL DAILY
Qty: 90 TABLET | Refills: 0 | OUTPATIENT
Start: 2019-12-20 | End: 2020-03-23 | Stop reason: SDUPTHER

## 2019-12-20 NOTE — TELEPHONE ENCOUNTER
Patient called yesterday, would like tamoxifen now called in to local pharmacy for 90 day refill due to insurance change in [place] with larger copay

## 2019-12-26 DIAGNOSIS — F41.1 GENERALIZED ANXIETY DISORDER: ICD-10-CM

## 2019-12-26 RX ORDER — LORAZEPAM 1 MG/1
TABLET ORAL
Qty: 90 TABLET | Refills: 0 | Status: SHIPPED | OUTPATIENT
Start: 2019-12-26 | End: 2020-01-21

## 2020-01-20 DIAGNOSIS — F41.1 GENERALIZED ANXIETY DISORDER: ICD-10-CM

## 2020-01-21 RX ORDER — LORAZEPAM 1 MG/1
TABLET ORAL
Qty: 90 TABLET | Refills: 0 | Status: SHIPPED | OUTPATIENT
Start: 2020-01-21 | End: 2020-07-15

## 2020-02-04 ENCOUNTER — TELEPHONE (OUTPATIENT)
Dept: FAMILY MEDICINE CLINIC | Facility: CLINIC | Age: 40
End: 2020-02-04

## 2020-02-04 NOTE — TELEPHONE ENCOUNTER
Patient called and stated that she has been having neck pain and muscle spasms for 2 weeks and wanted to know if there was anyway Dr Bruna Lazcano can squeeze her in tomorrow between 11 and 1 or any other day after 4pm   Please advise

## 2020-02-05 ENCOUNTER — OFFICE VISIT (OUTPATIENT)
Dept: FAMILY MEDICINE CLINIC | Facility: CLINIC | Age: 40
End: 2020-02-05
Payer: COMMERCIAL

## 2020-02-05 VITALS
DIASTOLIC BLOOD PRESSURE: 68 MMHG | OXYGEN SATURATION: 97 % | SYSTOLIC BLOOD PRESSURE: 128 MMHG | RESPIRATION RATE: 16 BRPM | TEMPERATURE: 99.3 F | BODY MASS INDEX: 30.15 KG/M2 | WEIGHT: 170.2 LBS | HEART RATE: 84 BPM

## 2020-02-05 DIAGNOSIS — M54.2 NECK PAIN ON RIGHT SIDE: Primary | ICD-10-CM

## 2020-02-05 PROCEDURE — 1036F TOBACCO NON-USER: CPT | Performed by: FAMILY MEDICINE

## 2020-02-05 PROCEDURE — 99213 OFFICE O/P EST LOW 20 MIN: CPT | Performed by: FAMILY MEDICINE

## 2020-02-05 RX ORDER — NORGESTIMATE AND ETHINYL ESTRADIOL 7DAYSX3 28
KIT ORAL
COMMUNITY
End: 2020-02-18 | Stop reason: ALTCHOICE

## 2020-02-05 RX ORDER — VARENICLINE TARTRATE 1 MG/1
TABLET, FILM COATED ORAL
COMMUNITY
End: 2020-02-18 | Stop reason: ALTCHOICE

## 2020-02-05 RX ORDER — MULTIVIT-MINERALS/FA/LYCOPENE 0.4 MG-6
TABLET ORAL
COMMUNITY
End: 2020-11-18

## 2020-02-05 RX ORDER — CYCLOBENZAPRINE HCL 5 MG
5 TABLET ORAL 3 TIMES DAILY PRN
Qty: 30 TABLET | Refills: 1 | Status: SHIPPED | OUTPATIENT
Start: 2020-02-05 | End: 2020-03-24 | Stop reason: ALTCHOICE

## 2020-02-05 RX ORDER — LEVOCETIRIZINE DIHYDROCHLORIDE 5 MG/1
5 TABLET, FILM COATED ORAL EVERY EVENING
COMMUNITY
Start: 2011-08-26

## 2020-02-05 NOTE — PROGRESS NOTES
Assessment/Plan:    No problem-specific Assessment & Plan notes found for this encounter  Diagnoses and all orders for this visit:    Neck pain on right side  Comments:  Pt stable; musc strain  Treat with heat to the region, OTC Tylenol/Advil PRN, stretching, Cyclobenzaprine PRN muscle spasm  PT referral placed  Orders:  -     Ambulatory referral to Physical Therapy; Future  -     cyclobenzaprine (FLEXERIL) 5 mg tablet; Take 1 tablet (5 mg total) by mouth 3 (three) times a day as needed for muscle spasms    Other orders  -     Multiple Vitamins-Minerals (PX MENS MULTIVITAMINS) TABS; Take by mouth  -     norgestimate-ethinyl estradiol (TRI-SPRINTEC) 0 18/0 215/0 25 MG-35 MCG per tablet; Take by mouth  -     varenicline (CHANTIX) 1 mg tablet; Take by mouth  -     levocetirizine (XYZAL) 5 MG tablet; Take by mouth          Subjective:      Patient ID: Anjel Hanley is a 42 y o  female  Pt presents today with the c/o right lower neck stiffness over the last few weeks  Has an old script for Flexeril, which did help  Possibly slept funny  No radicular pain        Hx of BTL in past       The following portions of the patient's history were reviewed and updated as appropriate: allergies, current medications, past family history, past social history, past surgical history and problem list     Past Medical History:   Diagnosis Date    Asthma     allergy excaberated    Breast cancer ([facility] [address] ) 2018    GERD (gastroesophageal reflux disease)     Gestational diabetes     History of adverse reaction to anesthesia     Pt reports waking up severly anxious    Seasonal allergies      Past Surgical History:   Procedure Laterality Date    BREAST RECONSTRUCTION Bilateral 2018    Procedure: RECONSTRUCTION BREAST W/ IMPLANT;  Surgeon: Kingsley Marc MD;  [facility];  Service: Plastics     SECTION      IR PORT REMOVAL  2019    PILONIDAL CYST EXCISION      resection    ND INSJ TUNNELED CTR VAD W/SUBQ PORT AGE 5 YR/> Left 2/13/2018    Procedure: INSERTION VENOUS PORT ( PORT-A-CATH) IR;  Surgeon: Angelica Frederick DO;  [facility];  Service: Interventional Radiology    TN MASTECTOMY, MODIFIED RADICAL Right 6/21/2018    Procedure: BREAST MODIFIED RADICAL MASTECTOMY;  Surgeon: Ladonna Link MD;  [facility];  Service: Surgical Oncology    TN MASTECTOMY, SIMPLE, COMPLETE Left 6/21/2018    Procedure: BREAST SIMPLE MASTECTOMY;  Surgeon: Ladonna Link MD;  [facility];  Service: Surgical Oncology    TN REVISE BREAST RECONSTRUCTION Bilateral 9/19/2018    Procedure: REVISION BREAST RECON W/WOUND CLOSURE;  Surgeon: Arie Friedman MD;  [facility];  Service: Plastics    TUBAL LIGATION      WISDOM TOOTH EXTRACTION         Current Outpatient Medications:     levocetirizine (XYZAL) 5 MG tablet, Take by mouth, Disp: , Rfl:     albuterol (VENTOLIN HFA) 90 mcg/act inhaler, Inhale 2 puffs every 4 (four) hours as needed, Disp: , Rfl:     cyclobenzaprine (FLEXERIL) 5 mg tablet, Take 1 tablet (5 mg total) by mouth 3 (three) times a day as needed for muscle spasms, Disp: 30 tablet, Rfl: 1    levocetirizine (XYZAL) 5 MG tablet, Take 1 tablet by mouth daily, Disp: , Rfl:     LORazepam (ATIVAN) 1 mg tablet, TAKE 1 TABLET BY MOUTH DAILY AT BEDTIME, Disp: 90 tablet, Rfl: 0    mometasone (NASONEX) 50 mcg/act nasal spray, 2 sprays into each nostril as needed , Disp: , Rfl:     Multiple Vitamins-Minerals (PX MENS MULTIVITAMINS) TABS, Take by mouth, Disp: , Rfl:     norgestimate-ethinyl estradiol (TRI-SPRINTEC) 0 18/0 215/0 25 MG-35 MCG per tablet, Take by mouth, Disp: , Rfl:     omeprazole (PriLOSEC) 20 mg delayed release capsule, Take 20 mg by mouth daily , Disp: , Rfl:     tamoxifen (NOLVADEX) 20 mg tablet, Take 1 tablet (20 mg total) by mouth daily, Disp: 90 tablet, Rfl: 0    varenicline (CHANTIX) 1 mg tablet, Take by mouth, Disp: , Rfl:     Allergies   Allergen Reactions    Trichophyton Other (See Comments)     AKA Fungi - Pt does not know reaction     Cat Hair Extract Sneezing    Dust Mite Extract Sneezing    Molds & Smuts Sneezing    Pollen Extract Sneezing    Tree Extract Sneezing     Social History     Tobacco Use    Smoking status: Former Smoker     Last attempt to quit: 2015     Years since quittin 0    Smokeless tobacco: Never Used   Substance Use Topics    Alcohol use: Yes     Comment: rare    Drug use: Never         Review of Systems   Constitutional: Negative for activity change and fever  Musculoskeletal: Positive for neck pain  Neurological:        No radicular pain  Objective:      /68   Pulse 84   Temp 99 3 °F (37 4 °C)   Resp 16   Wt 77 2 kg (170 lb 3 2 oz)   SpO2 97%   BMI 30 15 kg/m²          Physical Exam   Constitutional: She is oriented to person, place, and time  She appears well-developed and well-nourished  No distress  HENT:   Head: Normocephalic and atraumatic  Neck:       Neurological: She is alert and oriented to person, place, and time  She has normal strength  No sensory deficit  Reflex Scores:       Tricep reflexes are 2+ on the right side and 2+ on the left side  Bicep reflexes are 2+ on the right side and 2+ on the left side  Brachioradialis reflexes are 2+ on the right side and 2+ on the left side  MS +5/5 bilateral upper extremities  Skin: She is not diaphoretic  Psychiatric: Her behavior is normal  Judgment and thought content normal    Nursing note and vitals reviewed

## 2020-02-18 ENCOUNTER — CLINICAL SUPPORT (OUTPATIENT)
Dept: RADIATION ONCOLOGY | Facility: HOSPITAL | Age: 40
End: 2020-02-18
Attending: RADIOLOGY
Payer: COMMERCIAL

## 2020-02-18 VITALS
TEMPERATURE: 98.8 F | HEART RATE: 78 BPM | SYSTOLIC BLOOD PRESSURE: 120 MMHG | BODY MASS INDEX: 30.11 KG/M2 | RESPIRATION RATE: 12 BRPM | DIASTOLIC BLOOD PRESSURE: 80 MMHG | WEIGHT: 170 LBS

## 2020-02-18 DIAGNOSIS — Z17.0 MALIGNANT NEOPLASM OF UPPER-OUTER QUADRANT OF RIGHT BREAST IN FEMALE, ESTROGEN RECEPTOR POSITIVE (HCC): Primary | ICD-10-CM

## 2020-02-18 DIAGNOSIS — C50.411 MALIGNANT NEOPLASM OF UPPER-OUTER QUADRANT OF RIGHT BREAST IN FEMALE, ESTROGEN RECEPTOR POSITIVE (HCC): Primary | ICD-10-CM

## 2020-02-18 DIAGNOSIS — Z79.810 USE OF TAMOXIFEN (NOLVADEX): ICD-10-CM

## 2020-02-18 PROCEDURE — 99211 OFF/OP EST MAY X REQ PHY/QHP: CPT | Performed by: RADIOLOGY

## 2020-02-18 NOTE — PROGRESS NOTES
Follow-up - Radiation Oncology   Eleonora Sanchez 1980 42 y o  female [id number]      History of Present Illness   Cancer Staging  Malignant neoplasm of upper-outer quadrant of right breast in female, estrogen receptor positive ([facility] [address] )  Staging form: Breast, AJCC 8th Edition  - Clinical stage from 1/26/2018: Stage IB (cT2(3), cN1, cM0, G2, ER: Positive, WV: Positive, HER2: Positive) - Signed by Miko Mario MD on 1/26/2018  Laterality: Right  Tumor size (mm): 35  Multiple tumors: Yes  Number of tumors: 3  Method of lymph node assessment: Other  Nuclear grade: G3  Histologic grading system: 3 grade system      Eleonora Sanhcez is a 42y o  year old female who presents for 6 month follow up for breast cancer  Completed post-mastectomy radiation to the right chest wall on 12/14/18  Last seen on 8/20/19  Interval History:   8/21/19 Latisha Salcido: Tamoxifen  MCKAYLA  F/U 6 months     11/7/19 Efraín: MCKAYLA, F/U 6 months       11/18/19 Lum, OBGYN:   Normal annual GYN exam      She reports she is feeling well  She denies any fatigue  She continues to have hot flashes with sweating at times from tamoxifen  She denies any chest wall masses bilaterally  She has no difficulty with her surgical incisions and reports no nodularity  She has no significant right arm edema with the use of compressive sleeve  She does get some mild right hand edema but the compressive sleeve prevents this  She did obtain a new compressive sleeve is in December 2019  In addition to working full-time, she is busy caring for her son that will turn 3 in June of this year       2/25/2020 Latisha Ore  5/12/2020 Elvia Lynn    Historical Information      Malignant neoplasm of upper-outer quadrant of right breast in female, estrogen receptor positive ([facility] [address] )    1/19/2018 Initial Diagnosis     US guided core biopsy of right breast mass at [facility]     Malignant neoplasm of UOQ of right breast,   ER positive  HER2 positive      1/29/2018 Genetic Testing     Likely pathogenic variant was identified in the PALB2 gene  2018 - 2018 Chemotherapy     Neoadjuvant chemotherapy with TCH with pertuzumab  Dr Jeremy Lundberg  2018 Surgery     Right MRM  Invasive mucinous carcinoma  3 4 cm geronimo  Grade 1  3/16 lymph nodes  Stage IB - ypT1mi, ypN1mi, G1  Left simple mastectomy  Benign breast     Immediate reconstruction Dr Matthieu Hill       2018 - 2019 Chemotherapy     trastuzumab monotherapy every 3 weeks          2018 - 2018 Radiation     Course: C1    Plan ID Energy Fractions Dose per Fraction (cGy) Dose Correction (cGy) Total Dose Delivered (cGy) Elapsed Days   LAT R CW bst 6E 5 / 5 200 0 1,000 4   MED R CW bst 6E 5 / 5 200 0 1,000 4   New R PAB2 6X 25 / 25 47 0 1,175 36   New R Sclav2 6X 25 / 25 200 0 5,000 36   BpjFZBny89_72 6X 12 / 12 200 0 2,400 34   HyqKbnIqd77_9 10X 12 / 12 200 0 2,400 34   UpuLejND32_02 10X 13 /  200 0 2,600 39   NewR CW 10_30 6X 13 / 13 200 0 2,600 36      Treatment dates:  C1: 2018 - 2018 -  Hormone Therapy     Tamoxifen  With Dr Jeremy Lundberg         Past Medical History:   Diagnosis Date    Asthma     allergy excaberated    Breast cancer ([facility] [address] ) 2018    GERD (gastroesophageal reflux disease)     Gestational diabetes     History of adverse reaction to anesthesia     Pt reports waking up severly anxious    Seasonal allergies      Past Surgical History:   Procedure Laterality Date    BREAST RECONSTRUCTION Bilateral 2018    Procedure: RECONSTRUCTION BREAST W/ IMPLANT;  Surgeon: Arie Friedman MD;  [facility];  Service: Plastics     SECTION      IR PORT REMOVAL  2019    PILONIDAL CYST EXCISION      resection    IN INSJ TUNNELED CTR VAD W/SUBQ PORT AGE 5 YR/> Left 2018    Procedure: INSERTION VENOUS PORT ( PORT-A-CATH) IR;  Surgeon: Angelica Frederick DO;  [facility];  Service: Interventional Radiology    IN MASTECTOMY, MODIFIED RADICAL Right 2018 Procedure: BREAST MODIFIED RADICAL MASTECTOMY;  Surgeon: Shannan Lu MD;  [facility];  Service: Surgical Oncology    TX MASTECTOMY, SIMPLE, COMPLETE Left 2018    Procedure: BREAST SIMPLE MASTECTOMY;  Surgeon: Shannan Lu MD;  [facility];  Service: Surgical Oncology    TX REVISE BREAST RECONSTRUCTION Bilateral 2018    Procedure: REVISION BREAST RECON W/WOUND CLOSURE;  Surgeon: Sreekanth Wang MD;  [facility];  Service: Plastics    TUBAL LIGATION      WISDOM TOOTH EXTRACTION         Social History   Social History     Substance and Sexual Activity   Alcohol Use Yes    Comment: rare     Social History     Substance and Sexual Activity   Drug Use Never     Social History     Tobacco Use   Smoking Status Former Smoker    Last attempt to quit: 2015    Years since quittin 0   Smokeless Tobacco Never Used     Meds/Allergies     Current Outpatient Medications:     albuterol (VENTOLIN HFA) 90 mcg/act inhaler, Inhale 2 puffs every 4 (four) hours as needed, Disp: , Rfl:     cyclobenzaprine (FLEXERIL) 5 mg tablet, Take 1 tablet (5 mg total) by mouth 3 (three) times a day as needed for muscle spasms, Disp: 30 tablet, Rfl: 1    levocetirizine (XYZAL) 5 MG tablet, Take 1 tablet by mouth daily, Disp: , Rfl:     levocetirizine (XYZAL) 5 MG tablet, Take by mouth, Disp: , Rfl:     LORazepam (ATIVAN) 1 mg tablet, TAKE 1 TABLET BY MOUTH DAILY AT BEDTIME, Disp: 90 tablet, Rfl: 0    mometasone (NASONEX) 50 mcg/act nasal spray, 2 sprays into each nostril as needed , Disp: , Rfl:     omeprazole (PriLOSEC) 20 mg delayed release capsule, Take 20 mg by mouth daily , Disp: , Rfl:     tamoxifen (NOLVADEX) 20 mg tablet, Take 1 tablet (20 mg total) by mouth daily, Disp: 90 tablet, Rfl: 0    Multiple Vitamins-Minerals (PX MENS MULTIVITAMINS) TABS, Take by mouth, Disp: , Rfl:   Allergies   Allergen Reactions    Trichophyton Other (See Comments)     AKA Fungi - Pt does not know reaction     Cat Hair Extract Sneezing    Dust Mite Extract Sneezing    Molds & Smuts Sneezing    Pollen Extract Sneezing    Tree Extract Sneezing     Review of Systems   Constitutional: Positive for diaphoresis (hot flashes)  HENT: Negative  Eyes: Negative  Respiratory: Negative  Cardiovascular: Negative  Gastrointestinal: Negative  Endocrine: Negative  Genitourinary: Negative  Musculoskeletal:        Breast is tighter than non treated side  Using compression sleeve  Sees very slight swelling in hand  Some soreness in axilla  Under arm tingling sensation is present  Skin: Negative  Allergic/Immunologic: Negative  Neurological: Negative  Hematological: Negative  Psychiatric/Behavioral: Negative  OBJECTIVE:   /80   Pulse 78   Temp 98 8 °F (37 1 °C)   Resp 12   Wt 77 1 kg (170 lb)   BMI 30 11 kg/m²   Pain Assessment:  0  ECOG/Zubrod/WHO: 1 - Symptomatic but completely ambulatory    Physical Exam   Constitutional: She is oriented to person, place, and time  She appears well-developed and well-nourished  No distress  HENT:   Head: Normocephalic and atraumatic  Mouth/Throat: No oropharyngeal exudate  Eyes: Pupils are equal, round, and reactive to light  Conjunctivae and EOM are normal  No scleral icterus  Neck: Normal range of motion  Neck supple  No tracheal deviation present  No thyromegaly present  Cardiovascular: Normal rate, regular rhythm and normal heart sounds     Pulmonary/Chest: Effort normal and breath sounds normal  No respiratory distress  She has no wheezes  She has no rales  She exhibits no tenderness    Bilateral reconstructed chest wall mastectomy scars are well healed without any nodularity and no erythema   Bilateral permanent breast prosthesis are intact     Abdominal: Soft  Bowel sounds are normal  She exhibits no distension and no mass  There is no tenderness  There is no rebound and no guarding  Musculoskeletal: Normal range of motion   She exhibits no edema or tenderness  There is no edema of the right upper extremity, but there is mild edema of the right hand and she is wearing a compressive sleeve  Lymphadenopathy:     She has no cervical adenopathy      She has no axillary adenopathy         Right: No supraclavicular adenopathy present         Left: No supraclavicular adenopathy present  Neurological: She is alert and oriented to person, place, and time  No cranial nerve deficit  Coordination normal    Skin: Skin is warm and dry  No rash noted  She is not diaphoretic  No erythema  No pallor  Psychiatric: She has a normal mood and affect  Her behavior is normal  Judgment and thought content normal    Nursing note and vitals reviewed      RESULTS    Lab Results: No results found for this or any previous visit (from the past 672 hour(s))  Imaging Studies:No results found  Assessment/Plan:  No orders of the defined types were placed in this encounter         Mary Beth Garcia Sanjaykiel is a 42y o  year old female with a locally advanced right breast carcinoma   She presented with a palpable right breast mass as well as axillary lymphadenopathy   Her right breast measured 5 2 x 4 7 cm on CT scan along with 2 right axillary lymph nodes measuring 2 4 and 2 3 cm   There was no evident metastatic disease to the lungs or the abdomen or liver   Bone scan was without any evidence of metastatic disease   Recommendations were made for neoadjuvant chemotherapy with TCH and pertuzumab   She then had repeat imaging with an MRI of the breast that revealed reduction in size of her large right upper outer quadrant breast mass along with significant decrease in the degree of enhancement   There was reduction in the right axillary lymphadenopathy   She had surgery June 21, 2018 with a right modified radical mastectomy along with a left breast prophylactic mastectomy and reconstruction   Her preoperative stage would have been stage III and post neoadjuvant chemotherapy she has stage IB disease        She was seen for consultation July 17, 2018 and we recommended postmastectomy radiation therapy because initially she had a large 5 2 cm tumor with at least 2 positive axillary lymph nodes and pathologically 3/16 lymph nodes were positive   Postmastectomy radiation therapy has been shown to reduce the chance of local recurrence as well as improve survival   Her disease is hormone receptor positive and she saw Dr Maryjane Ross who recommended tamoxifen that was started August 1, 2018  She had simulation performed in August 2018 for treatment to the reconstructed right chest wall, supraclavicular, and axillary regions over 6 weeks   She had difficulty with healing of her bilateral reconstructed chest wall incisions such that she required revision of the bilateral incision/wound closure on September 19, 2018 with Dr Anyi Patel had stopped taking her tamoxifen on October 9, 2018 because she thought this was interfering with her wound healing   Her incisions closed such that she was able to undergo re-simulation October 23, 2018 and then complete radiation therapy to the left supraclavicular, axillary, and reconstructed chest wall regions on December 14, 2018       She returns today for follow-up examination  Chelle Hayward is doing well and has no clinical evidence of any recurrent disease  Her reconstructed chest wall skin is without any nodularity and she is applying moisturizer daily   She is tolerating tamoxifen well except for some hot flashes  She has some right upper extremity lymphedema controlled with a compressive sleeve    She will see Dr Maryjane Ross for follow-up next week  Chelle Haywrad has an appointment Dr Yamileth Patterson on May 12, 2020   She will return here for follow-up in 12 months        Charlaine Boast, MD  2/18/2020,4:10 PM    Portions of the record may have been created with voice recognition software   Occasional wrong word or "sound a like" substitutions may have occurred due to the inherent limitations of voice recognition software   Read the chart carefully and recognize, using context, where substitutions have occurred

## 2020-02-18 NOTE — PROGRESS NOTES
Vladimir Rahman Dhuyveaunj 1980 is a 42 y o  female       Follow up visit   Presents for 6 month follow up for breast cancer  Completed post-mastectomy radiation to the right chest wall on 12/14/18  Last seen on 8/20/19 8/21/19 Nadia Montero: Tamoxifen  MCKAYLA  F/U 6 months    11/7/19 Efraín: MCKAYLA, F/U 6 months  11/18/19 Lum, OBGYN:   Normal annual GYN exam      2/25/2020 Nadia Montero  5/12/2020 Efraín         Malignant neoplasm of upper-outer quadrant of right breast in female, estrogen receptor positive ([facility] [address] )    1/19/2018 Initial Diagnosis     US guided core biopsy of right breast mass at [facility]  Malignant neoplasm of UOQ of right breast,   ER positive  HER2 positive      1/29/2018 Genetic Testing     Likely pathogenic variant was identified in the PALB2 gene  2/2018 - 6/1/2018 Chemotherapy     Neoadjuvant chemotherapy with TCH with pertuzumab  Dr Nadia Montero  6/21/2018 Surgery     Right MRM  Invasive mucinous carcinoma  3 4 cm geronimo  Grade 1  3/16 lymph nodes  Stage IB - ypT1mi, ypN1mi, G1  Left simple mastectomy  Benign breast     Immediate reconstruction Dr Lennie Desir       7/19/2018 - 1/2019 Chemotherapy     trastuzumab monotherapy every 3 weeks          11/1/2018 - 12/14/2018 Radiation     Course: C1    Plan ID Energy Fractions Dose per Fraction (cGy) Dose Correction (cGy) Total Dose Delivered (cGy) Elapsed Days   LAT R CW bst 6E 5 / 5 200 0 1,000 4   MED R CW bst 6E 5 / 5 200 0 1,000 4   New R PAB2 6X 25 / 25 47 0 1,175 36   New R Sclav2 6X 25 / 25 200 0 5,000 36   KryLTBbj69_82 6X 12 / 12 200 0 2,400 34   YgaAyaAqu24_6 10X 12 / 12 200 0 2,400 34   UxdJeiGM38_12 10X 13 / 13 200 0 2,600 36   NewR CW 10_30 6X 13 / 13 200 0 2,600 36      Treatment dates:  C1: 11/1/2018 - 12/14/2018 1/22/2019 -  Hormone Therapy     Tamoxifen  With Dr Nadia Montero         Clinical Trial: no      Health Maintenance   Topic Date Due    Pneumococcal Vaccine: Pediatrics (0 to 5 Years) and At-Risk Patients (6 to 59 Years) (1 of 1 - PPSV23) 1988    HIV Screening  1997    Cervical Cancer Screening  01/15/2020    BMI: Followup Plan  2020    Depression Screening PHQ  2020    Annual Physical  2020    BMI: Adult  2021    DTaP,Tdap,and Td Vaccines (3 - Td) 2027    Pneumococcal Vaccine: 65+ Years (1 of 2 - PCV13) 2047    Influenza Vaccine  Completed    HIB Vaccine  Aged Out    Hepatitis B Vaccine  Aged Out    IPV Vaccine  Aged Out    Hepatitis A Vaccine  Aged Out    Meningococcal ACWY Vaccine  Aged Out    HPV Vaccine  Aged Out       Patient Active Problem List   Diagnosis    Asthma    Other constipation    Cigarette nicotine dependence without complication    Macular atrophy, retinal    Seasonal allergies    Malignant neoplasm of upper-outer quadrant of right breast in female, estrogen receptor positive ([facility] [address] )    Anxiety    Use of tamoxifen (Nolvadex)     Past Medical History:   Diagnosis Date    Asthma     allergy excaberated    Breast cancer ([facility] [address] ) 2018    GERD (gastroesophageal reflux disease)     Gestational diabetes     History of adverse reaction to anesthesia     Pt reports waking up severly anxious    Seasonal allergies      Past Surgical History:   Procedure Laterality Date    BREAST RECONSTRUCTION Bilateral 2018    Procedure: RECONSTRUCTION BREAST W/ IMPLANT;  Surgeon: Giovanny Meek MD;  [facility];  Service: Plastics     SECTION      IR PORT REMOVAL  2019    PILONIDAL CYST EXCISION      resection    CA INSJ TUNNELED CTR VAD W/SUBQ PORT AGE 5 YR/> Left 2018    Procedure: INSERTION VENOUS PORT ( PORT-A-CATH) IR;  Surgeon: Damien Benitez DO;  [facility];  Service: Interventional Radiology    CA MASTECTOMY, MODIFIED RADICAL Right 2018    Procedure: BREAST MODIFIED RADICAL MASTECTOMY;  Surgeon: Nitish Wilde MD;  [facility];  Service: Surgical Oncology    CA MASTECTOMY, SIMPLE, COMPLETE Left 2018 Procedure: BREAST SIMPLE MASTECTOMY;  Surgeon: Ruben Smiley MD;  [facility];  Service: Surgical Oncology    AR REVISE BREAST RECONSTRUCTION Bilateral 2018    Procedure: REVISION BREAST RECON W/WOUND CLOSURE;  Surgeon: Johnny Barrios MD;  [facility];  Service: Plastics    TUBAL LIGATION      WISDOM TOOTH EXTRACTION       Family History   Problem Relation Age of Onset    Diabetes Mother     Hypertension Mother     Rosacea Father     Allergies Father         seasonal    Liver cancer Paternal Grandfather     No Known Problems Brother     No Known Problems Son     Breast cancer Maternal Grandmother     Brain cancer Maternal Grandmother     No Known Problems Maternal Grandfather     Diabetes Paternal Grandmother     Kidney failure Paternal Grandmother     Hypertension Paternal Grandmother     No Known Problems Brother     Breast cancer Paternal Aunt      Social History     Socioeconomic History    Marital status: /Civil Union     Spouse name: Not on file    Number of children: Not on file    Years of education: Not on file    Highest education level: Not on file   Occupational History    Not on file   Social Needs    Financial resource strain: Not on file    Food insecurity:     Worry: Not on file     Inability: Not on file    Transportation needs:     Medical: Not on file     Non-medical: Not on file   Tobacco Use    Smoking status: Former Smoker     Last attempt to quit: 2015     Years since quittin 0    Smokeless tobacco: Never Used   Substance and Sexual Activity    Alcohol use: Yes     Comment: rare    Drug use: Never    Sexual activity: Yes     Partners: Male     Birth control/protection: Female Sterilization   Lifestyle    Physical activity:     Days per week: Not on file     Minutes per session: Not on file    Stress: Not on file   Relationships    Social connections:     Talks on phone: Not on file     Gets together: Not on file     Attends Cheondoism service: Not on file     Active member of club or organization: Not on file     Attends meetings of clubs or organizations: Not on file     Relationship status: Not on file    Intimate partner violence:     Fear of current or ex partner: Not on file     Emotionally abused: Not on file     Physically abused: Not on file     Forced sexual activity: Not on file   Other Topics Concern    Not on file   Social History Narrative    Drinks coffee       Current Outpatient Medications:     albuterol (VENTOLIN HFA) 90 mcg/act inhaler, Inhale 2 puffs every 4 (four) hours as needed, Disp: , Rfl:     cyclobenzaprine (FLEXERIL) 5 mg tablet, Take 1 tablet (5 mg total) by mouth 3 (three) times a day as needed for muscle spasms, Disp: 30 tablet, Rfl: 1    levocetirizine (XYZAL) 5 MG tablet, Take 1 tablet by mouth daily, Disp: , Rfl:     levocetirizine (XYZAL) 5 MG tablet, Take by mouth, Disp: , Rfl:     LORazepam (ATIVAN) 1 mg tablet, TAKE 1 TABLET BY MOUTH DAILY AT BEDTIME, Disp: 90 tablet, Rfl: 0    mometasone (NASONEX) 50 mcg/act nasal spray, 2 sprays into each nostril as needed , Disp: , Rfl:     omeprazole (PriLOSEC) 20 mg delayed release capsule, Take 20 mg by mouth daily , Disp: , Rfl:     tamoxifen (NOLVADEX) 20 mg tablet, Take 1 tablet (20 mg total) by mouth daily, Disp: 90 tablet, Rfl: 0    Multiple Vitamins-Minerals (PX MENS MULTIVITAMINS) TABS, Take by mouth, Disp: , Rfl:   Allergies   Allergen Reactions    Trichophyton Other (See Comments)     AKA Fungi - Pt does not know reaction     Cat Hair Extract Sneezing    Dust Mite Extract Sneezing    Molds & Smuts Sneezing    Pollen Extract Sneezing    Tree Extract Sneezing       Review of Systems:  Review of Systems   Constitutional: Positive for diaphoresis (hot flashes)  HENT: Negative  Eyes: Negative  Respiratory: Negative  Cardiovascular: Negative  Gastrointestinal: Negative  Endocrine: Negative  Genitourinary: Negative  Musculoskeletal:        Breast is tighter than non treated side  Using compression sleeve  Sees very slight swelling in hand  Some soreness in axilla  Under arm tingling sensation is present  Skin: Negative  Allergic/Immunologic: Negative  Neurological: Negative  Hematological: Negative  Psychiatric/Behavioral: Negative  Vitals:    02/18/20 1547   BP: 120/80   Pulse: 78   Resp: 12   Temp: 98 8 °F (37 1 °C)   Weight: 77 1 kg (170 lb)        Pain Score: 0-No pain      Imaging:No results found      Teaching

## 2020-02-25 ENCOUNTER — OFFICE VISIT (OUTPATIENT)
Dept: HEMATOLOGY ONCOLOGY | Facility: CLINIC | Age: 40
End: 2020-02-25
Payer: COMMERCIAL

## 2020-02-25 VITALS
WEIGHT: 169 LBS | HEART RATE: 81 BPM | BODY MASS INDEX: 29.95 KG/M2 | SYSTOLIC BLOOD PRESSURE: 114 MMHG | DIASTOLIC BLOOD PRESSURE: 72 MMHG | TEMPERATURE: 98.2 F | HEIGHT: 63 IN | RESPIRATION RATE: 18 BRPM | OXYGEN SATURATION: 97 %

## 2020-02-25 DIAGNOSIS — C50.411 MALIGNANT NEOPLASM OF UPPER-OUTER QUADRANT OF RIGHT BREAST IN FEMALE, ESTROGEN RECEPTOR POSITIVE (HCC): Primary | ICD-10-CM

## 2020-02-25 DIAGNOSIS — Z17.0 MALIGNANT NEOPLASM OF UPPER-OUTER QUADRANT OF RIGHT BREAST IN FEMALE, ESTROGEN RECEPTOR POSITIVE (HCC): Primary | ICD-10-CM

## 2020-02-25 PROCEDURE — 99214 OFFICE O/P EST MOD 30 MIN: CPT | Performed by: INTERNAL MEDICINE

## 2020-02-25 PROCEDURE — 1036F TOBACCO NON-USER: CPT | Performed by: INTERNAL MEDICINE

## 2020-02-25 PROCEDURE — 3008F BODY MASS INDEX DOCD: CPT | Performed by: INTERNAL MEDICINE

## 2020-02-25 NOTE — PROGRESS NOTES
Hematology / Oncology Outpatient Follow Up Note    Nicole Sanchez 42 y o  female ZUD:[birth year] [facility]:[id number]         Date:  2/25/2020    Assessment / Plan:  A 60-year-old premenopausal woman with locally advanced right breast cancer   She has PALB-2 probable pathogenic mutation   She had quite large palpable right breast mass and axillary adenopathy  This was mucinous histology, grade 2, ER 30% positive, WV 2% positive, HER2 3+ disease  She underwent neoadjuvant chemotherapy with [address] with pertuzumab x6 cycle, resulting in good clinical partial response   She underwent right mastectomy and axillary lymph node dissection as well as left prophylactic mastectomy, resulting in MCKAYLA   She had less than 1 mm of residual mucinous carcinoma as well as 3 positive lymph nodes which has micrometastasis   This is consistent with pathological good partial response   She completed adjuvant trastuzumab monotherapy without cardiac toxicity   She is currently on adjuvant hormonal therapy with tamoxifen 20 mg daily with minimal side effect  Based on her symptomatology and physical examinations, she has no evidence recurrent disease  I recommended her to continue with tamoxifen 20 mg daily  She is in agreement    I will see her again in a year for routine follow-up         Subjective:      HPI:  A 62-year-old premenopausal woman who initially noticed right breast lump when she was 8 months pregnant   She delivered 1st baby in June 2017  Elizabet Alonzo the delivery, she noticed right breast lump to be slowly enlarging   She brought this to medical attention   She underwent mammography which was quite abnormal   Biopsy from right breast at 10:00 position 12 cm from nipple showed mucinous carcinoma, grade 2   This was ER 30 to 40% positive, WV 2 to 3% positive, her 2 3+ disease   She was seen by Dr Jeremi Goodman who ordered MRI which showed extensive abnormality in her right breast including 3 5 cm of right breast mass as well as 4 cm of axillary adenopathy  She was referred to me to discuss neoadjuvant chemotherapy   She went to [facility] where she was recommended to have [address] P  she presents today with her mother to discuss treatment options  Huey Scott is somewhat anxious   Otherwise, she feels well   She denied any bone pain   Her weight is stable   She has no respiratory symptoms   Her performance status is normal  She does not have significant past medical history   Her paternal aunt had breast cancer at age of 62   Her 2 paternal grandfather's sister had breast cancer in their 46s   She underwent genetic testing of which result is pending at this time            Interval History:   A 24-year-old premenopausal woman with locally advanced right breast cancer  Huey Scott has quite large palpable right breast mass and axillary adenopathy  This was mucinous histology, grade 2, ER 30% positive, IN 2% positive, HER2 3+ disease   She was treated with neoadjuvant chemotherapy with TCH with pertuzumab with excellent tolerance x6 cycle which was completed in June 2018   She had clinical good partial response  Subsequently, she underwent mastectomy as well as prophylactic left mastectomy   Right mastectomy specimen showed less than 1 mm of residual mucinous carcinoma as well as 3/16 positive lymph nodes with tumor deposit less than 0 4 mm   She also completed adjuvant trastuzumab monotherapy with no cardiac toxicity   She presents today for follow-up  She is currently on adjuvant hormonal therapy with tamoxifen  She continued to have moderate hot flashes  Otherwise, she feels well  She has no complaint of pain  She denied swelling lower extremity or respiratory symptoms  The last 6 months, she had 2 menstrual bleeding  Her performance status is normal        Objective:      Primary Diagnosis:     1   Locally advanced right breast cancer with invasive mucinous histology, grade 2, ER 30% positive, IN 2% positive, her 2 3+ disease   Diagnosed in January 2018  2  PALB2 probable pathogenic mutation      Cancer Staging:  Malignant neoplasm of upper-outer quadrant of right breast in female, estrogen receptor positive ([facility] [address] )    Staging form: Breast, AJCC 8th Edition    - Clinical stage from 1/26/2018: Stage IB (cT2(3), cN1, cM0, G2, ER: Positive, IA: Positive, HER2: Positive) - Signed by Yamil Coleman MD on 1/26/2018        Previous Hematologic/ Oncologic Treatment:       1  Neoadjuvant chemotherapy with [employer] with pertuzumab x6 cycle, completed in June 2018  2    Adjuvant trastuzumab monotherapy from June 2018 through January 2019      Current Hematologic/ Oncologic Treatment:       1   Adjuvant hormonal therapy with tamoxifen since January 2019      Disease Status:      Clinical partial response  Pathologically good partial response  MCKAYLA status post right mastectomy with axillary lymph node dissection and prophylactic left mastectomy      Test Results:     Pathology:     Biopsy from right breast showed invasive mucinous carcinoma, grade 2, ER 30 to 40% positive, IA 2 to 3% positive, HER2 3+ disease      Mastectomy specimen showed less than 1 mm residual mucinous carcinoma within mucin pool   3/16 axillary lymph nodes were positive for metastatic disease with largest dimension measuring 0 4 mm without extranodal extension         Radiology:     Echocardiogram in December 2018 showed ejection fraction 60%     Laboratory:     See below     Physical Exam:        General Appearance:    Alert, oriented          Eyes:    PERRL   Ears:    Normal external ear canals, both ears   Nose:   Nares normal, septum midline   Throat:   Mucosa moist  Pharynx without injection      Neck:   Supple         Lungs:     Clear to auscultation bilaterally   Chest Wall:    No tenderness or deformity    Heart:    Regular rate and rhythm         Abdomen:     Soft, non-tender, bowel sounds +, no organomegaly               Extremities:   Extremities no cyanosis or edema         Skin:   no rash or icterus  Lymph nodes:   Cervical, supraclavicular, and axillary nodes normal   Neurologic:   CNII-XII intact, normal strength, sensation and reflexes     Throughout             Breast exam:  Status post bilateral mastectomy with reconstruction   No palpable abnormality in either reconstructed breast            ROS: Review of Systems   Constitutional:        Hot flashes   All other systems reviewed and are negative  Imaging: No results found  Labs:   Lab Results   Component Value Date    WBC 4 39 11/28/2018    HGB 12 0 11/28/2018    HCT 36 3 11/28/2018    MCV 99 (H) 11/28/2018     11/28/2018     Lab Results   Component Value Date    K 3 2 (L) 06/12/2018     06/12/2018    CO2 27 06/12/2018    BUN 12 06/12/2018    CREATININE 0 94 06/12/2018    GLUF 101 (H) 02/13/2018    CALCIUM 8 4 06/12/2018    AST 24 06/12/2018    ALT 33 06/12/2018    ALKPHOS 84 06/12/2018    EGFR 78 06/12/2018         Current Medications: Reviewed  Allergies: Reviewed  PMH/FH/SH:  Reviewed      Vital Sign:    Body surface area is 1 8 meters squared      Wt Readings from Last 3 Encounters:   02/25/20 76 7 kg (169 lb)   02/18/20 77 1 kg (170 lb)   02/05/20 77 2 kg (170 lb 3 2 oz)        Temp Readings from Last 3 Encounters:   02/25/20 98 2 °F (36 8 °C) (Tympanic Core)   02/18/20 98 8 °F (37 1 °C)   02/05/20 99 3 °F (37 4 °C)        BP Readings from Last 3 Encounters:   02/25/20 114/72   02/18/20 120/80   02/05/20 128/68         Pulse Readings from Last 3 Encounters:   02/25/20 81   02/18/20 78   02/05/20 84     @LASTSAO2(3)@

## 2020-03-23 DIAGNOSIS — C50.411 MALIGNANT NEOPLASM OF UPPER-OUTER QUADRANT OF RIGHT BREAST IN FEMALE, ESTROGEN RECEPTOR POSITIVE (HCC): ICD-10-CM

## 2020-03-23 DIAGNOSIS — Z17.0 MALIGNANT NEOPLASM OF UPPER-OUTER QUADRANT OF RIGHT BREAST IN FEMALE, ESTROGEN RECEPTOR POSITIVE (HCC): ICD-10-CM

## 2020-03-23 RX ORDER — TAMOXIFEN CITRATE 20 MG/1
20 TABLET ORAL DAILY
Qty: 90 TABLET | Refills: 0 | OUTPATIENT
Start: 2020-03-23 | End: 2020-07-16 | Stop reason: SDUPTHER

## 2020-03-24 ENCOUNTER — TELEMEDICINE (OUTPATIENT)
Dept: FAMILY MEDICINE CLINIC | Facility: CLINIC | Age: 40
End: 2020-03-24
Payer: COMMERCIAL

## 2020-03-24 DIAGNOSIS — J01.00 ACUTE NON-RECURRENT MAXILLARY SINUSITIS: Primary | ICD-10-CM

## 2020-03-24 DIAGNOSIS — Z17.0 MALIGNANT NEOPLASM OF UPPER-OUTER QUADRANT OF RIGHT BREAST IN FEMALE, ESTROGEN RECEPTOR POSITIVE (HCC): ICD-10-CM

## 2020-03-24 DIAGNOSIS — C50.411 MALIGNANT NEOPLASM OF UPPER-OUTER QUADRANT OF RIGHT BREAST IN FEMALE, ESTROGEN RECEPTOR POSITIVE (HCC): ICD-10-CM

## 2020-03-24 DIAGNOSIS — F41.9 ANXIETY: ICD-10-CM

## 2020-03-24 PROCEDURE — 99214 OFFICE O/P EST MOD 30 MIN: CPT | Performed by: FAMILY MEDICINE

## 2020-03-24 RX ORDER — AMOXICILLIN AND CLAVULANATE POTASSIUM 875; 125 MG/1; MG/1
1 TABLET, FILM COATED ORAL EVERY 12 HOURS SCHEDULED
Qty: 14 TABLET | Refills: 0 | Status: SHIPPED | OUTPATIENT
Start: 2020-03-24 | End: 2020-03-31

## 2020-03-24 NOTE — PROGRESS NOTES
Virtual Regular Visit    Ama Dawson was seen today for virtual regular visit  Diagnoses and all orders for this visit:    Acute non-recurrent maxillary sinusitis  -     amoxicillin-clavulanate (AUGMENTIN) 875-125 mg per tablet; Take 1 tablet by mouth every 12 (twelve) hours for 7 days    Anxiety  Comments:  ativan prn   overall doing well     Malignant neoplasm of upper-outer quadrant of right breast in female, estrogen receptor positive ([facility] [address] )  Comments:  in remision   care per oncologist   Ciara as directed         BMI Counseling: There is no height or weight on file to calculate BMI  The BMI is above normal  Nutrition recommendations include decreasing portion sizes and encouraging healthy choices of fruits and vegetables  Exercise recommendations include moderate physical activity 150 minutes/week  No pharmacotherapy was ordered  Reason for visit is sinus congestion and cold symptoms     Encounter provider Paulina Richard MD    Provider located at [address]      Recent Visits  No visits were found meeting these conditions  Showing recent visits within past 7 days and meeting all other requirements     Future Appointments  No visits were found meeting these conditions  Showing future appointments within next 150 days and meeting all other requirements        After connecting through [employer], the patient was identified by name and date of birth  Sanjana Sanchez was informed that this is a telemedicine visit and that the visit is being conducted through [employer] which may not be secure and therefore, might not be HIPAA-compliant  My office door was closed  No one else was in the room  She acknowledged consent and understanding of privacy and security of the video platform  The patient has agreed to participate and understands they can discontinue the visit at any time      Partha  Jose Sin is a 42 y o  female C/o cold symptoms in the beginning of the month and got better after 1 week   Sore throat and coughing and low grade temp 99 for 2 weeks now  Tried Humidifier and netti pot helps a bit           Past Medical History:   Diagnosis Date    Asthma     allergy excaberated    Breast cancer ([address] ) 2018    GERD (gastroesophageal reflux disease)     Gestational diabetes     History of adverse reaction to anesthesia     Pt reports waking up severly anxious    Seasonal allergies        Past Surgical History:   Procedure Laterality Date    BREAST RECONSTRUCTION Bilateral 2018    Procedure: RECONSTRUCTION BREAST W/ IMPLANT;  Surgeon: Sreekanth Wang MD;  [facility];  Service: Plastics     SECTION      IR PORT REMOVAL  2019    PILONIDAL CYST EXCISION      resection    WV INSJ TUNNELED CTR VAD W/SUBQ PORT AGE 5 YR/> Left 2018    Procedure: INSERTION VENOUS PORT ( PORT-A-CATH) IR;  Surgeon: Bunny Solis DO;  [facility];  Service: Interventional Radiology    WV MASTECTOMY, MODIFIED RADICAL Right 2018    Procedure: BREAST MODIFIED RADICAL MASTECTOMY;  Surgeon: Shannan Lu MD;  [facility];  Service: Surgical Oncology    WV MASTECTOMY, SIMPLE, COMPLETE Left 2018    Procedure: BREAST SIMPLE MASTECTOMY;  Surgeon: Shannan Lu MD;  [facility];  Service: Surgical Oncology    WV REVISE BREAST RECONSTRUCTION Bilateral 2018    Procedure: REVISION BREAST RECON W/WOUND CLOSURE;  Surgeon: Sreekanth Wang MD;  [facility];  Service: Plastics    TUBAL LIGATION      WISDOM TOOTH EXTRACTION         Current Outpatient Medications   Medication Sig Dispense Refill    albuterol (VENTOLIN HFA) 90 mcg/act inhaler Inhale 2 puffs every 4 (four) hours as needed      amoxicillin-clavulanate (AUGMENTIN) 875-125 mg per tablet Take 1 tablet by mouth every 12 (twelve) hours for 7 days 14 tablet 0    levocetirizine (XYZAL) 5 MG tablet Take by mouth      LORazepam (ATIVAN) 1 mg tablet TAKE 1 TABLET BY MOUTH DAILY AT BEDTIME 90 tablet 0    mometasone (NASONEX) 50 mcg/act nasal spray 2 sprays into each nostril as needed       Multiple Vitamins-Minerals (PX MENS MULTIVITAMINS) TABS Take by mouth      omeprazole (PriLOSEC) 20 mg delayed release capsule Take 20 mg by mouth daily       tamoxifen (NOLVADEX) 20 mg tablet Take 1 tablet (20 mg total) by mouth daily 90 tablet 0     No current facility-administered medications for this visit  Allergies   Allergen Reactions    Trichophyton Other (See Comments)     AKA Fungi - Pt does not know reaction     Cat Hair Extract Sneezing    Dust Mite Extract Sneezing    Molds & Smuts Sneezing    Pollen Extract Sneezing    Tree Extract Sneezing       Review of Systems   Constitutional: Positive for fatigue  Negative for activity change, appetite change and fever  HENT: Positive for congestion, postnasal drip, rhinorrhea, sinus pressure, sinus pain and sore throat  Negative for facial swelling, tinnitus, trouble swallowing and voice change  Eyes: Negative for itching  Respiratory: Positive for cough  Negative for apnea, shortness of breath and stridor  Cardiovascular: Negative for chest pain and leg swelling  Hematological: Negative for adenopathy  Does not bruise/bleed easily  Psychiatric/Behavioral: Negative for agitation and behavioral problems  Physical Exam   Constitutional: She is oriented to person, place, and time  She appears well-developed and well-nourished  HENT:   Mouth/Throat: No oropharyngeal exudate  Patient was able to palpate maxillary sinus tenderness on right side    Eyes: Right eye exhibits no discharge  Left eye exhibits no discharge  No scleral icterus  Neck: No tracheal deviation present  No thyromegaly present  Neurological: She is alert and oriented to person, place, and time  Skin: No rash noted  No erythema  Psychiatric: She has a normal mood and affect   Her behavior is normal         I spent 25 minutes with the patient during this visit    [id number]

## 2020-04-29 ENCOUNTER — TELEMEDICINE (OUTPATIENT)
Dept: FAMILY MEDICINE CLINIC | Facility: CLINIC | Age: 40
End: 2020-04-29
Payer: COMMERCIAL

## 2020-04-29 VITALS — BODY MASS INDEX: 29.95 KG/M2 | WEIGHT: 169 LBS | TEMPERATURE: 98.1 F | HEIGHT: 63 IN

## 2020-04-29 DIAGNOSIS — C50.411 MALIGNANT NEOPLASM OF UPPER-OUTER QUADRANT OF RIGHT BREAST IN FEMALE, ESTROGEN RECEPTOR POSITIVE (HCC): ICD-10-CM

## 2020-04-29 DIAGNOSIS — K21.9 GASTROESOPHAGEAL REFLUX DISEASE WITHOUT ESOPHAGITIS: ICD-10-CM

## 2020-04-29 DIAGNOSIS — R22.0 FACIAL SWELLING: Primary | ICD-10-CM

## 2020-04-29 DIAGNOSIS — J45.20 MILD INTERMITTENT ASTHMA WITHOUT COMPLICATION: ICD-10-CM

## 2020-04-29 DIAGNOSIS — F41.9 ANXIETY: ICD-10-CM

## 2020-04-29 DIAGNOSIS — Z17.0 MALIGNANT NEOPLASM OF UPPER-OUTER QUADRANT OF RIGHT BREAST IN FEMALE, ESTROGEN RECEPTOR POSITIVE (HCC): ICD-10-CM

## 2020-04-29 PROCEDURE — 99213 OFFICE O/P EST LOW 20 MIN: CPT | Performed by: FAMILY MEDICINE

## 2020-04-29 RX ORDER — CEPHALEXIN 500 MG/1
500 CAPSULE ORAL EVERY 8 HOURS SCHEDULED
Qty: 21 CAPSULE | Refills: 0 | Status: SHIPPED | OUTPATIENT
Start: 2020-04-29 | End: 2020-05-06

## 2020-05-08 ENCOUNTER — TELEPHONE (OUTPATIENT)
Dept: SURGICAL ONCOLOGY | Facility: CLINIC | Age: 40
End: 2020-05-08

## 2020-05-12 ENCOUNTER — OFFICE VISIT (OUTPATIENT)
Dept: SURGICAL ONCOLOGY | Facility: CLINIC | Age: 40
End: 2020-05-12
Payer: COMMERCIAL

## 2020-05-12 VITALS
HEART RATE: 87 BPM | RESPIRATION RATE: 16 BRPM | SYSTOLIC BLOOD PRESSURE: 130 MMHG | HEIGHT: 63 IN | DIASTOLIC BLOOD PRESSURE: 84 MMHG | WEIGHT: 173 LBS | BODY MASS INDEX: 30.65 KG/M2 | TEMPERATURE: 97.5 F

## 2020-05-12 DIAGNOSIS — Z15.01 MONOALLELIC MUTATION OF PALB2 GENE: ICD-10-CM

## 2020-05-12 DIAGNOSIS — Z15.89 MONOALLELIC MUTATION OF PALB2 GENE: ICD-10-CM

## 2020-05-12 DIAGNOSIS — Z15.09 MONOALLELIC MUTATION OF PALB2 GENE: ICD-10-CM

## 2020-05-12 DIAGNOSIS — Z17.0 MALIGNANT NEOPLASM OF UPPER-OUTER QUADRANT OF RIGHT BREAST IN FEMALE, ESTROGEN RECEPTOR POSITIVE (HCC): Primary | ICD-10-CM

## 2020-05-12 DIAGNOSIS — Z79.810 USE OF TAMOXIFEN (NOLVADEX): ICD-10-CM

## 2020-05-12 DIAGNOSIS — C50.411 MALIGNANT NEOPLASM OF UPPER-OUTER QUADRANT OF RIGHT BREAST IN FEMALE, ESTROGEN RECEPTOR POSITIVE (HCC): Primary | ICD-10-CM

## 2020-05-12 PROCEDURE — 99214 OFFICE O/P EST MOD 30 MIN: CPT | Performed by: NURSE PRACTITIONER

## 2020-05-12 PROCEDURE — 3008F BODY MASS INDEX DOCD: CPT | Performed by: NURSE PRACTITIONER

## 2020-05-12 PROCEDURE — 1036F TOBACCO NON-USER: CPT | Performed by: NURSE PRACTITIONER

## 2020-05-13 ENCOUNTER — TELEPHONE (OUTPATIENT)
Dept: SURGICAL ONCOLOGY | Facility: CLINIC | Age: 40
End: 2020-05-13

## 2020-06-16 ENCOUNTER — OFFICE VISIT (OUTPATIENT)
Dept: FAMILY MEDICINE CLINIC | Facility: CLINIC | Age: 40
End: 2020-06-16

## 2020-06-16 VITALS
SYSTOLIC BLOOD PRESSURE: 102 MMHG | TEMPERATURE: 98.5 F | BODY MASS INDEX: 30.23 KG/M2 | HEART RATE: 83 BPM | HEIGHT: 63 IN | RESPIRATION RATE: 18 BRPM | DIASTOLIC BLOOD PRESSURE: 70 MMHG | WEIGHT: 170.6 LBS | OXYGEN SATURATION: 98 %

## 2020-06-16 DIAGNOSIS — Z00.00 ANNUAL PHYSICAL EXAM: Primary | ICD-10-CM

## 2020-06-16 DIAGNOSIS — Z11.4 ENCOUNTER FOR SCREENING FOR HIV: ICD-10-CM

## 2020-06-16 PROCEDURE — 99395 PREV VISIT EST AGE 18-39: CPT | Performed by: FAMILY MEDICINE

## 2020-06-16 PROCEDURE — 3008F BODY MASS INDEX DOCD: CPT | Performed by: FAMILY MEDICINE

## 2020-07-14 DIAGNOSIS — F41.1 GENERALIZED ANXIETY DISORDER: ICD-10-CM

## 2020-07-15 RX ORDER — LORAZEPAM 1 MG/1
TABLET ORAL
Qty: 90 TABLET | Refills: 0 | Status: SHIPPED | OUTPATIENT
Start: 2020-07-15 | End: 2020-10-16 | Stop reason: SDUPTHER

## 2020-07-15 NOTE — TELEPHONE ENCOUNTER
Medication: ativan  PDMP:   04/17/2020  1  01/21/2020  LORAZEPAM 1 MG TABLET  90 0  90  TI [facility]       Active agreement on file -No

## 2020-07-16 ENCOUNTER — TELEPHONE (OUTPATIENT)
Dept: HEMATOLOGY ONCOLOGY | Facility: CLINIC | Age: 40
End: 2020-07-16

## 2020-07-16 DIAGNOSIS — C50.411 MALIGNANT NEOPLASM OF UPPER-OUTER QUADRANT OF RIGHT BREAST IN FEMALE, ESTROGEN RECEPTOR POSITIVE (HCC): ICD-10-CM

## 2020-07-16 DIAGNOSIS — Z17.0 MALIGNANT NEOPLASM OF UPPER-OUTER QUADRANT OF RIGHT BREAST IN FEMALE, ESTROGEN RECEPTOR POSITIVE (HCC): ICD-10-CM

## 2020-07-16 RX ORDER — TAMOXIFEN CITRATE 20 MG/1
20 TABLET ORAL DAILY
Qty: 90 TABLET | Refills: 1 | OUTPATIENT
Start: 2020-07-16 | End: 2021-01-08 | Stop reason: SDUPTHER

## 2020-07-16 NOTE — TELEPHONE ENCOUNTER
Patient called requesting a refill on:    tamoxifen          Patient has 5    pills left      Preferred pharmacy:   [facility] [address]          Patient's call back #  [phone]

## 2020-07-20 ENCOUNTER — TELEPHONE (OUTPATIENT)
Dept: HEMATOLOGY ONCOLOGY | Facility: CLINIC | Age: 40
End: 2020-07-20

## 2020-07-20 NOTE — TELEPHONE ENCOUNTER
Patient called in, [facility] does not have the script or any refills  Please refill patient tamoxifen 20mg tablet  Patient can be reached on [phone]

## 2020-07-20 NOTE — TELEPHONE ENCOUNTER
Tamoxifen prescription was sent to [facility] on 7/16/20  Per patient [facility] did not receive it  Call placed to [facility]/pharmacy #[id number]- [place], [address]  Verbal order given to pharmacist Mahendra Emanuel  Patient advised verbal order was called to her pharmacy

## 2020-08-05 ENCOUNTER — TELEPHONE (OUTPATIENT)
Dept: HEMATOLOGY ONCOLOGY | Facility: CLINIC | Age: 40
End: 2020-08-05

## 2020-08-05 NOTE — TELEPHONE ENCOUNTER
I called patient and left a message to remind her of her upcoming appointment with Lawyer Troy tomorrow at 2:30 pm, advised patient to return call at [phone] to complete COVID-19 Screening questions

## 2020-08-06 ENCOUNTER — CONSULT (OUTPATIENT)
Dept: GYNECOLOGIC ONCOLOGY | Facility: CLINIC | Age: 40
End: 2020-08-06

## 2020-08-06 DIAGNOSIS — Z17.0 MALIGNANT NEOPLASM OF UPPER-OUTER QUADRANT OF RIGHT BREAST IN FEMALE, ESTROGEN RECEPTOR POSITIVE (HCC): ICD-10-CM

## 2020-08-06 DIAGNOSIS — Z15.01 MONOALLELIC MUTATION OF PALB2 GENE: Primary | ICD-10-CM

## 2020-08-06 DIAGNOSIS — Z15.89 MONOALLELIC MUTATION OF PALB2 GENE: Primary | ICD-10-CM

## 2020-08-06 DIAGNOSIS — Z15.09 MONOALLELIC MUTATION OF PALB2 GENE: Primary | ICD-10-CM

## 2020-08-06 DIAGNOSIS — Z80.3 FAMILY HISTORY OF BREAST CANCER: ICD-10-CM

## 2020-08-06 DIAGNOSIS — C50.411 MALIGNANT NEOPLASM OF UPPER-OUTER QUADRANT OF RIGHT BREAST IN FEMALE, ESTROGEN RECEPTOR POSITIVE (HCC): ICD-10-CM

## 2020-08-06 PROCEDURE — NC001 PR NO CHARGE: Performed by: GENETIC COUNSELOR, MS

## 2020-08-06 NOTE — PROGRESS NOTES
Post-Test Genetic Counseling Consult Note    Patient Name: Alisson Harris   /Age: 1980/40 y o  Referring Provider: CHARAN Fitzpatrick    Date of Service: 2020  Genetic Counselor: Tessy Groves MS, [facility]  Interpretation Services: None   Location: Telephone consult   Length of Visit: 60 minutes      Heather Cantu was referred to the [address] [place] [address] and Genetic Assessment Program to discuss her genetic test result  Genetic Testing History:     Report Date: 2018     Test: [employer]ae Breast Cancer STAT panel and [employer]ae Multi-Cancer Panel (80 genes):  ALK, APC, TOM, AXIN2, BAP1, BARD1, BLM, BMPR1A, BRCA1, BRCA2, BRIP1, CASR, CDC73, CDH1, CDK4, CDKN1B, CDKN1C, CDKN2A, CEBPA, CHEK2, DICER1, DIS3L2, EGFR, EPCAM, FH, FLCN, GATA2, GPC3, GREM1, HOXB13, HRAS, KIT, MAX, MEN1, MET, MITF, MLH1, MSH2, MSH6, MUTYH, NBN, NF1, NF2, PALB2, PDGFRA, PHOX2B, PMS2, POLD1, POLE, POT1, BISFJ1U, PTCH1, PTEN, RAD50, RAD51C, RAD51D, RB1, RECQL4, RET, RUNX1, SDHA, SDHAF2, SDHB, SDHC, SDHD, SMAD4, SMARCA4, SMARCB1, SMARCE1, STK11, SUFU, TERC, TERT, PINO716, TP53, TSC1, TSC2, VHL, WRN, WT1     Result: Positive: 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; 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); heterozygous      Cancer History and Treatment:     Personal History: Personal history of breast cancer at age 30    Malignant neoplasm of upper-outer quadrant of right breast in female, estrogen receptor positive ([facility] [address] )     2018 Initial Diagnosis       US guided core biopsy of right breast mass at [facility]  Malignant neoplasm of UOQ of right breast,   ER positive  HER2 positive        2018 Genetic Testing       Likely pathogenic variant was identified in the PALB2 gene         2018 - 2018 Chemotherapy       Neoadjuvant chemotherapy with TCH with pertuzumab    Dr Arun Luna         2018 Surgery       Right MRM  Invasive mucinous carcinoma  3 4 cm geronimo  Grade 1  3/16 lymph nodes  Stage IB - ypT1mi, ypN1mi, G1      Left simple mastectomy  Benign breast      Immediate reconstruction Dr Oneal Espinosa         7/19/2018 - 1/2019 Chemotherapy       trastuzumab monotherapy every 3 weeks           11/1/2018 - 12/14/2018 Radiation     1/22/2019 -  Hormone Therapy        Tamoxifen  With Dr Irene Mason Hx: pre-menopausal, ovaries/uterus intact    Medical and Surgical History  Pertinent surgical history:   Past Surgical History:   Procedure Laterality Date    BREAST RECONSTRUCTION Bilateral 6/21/2018    Procedure: RECONSTRUCTION BREAST W/ IMPLANT;  Surgeon: Bola Villatoro MD;  [facility];  Service: Plastics   [address] [place] [address] IR PORT REMOVAL  2/22/2019    PILONIDAL CYST EXCISION      resection    CO INSJ TUNNELED CTR VAD W/SUBQ PORT AGE 5 YR/> Left 2/13/2018    Procedure: INSERTION VENOUS PORT ( PORT-A-CATH) IR;  Surgeon: Gen Hannah DO;  [facility];  Service: Interventional Radiology    CO MASTECTOMY, MODIFIED RADICAL Right 6/21/2018    Procedure: BREAST MODIFIED RADICAL MASTECTOMY;  Surgeon: Angel Yanez MD;  [facility];  Service: Surgical Oncology    CO MASTECTOMY, SIMPLE, COMPLETE Left 6/21/2018    Procedure: BREAST SIMPLE MASTECTOMY;  Surgeon: Angel Yanez MD;  [facility];  Service: Surgical Oncology    CO REVISE BREAST RECONSTRUCTION Bilateral 9/19/2018    Procedure: REVISION BREAST RECON Jose Matters CLOSURE;  Surgeon: Bola Villatoro MD;  [facility];  Service: Plastics    TUBAL LIGATION      WISDOM TOOTH EXTRACTION        Pertinent medical history:  Past Medical History:   Diagnosis Date    Asthma     allergy excaberated    Breast cancer ([facility] [address] ) 01/25/2018    Cigarette nicotine dependence without complication 13/1/1053    GERD (gastroesophageal reflux disease)     Gestational diabetes     History of adverse reaction to anesthesia     Pt reports waking up severly anxious    Seasonal allergies        Other History:  Height:   Ht Readings from Last 1 Encounters:   06/16/20 5' 3 15" (1 604 m)     Weight:    Wt Readings from Last 1 Encounters:   20 77 4 kg (170 lb 9 6 oz)     Relevant Family History   Patient reports no Ashkenazi Islam ancestry  - 2 brothers (ages 32 and 28) with no history of cancer; neither brother has undergone genetic testing  - Mother: Age 61 with no history of cancer and no genetic testing  - Maternal Grandmother:  63's with breast cancer at age 48  - Maternal Grandfather: Age 80 with bladder cancer; he had a strong history of tobacco use  - Father: Age 77 with no history of cancer and no genetic testing  - Paternal Aunt:  age 58 with breast cancer at age 62 with no history of genetic testing  - Paternal Grandfather:  age 79 with prostate cancer and unknown primary on liver  - 2 Paternal Great-Aunts (sisters to grandfather):  with breast cancer    Please refer to the scanned pedigree in the Media Tab for a complete family history     *All history is reported as provided by the patient; records are not available for review, except where indicated  Assessment:   Brad Yang carries one likely pathogenic variant in the PALB2 gene, specifically a deletion of exons 11-12 and has an increased risk for PALB2-related cancers  The PALB2 gene is associated with autosomal dominant predisposition to breast, pancreatic and possibly ovarian cancer and autosomal recessive Fanconi anemia type (Matheus Rear: 538785)  PALB2-Associated Cancer Predisposition  The lifetime risk of breast cancer in females with a pathogenic PALB2 variant is up to 58% by age 79 (PMID: 70326170)  There is also an increased risk of pancreatic cancer (PMID: 53989134, 95159195)  Studies have suggested a correlation with PALB2 and autosomal dominant predisposition to ovarian, prostate and male breast cancer  The evidence, however, is preliminary and insufficient to make a determination regarding these relationships  The PALB2 gene is not currently known to be associated with endometrial cancer  Reproductive Risks:  When an individual carries two pathogenic variants in the PALB2 gene they have a condition called Fanconi anemia  Fanconi anemia is characterized by bone marrow failure, short stature, abnormal skin pigmentation, developmental delay and malformations of the thumbs, skeletal and central nervous systems (PMID: 75749414, 8343873)  The risks for leukemia and early onset solid tumors are significantly elevated (PMID: 58463697, 86437228, 37186182)  If both parents carry one pathogenic variant in the PALB2 gene there is a 25% chance of having a child with Fanconi anemia  For patients of reproductive age, there are options for prenatal diagnosis and assisted reproduction including pre-implantation genetic diagnosis  If [facility] is interested in learning more about her reproductive risks we recommend a consult with our prenatal genetic counselors to discuss the reproductive risks and benefits/limitations of available technologies  Risks for Family Members: This test result may help clarify the risk for other family members to develop cancer  Lissette's first-degree relatives have up to a 50% chance of having the pathogenic variant  Therefore, it is recommended that parents, siblings and adult children consider genetic counseling and genetic testing for this pathogenic variant  Relatives who wish to pursue genetic testing can reach out to the [facility] [address] ([phone]) to schedule an appointment or visit www [facility] org to identify a local genetic counselor  Plan:   Management guidelines for individuals with pathogenic and likely pahtogenic PALB2 variants have been developed by the [facility]  Please refer to the current NCCN guidelines for the most up to date guidelines  The recommendations listed below are specific to [facility] and are based on V1 2020 guidelines    These recommendations are subject to change over time and we encouraged [facility] to follow up with our office on an annual basis to review the most up to date guidelines  Breast Cancer Screening:  -[facility] had a bilateral  Mastectomy; continue to follow with healthcare providers      Ovarian Cancer Screening:  Evidence for a risk-reducing salpingo-oophorectomy is insufficient; manage based on family history     Pancreatic Cancer Screening:  NCCN does not currently recommend pancreatic cancer screening for carriers in the absence of a close family history of exocrine pancreatic cancer  [facility] was encouraged to follow up with us on an annual basis or as needed, as cancer screening recommendations are subject to change  Colon Cancer Screening:   Population-based screening guidelines are appropriate      Positive Result: [facility] was strongly encouraged to follow up on with our office on an annual basis to review the most up to date guidelines as recommendations are subject to change over time

## 2020-08-06 NOTE — TELEPHONE ENCOUNTER
I called and spoke with patient regarding her appointment today, patient stated she would preferred a televisit appointment instead  I advised patient appointment has been switch to Televisit

## 2020-10-16 DIAGNOSIS — F41.1 GENERALIZED ANXIETY DISORDER: ICD-10-CM

## 2020-10-20 RX ORDER — LORAZEPAM 1 MG/1
1 TABLET ORAL
Qty: 90 TABLET | Refills: 0 | Status: SHIPPED | OUTPATIENT
Start: 2020-10-20 | End: 2021-01-27

## 2020-11-09 ENCOUNTER — TELEPHONE (OUTPATIENT)
Dept: HEMATOLOGY ONCOLOGY | Facility: CLINIC | Age: 40
End: 2020-11-09

## 2020-11-16 DIAGNOSIS — R33.8 ACUTE URINARY RETENTION: Primary | ICD-10-CM

## 2020-11-16 DIAGNOSIS — D25.2 SUBSEROUS LEIOMYOMA OF UTERUS: ICD-10-CM

## 2020-11-17 ENCOUNTER — TELEPHONE (OUTPATIENT)
Dept: GYNECOLOGIC ONCOLOGY | Facility: CLINIC | Age: 40
End: 2020-11-17

## 2020-11-17 ENCOUNTER — TELEPHONE (OUTPATIENT)
Dept: SURGICAL ONCOLOGY | Facility: CLINIC | Age: 40
End: 2020-11-17

## 2020-11-18 ENCOUNTER — TELEPHONE (OUTPATIENT)
Dept: OBGYN CLINIC | Facility: CLINIC | Age: 40
End: 2020-11-18

## 2020-11-18 ENCOUNTER — PREP FOR PROCEDURE (OUTPATIENT)
Dept: OBGYN CLINIC | Facility: CLINIC | Age: 40
End: 2020-11-18

## 2020-11-18 ENCOUNTER — CONSULT (OUTPATIENT)
Dept: GYNECOLOGIC ONCOLOGY | Facility: CLINIC | Age: 40
End: 2020-11-18
Payer: COMMERCIAL

## 2020-11-18 ENCOUNTER — OFFICE VISIT (OUTPATIENT)
Dept: OBGYN CLINIC | Facility: CLINIC | Age: 40
End: 2020-11-18
Payer: COMMERCIAL

## 2020-11-18 VITALS
HEIGHT: 64 IN | TEMPERATURE: 97.6 F | DIASTOLIC BLOOD PRESSURE: 82 MMHG | SYSTOLIC BLOOD PRESSURE: 134 MMHG | WEIGHT: 170 LBS | BODY MASS INDEX: 29.02 KG/M2

## 2020-11-18 VITALS
TEMPERATURE: 98.6 F | BODY MASS INDEX: 29.53 KG/M2 | WEIGHT: 173 LBS | SYSTOLIC BLOOD PRESSURE: 118 MMHG | HEIGHT: 64 IN | DIASTOLIC BLOOD PRESSURE: 64 MMHG

## 2020-11-18 DIAGNOSIS — D25.2 SUBSEROUS LEIOMYOMA OF UTERUS: ICD-10-CM

## 2020-11-18 DIAGNOSIS — D25.0 INTRAMURAL AND SUBMUCOUS LEIOMYOMA OF UTERUS: Primary | ICD-10-CM

## 2020-11-18 DIAGNOSIS — R33.8 ACUTE URINARY RETENTION: Primary | ICD-10-CM

## 2020-11-18 DIAGNOSIS — R33.8 ACUTE URINARY RETENTION: ICD-10-CM

## 2020-11-18 DIAGNOSIS — Z15.89 MONOALLELIC MUTATION OF PALB2 GENE: ICD-10-CM

## 2020-11-18 DIAGNOSIS — Z15.01 MONOALLELIC MUTATION OF PALB2 GENE: ICD-10-CM

## 2020-11-18 DIAGNOSIS — Z79.810 USE OF TAMOXIFEN (NOLVADEX): ICD-10-CM

## 2020-11-18 DIAGNOSIS — Z97.8 FOLEY CATHETER IN PLACE: ICD-10-CM

## 2020-11-18 DIAGNOSIS — D25.1 INTRAMURAL AND SUBMUCOUS LEIOMYOMA OF UTERUS: Primary | ICD-10-CM

## 2020-11-18 DIAGNOSIS — Z15.09 MONOALLELIC MUTATION OF PALB2 GENE: ICD-10-CM

## 2020-11-18 DIAGNOSIS — D25.2 SUBSEROUS LEIOMYOMA OF UTERUS: Primary | ICD-10-CM

## 2020-11-18 PROBLEM — D25.9 FIBROID UTERUS: Status: ACTIVE | Noted: 2020-11-18

## 2020-11-18 PROCEDURE — 3008F BODY MASS INDEX DOCD: CPT

## 2020-11-18 PROCEDURE — 1036F TOBACCO NON-USER: CPT | Performed by: OBSTETRICS & GYNECOLOGY

## 2020-11-18 PROCEDURE — 99214 OFFICE O/P EST MOD 30 MIN: CPT | Performed by: OBSTETRICS & GYNECOLOGY

## 2020-11-18 PROCEDURE — 99203 OFFICE O/P NEW LOW 30 MIN: CPT | Performed by: OBSTETRICS & GYNECOLOGY

## 2020-11-18 PROCEDURE — U0003 INFECTIOUS AGENT DETECTION BY NUCLEIC ACID (DNA OR RNA); SEVERE ACUTE RESPIRATORY SYNDROME CORONAVIRUS 2 (SARS-COV-2) (CORONAVIRUS DISEASE [COVID-19]), AMPLIFIED PROBE TECHNIQUE, MAKING USE OF HIGH THROUGHPUT TECHNOLOGIES AS DESCRIBED BY CMS-2020-01-R: HCPCS | Performed by: OBSTETRICS & GYNECOLOGY

## 2020-11-18 RX ORDER — PHENAZOPYRIDINE HYDROCHLORIDE 100 MG/1
100 TABLET, FILM COATED ORAL ONCE
Status: CANCELLED | OUTPATIENT
Start: 2020-11-18

## 2020-11-18 RX ORDER — ACETAMINOPHEN 325 MG/1
975 TABLET ORAL ONCE
Status: CANCELLED | OUTPATIENT
Start: 2020-11-18 | End: 2020-11-18

## 2020-11-18 RX ORDER — GABAPENTIN 100 MG/1
200 CAPSULE ORAL ONCE
Status: CANCELLED | OUTPATIENT
Start: 2020-11-18 | End: 2020-11-18

## 2020-11-18 RX ORDER — CELECOXIB 100 MG/1
200 CAPSULE ORAL ONCE
Status: CANCELLED | OUTPATIENT
Start: 2020-11-18 | End: 2020-11-18

## 2020-11-19 ENCOUNTER — LAB (OUTPATIENT)
Dept: LAB | Facility: HOSPITAL | Age: 40
DRG: 743 | End: 2020-11-19
Attending: OBSTETRICS & GYNECOLOGY
Payer: COMMERCIAL

## 2020-11-19 ENCOUNTER — CLINICAL SUPPORT (OUTPATIENT)
Dept: OBGYN CLINIC | Facility: CLINIC | Age: 40
End: 2020-11-19
Payer: COMMERCIAL

## 2020-11-19 ENCOUNTER — TELEPHONE (OUTPATIENT)
Dept: OBGYN CLINIC | Facility: CLINIC | Age: 40
End: 2020-11-19

## 2020-11-19 VITALS
TEMPERATURE: 98.6 F | BODY MASS INDEX: 30.3 KG/M2 | RESPIRATION RATE: 16 BRPM | WEIGHT: 173.8 LBS | SYSTOLIC BLOOD PRESSURE: 110 MMHG | HEART RATE: 80 BPM | DIASTOLIC BLOOD PRESSURE: 70 MMHG

## 2020-11-19 DIAGNOSIS — D25.2 SUBSEROUS LEIOMYOMA OF UTERUS: ICD-10-CM

## 2020-11-19 DIAGNOSIS — R30.1 PAINFUL BLADDER SPASM: ICD-10-CM

## 2020-11-19 DIAGNOSIS — Z97.8 FOLEY CATHETER IN PLACE: ICD-10-CM

## 2020-11-19 DIAGNOSIS — R33.8 ACUTE URINARY RETENTION: ICD-10-CM

## 2020-11-19 DIAGNOSIS — R33.8 ACUTE URINARY RETENTION: Primary | ICD-10-CM

## 2020-11-19 LAB
ABO GROUP BLD: NORMAL
BASOPHILS # BLD AUTO: 0.06 THOUSANDS/ΜL (ref 0–0.1)
BASOPHILS NFR BLD AUTO: 1 % (ref 0–1)
EOSINOPHIL # BLD AUTO: 0.1 THOUSAND/ΜL (ref 0–0.61)
EOSINOPHIL NFR BLD AUTO: 2 % (ref 0–6)
ERYTHROCYTE [DISTWIDTH] IN BLOOD BY AUTOMATED COUNT: 13.1 % (ref 11.6–15.1)
EST. AVERAGE GLUCOSE BLD GHB EST-MCNC: 111 MG/DL
HBA1C MFR BLD: 5.5 %
HCT VFR BLD AUTO: 39.2 % (ref 34.8–46.1)
HGB BLD-MCNC: 13.1 G/DL (ref 11.5–15.4)
IMM GRANULOCYTES # BLD AUTO: 0.04 THOUSAND/UL (ref 0–0.2)
IMM GRANULOCYTES NFR BLD AUTO: 1 % (ref 0–2)
LYMPHOCYTES # BLD AUTO: 1.57 THOUSANDS/ΜL (ref 0.6–4.47)
LYMPHOCYTES NFR BLD AUTO: 26 % (ref 14–44)
MCH RBC QN AUTO: 33.2 PG (ref 26.8–34.3)
MCHC RBC AUTO-ENTMCNC: 33.4 G/DL (ref 31.4–37.4)
MCV RBC AUTO: 99 FL (ref 82–98)
MONOCYTES # BLD AUTO: 0.6 THOUSAND/ΜL (ref 0.17–1.22)
MONOCYTES NFR BLD AUTO: 10 % (ref 4–12)
NEUTROPHILS # BLD AUTO: 3.73 THOUSANDS/ΜL (ref 1.85–7.62)
NEUTS SEG NFR BLD AUTO: 60 % (ref 43–75)
NRBC BLD AUTO-RTO: 0 /100 WBCS
PLATELET # BLD AUTO: 232 THOUSANDS/UL (ref 149–390)
PMV BLD AUTO: 9.3 FL (ref 8.9–12.7)
RBC # BLD AUTO: 3.95 MILLION/UL (ref 3.81–5.12)
RH BLD: POSITIVE
WBC # BLD AUTO: 6.1 THOUSAND/UL (ref 4.31–10.16)

## 2020-11-19 PROCEDURE — 83036 HEMOGLOBIN GLYCOSYLATED A1C: CPT

## 2020-11-19 PROCEDURE — 86850 RBC ANTIBODY SCREEN: CPT

## 2020-11-19 PROCEDURE — 36415 COLL VENOUS BLD VENIPUNCTURE: CPT

## 2020-11-19 PROCEDURE — 99211 OFF/OP EST MAY X REQ PHY/QHP: CPT

## 2020-11-19 PROCEDURE — 1036F TOBACCO NON-USER: CPT

## 2020-11-19 PROCEDURE — 86901 BLOOD TYPING SEROLOGIC RH(D): CPT

## 2020-11-19 PROCEDURE — 85025 COMPLETE CBC W/AUTO DIFF WBC: CPT

## 2020-11-19 PROCEDURE — 86900 BLOOD TYPING SEROLOGIC ABO: CPT

## 2020-11-19 RX ORDER — OXYBUTYNIN CHLORIDE 5 MG/1
5 TABLET ORAL 3 TIMES DAILY PRN
Qty: 20 TABLET | Refills: 0 | Status: SHIPPED | OUTPATIENT
Start: 2020-11-19 | End: 2020-11-26 | Stop reason: HOSPADM

## 2020-11-19 RX ORDER — NITROFURANTOIN 25; 75 MG/1; MG/1
100 CAPSULE ORAL 2 TIMES DAILY
Qty: 2 CAPSULE | Refills: 0 | Status: SHIPPED | OUTPATIENT
Start: 2020-11-19 | End: 2020-11-20

## 2020-11-20 ENCOUNTER — TELEPHONE (OUTPATIENT)
Dept: OBGYN CLINIC | Facility: CLINIC | Age: 40
End: 2020-11-20

## 2020-11-20 ENCOUNTER — TELEPHONE (OUTPATIENT)
Dept: SURGICAL ONCOLOGY | Facility: CLINIC | Age: 40
End: 2020-11-20

## 2020-11-20 LAB — SARS-COV-2 RNA SPEC QL NAA+PROBE: NOT DETECTED

## 2020-11-23 ENCOUNTER — OFFICE VISIT (OUTPATIENT)
Dept: OBGYN CLINIC | Facility: CLINIC | Age: 40
End: 2020-11-23

## 2020-11-23 ENCOUNTER — ANESTHESIA EVENT (OUTPATIENT)
Dept: PERIOP | Facility: HOSPITAL | Age: 40
DRG: 743 | End: 2020-11-23
Payer: COMMERCIAL

## 2020-11-23 VITALS — SYSTOLIC BLOOD PRESSURE: 114 MMHG | DIASTOLIC BLOOD PRESSURE: 76 MMHG | WEIGHT: 172.2 LBS | BODY MASS INDEX: 30.03 KG/M2

## 2020-11-23 DIAGNOSIS — Z97.8 FOLEY CATHETER IN PLACE: ICD-10-CM

## 2020-11-23 DIAGNOSIS — D25.2 SUBSEROUS LEIOMYOMA OF UTERUS: Primary | ICD-10-CM

## 2020-11-23 DIAGNOSIS — R33.8 ACUTE URINARY RETENTION: ICD-10-CM

## 2020-11-23 DIAGNOSIS — Z01.818 PREOPERATIVE EXAM FOR GYNECOLOGIC SURGERY: ICD-10-CM

## 2020-11-23 PROCEDURE — PREOP: Performed by: OBSTETRICS & GYNECOLOGY

## 2020-11-23 RX ORDER — ACETAMINOPHEN,DIPHENHYDRAMINE HCL 500; 25 MG/1; MG/1
1 TABLET, FILM COATED ORAL
COMMUNITY

## 2020-11-25 ENCOUNTER — ANESTHESIA (OUTPATIENT)
Dept: PERIOP | Facility: HOSPITAL | Age: 40
DRG: 743 | End: 2020-11-25
Payer: COMMERCIAL

## 2020-11-25 ENCOUNTER — HOSPITAL ENCOUNTER (INPATIENT)
Facility: HOSPITAL | Age: 40
LOS: 1 days | Discharge: HOME | DRG: 743 | End: 2020-11-26
Attending: OBSTETRICS & GYNECOLOGY | Admitting: OBSTETRICS & GYNECOLOGY
Payer: COMMERCIAL

## 2020-11-25 VITALS — HEART RATE: 97 BPM

## 2020-11-25 DIAGNOSIS — Z90.710 STATUS POST TOTAL ABDOMINAL HYSTERECTOMY: Primary | ICD-10-CM

## 2020-11-25 DIAGNOSIS — Z97.8 FOLEY CATHETER IN PLACE: ICD-10-CM

## 2020-11-25 DIAGNOSIS — R33.8 ACUTE URINARY RETENTION: ICD-10-CM

## 2020-11-25 DIAGNOSIS — D25.2 SUBSEROUS LEIOMYOMA OF UTERUS: ICD-10-CM

## 2020-11-25 LAB
ABO GROUP BLD: NORMAL
ANION GAP SERPL CALCULATED.3IONS-SCNC: 5 MMOL/L (ref 4–13)
BLD GP AB SCN SERPL QL: NEGATIVE
BUN SERPL-MCNC: 15 MG/DL (ref 5–25)
CALCIUM SERPL-MCNC: 9 MG/DL (ref 8.3–10.1)
CHLORIDE SERPL-SCNC: 107 MMOL/L (ref 100–108)
CO2 SERPL-SCNC: 26 MMOL/L (ref 21–32)
CREAT SERPL-MCNC: 1.03 MG/DL (ref 0.6–1.3)
EXT PREGNANCY TEST URINE: NEGATIVE
EXT. CONTROL: NORMAL
GFR SERPL CREATININE-BSD FRML MDRD: 69 ML/MIN/1.73SQ M
GLUCOSE SERPL-MCNC: 121 MG/DL (ref 65–140)
GLUCOSE SERPL-MCNC: 135 MG/DL (ref 65–140)
POTASSIUM SERPL-SCNC: 3.7 MMOL/L (ref 3.5–5.3)
RH BLD: POSITIVE
SODIUM SERPL-SCNC: 138 MMOL/L (ref 136–145)
SPECIMEN EXPIRATION DATE: NORMAL

## 2020-11-25 PROCEDURE — 0UT90ZZ RESECTION OF UTERUS, OPEN APPROACH: ICD-10-PCS | Performed by: OBSTETRICS & GYNECOLOGY

## 2020-11-25 PROCEDURE — 88307 TISSUE EXAM BY PATHOLOGIST: CPT | Performed by: PATHOLOGY

## 2020-11-25 PROCEDURE — 0TJB8ZZ INSPECTION OF BLADDER, VIA NATURAL OR ARTIFICIAL OPENING ENDOSCOPIC: ICD-10-PCS | Performed by: OBSTETRICS & GYNECOLOGY

## 2020-11-25 PROCEDURE — 81025 URINE PREGNANCY TEST: CPT | Performed by: OBSTETRICS & GYNECOLOGY

## 2020-11-25 PROCEDURE — 58150 TOTAL HYSTERECTOMY: CPT | Performed by: OBSTETRICS & GYNECOLOGY

## 2020-11-25 PROCEDURE — 0UT70ZZ RESECTION OF BILATERAL FALLOPIAN TUBES, OPEN APPROACH: ICD-10-PCS | Performed by: OBSTETRICS & GYNECOLOGY

## 2020-11-25 PROCEDURE — 80048 BASIC METABOLIC PNL TOTAL CA: CPT | Performed by: ANESTHESIOLOGY

## 2020-11-25 PROCEDURE — 0UJD4ZZ INSPECTION OF UTERUS AND CERVIX, PERCUTANEOUS ENDOSCOPIC APPROACH: ICD-10-PCS | Performed by: OBSTETRICS & GYNECOLOGY

## 2020-11-25 PROCEDURE — 82948 REAGENT STRIP/BLOOD GLUCOSE: CPT

## 2020-11-25 PROCEDURE — G0379 DIRECT REFER HOSPITAL OBSERV: HCPCS

## 2020-11-25 RX ORDER — ACETAMINOPHEN 325 MG/1
975 TABLET ORAL ONCE
Status: COMPLETED | OUTPATIENT
Start: 2020-11-25 | End: 2020-11-25

## 2020-11-25 RX ORDER — FENTANYL CITRATE/PF 50 MCG/ML
50 SYRINGE (ML) INJECTION
Status: DISCONTINUED | OUTPATIENT
Start: 2020-11-25 | End: 2020-11-25 | Stop reason: HOSPADM

## 2020-11-25 RX ORDER — SUCCINYLCHOLINE/SOD CL,ISO/PF 100 MG/5ML
SYRINGE (ML) INTRAVENOUS AS NEEDED
Status: DISCONTINUED | OUTPATIENT
Start: 2020-11-25 | End: 2020-11-25

## 2020-11-25 RX ORDER — CELECOXIB 200 MG/1
200 CAPSULE ORAL ONCE
Status: COMPLETED | OUTPATIENT
Start: 2020-11-25 | End: 2020-11-25

## 2020-11-25 RX ORDER — ROCURONIUM BROMIDE 10 MG/ML
INJECTION, SOLUTION INTRAVENOUS AS NEEDED
Status: DISCONTINUED | OUTPATIENT
Start: 2020-11-25 | End: 2020-11-25

## 2020-11-25 RX ORDER — KETAMINE HYDROCHLORIDE 50 MG/ML
INJECTION, SOLUTION, CONCENTRATE INTRAMUSCULAR; INTRAVENOUS AS NEEDED
Status: DISCONTINUED | OUTPATIENT
Start: 2020-11-25 | End: 2020-11-25

## 2020-11-25 RX ORDER — HYDROMORPHONE HCL/PF 1 MG/ML
0.2 SYRINGE (ML) INJECTION
Status: DISCONTINUED | OUTPATIENT
Start: 2020-11-25 | End: 2020-11-25 | Stop reason: HOSPADM

## 2020-11-25 RX ORDER — ALBUTEROL SULFATE 90 UG/1
2 AEROSOL, METERED RESPIRATORY (INHALATION) EVERY 4 HOURS PRN
Status: DISCONTINUED | OUTPATIENT
Start: 2020-11-25 | End: 2020-11-26 | Stop reason: HOSPADM

## 2020-11-25 RX ORDER — OXYCODONE HYDROCHLORIDE 5 MG/1
5 TABLET ORAL EVERY 4 HOURS PRN
Status: DISCONTINUED | OUTPATIENT
Start: 2020-11-25 | End: 2020-11-26 | Stop reason: HOSPADM

## 2020-11-25 RX ORDER — SCOLOPAMINE TRANSDERMAL SYSTEM 1 MG/1
1 PATCH, EXTENDED RELEASE TRANSDERMAL
Status: DISCONTINUED | OUTPATIENT
Start: 2020-11-25 | End: 2020-11-25 | Stop reason: HOSPADM

## 2020-11-25 RX ORDER — MIDAZOLAM HYDROCHLORIDE 2 MG/2ML
INJECTION, SOLUTION INTRAMUSCULAR; INTRAVENOUS AS NEEDED
Status: DISCONTINUED | OUTPATIENT
Start: 2020-11-25 | End: 2020-11-25

## 2020-11-25 RX ORDER — HYDROMORPHONE HCL/PF 1 MG/ML
0.5 SYRINGE (ML) INJECTION
Status: DISCONTINUED | OUTPATIENT
Start: 2020-11-25 | End: 2020-11-26 | Stop reason: HOSPADM

## 2020-11-25 RX ORDER — SODIUM CHLORIDE 9 MG/ML
INJECTION, SOLUTION INTRAVENOUS CONTINUOUS PRN
Status: DISCONTINUED | OUTPATIENT
Start: 2020-11-25 | End: 2020-11-25

## 2020-11-25 RX ORDER — DEXAMETHASONE SODIUM PHOSPHATE 10 MG/ML
INJECTION, SOLUTION INTRAMUSCULAR; INTRAVENOUS AS NEEDED
Status: DISCONTINUED | OUTPATIENT
Start: 2020-11-25 | End: 2020-11-25

## 2020-11-25 RX ORDER — TAMOXIFEN CITRATE 10 MG/1
20 TABLET ORAL DAILY
Status: DISCONTINUED | OUTPATIENT
Start: 2020-11-26 | End: 2020-11-26 | Stop reason: HOSPADM

## 2020-11-25 RX ORDER — LIDOCAINE HYDROCHLORIDE 10 MG/ML
0.5 INJECTION, SOLUTION EPIDURAL; INFILTRATION; INTRACAUDAL; PERINEURAL ONCE AS NEEDED
Status: DISCONTINUED | OUTPATIENT
Start: 2020-11-25 | End: 2020-11-25 | Stop reason: HOSPADM

## 2020-11-25 RX ORDER — BUPIVACAINE HYDROCHLORIDE 2.5 MG/ML
INJECTION, SOLUTION EPIDURAL; INFILTRATION; INTRACAUDAL AS NEEDED
Status: DISCONTINUED | OUTPATIENT
Start: 2020-11-25 | End: 2020-11-25 | Stop reason: HOSPADM

## 2020-11-25 RX ORDER — CEFAZOLIN SODIUM 1 G/50ML
1000 SOLUTION INTRAVENOUS ONCE
Status: DISCONTINUED | OUTPATIENT
Start: 2020-11-25 | End: 2020-11-25 | Stop reason: HOSPADM

## 2020-11-25 RX ORDER — ONDANSETRON 2 MG/ML
4 INJECTION INTRAMUSCULAR; INTRAVENOUS EVERY 6 HOURS PRN
Status: DISCONTINUED | OUTPATIENT
Start: 2020-11-25 | End: 2020-11-26 | Stop reason: HOSPADM

## 2020-11-25 RX ORDER — HYDROMORPHONE HCL/PF 1 MG/ML
SYRINGE (ML) INJECTION AS NEEDED
Status: DISCONTINUED | OUTPATIENT
Start: 2020-11-25 | End: 2020-11-25

## 2020-11-25 RX ORDER — KETOROLAC TROMETHAMINE 30 MG/ML
30 INJECTION, SOLUTION INTRAMUSCULAR; INTRAVENOUS EVERY 6 HOURS SCHEDULED
Status: COMPLETED | OUTPATIENT
Start: 2020-11-25 | End: 2020-11-26

## 2020-11-25 RX ORDER — KETAMINE HYDROCHLORIDE 50 MG/ML
25 INJECTION, SOLUTION, CONCENTRATE INTRAMUSCULAR; INTRAVENOUS ONCE
Status: DISCONTINUED | OUTPATIENT
Start: 2020-11-25 | End: 2020-11-25 | Stop reason: HOSPADM

## 2020-11-25 RX ORDER — OXYCODONE HYDROCHLORIDE 10 MG/1
10 TABLET ORAL EVERY 4 HOURS PRN
Status: DISCONTINUED | OUTPATIENT
Start: 2020-11-25 | End: 2020-11-26 | Stop reason: HOSPADM

## 2020-11-25 RX ORDER — GABAPENTIN 100 MG/1
200 CAPSULE ORAL ONCE
Status: COMPLETED | OUTPATIENT
Start: 2020-11-25 | End: 2020-11-25

## 2020-11-25 RX ORDER — METOCLOPRAMIDE HYDROCHLORIDE 5 MG/ML
INJECTION INTRAMUSCULAR; INTRAVENOUS AS NEEDED
Status: DISCONTINUED | OUTPATIENT
Start: 2020-11-25 | End: 2020-11-25

## 2020-11-25 RX ORDER — ACETAMINOPHEN 325 MG/1
650 TABLET ORAL EVERY 4 HOURS
Status: DISCONTINUED | OUTPATIENT
Start: 2020-11-25 | End: 2020-11-26 | Stop reason: HOSPADM

## 2020-11-25 RX ORDER — LORAZEPAM 2 MG/ML
1 INJECTION INTRAMUSCULAR ONCE
Status: COMPLETED | OUTPATIENT
Start: 2020-11-25 | End: 2020-11-25

## 2020-11-25 RX ORDER — PROPOFOL 10 MG/ML
INJECTION, EMULSION INTRAVENOUS AS NEEDED
Status: DISCONTINUED | OUTPATIENT
Start: 2020-11-25 | End: 2020-11-25

## 2020-11-25 RX ORDER — MAGNESIUM HYDROXIDE 1200 MG/15ML
LIQUID ORAL AS NEEDED
Status: DISCONTINUED | OUTPATIENT
Start: 2020-11-25 | End: 2020-11-25 | Stop reason: HOSPADM

## 2020-11-25 RX ORDER — LIDOCAINE HYDROCHLORIDE 10 MG/ML
INJECTION, SOLUTION EPIDURAL; INFILTRATION; INTRACAUDAL; PERINEURAL AS NEEDED
Status: DISCONTINUED | OUTPATIENT
Start: 2020-11-25 | End: 2020-11-25

## 2020-11-25 RX ORDER — ACETAMINOPHEN 325 MG/1
650 TABLET ORAL EVERY 6 HOURS PRN
Status: DISCONTINUED | OUTPATIENT
Start: 2020-11-25 | End: 2020-11-26 | Stop reason: HOSPADM

## 2020-11-25 RX ORDER — FENTANYL CITRATE 50 UG/ML
INJECTION, SOLUTION INTRAMUSCULAR; INTRAVENOUS AS NEEDED
Status: DISCONTINUED | OUTPATIENT
Start: 2020-11-25 | End: 2020-11-25

## 2020-11-25 RX ORDER — ONDANSETRON 2 MG/ML
INJECTION INTRAMUSCULAR; INTRAVENOUS AS NEEDED
Status: DISCONTINUED | OUTPATIENT
Start: 2020-11-25 | End: 2020-11-25

## 2020-11-25 RX ORDER — SODIUM CHLORIDE, SODIUM LACTATE, POTASSIUM CHLORIDE, CALCIUM CHLORIDE 600; 310; 30; 20 MG/100ML; MG/100ML; MG/100ML; MG/100ML
20 INJECTION, SOLUTION INTRAVENOUS CONTINUOUS
Status: DISCONTINUED | OUTPATIENT
Start: 2020-11-25 | End: 2020-11-25

## 2020-11-25 RX ORDER — CEFAZOLIN SODIUM 1 G/3ML
INJECTION, POWDER, FOR SOLUTION INTRAMUSCULAR; INTRAVENOUS AS NEEDED
Status: DISCONTINUED | OUTPATIENT
Start: 2020-11-25 | End: 2020-11-25

## 2020-11-25 RX ORDER — PHENAZOPYRIDINE HYDROCHLORIDE 100 MG/1
100 TABLET, FILM COATED ORAL ONCE
Status: DISCONTINUED | OUTPATIENT
Start: 2020-11-25 | End: 2020-11-25 | Stop reason: SDUPTHER

## 2020-11-25 RX ORDER — SODIUM CHLORIDE, SODIUM LACTATE, POTASSIUM CHLORIDE, CALCIUM CHLORIDE 600; 310; 30; 20 MG/100ML; MG/100ML; MG/100ML; MG/100ML
INJECTION, SOLUTION INTRAVENOUS CONTINUOUS PRN
Status: DISCONTINUED | OUTPATIENT
Start: 2020-11-25 | End: 2020-11-25

## 2020-11-25 RX ORDER — PHENAZOPYRIDINE HYDROCHLORIDE 100 MG/1
100 TABLET, FILM COATED ORAL ONCE
Status: COMPLETED | OUTPATIENT
Start: 2020-11-25 | End: 2020-11-25

## 2020-11-25 RX ORDER — LORAZEPAM 0.5 MG/1
1 TABLET ORAL
Status: DISCONTINUED | OUTPATIENT
Start: 2020-11-25 | End: 2020-11-26 | Stop reason: HOSPADM

## 2020-11-25 RX ORDER — KETOROLAC TROMETHAMINE 30 MG/ML
INJECTION, SOLUTION INTRAMUSCULAR; INTRAVENOUS AS NEEDED
Status: DISCONTINUED | OUTPATIENT
Start: 2020-11-25 | End: 2020-11-25

## 2020-11-25 RX ADMIN — FENTANYL CITRATE 25 MCG: 50 INJECTION INTRAMUSCULAR; INTRAVENOUS at 13:09

## 2020-11-25 RX ADMIN — FENTANYL CITRATE 25 MCG: 50 INJECTION INTRAMUSCULAR; INTRAVENOUS at 13:08

## 2020-11-25 RX ADMIN — Medication 50 MCG: at 16:45

## 2020-11-25 RX ADMIN — FENTANYL CITRATE 50 MCG: 50 INJECTION INTRAMUSCULAR; INTRAVENOUS at 14:00

## 2020-11-25 RX ADMIN — OXYCODONE HYDROCHLORIDE 10 MG: 10 TABLET ORAL at 20:56

## 2020-11-25 RX ADMIN — Medication 500 MG: at 13:16

## 2020-11-25 RX ADMIN — SUGAMMADEX 200 MG: 100 INJECTION, SOLUTION INTRAVENOUS at 16:23

## 2020-11-25 RX ADMIN — DEXAMETHASONE SODIUM PHOSPHATE 10 MG: 10 INJECTION, SOLUTION INTRAMUSCULAR; INTRAVENOUS at 13:51

## 2020-11-25 RX ADMIN — Medication 80 MG: at 13:13

## 2020-11-25 RX ADMIN — MIDAZOLAM 2 MG: 1 INJECTION INTRAMUSCULAR; INTRAVENOUS at 13:03

## 2020-11-25 RX ADMIN — ACETAMINOPHEN 975 MG: 325 TABLET, FILM COATED ORAL at 11:12

## 2020-11-25 RX ADMIN — ONDANSETRON 4 MG: 2 INJECTION INTRAMUSCULAR; INTRAVENOUS at 13:51

## 2020-11-25 RX ADMIN — HYDROMORPHONE HYDROCHLORIDE 0.2 MG: 1 INJECTION, SOLUTION INTRAMUSCULAR; INTRAVENOUS; SUBCUTANEOUS at 17:00

## 2020-11-25 RX ADMIN — LORAZEPAM 1 MG: 2 INJECTION INTRAMUSCULAR; INTRAVENOUS at 17:30

## 2020-11-25 RX ADMIN — KETAMINE HYDROCHLORIDE 25 MG: 50 INJECTION, SOLUTION INTRAMUSCULAR; INTRAVENOUS at 13:08

## 2020-11-25 RX ADMIN — HYDROMORPHONE HYDROCHLORIDE 0.5 MG: 1 INJECTION, SOLUTION INTRAMUSCULAR; INTRAVENOUS; SUBCUTANEOUS at 14:18

## 2020-11-25 RX ADMIN — HYDROMORPHONE HYDROCHLORIDE 0.2 MG: 1 INJECTION, SOLUTION INTRAMUSCULAR; INTRAVENOUS; SUBCUTANEOUS at 17:07

## 2020-11-25 RX ADMIN — SODIUM CHLORIDE, SODIUM LACTATE, POTASSIUM CHLORIDE, AND CALCIUM CHLORIDE 20 ML/HR: .6; .31; .03; .02 INJECTION, SOLUTION INTRAVENOUS at 11:40

## 2020-11-25 RX ADMIN — SODIUM CHLORIDE: 0.9 INJECTION, SOLUTION INTRAVENOUS at 13:22

## 2020-11-25 RX ADMIN — GABAPENTIN 200 MG: 100 CAPSULE ORAL at 11:13

## 2020-11-25 RX ADMIN — LORAZEPAM 1 MG: 0.5 TABLET ORAL at 22:43

## 2020-11-25 RX ADMIN — ROCURONIUM BROMIDE 40 MG: 50 INJECTION, SOLUTION INTRAVENOUS at 14:43

## 2020-11-25 RX ADMIN — Medication 50 MCG: at 16:50

## 2020-11-25 RX ADMIN — SCOPALAMINE 1 PATCH: 1 PATCH, EXTENDED RELEASE TRANSDERMAL at 13:50

## 2020-11-25 RX ADMIN — ACETAMINOPHEN 650 MG: 325 TABLET, FILM COATED ORAL at 19:22

## 2020-11-25 RX ADMIN — SODIUM CHLORIDE, SODIUM LACTATE, POTASSIUM CHLORIDE, AND CALCIUM CHLORIDE 20 ML/HR: .6; .31; .03; .02 INJECTION, SOLUTION INTRAVENOUS at 17:53

## 2020-11-25 RX ADMIN — ACETAMINOPHEN 650 MG: 325 TABLET, FILM COATED ORAL at 22:43

## 2020-11-25 RX ADMIN — KETOROLAC TROMETHAMINE 30 MG: 30 INJECTION, SOLUTION INTRAMUSCULAR at 16:15

## 2020-11-25 RX ADMIN — CEFAZOLIN 2000 MG: 1 INJECTION, POWDER, FOR SOLUTION INTRAVENOUS at 13:36

## 2020-11-25 RX ADMIN — SODIUM CHLORIDE, SODIUM LACTATE, POTASSIUM CHLORIDE, AND CALCIUM CHLORIDE: .6; .31; .03; .02 INJECTION, SOLUTION INTRAVENOUS at 13:05

## 2020-11-25 RX ADMIN — METOCLOPRAMIDE 10 MG: 5 INJECTION, SOLUTION INTRAMUSCULAR; INTRAVENOUS at 13:51

## 2020-11-25 RX ADMIN — HYDROMORPHONE HYDROCHLORIDE 0.5 MG: 1 INJECTION, SOLUTION INTRAMUSCULAR; INTRAVENOUS; SUBCUTANEOUS at 22:00

## 2020-11-25 RX ADMIN — PROPOFOL 200 MG: 10 INJECTION, EMULSION INTRAVENOUS at 13:11

## 2020-11-25 RX ADMIN — PHENAZOPYRIDINE 100 MG: 100 TABLET ORAL at 11:15

## 2020-11-25 RX ADMIN — PHENYLEPHRINE HYDROCHLORIDE 25 MCG/MIN: 10 INJECTION INTRAVENOUS at 13:56

## 2020-11-25 RX ADMIN — KETAMINE HYDROCHLORIDE 25 MG: 50 INJECTION, SOLUTION INTRAMUSCULAR; INTRAVENOUS at 14:00

## 2020-11-25 RX ADMIN — CELECOXIB 200 MG: 200 CAPSULE ORAL at 11:12

## 2020-11-25 RX ADMIN — LIDOCAINE HYDROCHLORIDE 100 MG: 10 INJECTION, SOLUTION EPIDURAL; INFILTRATION; INTRACAUDAL; PERINEURAL at 13:09

## 2020-11-25 RX ADMIN — KETOROLAC TROMETHAMINE 30 MG: 30 INJECTION, SOLUTION INTRAMUSCULAR at 19:22

## 2020-11-26 VITALS
BODY MASS INDEX: 29.37 KG/M2 | HEIGHT: 64 IN | WEIGHT: 172 LBS | HEART RATE: 87 BPM | TEMPERATURE: 98 F | DIASTOLIC BLOOD PRESSURE: 57 MMHG | RESPIRATION RATE: 15 BRPM | SYSTOLIC BLOOD PRESSURE: 120 MMHG | OXYGEN SATURATION: 100 %

## 2020-11-26 PROBLEM — D25.2 SUBSEROUS LEIOMYOMA OF UTERUS: Status: ACTIVE | Noted: 2020-11-18

## 2020-11-26 PROBLEM — R33.8 ACUTE URINARY RETENTION: Status: RESOLVED | Noted: 2020-11-25 | Resolved: 2020-11-26

## 2020-11-26 LAB
ANION GAP SERPL CALCULATED.3IONS-SCNC: 6 MMOL/L (ref 4–13)
BASOPHILS # BLD AUTO: 0.02 THOUSANDS/ΜL (ref 0–0.1)
BASOPHILS NFR BLD AUTO: 0 % (ref 0–1)
BUN SERPL-MCNC: 12 MG/DL (ref 5–25)
CALCIUM SERPL-MCNC: 7.8 MG/DL (ref 8.3–10.1)
CHLORIDE SERPL-SCNC: 112 MMOL/L (ref 100–108)
CO2 SERPL-SCNC: 22 MMOL/L (ref 21–32)
CREAT SERPL-MCNC: 0.89 MG/DL (ref 0.6–1.3)
EOSINOPHIL # BLD AUTO: 0 THOUSAND/ΜL (ref 0–0.61)
EOSINOPHIL NFR BLD AUTO: 0 % (ref 0–6)
ERYTHROCYTE [DISTWIDTH] IN BLOOD BY AUTOMATED COUNT: 13.1 % (ref 11.6–15.1)
GFR SERPL CREATININE-BSD FRML MDRD: 82 ML/MIN/1.73SQ M
GLUCOSE SERPL-MCNC: 117 MG/DL (ref 65–140)
HCT VFR BLD AUTO: 31.4 % (ref 34.8–46.1)
HGB BLD-MCNC: 9.9 G/DL (ref 11.5–15.4)
IMM GRANULOCYTES # BLD AUTO: 0.05 THOUSAND/UL (ref 0–0.2)
IMM GRANULOCYTES NFR BLD AUTO: 0 % (ref 0–2)
LYMPHOCYTES # BLD AUTO: 1.2 THOUSANDS/ΜL (ref 0.6–4.47)
LYMPHOCYTES NFR BLD AUTO: 10 % (ref 14–44)
MCH RBC QN AUTO: 33 PG (ref 26.8–34.3)
MCHC RBC AUTO-ENTMCNC: 31.5 G/DL (ref 31.4–37.4)
MCV RBC AUTO: 105 FL (ref 82–98)
MONOCYTES # BLD AUTO: 0.83 THOUSAND/ΜL (ref 0.17–1.22)
MONOCYTES NFR BLD AUTO: 7 % (ref 4–12)
NEUTROPHILS # BLD AUTO: 9.81 THOUSANDS/ΜL (ref 1.85–7.62)
NEUTS SEG NFR BLD AUTO: 83 % (ref 43–75)
NRBC BLD AUTO-RTO: 0 /100 WBCS
PLATELET # BLD AUTO: 193 THOUSANDS/UL (ref 149–390)
PMV BLD AUTO: 9.4 FL (ref 8.9–12.7)
POTASSIUM SERPL-SCNC: 3.8 MMOL/L (ref 3.5–5.3)
RBC # BLD AUTO: 3 MILLION/UL (ref 3.81–5.12)
SODIUM SERPL-SCNC: 140 MMOL/L (ref 136–145)
WBC # BLD AUTO: 11.91 THOUSAND/UL (ref 4.31–10.16)

## 2020-11-26 PROCEDURE — 85025 COMPLETE CBC W/AUTO DIFF WBC: CPT | Performed by: OBSTETRICS & GYNECOLOGY

## 2020-11-26 PROCEDURE — 80048 BASIC METABOLIC PNL TOTAL CA: CPT | Performed by: OBSTETRICS & GYNECOLOGY

## 2020-11-26 PROCEDURE — 99024 POSTOP FOLLOW-UP VISIT: CPT | Performed by: OBSTETRICS & GYNECOLOGY

## 2020-11-26 RX ORDER — SODIUM CHLORIDE, SODIUM LACTATE, POTASSIUM CHLORIDE, CALCIUM CHLORIDE 600; 310; 30; 20 MG/100ML; MG/100ML; MG/100ML; MG/100ML
125 INJECTION, SOLUTION INTRAVENOUS CONTINUOUS
Status: DISCONTINUED | OUTPATIENT
Start: 2020-11-26 | End: 2020-11-26

## 2020-11-26 RX ORDER — IBUPROFEN 200 MG
600 TABLET ORAL EVERY 6 HOURS PRN
Start: 2020-11-26 | End: 2020-12-10

## 2020-11-26 RX ORDER — OXYCODONE HYDROCHLORIDE 5 MG/1
5 TABLET ORAL EVERY 6 HOURS PRN
Qty: 30 TABLET | Refills: 0 | Status: SHIPPED | OUTPATIENT
Start: 2020-11-26 | End: 2020-11-26

## 2020-11-26 RX ORDER — ACETAMINOPHEN 325 MG/1
650 TABLET ORAL EVERY 6 HOURS PRN
Refills: 0
Start: 2020-11-26 | End: 2021-08-23

## 2020-11-26 RX ORDER — OXYCODONE HYDROCHLORIDE 5 MG/1
5 TABLET ORAL EVERY 6 HOURS PRN
Qty: 30 TABLET | Refills: 0 | Status: SHIPPED | OUTPATIENT
Start: 2020-11-26 | End: 2020-12-10 | Stop reason: SDUPTHER

## 2020-11-26 RX ADMIN — ACETAMINOPHEN 650 MG: 325 TABLET, FILM COATED ORAL at 03:55

## 2020-11-26 RX ADMIN — TAMOXIFEN CITRATE 20 MG: 10 TABLET ORAL at 08:35

## 2020-11-26 RX ADMIN — OXYCODONE HYDROCHLORIDE 10 MG: 10 TABLET ORAL at 04:47

## 2020-11-26 RX ADMIN — KETOROLAC TROMETHAMINE 30 MG: 30 INJECTION, SOLUTION INTRAMUSCULAR at 12:11

## 2020-11-26 RX ADMIN — ACETAMINOPHEN 650 MG: 325 TABLET, FILM COATED ORAL at 07:51

## 2020-11-26 RX ADMIN — SODIUM CHLORIDE, SODIUM LACTATE, POTASSIUM CHLORIDE, AND CALCIUM CHLORIDE 125 ML/HR: .6; .31; .03; .02 INJECTION, SOLUTION INTRAVENOUS at 00:26

## 2020-11-26 RX ADMIN — OXYCODONE HYDROCHLORIDE 5 MG: 5 TABLET ORAL at 13:48

## 2020-11-26 RX ADMIN — KETOROLAC TROMETHAMINE 30 MG: 30 INJECTION, SOLUTION INTRAMUSCULAR at 06:01

## 2020-11-26 RX ADMIN — KETOROLAC TROMETHAMINE 30 MG: 30 INJECTION, SOLUTION INTRAMUSCULAR at 01:39

## 2020-11-26 RX ADMIN — ACETAMINOPHEN 650 MG: 325 TABLET, FILM COATED ORAL at 11:06

## 2020-11-26 RX ADMIN — OXYCODONE HYDROCHLORIDE 5 MG: 5 TABLET ORAL at 09:27

## 2020-11-30 ENCOUNTER — TRANSITIONAL CARE MANAGEMENT (OUTPATIENT)
Dept: FAMILY MEDICINE CLINIC | Facility: CLINIC | Age: 40
End: 2020-11-30

## 2020-12-07 ENCOUNTER — OFFICE VISIT (OUTPATIENT)
Dept: FAMILY MEDICINE CLINIC | Facility: CLINIC | Age: 40
End: 2020-12-07
Payer: COMMERCIAL

## 2020-12-07 VITALS
BODY MASS INDEX: 30.9 KG/M2 | HEART RATE: 79 BPM | SYSTOLIC BLOOD PRESSURE: 114 MMHG | TEMPERATURE: 98.7 F | DIASTOLIC BLOOD PRESSURE: 70 MMHG | OXYGEN SATURATION: 99 % | WEIGHT: 174.4 LBS | HEIGHT: 63 IN

## 2020-12-07 DIAGNOSIS — Z09 HOSPITAL DISCHARGE FOLLOW-UP: Primary | ICD-10-CM

## 2020-12-07 DIAGNOSIS — D25.2 SUBSEROUS LEIOMYOMA OF UTERUS: ICD-10-CM

## 2020-12-07 DIAGNOSIS — Z90.710 STATUS POST TOTAL ABDOMINAL HYSTERECTOMY: ICD-10-CM

## 2020-12-07 DIAGNOSIS — F41.9 ANXIETY: ICD-10-CM

## 2020-12-07 PROCEDURE — 1111F DSCHRG MED/CURRENT MED MERGE: CPT | Performed by: FAMILY MEDICINE

## 2020-12-07 PROCEDURE — 3008F BODY MASS INDEX DOCD: CPT

## 2020-12-07 PROCEDURE — 99214 OFFICE O/P EST MOD 30 MIN: CPT | Performed by: FAMILY MEDICINE

## 2020-12-07 RX ORDER — KETOROLAC TROMETHAMINE 10 MG/1
10 TABLET, FILM COATED ORAL EVERY 8 HOURS PRN
Qty: 10 TABLET | Refills: 0 | Status: SHIPPED | OUTPATIENT
Start: 2020-12-07 | End: 2020-12-15 | Stop reason: SDUPTHER

## 2020-12-10 ENCOUNTER — OFFICE VISIT (OUTPATIENT)
Dept: OBGYN CLINIC | Facility: CLINIC | Age: 40
End: 2020-12-10

## 2020-12-10 VITALS — DIASTOLIC BLOOD PRESSURE: 76 MMHG | SYSTOLIC BLOOD PRESSURE: 116 MMHG

## 2020-12-10 DIAGNOSIS — Z90.710 STATUS POST TOTAL ABDOMINAL HYSTERECTOMY: ICD-10-CM

## 2020-12-10 DIAGNOSIS — Z09 POSTOPERATIVE EXAMINATION: Primary | ICD-10-CM

## 2020-12-10 DIAGNOSIS — D25.2 SUBSEROUS LEIOMYOMA OF UTERUS: ICD-10-CM

## 2020-12-10 PROCEDURE — 99024 POSTOP FOLLOW-UP VISIT: CPT | Performed by: OBSTETRICS & GYNECOLOGY

## 2020-12-10 RX ORDER — OXYCODONE HYDROCHLORIDE 5 MG/1
5 TABLET ORAL
Qty: 7 TABLET | Refills: 0 | Status: SHIPPED | OUTPATIENT
Start: 2020-12-10 | End: 2021-04-29

## 2020-12-15 DIAGNOSIS — Z90.710 STATUS POST TOTAL ABDOMINAL HYSTERECTOMY: ICD-10-CM

## 2020-12-15 RX ORDER — KETOROLAC TROMETHAMINE 10 MG/1
10 TABLET, FILM COATED ORAL EVERY 8 HOURS PRN
Qty: 10 TABLET | Refills: 0 | Status: SHIPPED | OUTPATIENT
Start: 2020-12-15 | End: 2021-04-29

## 2021-01-08 DIAGNOSIS — Z17.0 MALIGNANT NEOPLASM OF UPPER-OUTER QUADRANT OF RIGHT BREAST IN FEMALE, ESTROGEN RECEPTOR POSITIVE (HCC): ICD-10-CM

## 2021-01-08 DIAGNOSIS — C50.411 MALIGNANT NEOPLASM OF UPPER-OUTER QUADRANT OF RIGHT BREAST IN FEMALE, ESTROGEN RECEPTOR POSITIVE (HCC): ICD-10-CM

## 2021-01-08 RX ORDER — TAMOXIFEN CITRATE 20 MG/1
20 TABLET ORAL DAILY
Qty: 90 TABLET | Refills: 1 | OUTPATIENT
Start: 2021-01-08 | End: 2021-07-08

## 2021-01-15 ENCOUNTER — TELEPHONE (OUTPATIENT)
Dept: HEMATOLOGY ONCOLOGY | Facility: CLINIC | Age: 41
End: 2021-01-15

## 2021-01-15 NOTE — TELEPHONE ENCOUNTER
Call placed to [facility] pharmacy spoke with pharmacist Yves West  Verbal order given tamoxifen (NOLVADEX) 20 mg tablet [[id number]]     Order Details  Dose: 20 mg Route: Oral Frequency: Daily   Dispense Quantity: 90 tablet Refills: 1 Fills remaining: --           Sig: Take 1 tablet (20 mg total) by mouth daily        Patient called and advised that a verbal order was given  Patient verbalized understanding of above

## 2021-01-15 NOTE — TELEPHONE ENCOUNTER
daMedication Refill     Who is Calling  Patient   Medication  tamoxifen (NOLVADEX) 20 mg tablet       How many pills left 2   Preferred Pharmacy [facility]   Call back number  [phone]   Relevant Information

## 2021-01-18 ENCOUNTER — IMMUNIZATIONS (OUTPATIENT)
Dept: FAMILY MEDICINE CLINIC | Facility: HOSPITAL | Age: 41
End: 2021-01-18

## 2021-01-18 DIAGNOSIS — Z23 ENCOUNTER FOR IMMUNIZATION: Primary | ICD-10-CM

## 2021-01-18 PROCEDURE — 0001A SARS-COV-2 / COVID-19 MRNA VACCINE (PFIZER-BIONTECH) 30 MCG: CPT

## 2021-01-18 PROCEDURE — 91300 SARS-COV-2 / COVID-19 MRNA VACCINE (PFIZER-BIONTECH) 30 MCG: CPT

## 2021-01-26 DIAGNOSIS — F41.1 GENERALIZED ANXIETY DISORDER: ICD-10-CM

## 2021-01-27 RX ORDER — LORAZEPAM 1 MG/1
TABLET ORAL
Qty: 90 TABLET | Refills: 0 | Status: SHIPPED | OUTPATIENT
Start: 2021-01-27 | End: 2021-04-27

## 2021-01-27 NOTE — TELEPHONE ENCOUNTER
Medication:  PDMP   10/20/2020  3  10/20/2020  LORAZEPAM 1 MG TABLET  90 0  90  TI CHI       Active agreement on file -No

## 2021-02-06 ENCOUNTER — IMMUNIZATIONS (OUTPATIENT)
Dept: FAMILY MEDICINE CLINIC | Facility: HOSPITAL | Age: 41
End: 2021-02-06

## 2021-02-06 DIAGNOSIS — Z23 ENCOUNTER FOR IMMUNIZATION: Primary | ICD-10-CM

## 2021-02-06 PROCEDURE — 91300 SARS-COV-2 / COVID-19 MRNA VACCINE (PFIZER-BIONTECH) 30 MCG: CPT

## 2021-02-06 PROCEDURE — 0002A SARS-COV-2 / COVID-19 MRNA VACCINE (PFIZER-BIONTECH) 30 MCG: CPT

## 2021-02-16 ENCOUNTER — RADIATION ONCOLOGY FOLLOW-UP (OUTPATIENT)
Dept: RADIATION ONCOLOGY | Facility: HOSPITAL | Age: 41
End: 2021-02-16
Attending: RADIOLOGY
Payer: COMMERCIAL

## 2021-02-16 VITALS
HEIGHT: 64 IN | SYSTOLIC BLOOD PRESSURE: 115 MMHG | TEMPERATURE: 98.3 F | BODY MASS INDEX: 30.41 KG/M2 | DIASTOLIC BLOOD PRESSURE: 78 MMHG | HEART RATE: 70 BPM

## 2021-02-16 DIAGNOSIS — C50.411 MALIGNANT NEOPLASM OF UPPER-OUTER QUADRANT OF RIGHT BREAST IN FEMALE, ESTROGEN RECEPTOR POSITIVE (HCC): Primary | ICD-10-CM

## 2021-02-16 DIAGNOSIS — Z17.0 MALIGNANT NEOPLASM OF UPPER-OUTER QUADRANT OF RIGHT BREAST IN FEMALE, ESTROGEN RECEPTOR POSITIVE (HCC): Primary | ICD-10-CM

## 2021-02-16 DIAGNOSIS — Z79.810 USE OF TAMOXIFEN (NOLVADEX): ICD-10-CM

## 2021-02-16 PROCEDURE — 99211 OFF/OP EST MAY X REQ PHY/QHP: CPT | Performed by: RADIOLOGY

## 2021-02-16 NOTE — PROGRESS NOTES
Virtual Regular Visit    Problem List Items Addressed This Visit     None               Reason for visit is radiation oncology follow-up    Encounter provider Robert Church MD    Provider located at [address] [address]    Recent Visits  No visits were found meeting these conditions  Showing recent visits within past 7 days and meeting all other requirements     Future Appointments  No visits were found meeting these conditions  Showing future appointments within next 150 days and meeting all other requirements        After connecting through [employer], the patient was identified by name and date of birth  Racheal Novak Laura was informed that this is a telemedicine visit and that the visit is being conducted through telephone which may not be secure and therefore, might not be HIPAA-compliant  My office door was closed  No one else was in the room  She acknowledged consent and understanding of privacy and security of the video platform  The patient has agreed to participate and understands they can discontinue the visit at any time  Partha Mayen is a 47 y o  female with a history of right breast cancer  She completed post-mastectomy radiation to the chest wall 12/14/18  She was last seen by radiation oncology 2/18/20  She returns today for follow-up  2/25/20 Dr Zach Mason- currently on adjuvant hormonal therapy with tamoxifen 20 mg daily with minimal side effect  No evidence recurrent disease  Recommend her to continue tamoxifen  F/u 1 year    5/12/20 Lane Stauffer NP- No new complaints aside from fatigue  No worrisome findings on her exam   I recommended that she meet with our oncology Genetics counselor to discuss in detail her genetic testing results and appropriate screening regimens for ovarian and pancreatic cancers   F/u in 6 months    11/18/20 Dr Vivi Pat obgyn- seen for ER follow-up visit  She was seen at [facility] due to acute urinary retention  Paul was placed and has been left in  On imaging she was noted to have an enlarged fibroid uterus measuring approximately 14 x 11 x 12 cm with a posterior subserosal fibroid measuring approximately 10 x 13 x 11 cm  She has been aware of posterior fibroid since her last delivery in 2017 at which time it was approximately 8 cm  Denies vaginal bleeding  Reports pelvic pressure and states that prior to being seen in the ED she had vaginal and pelvic pressure similar to when she was pregnant  Explained pelvic pressure and vaginal discomfort secondary to posterior fibroid pushing on to vaginal wall  Discussed surgical removal is recommended  Has appt with gyn onc to discuss surgical options  Posterior fibroid palpated vaginally on bimanual exam  Fibroid significantly deviates cervix anteriorly  Uterus palpated to approximately 5cm below umbilicus  No adnexal masses palpated bilaterally  11/18/20 Dr Cherie Gordon- would offer hysterectomy for definitive management  Patient hoping fibroid could be removed instead  Discussed that fibroid is large and solitary so may be reasonable to offer myomectomy however it is also possible at the time of surgery they may need to perform hysterectomy given large defect  11/25/20 Diagnostic laparoscopy, laparoscopic bilateral salpingectomy, total abdominal hysterectomy, and cystoscopy    12/10/20 Dr Jameel Kearns- recovering well  Discussed pathology report  Reviewed lack of dysplasia associated with cervic and no need for pap smears in the future   F/u prn and for annual visit    3/2/21 Dr Angelito Cummings  4/29/21 Arletta Neighbours NP      Past Medical History:   Diagnosis Date    Anxiety     Asthma     allergy excaberated    Breast cancer ([facility] [address] ) 01/25/2018    Cancer ([facility] [address] )     Cigarette nicotine dependence without complication 42/9/0882    Fibroid 2017    GERD (gastroesophageal reflux disease)  Gestational diabetes     History of adverse reaction to anesthesia     Pt reports waking up severly anxious    Pneumonia     Seasonal allergies        Past Surgical History:   Procedure Laterality Date    BREAST RECONSTRUCTION Bilateral 2018    Procedure: RECONSTRUCTION BREAST W/ IMPLANT;  Surgeon: Jenny Carlson MD;  [facility];  Service: Plastics     SECTION      HYSTERECTOMY N/A 2020    Procedure: HYSTERECTOMY LAPAROSCOPIC TOTAL ([address]) WITH BILATERAL SALPINGECTOMY-ATTEMPTED;  Surgeon: Carissa Braga MD;  [facility];  Service: Gynecology    HYSTERECTOMY N/A 2020    Procedure: HYSTERECTOMY TOTAL ABDOMINAL (AARON);   Surgeon: Carissa Braga MD;  [facility];  Service: Gynecology    IR PORT REMOVAL  2019    MASTECTOMY      PILONIDAL CYST EXCISION      resection    MT CYSTOURETHROSCOPY N/A 2020    Procedure: CYSTOSCOPY;  Surgeon: Carissa Braga MD;  [facility];  Service: Gynecology    MT INSJ TUNNELED CTR VAD W/SUBQ PORT AGE 5 YR/> Left 2018    Procedure: INSERTION VENOUS PORT ( PORT-A-CATH) IR;  Surgeon: Ismael Guerra DO;  [facility];  Service: Interventional Radiology    MT LAP,DIAGNOSTIC ABDOMEN N/A 2020    Procedure: LAPAROSCOPY DIAGNOSTIC;  Surgeon: Carissa Braga MD;  [facility];  Service: Gynecology    MT MASTECTOMY, MODIFIED RADICAL Right 2018    Procedure: BREAST MODIFIED RADICAL MASTECTOMY;  Surgeon: Leila Wilkins MD;  [facility];  Service: Surgical Oncology    MT MASTECTOMY, SIMPLE, COMPLETE Left 2018    Procedure: BREAST SIMPLE MASTECTOMY;  Surgeon: Leila Wilkins MD;  [facility];  Service: Surgical Oncology    MT REVISION OF RECONSTRUCTED BREAST Bilateral 2018    Procedure: REVISION BREAST RECON Scottie Drape CLOSURE;  Surgeon: Jenny Carlson MD;  [facility];  Service: Plastics    TUBAL LIGATION      WISDOM TOOTH EXTRACTION         Current Outpatient Medications   Medication Sig Dispense Refill    acetaminophen (TYLENOL) 325 mg tablet Take 2 tablets (650 mg total) by mouth every 6 (six) hours as needed for mild pain  0    albuterol (VENTOLIN HFA) 90 mcg/act inhaler Inhale 2 puffs every 4 (four) hours as needed      diphenhydrAMINE-acetaminophen (TYLENOL PM)  MG TABS Take 1 tablet by mouth daily at bedtime      levocetirizine (XYZAL) 5 MG tablet Take 5 mg by mouth every evening       LORazepam (ATIVAN) 1 mg tablet TAKE 1 TABLET BY MOUTH DAILY AT BEDTIME 90 tablet 0    omeprazole (PriLOSEC) 20 mg delayed release capsule Take 20 mg by mouth daily       tamoxifen (NOLVADEX) 20 mg tablet Take 1 tablet (20 mg total) by mouth daily 90 tablet 1    ketorolac (TORADOL) 10 mg tablet Take 1 tablet (10 mg total) by mouth every 8 (eight) hours as needed for moderate pain (Patient not taking: Reported on 2/16/2021) 10 tablet 0    oxyCODONE (ROXICODONE) 5 mg immediate release tablet Take 1 tablet (5 mg total) by mouth daily at bedtime as needed for severe pain for up to 7 dosesMax Daily Amount: 5 mg (Patient not taking: Reported on 2/16/2021) 7 tablet 0     No current facility-administered medications for this visit  Allergies   Allergen Reactions    Trichophyton Other (See Comments)     AKA Fungi - Pt does not know reaction     Latex Swelling    Cat Hair Extract Sneezing    Dust Mite Extract Sneezing    Molds & Smuts Sneezing    Pollen Extract Sneezing    Tree Extract Sneezing         I spent 30 minutes with the patient during this visit  Follow up visit       Oncology History   Malignant neoplasm of upper-outer quadrant of right breast in female, estrogen receptor positive ([facility] [address] )   1/19/2018 Initial Diagnosis    US guided core biopsy of right breast mass at [facility]  Malignant neoplasm of UOQ of right breast,   ER positive  HER2 positive     1/29/2018 Genetic Testing    Likely pathogenic variant was identified in the PALB2 gene       2/2018 - 6/1/2018 Chemotherapy    Neoadjuvant chemotherapy with TCH with pertuzumab  Dr Angelito Cummings  6/21/2018 Surgery    Right MRM  Invasive mucinous carcinoma  3 4 cm geronimo  Grade 1  3/16 lymph nodes  Stage IB - ypT1mi, ypN1mi, G1  Left simple mastectomy  Benign breast     Immediate reconstruction Dr Duncan Dominguez      7/19/2018 - 1/2019 Chemotherapy    trastuzumab monotherapy every 3 weeks         11/1/2018 - 12/14/2018 Radiation    Course: C1    Plan ID Energy Fractions Dose per Fraction (cGy) Dose Correction (cGy) Total Dose Delivered (cGy) Elapsed Days   LAT R CW bst 6E 5 / 5 200 0 1,000 4   MED R CW bst 6E 5 / 5 200 0 1,000 4   New R PAB2 6X 25 / 25 47 0 1,175 36   New R Sclav2 6X 25 / 25 200 0 5,000 36   [id number]_22 6X 12 / 12 200 0 2,400 34   KerTfpMdq61_7 10X 12 / 12 200 0 2,400 34   [id number] 10X 13 / 13 200 0 2,600 36   NewR CW 10_30 6X 13 / 13 200 0 2,600 36      Treatment dates:  C1: 11/1/2018 - 12/14/2018 1/22/2019 -  Hormone Therapy    Tamoxifen  With Dr Angelito Cummings         Clinical Trial: no      Health Maintenance   Topic Date Due    Pneumococcal Vaccine: Pediatrics (0 to 5 Years) and At-Risk Patients (6 to 59 Years) (1 of 1 - PPSV23) 05/04/1988    HIV Screening  05/04/1997    Annual Physical  06/16/2021    Depression Screening PHQ  12/07/2021    BMI: Adult  12/07/2021    DTaP,Tdap,and Td Vaccines (3 - Td) 05/03/2027    Influenza Vaccine  Completed    COVID-19 Vaccine  Completed    HIB Vaccine  Aged Out    Hepatitis B Vaccine  Aged Out    IPV Vaccine  Aged Out    Hepatitis A Vaccine  Aged Out    Meningococcal ACWY Vaccine  Aged Out    HPV Vaccine  Aged Out       Patient Active Problem List   Diagnosis    Asthma    Other constipation    Macular atrophy, retinal    Seasonal allergies    Malignant neoplasm of upper-outer quadrant of right breast in female, estrogen receptor positive (HCC)    Anxiety    Use of tamoxifen (Nolvadex)    Monoallelic mutation of PALB2 gene    Subserous leiomyoma of uterus    Status post total abdominal hysterectomy     Past Medical History:   Diagnosis Date    Anxiety     Asthma     allergy excaberated    Breast cancer ([facility] [address] 2018    Cancer ([facility] [address] [facility]     Cigarette nicotine dependence without complication     Fibroid 2017    GERD (gastroesophageal reflux disease)     Gestational diabetes     History of adverse reaction to anesthesia     Pt reports waking up severly anxious    Pneumonia     Seasonal allergies      Past Surgical History:   Procedure Laterality Date    BREAST RECONSTRUCTION Bilateral 2018    Procedure: RECONSTRUCTION BREAST W/ IMPLANT;  Surgeon: Amber Albrecht MD;  [facility];  Service: Plastics     SECTION      HYSTERECTOMY N/A 2020    Procedure: HYSTERECTOMY LAPAROSCOPIC TOTAL ([address]) WITH BILATERAL SALPINGECTOMY-ATTEMPTED;  Surgeon: Lesvia Dunaway MD;  [facility];  Service: Gynecology    HYSTERECTOMY N/A 2020    Procedure: HYSTERECTOMY TOTAL ABDOMINAL (AARON);   Surgeon: Lesvia Dunaway MD;  [facility];  Service: Gynecology    IR PORT REMOVAL  2019    MASTECTOMY      PILONIDAL CYST EXCISION      resection    KS CYSTOURETHROSCOPY N/A 2020    Procedure: CYSTOSCOPY;  Surgeon: Lesvia Dunaway MD;  [facility];  Service: Gynecology    KS INSJ TUNNELED CTR VAD W/SUBQ PORT AGE 5 YR/> Left 2018    Procedure: INSERTION VENOUS PORT ( PORT-A-CATH) IR;  Surgeon: Francois Hurley DO;  [facility];  Service: Interventional Radiology    KS LAP,DIAGNOSTIC ABDOMEN N/A 2020    Procedure: LAPAROSCOPY DIAGNOSTIC;  Surgeon: Lesvia Dunaway MD;  [facility];  Service: Gynecology    KS MASTECTOMY, MODIFIED RADICAL Right 2018    Procedure: BREAST MODIFIED RADICAL MASTECTOMY;  Surgeon: Yimi Barnard MD;  [facility];  Service: Surgical Oncology    KS MASTECTOMY, SIMPLE, COMPLETE Left 2018    Procedure: BREAST SIMPLE MASTECTOMY; Surgeon: Siomara Ritchie MD;  [facility];  Service: Surgical Oncology    MA REVISION OF RECONSTRUCTED BREAST Bilateral 2018    Procedure: REVISION BREAST RECON W/WOUND CLOSURE;  Surgeon: Nikia Boucher MD;  [facility];  Service: Plastics    TUBAL LIGATION      WISDOM TOOTH EXTRACTION       Family History   Problem Relation Age of Onset    Diabetes Mother     Hypertension Mother     Rosacea Father     Allergies Father         seasonal    Liver cancer Paternal Grandfather     Cancer Paternal Grandfather     No Known Problems Brother     No Known Problems Son     Breast cancer Maternal Grandmother     Brain cancer Maternal Grandmother     Cancer Maternal Grandmother     No Known Problems Maternal Grandfather     Diabetes Paternal Grandmother     Kidney failure Paternal Grandmother     Hypertension Paternal Grandmother     No Known Problems Brother     Breast cancer Paternal Aunt      Social History     Socioeconomic History    Marital status: /Civil Union     Spouse name: Not on file    Number of children: Not on file    Years of education: Not on file    Highest education level: Not on file   Occupational History    Not on file   Social Needs    Financial resource strain: Not on file    Food insecurity     Worry: Not on file     Inability: Not on file    Transportation needs     Medical: Not on file     Non-medical: Not on file   Tobacco Use    Smoking status: Former Smoker     Packs/day: 0 50     Years: 15 00     Pack years: 7 50     Quit date: 2014     Years since quittin 2    Smokeless tobacco: Never Used   Substance and Sexual Activity    Alcohol use:  Yes     Alcohol/week: 1 0 standard drinks     Types: 1 Standard drinks or equivalent per week     Frequency: Monthly or less     Drinks per session: 1 or 2     Comment: occ    Drug use: No    Sexual activity: Yes     Partners: Male     Birth control/protection: Other   Lifestyle    Physical activity Days per week: Not on file     Minutes per session: Not on file    Stress: Not on file   Relationships    Social connections     Talks on phone: Not on file     Gets together: Not on file     Attends [employer] service: Not on file     Active member of club or organization: Not on file     Attends meetings of clubs or organizations: Not on file     Relationship status: Not on file    Intimate partner violence     Fear of current or ex partner: Not on file     Emotionally abused: Not on file     Physically abused: Not on file     Forced sexual activity: Not on file   Other Topics Concern    Not on file   Social History Narrative    Drinks coffee       Current Outpatient Medications:     acetaminophen (TYLENOL) 325 mg tablet, Take 2 tablets (650 mg total) by mouth every 6 (six) hours as needed for mild pain, Disp: , Rfl: 0    albuterol (VENTOLIN HFA) 90 mcg/act inhaler, Inhale 2 puffs every 4 (four) hours as needed, Disp: , Rfl:     diphenhydrAMINE-acetaminophen (TYLENOL PM)  MG TABS, Take 1 tablet by mouth daily at bedtime, Disp: , Rfl:     levocetirizine (XYZAL) 5 MG tablet, Take 5 mg by mouth every evening , Disp: , Rfl:     LORazepam (ATIVAN) 1 mg tablet, TAKE 1 TABLET BY MOUTH DAILY AT BEDTIME, Disp: 90 tablet, Rfl: 0    omeprazole (PriLOSEC) 20 mg delayed release capsule, Take 20 mg by mouth daily , Disp: , Rfl:     tamoxifen (NOLVADEX) 20 mg tablet, Take 1 tablet (20 mg total) by mouth daily, Disp: 90 tablet, Rfl: 1    ketorolac (TORADOL) 10 mg tablet, Take 1 tablet (10 mg total) by mouth every 8 (eight) hours as needed for moderate pain (Patient not taking: Reported on 2/16/2021), Disp: 10 tablet, Rfl: 0    oxyCODONE (ROXICODONE) 5 mg immediate release tablet, Take 1 tablet (5 mg total) by mouth daily at bedtime as needed for severe pain for up to 7 dosesMax Daily Amount: 5 mg (Patient not taking: Reported on 2/16/2021), Disp: 7 tablet, Rfl: 0  Allergies   Allergen Reactions    Trichophyton Other (See Comments)     AKA Fungi - Pt does not know reaction     Latex Swelling    Cat Hair Extract Sneezing    Dust Mite Extract Sneezing    Molds & Smuts Sneezing    Pollen Extract Sneezing    Tree Extract Sneezing       Review of Systems:  Review of Systems   Constitutional: Negative  HENT: Negative  Eyes: Negative  Respiratory: Negative  Cardiovascular: Negative  Gastrointestinal: Negative  Endocrine: Negative  Genitourinary: Negative  Musculoskeletal: Negative  Skin: Negative  Allergic/Immunologic: Negative  Neurological: Positive for numbness (right arm)  Hematological: Negative  Psychiatric/Behavioral: The patient is nervous/anxious (mild)  Vitals:    02/16/21 1538   Height: 5' 3 5" (1 613 m)        Pain Score: 0-No pain      Imaging:No results found

## 2021-02-16 NOTE — PROGRESS NOTES
Follow-up - Radiation Oncology   Angela Sanchez 1980 47 y o  female [id number]      History of Present Illness   Cancer Staging  Malignant neoplasm of upper-outer quadrant of right breast in female, estrogen receptor positive ([facility] [address] )  Staging form: Breast, AJCC 8th Edition  - Clinical stage from 1/26/2018: Stage IB (cT2(3), cN1, cM0, G2, ER: Positive, IA: Positive, HER2: Positive) - Signed by Soha Ahn MD on 1/26/2018  Method of lymph node assessment: Other  Nuclear grade: G3  Histologic grading system: 3 grade system  Laterality: Right  Tumor size (mm): 35  Multiple tumors: Yes  Number of tumors: 3      Angela Sanchez is a 47y o  year old female with a history of right breast carcinoma      Reason for visit is radiation oncology follow-up     Encounter provider Bethany Gamino MD     Provider located at [place] [id number]     Recent Visits  No visits were found meeting these conditions  Showing recent visits within past 7 days and meeting all other requirements      Future Appointments  No visits were found meeting these conditions  Showing future appointments within next 150 days and meeting all other requirements         After connecting through [employer], the patient was identified by name and date of birth  Angela Sanchez was informed that this is a telemedicine visit and that the visit is being conducted through telephone which may not be secure and therefore, might not be HIPAA-compliant  My office door was closed  No one else was in the room  She acknowledged consent and understanding of privacy and security of the video platform  The patient has agreed to participate and understands they can discontinue the visit at any time      It was my intent to perform this visit via video technology but the patient was not able to do a video connection so the visit was completed via audio telephone only  Subjective  Kiersten Mayen is a 47 y o  female with a history of right breast cancer  She completed post-mastectomy radiation to the chest wall 12/14/18  She was last seen by radiation oncology 2/18/20  She returns today for follow-up  Interval History:   2/25/20 Dr Zach Mason- currently on adjuvant hormonal therapy with tamoxifen 20 mg daily with minimal side effect  No evidence recurrent disease  Recommend her to continue tamoxifen  F/u 1 year     5/12/20 Orgerardo Stauffer NP- No new complaints aside from fatigue  No worrisome findings on her exam   I recommended that she meet with our oncology Genetics counselor to discuss in detail her genetic testing results and appropriate screening regimens for ovarian and pancreatic cancers  F/u in 6 months     11/18/20 Dr Vivi leach- seen for ER follow-up visit  She was seen at [facility] due to acute urinary retention  Paul was placed and has been left in  On imaging she was noted to have an enlarged fibroid uterus measuring approximately 14 x 11 x 12 cm with a posterior subserosal fibroid measuring approximately 10 x 13 x 11 cm  She has been aware of posterior fibroid since her last delivery in 2017 at which time it was approximately 8 cm  Denies vaginal bleeding  Reports pelvic pressure and states that prior to being seen in the ED she had vaginal and pelvic pressure similar to when she was pregnant  Explained pelvic pressure and vaginal discomfort secondary to posterior fibroid pushing on to vaginal wall  Discussed surgical removal is recommended  Has appt with gyn onc to discuss surgical options  Posterior fibroid palpated vaginally on bimanual exam  Fibroid significantly deviates cervix anteriorly  Uterus palpated to approximately 5cm below umbilicus  No adnexal masses palpated bilaterally        11/18/20 Dr Laura Mauricio- would offer hysterectomy for definitive management  Patient hoping fibroid could be removed instead  Discussed that fibroid is large and solitary so may be reasonable to offer myomectomy however it is also possible at the time of surgery they may need to perform hysterectomy given large defect       11/25/20 Diagnostic laparoscopy, laparoscopic bilateral salpingectomy, total abdominal hysterectomy, and cystoscopy     12/10/20 Dr Mariano Emery- recovering well  Discussed pathology report  Reviewed lack of dysplasia associated with cervic and no need for pap smears in the future  F/u prn and for annual visit    She reports she is feeling well  She denies any fatigue  She has minimal hot flashes from tamoxifen that are stable  She reports her skin is intact without any erythema nor areas of any nodularity or skin breakdown  She has no reconstructed chest wall pain  She denies any significant right arm edema with the use of a compressive sleeve   She is busy caring for 3-2/2year-old son  She received her 2nd COVID vaccine last week       3/2/21 Dr Billie Monaco  4/29/21 Hunter Sullivan NP     Historical Information   Oncology History   Malignant neoplasm of upper-outer quadrant of right breast in female, estrogen receptor positive ([facility] [address] )   1/19/2018 Initial Diagnosis    US guided core biopsy of right breast mass at [facility]  Malignant neoplasm of UOQ of right breast,   ER positive  HER2 positive     1/29/2018 Genetic Testing    Likely pathogenic variant was identified in the PALB2 gene  2/2018 - 6/1/2018 Chemotherapy    Neoadjuvant chemotherapy with TCH with pertuzumab  Dr Billie Monaco  6/21/2018 Surgery    Right MRM  Invasive mucinous carcinoma  3 4 cm geronimo  Grade 1  3/16 lymph nodes  Stage IB - ypT1mi, ypN1mi, G1  Left simple mastectomy  Benign breast     Immediate reconstruction Dr Santi Irvin      7/19/2018 - 1/2019 Chemotherapy    trastuzumab monotherapy every 3 weeks         11/1/2018 - 12/14/2018 Radiation    Course: C1    Plan ID Energy Fractions Dose per Fraction (cGy) Dose Correction (cGy) Total Dose Delivered (cGy) Elapsed Days   LAT R CW bst 6E 5 / 5 200 0 1,000 4   MED R CW bst 6E 5 / 5 200 0 1,000 4   New R PAB2 6X 25 / 25 47 0 1,175 36   New R Sclav2 6X 25 / 25 200 0 5,000 36   UvqYLVma27_95 6X 12 /  200 0 2,400 34   QfkMnfNaw47_2 10X 12 /  200 0 2,400 34   ZwdUkzQX90_23 10X 13 /  200 0 2,600 36   NewR CW 10_30 6X 13 /  200 0 2,600 39      Treatment dates:  C1: 2018 - 2018 -  Hormone Therapy    Tamoxifen  With Dr Martin Velazco         Past Medical History:   Diagnosis Date    Anxiety     Asthma     allergy excaberated    Breast cancer ([facility] [address] ) 2018    Cancer ([facility] [address] )     Cigarette nicotine dependence without complication     Fibroid 2017    GERD (gastroesophageal reflux disease)     Gestational diabetes     History of adverse reaction to anesthesia     Pt reports waking up severly anxious    Pneumonia     Seasonal allergies      Past Surgical History:   Procedure Laterality Date    BREAST RECONSTRUCTION Bilateral 2018    Procedure: RECONSTRUCTION BREAST W/ IMPLANT;  Surgeon: Cruzito Ramírez MD;  [facility];  Service: Plastics     SECTION      HYSTERECTOMY N/A 2020    Procedure: HYSTERECTOMY LAPAROSCOPIC TOTAL ([address]) [facility];  Surgeon: Gómez Navarro MD;  Location: [facility];  Service: Gynecology    HYSTERECTOMY N/A 2020    Procedure: HYSTERECTOMY TOTAL ABDOMINAL ([facility]);   Surgeon: Gómez Navarro MD;  [facility];  Service: Gynecology    IR PORT REMOVAL  2019    MASTECTOMY      PILONIDAL CYST EXCISION      resection    MD CYSTOURETHROSCOPY N/A 2020    Procedure: CYSTOSCOPY;  Surgeon: Gómez Navarro MD;  [facility];  Service: Gynecology    MD INSJ TUNNELED CTR VAD W/SUBQ PORT AGE 5 YR/> Left 2018    Procedure: INSERTION VENOUS PORT ( PORT-A-CATH) IR;  Surgeon: Trav Lombardi DO;  [facility];  Service: Interventional Radiology    MD LAP,DIAGNOSTIC ABDOMEN N/A 2020    Procedure: LAPAROSCOPY DIAGNOSTIC;  Surgeon: Jocelyn Abraham MD;  [facility];  Service: Gynecology    MT MASTECTOMY, MODIFIED RADICAL Right 2018    Procedure: BREAST MODIFIED RADICAL MASTECTOMY;  Surgeon: Cindy Valdivia MD;  [facility];  Service: Surgical Oncology    MT MASTECTOMY, SIMPLE, COMPLETE Left 2018    Procedure: BREAST SIMPLE MASTECTOMY;  Surgeon: Cindy Valdivia MD;  [facility];  Service: Surgical Oncology    MT REVISION OF RECONSTRUCTED BREAST Bilateral 2018    Procedure: REVISION BREAST RECON W/WOUND CLOSURE;  Surgeon: Fatimah Cowan MD;  [facility];  Service: Plastics    TUBAL LIGATION      WISDOM TOOTH EXTRACTION         Social History   Social History     Substance and Sexual Activity   Alcohol Use Yes    Alcohol/week: 1 0 standard drinks    Types: 1 Standard drinks or equivalent per week    Frequency: Monthly or less    Drinks per session: 1 or 2    Comment: occ     Social History     Substance and Sexual Activity   Drug Use No     Social History     Tobacco Use   Smoking Status Former Smoker    Packs/day: 0 50    Years: 15 00    Pack years: 7 50    Quit date: 2014    Years since quittin 2   Smokeless Tobacco Never Used         Meds/Allergies     Current Outpatient Medications:     acetaminophen (TYLENOL) 325 mg tablet, Take 2 tablets (650 mg total) by mouth every 6 (six) hours as needed for mild pain, Disp: , Rfl: 0    albuterol (VENTOLIN HFA) 90 mcg/act inhaler, Inhale 2 puffs every 4 (four) hours as needed, Disp: , Rfl:     diphenhydrAMINE-acetaminophen (TYLENOL PM)  MG TABS, Take 1 tablet by mouth daily at bedtime, Disp: , Rfl:     levocetirizine (XYZAL) 5 MG tablet, Take 5 mg by mouth every evening , Disp: , Rfl:     LORazepam (ATIVAN) 1 mg tablet, TAKE 1 TABLET BY MOUTH DAILY AT BEDTIME, Disp: 90 tablet, Rfl: 0    omeprazole (PriLOSEC) 20 mg delayed release capsule, Take 20 mg by mouth daily , Disp: , Rfl:     tamoxifen (NOLVADEX) 20 mg tablet, Take 1 tablet (20 mg total) by mouth daily, Disp: 90 tablet, Rfl: 1    ketorolac (TORADOL) 10 mg tablet, Take 1 tablet (10 mg total) by mouth every 8 (eight) hours as needed for moderate pain (Patient not taking: Reported on 2/16/2021), Disp: 10 tablet, Rfl: 0    oxyCODONE (ROXICODONE) 5 mg immediate release tablet, Take 1 tablet (5 mg total) by mouth daily at bedtime as needed for severe pain for up to 7 dosesMax Daily Amount: 5 mg (Patient not taking: Reported on 2/16/2021), Disp: 7 tablet, Rfl: 0  Allergies   Allergen Reactions    Trichophyton Other (See Comments)     AKA Fungi - Pt does not know reaction     Latex Swelling    Cat Hair Extract Sneezing    Dust Mite Extract Sneezing    Molds & Smuts Sneezing    Pollen Extract Sneezing    Tree Extract Sneezing     Review of Systems   Constitutional: Negative  HENT: Negative  Eyes: Negative  Respiratory: Negative  Cardiovascular: Negative  Gastrointestinal: Negative  Endocrine: Negative  Genitourinary: Negative  Musculoskeletal: Negative  Skin: Negative  Allergic/Immunologic: Negative  Neurological: Positive for numbness (right arm)  Hematological: Negative  Psychiatric/Behavioral: The patient is nervous/anxious (mild)  OBJECTIVE:   /78 Comment: per patient  Pulse 70 Comment: per patient  Temp 98 3 °F (36 8 °C) Comment: per patient  Ht 5' 3 5" (1 613 m)   LMP 10/15/2020   BMI 30 41 kg/m²   Pain Assessment:  0  ECOG/Zubrod/WHO: 1 - Symptomatic but completely ambulatory    Physical Exam   Telemedicine phone visit, so no PE     RESULTS    Lab Results: No results found for this or any previous visit (from the past 672 hour(s))  Imaging Studies:No results found  Assessment/Plan:  No orders of the defined types were placed in this encounter         Tamiko Sanchez is a 47y o  year old female with a locally advanced right breast carcinoma   She presented with a palpable right breast mass as well as axillary lymphadenopathy   Her right breast measured 5 2 x 4 7 cm on CT scan along with 2 right axillary lymph nodes measuring 2 4 and 2 3 cm   There was no evident metastatic disease to the lungs or the abdomen or liver   Bone scan was without any evidence of metastatic disease   Recommendations were made for neoadjuvant chemotherapy with TCH and pertuzumab   She then had repeat imaging with an MRI of the breast that revealed reduction in size of her large right upper outer quadrant breast mass along with significant decrease in the degree of enhancement   There was reduction in the right axillary lymphadenopathy   She had surgery June 21, 2018 with a right modified radical mastectomy along with a left breast prophylactic mastectomy and reconstruction   Her preoperative stage would have been stage III and post neoadjuvant chemotherapy she has stage IB disease        She was seen for consultation July 17, 2018 and we recommended postmastectomy radiation therapy because initially she had a large 5 2 cm tumor with at least 2 positive axillary lymph nodes and pathologically 3/16 lymph nodes were positive   Postmastectomy radiation therapy has been shown to reduce the chance of local recurrence as well as improve survival   Her disease is hormone receptor positive and she saw Dr Stoney Yanez who recommended tamoxifen that was started August 1, 2018  She had simulation performed in August 2018 for treatment to the reconstructed right chest wall, supraclavicular, and axillary regions over 6 weeks   She had difficulty with healing of her bilateral reconstructed chest wall incisions such that she required revision of the bilateral incision/wound closure on September 19, 2018 with Dr Ewa Moise had stopped taking her tamoxifen on October 9, 2018 because she thought this was interfering with her wound healing   Her incisions closed such that she was able to undergo re-simulation October 23, 2018 and then complete radiation therapy to the left supraclavicular, axillary, and reconstructed chest wall regions on December 14, 2018       She returns today for follow-up examination   She is doing well and has no clinical evidence of any recurrent disease   Her reconstructed chest wall skin is without any nodularity and she is applying moisturizer daily   She is tolerating tamoxifen well except for some mild hot flashes  Sandra Blanco has some right upper extremity lymphedema controlled with a compressive sleeve    She will see Dr Latisha Salcido for follow-up in 2 weeks  Sandra Blanco has surgical oncology appointment April 29, 2021  She will return here for follow-up in 12 months        Margot Eisenberg MD  2/16/2021,5:07 PM    Portions of the record may have been created with voice recognition software   Occasional wrong word or "sound a like" substitutions may have occurred due to the inherent limitations of voice recognition software   Read the chart carefully and recognize, using context, where substitutions have occurred

## 2021-03-01 ENCOUNTER — TELEPHONE (OUTPATIENT)
Dept: HEMATOLOGY ONCOLOGY | Facility: CLINIC | Age: 41
End: 2021-03-01

## 2021-03-02 ENCOUNTER — OFFICE VISIT (OUTPATIENT)
Dept: HEMATOLOGY ONCOLOGY | Facility: CLINIC | Age: 41
End: 2021-03-02
Payer: COMMERCIAL

## 2021-03-02 VITALS
TEMPERATURE: 98.5 F | WEIGHT: 174 LBS | SYSTOLIC BLOOD PRESSURE: 134 MMHG | RESPIRATION RATE: 18 BRPM | OXYGEN SATURATION: 98 % | DIASTOLIC BLOOD PRESSURE: 80 MMHG | HEART RATE: 77 BPM | BODY MASS INDEX: 30.83 KG/M2 | HEIGHT: 63 IN

## 2021-03-02 DIAGNOSIS — C50.411 MALIGNANT NEOPLASM OF UPPER-OUTER QUADRANT OF RIGHT BREAST IN FEMALE, ESTROGEN RECEPTOR POSITIVE (HCC): Primary | ICD-10-CM

## 2021-03-02 DIAGNOSIS — Z17.0 MALIGNANT NEOPLASM OF UPPER-OUTER QUADRANT OF RIGHT BREAST IN FEMALE, ESTROGEN RECEPTOR POSITIVE (HCC): Primary | ICD-10-CM

## 2021-03-02 PROCEDURE — 1036F TOBACCO NON-USER: CPT | Performed by: INTERNAL MEDICINE

## 2021-03-02 PROCEDURE — 3008F BODY MASS INDEX DOCD: CPT | Performed by: INTERNAL MEDICINE

## 2021-03-02 PROCEDURE — 99214 OFFICE O/P EST MOD 30 MIN: CPT | Performed by: INTERNAL MEDICINE

## 2021-03-02 NOTE — PROGRESS NOTES
Hematology / Oncology Outpatient Follow Up Note    Alejandro Sanchez 47 y o  female SPU:[birth year] [facility]:[id number]         Date:  3/2/2021    Assessment / Plan:  A 29-year-old premenopausal woman with locally advanced right breast cancer   She has PALB-2 probable pathogenic mutation   She had quite large palpable right breast mass and axillary adenopathy  This was mucinous histology, grade 2, ER 30% positive, ND 2% positive, HER2 3+ disease  She underwent neoadjuvant chemotherapy with [address] with pertuzumab x6 cycle, resulting in good clinical partial response   She underwent right mastectomy and axillary lymph node dissection as well as left prophylactic mastectomy, resulting in MCKAYLA   She had less than 1 mm of residual mucinous carcinoma as well as 3 positive lymph nodes which has micrometastasis   This is consistent with pathological good partial response   She completed adjuvant trastuzumab monotherapy without cardiac toxicity   She is currently on adjuvant hormonal therapy with tamoxifen 20 mg daily with minimal side effect  Clinically, she has no evidence recurrent disease  I recommended her to continue with tamoxifen 20 mg daily  She is in agreement with my recommendation  I will see her again in a year for routine follow-up       Subjective:      HPI:  A 79-year-old premenopausal woman who initially noticed right breast lump when she was 8 months pregnant   She delivered 1st baby in June 2017  Zachariah Velazcote the delivery, she noticed right breast lump to be slowly enlarging   She brought this to medical attention   She underwent mammography which was quite abnormal   Biopsy from right breast at 10:00 position 12 cm from nipple showed mucinous carcinoma, grade 2   This was ER 30 to 40% positive, ND 2 to 3% positive, her 2 3+ disease   She was seen by Dr Fiorella Maradiaga who ordered MRI which showed extensive abnormality in her right breast including 3 5 cm of right breast mass as well as 4 cm of axillary adenopathy   She was referred to me to discuss neoadjuvant chemotherapy   She went to [facility] where she was recommended to have [address] P  she presents today with her mother to discuss treatment options  Andres Camp is somewhat anxious   Otherwise, she feels well   She denied any bone pain   Her weight is stable   She has no respiratory symptoms   Her performance status is normal  She does not have significant past medical history   Her paternal aunt had breast cancer at age of 62   Her 2 paternal grandfather's sister had breast cancer in their 46s   She underwent genetic testing of which result is pending at this time            Interval History:   A 72-year-old premenopausal woman with locally advanced right breast cancer  Andres Camp has quite large palpable right breast mass and axillary adenopathy  This was mucinous histology, grade 2, ER 30% positive, MI 2% positive, HER2 3+ disease   She was treated with neoadjuvant chemotherapy with TCH with pertuzumab with excellent tolerance x6 cycle which was completed in June 2018   She had clinical good partial response  Subsequently, she underwent mastectomy as well as prophylactic left mastectomy   Right mastectomy specimen showed less than 1 mm of residual mucinous carcinoma as well as 3/16 positive lymph nodes with tumor deposit less than 0 4 mm   She also completed adjuvant trastuzumab monotherapy with no cardiac toxicity   She presents today for follow-up  She is currently on adjuvant hormonal therapy with tamoxifen  Her previous hot flashes has been gradually subsiding  She underwent hysterectomy in September 2020 for fibroid  There was no evidence of malignancy in her uterus as well as fallopian tube  She has no complaint of pain  She denied any respiratory symptoms  Her weight is stable  Her performance status is normal      Objective:      Primary Diagnosis:     1   Locally advanced right breast cancer with invasive mucinous histology, grade 2, ER 30% positive, MI 2% positive, HER2 3+ disease   Diagnosed in January 2018  2  PALB2 probable pathogenic mutation      Cancer Staging:  Malignant neoplasm of upper-outer quadrant of right breast in female, estrogen receptor positive ([facility] [address] )    Staging form: Breast, AJCC 8th Edition    - Clinical stage from 1/26/2018: Stage IB (cT2(3), cN1, cM0, G2, ER: Positive, DE: Positive, HER2: Positive) - Signed by Yimi Barnard MD on 1/26/2018        Previous Hematologic/ Oncologic Treatment:       1  Neoadjuvant chemotherapy with [facility] with pertuzumab x6 cycle, completed in June 2018  2    Adjuvant trastuzumab monotherapy from June 2018 through January 2019      Current Hematologic/ Oncologic Treatment:       1   Adjuvant hormonal therapy with tamoxifen since January 2019      Disease Status:      Clinical partial response  Pathologically good partial response  MCKAYLA status post right mastectomy with axillary lymph node dissection and prophylactic left mastectomy      Test Results:     Pathology:     Biopsy from right breast showed invasive mucinous carcinoma, grade 2, ER 30 to 40% positive, DE 2 to 3% positive, HER2 3+ disease      Mastectomy specimen showed less than 1 mm residual mucinous carcinoma within mucin pool   3/16 axillary lymph nodes were positive for metastatic disease with largest dimension measuring 0 4 mm without extranodal extension         Radiology:     Echocardiogram in December 2018 showed ejection fraction 60%     Laboratory:     See below     Physical Exam:        General Appearance:    Alert, oriented          Eyes:    PERRL   Ears:    Normal external ear canals, both ears   Nose:   Nares normal, septum midline   Throat:   Mucosa moist  Pharynx without injection      Neck:   Supple         Lungs:     Clear to auscultation bilaterally   Chest Wall:    No tenderness or deformity    Heart:    Regular rate and rhythm         Abdomen:     Soft, non-tender, bowel sounds +, no organomegaly               Extremities:   Extremities no cyanosis or edema         Skin:   no rash or icterus  Lymph nodes:   Cervical, supraclavicular, and axillary nodes normal   Neurologic:   CNII-XII intact, normal strength, sensation and reflexes     Throughout             Breast exam:  Status post bilateral mastectomy with reconstruction   No palpable abnormality in either reconstructed breast          ROS: Review of Systems   All other systems reviewed and are negative  Imaging: No results found  Labs:   Lab Results   Component Value Date    WBC 11 91 (H) 11/26/2020    HGB 9 9 (L) 11/26/2020    HCT 31 4 (L) 11/26/2020     (H) 11/26/2020     11/26/2020     Lab Results   Component Value Date    K 3 8 11/26/2020     (H) 11/26/2020    CO2 22 11/26/2020    BUN 12 11/26/2020    CREATININE 0 89 11/26/2020    GLUF 101 (H) 02/13/2018    CALCIUM 7 8 (L) 11/26/2020    AST 24 06/12/2018    ALT 33 06/12/2018    ALKPHOS 84 06/12/2018    EGFR 82 11/26/2020           Current Medications: Reviewed  Allergies: Reviewed  PMH/FH/SH:  Reviewed      Vital Sign:    Body surface area is 1 82 meters squared      Wt Readings from Last 3 Encounters:   03/02/21 78 9 kg (174 lb)   12/07/20 79 1 kg (174 lb 6 4 oz)   11/25/20 78 kg (172 lb)        Temp Readings from Last 3 Encounters:   03/02/21 98 5 °F (36 9 °C) (Tympanic Core)   02/16/21 98 3 °F (36 8 °C)   12/07/20 98 7 °F (37 1 °C) (Tympanic)        BP Readings from Last 3 Encounters:   03/02/21 134/80   02/16/21 115/78   12/10/20 116/76         Pulse Readings from Last 3 Encounters:   03/02/21 77   02/16/21 70   12/07/20 79     @LASTSAO2(3)@

## 2021-04-02 NOTE — TELEPHONE ENCOUNTER
Called and spoke with patient at [phone] (mobile). She was informed that per Pat's nurse she can use the Miralax/Gatorade/Dulcolax prep. She would like to receive these instructions thru the Patient Portal as well as thru the mail.   Please put this through

## 2021-04-26 DIAGNOSIS — F41.1 GENERALIZED ANXIETY DISORDER: ICD-10-CM

## 2021-04-27 RX ORDER — LORAZEPAM 1 MG/1
TABLET ORAL
Qty: 90 TABLET | Refills: 0 | Status: SHIPPED | OUTPATIENT
Start: 2021-04-27 | End: 2021-07-22

## 2021-04-27 NOTE — TELEPHONE ENCOUNTER
Medication:  PDMP   01/27/2021  3  01/27/2021  LORAZEPAM 1 MG TABLET  90 0  90  TI CHI        Active agreement on file -No

## 2021-04-29 ENCOUNTER — OFFICE VISIT (OUTPATIENT)
Dept: SURGICAL ONCOLOGY | Facility: CLINIC | Age: 41
End: 2021-04-29
Payer: COMMERCIAL

## 2021-04-29 VITALS
SYSTOLIC BLOOD PRESSURE: 120 MMHG | TEMPERATURE: 97.5 F | BODY MASS INDEX: 31.36 KG/M2 | RESPIRATION RATE: 16 BRPM | HEART RATE: 83 BPM | DIASTOLIC BLOOD PRESSURE: 80 MMHG | HEIGHT: 63 IN | WEIGHT: 177 LBS

## 2021-04-29 DIAGNOSIS — Z15.89 MONOALLELIC MUTATION OF PALB2 GENE: ICD-10-CM

## 2021-04-29 DIAGNOSIS — Z17.0 MALIGNANT NEOPLASM OF UPPER-OUTER QUADRANT OF RIGHT BREAST IN FEMALE, ESTROGEN RECEPTOR POSITIVE (HCC): Primary | ICD-10-CM

## 2021-04-29 DIAGNOSIS — N63.20 LEFT BREAST LUMP: ICD-10-CM

## 2021-04-29 DIAGNOSIS — Z79.810 USE OF TAMOXIFEN (NOLVADEX): ICD-10-CM

## 2021-04-29 DIAGNOSIS — C50.411 MALIGNANT NEOPLASM OF UPPER-OUTER QUADRANT OF RIGHT BREAST IN FEMALE, ESTROGEN RECEPTOR POSITIVE (HCC): Primary | ICD-10-CM

## 2021-04-29 DIAGNOSIS — Z15.01 MONOALLELIC MUTATION OF PALB2 GENE: ICD-10-CM

## 2021-04-29 DIAGNOSIS — Z15.09 MONOALLELIC MUTATION OF PALB2 GENE: ICD-10-CM

## 2021-04-29 PROCEDURE — 99214 OFFICE O/P EST MOD 30 MIN: CPT | Performed by: NURSE PRACTITIONER

## 2021-04-29 PROCEDURE — 3008F BODY MASS INDEX DOCD: CPT | Performed by: NURSE PRACTITIONER

## 2021-04-29 PROCEDURE — 1036F TOBACCO NON-USER: CPT | Performed by: NURSE PRACTITIONER

## 2021-04-29 NOTE — PROGRESS NOTES
Surgical Oncology Follow Up       [address]  [facility]  [address] [facility] [address]    Lissette L Laura  1980  [id number]  [address] [facility]  [facility] [place]  [address] [address]    Chief Complaint   Patient presents with    Breast Cancer     Pt is here for 6 month f/u        Assessment/Plan:  1  Malignant neoplasm of upper-outer quadrant of right breast in female, estrogen receptor positive ([address] )  - 6 mo f/u vist    2  Use of tamoxifen (Nolvadex)  - Continue use per medical oncology    3  Monoallelic mutation of PALB2 gene  - Continue annual visits with onc genetics    4  Left breast lump   - suspect fat necrosis; will assess with imaging  - US breast left limited (diagnostic); Future      Discussion/Summary: Patient is a 82-year-old female who presents today for six-month follow-up visit for right breast cancer diagnosed in January of 2018  Her pathology revealed invasive mucinous carcinoma, ER 30-40 percent, MD 2-3 percent, her 2 positive   She underwent genetic testing which revealed a likely pathogenic variant to the Joechester gene   She underwent neoadjuvant chemotherapy and then underwent a right modified radical mastectomy and a left simple mastectomy with Dr Marci Luaa Bang had 3/16 positive lymph nodes   She completed Herceptin monotherapy and radiation therapy   She is currently taking tamoxifen  She offers no new complaints today  On today's exam, she does have a new firm pea sized lump just superior to her left mastectomy scar  She had wound healing issues in this region  I suspect this represents fat necrosis but will assess with u/s  Assuming this is benign, I will plan to see her back in 6 months or sooner should the need arise  She was instructed to call with any new concerns or symptoms prior to that time  All of her questions were answered today      History of Present Illness:     Oncology History   Malignant neoplasm of upper-outer quadrant of right breast in female, estrogen receptor positive ([facility] [address] )   1/19/2018 Initial Diagnosis    US guided core biopsy of right breast mass at [facility]  Malignant neoplasm of UOQ of right breast,   ER positive  HER2 positive     1/29/2018 Genetic Testing    Likely pathogenic variant was identified in the PALB2 gene  2/2018 - 6/1/2018 Chemotherapy    Neoadjuvant chemotherapy with TCH with pertuzumab  Dr Kira Quarles  6/21/2018 Surgery    Right MRM  Invasive mucinous carcinoma  3 4 cm geronimo  Grade 1  3/16 lymph nodes  Stage IB - ypT1mi, ypN1mi, G1  Left simple mastectomy  Benign breast     Immediate reconstruction Dr Pérez Griffiths      7/19/2018 - 1/2019 Chemotherapy    trastuzumab monotherapy every 3 weeks  11/1/2018 - 12/14/2018 Radiation    Course: C1    Plan ID Energy Fractions Dose per Fraction (cGy) Dose Correction (cGy) Total Dose Delivered (cGy) Elapsed Days   LAT R CW bst 6E 5 / 5 200 0 1,000 4   MED R CW bst 6E 5 / 5 200 0 1,000 4   New R PAB2 6X 25 / 25 47 0 1,175 36   New R Sclav2 6X 25 / 25 200 0 5,000 36   DxlPZTed76_61 6X 12 / 12 200 0 2,400 34   AsvKjaOmy77_8 10X 12 / 12 200 0 2,400 34   ZvcWxlTH39_55 10X 13 / 13 200 0 2,600 36   NewR CW 10_30 6X 13 / 13 200 0 2,600 36      Treatment dates:  C1: 11/1/2018 - 12/14/2018 1/22/2019 -  Hormone Therapy    Tamoxifen  With Dr Kira Quarles          -Interval History:  Patient underwent a hysterectomy secondary to acute urinary retention secondary to a subserous leomyoma of the uterus  She has recovered well  She met with Dorothy Price, oncology genetics and will continue annual visits given her gene mutation  She notices no changes on self exam other than the right reconstructed breast seems more taut secondary to the RT  Denies persistent headaches, back pain or bone pain, cough or shortness of breath, abdominal pain      Review of Systems:  Review of Systems   Constitutional: Negative for activity change, appetite change, chills, fatigue, fever and unexpected weight change  Respiratory: Negative for cough and shortness of breath  Cardiovascular: Negative for chest pain  Gastrointestinal: Negative for abdominal pain, constipation, diarrhea, nausea and vomiting  Musculoskeletal: Negative for arthralgias, back pain, gait problem and myalgias  Skin: Negative for color change and rash  Neurological: Negative for dizziness and headaches  Hematological: Negative for adenopathy  Psychiatric/Behavioral: Negative for agitation and confusion  All other systems reviewed and are negative        Patient Active Problem List   Diagnosis    Asthma    Other constipation    Macular atrophy, retinal    Seasonal allergies    Malignant neoplasm of upper-outer quadrant of right breast in female, estrogen receptor positive ([facility] [address] )    Anxiety    Use of tamoxifen (Nolvadex)    Monoallelic mutation of PALB2 gene    Subserous leiomyoma of uterus    Status post total abdominal hysterectomy     Past Medical History:   Diagnosis Date    Anxiety     Asthma     allergy excaberated    Breast cancer ([facility] [address] ) 2018    Cancer ([facility] )     Cigarette nicotine dependence without complication 9751    Fibroid     GERD (gastroesophageal reflux disease)     Gestational diabetes     History of adverse reaction to anesthesia     Pt reports waking up severly anxious    Pneumonia     Seasonal allergies      Past Surgical History:   Procedure Laterality Date    BREAST RECONSTRUCTION Bilateral 2018    Procedure: RECONSTRUCTION BREAST W/ IMPLANT;  Surgeon: Quinten Baez MD;  [facility];  Service: Plastics     SECTION      HYSTERECTOMY N/A 2020    Procedure: HYSTERECTOMY LAPAROSCOPIC TOTAL ([address]) WITH BILATERAL SALPINGECTOMY-ATTEMPTED;  Surgeon: Marylene Silos, MD;  [facility];  Service: Gynecology    HYSTERECTOMY N/A 2020    Procedure: HYSTERECTOMY TOTAL ABDOMINAL (AARON);   Surgeon: Tereza Whitehead MD;  [facility];  Service: Gynecology    IR PORT REMOVAL  2/22/2019    MASTECTOMY      PILONIDAL CYST EXCISION      resection    ME CYSTOURETHROSCOPY N/A 11/25/2020    Procedure: CYSTOSCOPY;  Surgeon: Tereza Whitehead MD;  [facility];  Service: Gynecology    ME INSJ TUNNELED CTR VAD W/SUBQ PORT AGE 5 YR/> Left 2/13/2018    Procedure: INSERTION VENOUS PORT ( PORT-A-CATH) IR;  Surgeon: Veronica Patiño DO;  [facility];  Service: Interventional Radiology    ME LAP,DIAGNOSTIC ABDOMEN N/A 11/25/2020    Procedure: LAPAROSCOPY DIAGNOSTIC;  Surgeon: Tereza Whitehead MD;  [facility];  Service: Gynecology    ME MASTECTOMY, MODIFIED RADICAL Right 6/21/2018    Procedure: BREAST MODIFIED RADICAL MASTECTOMY;  Surgeon: Keli Wyman MD;  [facility];  Service: Surgical Oncology    ME MASTECTOMY, SIMPLE, COMPLETE Left 6/21/2018    Procedure: BREAST SIMPLE MASTECTOMY;  Surgeon: Keli Wyman MD;  [facility];  Service: Surgical Oncology    ME REVISION OF RECONSTRUCTED BREAST Bilateral 9/19/2018    Procedure: REVISION BREAST RECON Adra Bunde CLOSURE;  Surgeon: Flavio Farmer MD;  [facility];  Service: Plastics    TUBAL LIGATION      WISDOM TOOTH EXTRACTION       Family History   Problem Relation Age of Onset    Diabetes Mother     Hypertension Mother     Rosacea Father     Allergies Father         seasonal    Liver cancer Paternal Grandfather     Cancer Paternal Grandfather     No Known Problems Brother     No Known Problems Son     Breast cancer Maternal Grandmother     Brain cancer Maternal Grandmother     Cancer Maternal Grandmother     No Known Problems Maternal Grandfather     Diabetes Paternal Grandmother     Kidney failure Paternal Grandmother     Hypertension Paternal Grandmother     No Known Problems Brother     Breast cancer Paternal Aunt      Social History     Socioeconomic History    Marital status: /Civil Union     Spouse name: Not on file    Number of children: Not on file    Years of education: Not on file    Highest education level: Not on file   Occupational History    Not on file   Social Needs    Financial resource strain: Not on file    Food insecurity     Worry: Not on file     Inability: Not on file    Transportation needs     Medical: Not on file     Non-medical: Not on file   Tobacco Use    Smoking status: Former Smoker     Packs/day: 0 50     Years: 15 00     Pack years: 7 50     Quit date: 2014     Years since quittin 4    Smokeless tobacco: Never Used   Substance and Sexual Activity    Alcohol use:  Yes     Alcohol/week: 1 0 standard drinks     Types: 1 Standard drinks or equivalent per week     Frequency: Monthly or less     Drinks per session: 1 or 2     Comment: occ    Drug use: No    Sexual activity: Yes     Partners: Male     Birth control/protection: Other   Lifestyle    Physical activity     Days per week: Not on file     Minutes per session: Not on file    Stress: Not on file   Relationships    Social connections     Talks on phone: Not on file     Gets together: Not on file     Attends Restorationism service: Not on file     Active member of club or organization: Not on file     Attends meetings of clubs or organizations: Not on file     Relationship status: Not on file    Intimate partner violence     Fear of current or ex partner: Not on file     Emotionally abused: Not on file     Physically abused: Not on file     Forced sexual activity: Not on file   Other Topics Concern    Not on file   Social History Narrative    Drinks coffee       Current Outpatient Medications:     acetaminophen (TYLENOL) 325 mg tablet, Take 2 tablets (650 mg total) by mouth every 6 (six) hours as needed for mild pain, Disp: , Rfl: 0    albuterol (VENTOLIN HFA) 90 mcg/act inhaler, Inhale 2 puffs every 4 (four) hours as needed, Disp: , Rfl:     diphenhydrAMINE-acetaminophen (TYLENOL PM)  MG TABS, Take 1 tablet by mouth daily at bedtime, Disp: , Rfl:     levocetirizine (XYZAL) 5 MG tablet, Take 5 mg by mouth every evening , Disp: , Rfl:     LORazepam (ATIVAN) 1 mg tablet, TAKE 1 TABLET BY MOUTH EVERYDAY AT BEDTIME, Disp: 90 tablet, Rfl: 0    omeprazole (PriLOSEC) 20 mg delayed release capsule, Take 20 mg by mouth daily , Disp: , Rfl:     tamoxifen (NOLVADEX) 20 mg tablet, Take 1 tablet (20 mg total) by mouth daily, Disp: 90 tablet, Rfl: 1  Allergies   Allergen Reactions    Trichophyton Other (See Comments)     AKA Fungi - Pt does not know reaction     Latex Swelling    Cat Hair Extract Sneezing    Dust Mite Extract Sneezing    Molds & Smuts Sneezing    Pollen Extract Sneezing    Tree Extract Sneezing     Vitals:    04/29/21 1513   BP: 120/80   Pulse: 83   Resp: 16   Temp: 97 5 °F (36 4 °C)       Physical Exam  Vitals signs reviewed  Constitutional:       General: She is not in acute distress  Appearance: Normal appearance  She is well-developed  She is not diaphoretic  HENT:      Head: Normocephalic and atraumatic  Neck:      Musculoskeletal: Normal range of motion  Cardiovascular:      Rate and Rhythm: Normal rate and regular rhythm  Heart sounds: Normal heart sounds  Pulmonary:      Effort: Pulmonary effort is normal       Breath sounds: Normal breath sounds  Chest:          Comments: Right reconstructed breast with implant present  There is stable scarring in the right axillary region but no evidence of a dominant mass, skin changes or nodules  In the left reconstructed breast there is a small, pea size firm lump at the 2-3:00 position just superior to the mastectomy scar  Suspect fat necrosis but will assess with u/s  Abdominal:      Palpations: Abdomen is soft  There is no mass  Tenderness: There is no abdominal tenderness  Musculoskeletal: Normal range of motion     Lymphadenopathy:      Upper Body:      Right upper body: No supraclavicular or axillary adenopathy  Left upper body: No supraclavicular or axillary adenopathy  Skin:     General: Skin is warm and dry  Findings: No rash  Neurological:      Mental Status: She is alert and oriented to person, place, and time  Psychiatric:         Speech: Speech normal        Advance Care Planning/Advance Directives:  Discussed disease status, cancer treatment plans and/or cancer treatment goals with the patient

## 2021-05-08 NOTE — PROGRESS NOTES
Wrong chart    Yolie Sanchez  1980   Ms Sanchez is a 41 y o  female       Chief Complaint   Patient presents with    Breast Cancer       Cancer Staging  Malignant neoplasm of upper-outer quadrant of right breast in female, estrogen receptor positive ([facility] [address] )  Staging form: Breast, AJCC 8th Edition  - Clinical stage from 1/26/2018: Stage IB (cT2(3), cN1, cM0, G2, ER: Positive, WV: Positive, HER2: Positive) - Signed by Allen Ruggiero MD on 1/26/2018         Malignant neoplasm of upper-outer quadrant of right breast in female, estrogen receptor positive ([facility] [address] )    1/19/2018 Initial Diagnosis     U/Sguided core biopsy of right breast mass at [facility]  Malignant neoplasm of UOQ of right breast, estrogen receptor positive ([facility] [address] ), Her 2 positive  1/29/2018 Genetic Testing     Likely pathogenic variant was identified in the PALB2 gene  2/2018 - 6/1/2018 Chemotherapy     Neoadjuvant chemotherapy with TCH with pertuzumab  Dr Cassandria Lombard  6/21/2018 Surgery     Right MRM  Invasive mucinous carcinoma  3 4 cm geronimo  Grade 1  3/16 lymph nodes  Stage Stage IB - ypT1mi, ypN1mi, G1  Left simple mastectomy  Benign breast     Immediate reconstruction Dr Marie Walter           Chemotherapy     trastuzumab monotherapy every 3 weeks  Begins 7/19/18 11/1/2018 - 12/14/2018 Radiation     Course: C1    Plan ID Energy Fractions Dose per Fraction (cGy) Dose Correction (cGy) Total Dose Delivered (cGy) Elapsed Days   LAT R CW bst 6E 5 / 5 200 0 1,000 4   MED R CW bst 6E 5 / 5 200 0 1,000 4   New R PAB2 6X 25 / 25 47 0 1,175 36   New R Sclav2 6X 25 / 25 200 0 5,000 36   QtxKRSag54_66 6X 12 / 12 200 0 2,400 34   UkeKehIgs45_5 10X 12 / 12 200 0 2,400 34   KxdEydDG45_19 10X 13 / 13 200 0 2,600 36   NewR CW 10_30 6X 13 / 13 200 0 2,600 36      Treatment dates:  C1: 11/1/2018 - 12/14/2018 1/22/2019 -  Hormone Therapy     Tamoxifen - Cassandria Lombard  Started in summer,  Stopped due to incomplete healing of incision   Restarted 1/22/19 Clinical Trial: no        Interval History  Presents for 1 month follow up, EOT  Last chemo this week  No follow up with Dr Tadeo Quintero surgeon scheduled yet  Juan Pablo Peterson 1/29/19  Graciela Dobson 4/17/19    Screening  Tobacco  Current tobacco user: no  If yes, brief counseling provided: NA    Hypertension  Hypertension screening performed: yes  Normotensive:  yes  If no, referred to PCP: n/a    Depression Screening  Screened for depression using PHQ-2: yes    Screened for depression using PHQ-9:  no  Screening positive or negative:  negative  If score >4, was any of the following actions taken?    Additional evaluation for depression, suicide risk assesment, referral to PCP or psychiatry, medication started:  n/a    Advanced Care Planning for Patients >65 years  Advanced Care Planning Discussed:  yes  Patient named surrogate decision maker or care plan in chart: yes    [unfilled]  Health Maintenance   Topic Date Due    Pneumococcal PPSV23 Highest Risk Adult (1 of 3 - PCV13) 05/04/2001    Depression Screening PHQ  10/23/2019    PAP SMEAR  01/15/2020    DTaP,Tdap,and Td Vaccines (3 - Td) 05/03/2027    INFLUENZA VACCINE  Completed       Patient Active Problem List   Diagnosis    Asthma    Other constipation    Cigarette nicotine dependence without complication    Macular atrophy, retinal    Seasonal allergies    Malignant neoplasm of upper-outer quadrant of right breast in female, estrogen receptor positive ([facility] [address] )    Anxiety     Past Medical History:   Diagnosis Date    Asthma     allergy excaberated    Breast cancer ([facility] [address] ) 01/25/2018    GERD (gastroesophageal reflux disease)     History of adverse reaction to anesthesia     Pt reports waking up severly anxious    Seasonal allergies      Past Surgical History:   Procedure Laterality Date    BREAST RECONSTRUCTION Bilateral 6/21/2018    Procedure: RECONSTRUCTION BREAST W/ IMPLANT;  Surgeon: Елена Jasso MD;  [facility]; Service: Plastics     SECTION      PILONIDAL CYST EXCISION      resection    AZ INSJ TUNNELED CTR VAD W/SUBQ PORT AGE 5 YR/> Left 2018    Procedure: INSERTION VENOUS PORT ( PORT-A-CATH) IR;  Surgeon: Shruthi Awad DO;  [facility];  Service: Interventional Radiology    AZ MASTECTOMY, MODIFIED RADICAL Right 2018    Procedure: BREAST MODIFIED RADICAL MASTECTOMY;  Surgeon: Allen Velez MD;  [facility];  Service: Surgical Oncology    AZ MASTECTOMY, SIMPLE, COMPLETE Left 2018    Procedure: BREAST SIMPLE MASTECTOMY;  Surgeon: Allen Velez MD;  [facility];  Service: Surgical Oncology    AZ REVISE BREAST RECONSTRUCTION Bilateral 2018    Procedure: REVISION BREAST RECON Lewayne Trimble CLOSURE;  Surgeon: Christine Mejia MD;  [facility];  Service: Plastics    TUBAL LIGATION      WISDOM TOOTH EXTRACTION       Family History   Problem Relation Age of Onset    Diabetes Mother     Hypertension Mother     Rosacea Father     Allergies Father         seasonal    Liver cancer Paternal Grandfather     Breast cancer Family      Social History     Social History    Marital status: /Civil Union     Spouse name: N/A    Number of children: N/A    Years of education: N/A     Occupational History    Not on file       Social History Main Topics    Smoking status: Former Smoker     Quit date: 2015    Smokeless tobacco: Never Used    Alcohol use Yes      Comment: rare    Drug use: No    Sexual activity: Yes     Partners: Male     Birth control/ protection: Other     Other Topics Concern    Not on file     Social History Narrative    Drinks coffee           Current Outpatient Prescriptions:     albuterol (VENTOLIN HFA) 90 mcg/act inhaler, Inhale 2 puffs every 4 (four) hours as needed, Disp: , Rfl:     levocetirizine (XYZAL) 5 MG tablet, Take 1 tablet by mouth daily, Disp: , Rfl:     lidocaine-prilocaine (EMLA) cream, Apply topically as needed for mild pain, Disp: 30 g, Rfl: 0    LORazepam (ATIVAN) 1 mg tablet, TAKE 1 TABLET (1 MG TOTAL) BY MOUTH DAILY AT BEDTIME FOR 30 DAYS, Disp: 30 tablet, Rfl: 0    mometasone (NASONEX) 50 mcg/act nasal spray, 2 sprays into each nostril daily, Disp: , Rfl:     omeprazole (PriLOSEC) 20 mg delayed release capsule, Take 20 mg by mouth, Disp: , Rfl:     tamoxifen (NOLVADEX) 20 mg tablet, Take 1 tablet (20 mg total) by mouth daily, Disp: 90 tablet, Rfl: 1    doxycycline hyclate (VIBRAMYCIN) 100 mg capsule, Take 1 capsule by mouth 2 (two) times a day, Disp: , Rfl:     LORazepam (ATIVAN) 1 mg tablet, TAKE 1 TABLET (1 MG TOTAL) BY MOUTH DAILY AT BEDTIME FOR 30 DAYS, Disp: 30 tablet, Rfl: 0    mupirocin (BACTROBAN) 2 % ointment, , Disp: , Rfl:   Allergies   Allergen Reactions    Trichophyton Other (See Comments)     Pt does not know reaction     Bee Pollen     Cat Hair Extract Sneezing    Dust Mite Extract Sneezing    Molds & Smuts Sneezing    Pollen Extract Sneezing    Tree Extract Sneezing       Review of Systems:  Review of Systems   Constitutional: Positive for activity change and fatigue (Improving)  HENT: Negative  Eyes: Negative  Respiratory: Negative  Cardiovascular: Negative  Gastrointestinal: Negative  Endocrine: Negative  Genitourinary: Negative  Musculoskeletal: Negative  Lymphadema in right arm  Numbness/tingling upper right arm  Skin: Negative  Allergic/Immunologic: Negative  Neurological: Negative  Hematological: Negative  Psychiatric/Behavioral: Negative  Vitals:    01/22/19 1524   BP: 105/80   Pulse: 76   Resp: 12   Temp: 98 1 °F (36 7 °C)   Weight: 73 4 kg (161 lb 12 8 oz)       Pain Score: 0-No pain (02 98)    Imaging:No results found      Teaching

## 2021-05-10 ENCOUNTER — HOSPITAL ENCOUNTER (OUTPATIENT)
Dept: ULTRASOUND IMAGING | Facility: CLINIC | Age: 41
Discharge: HOME | End: 2021-05-10
Payer: COMMERCIAL

## 2021-05-10 VITALS — WEIGHT: 177 LBS | HEIGHT: 63 IN | BODY MASS INDEX: 31.36 KG/M2

## 2021-05-10 DIAGNOSIS — N63.20 LEFT BREAST LUMP: ICD-10-CM

## 2021-05-10 PROCEDURE — 76642 ULTRASOUND BREAST LIMITED: CPT

## 2021-07-08 DIAGNOSIS — Z17.0 MALIGNANT NEOPLASM OF UPPER-OUTER QUADRANT OF RIGHT BREAST IN FEMALE, ESTROGEN RECEPTOR POSITIVE (HCC): ICD-10-CM

## 2021-07-08 DIAGNOSIS — C50.411 MALIGNANT NEOPLASM OF UPPER-OUTER QUADRANT OF RIGHT BREAST IN FEMALE, ESTROGEN RECEPTOR POSITIVE (HCC): ICD-10-CM

## 2021-07-08 RX ORDER — TAMOXIFEN CITRATE 20 MG/1
TABLET ORAL
Qty: 90 TABLET | Refills: 3 | Status: SHIPPED | OUTPATIENT
Start: 2021-07-08 | End: 2022-06-21

## 2021-07-16 DIAGNOSIS — K59.09 OTHER CONSTIPATION: Primary | ICD-10-CM

## 2021-07-21 DIAGNOSIS — F41.1 GENERALIZED ANXIETY DISORDER: ICD-10-CM

## 2021-07-22 RX ORDER — LORAZEPAM 1 MG/1
TABLET ORAL
Qty: 90 TABLET | Refills: 0 | Status: SHIPPED | OUTPATIENT
Start: 2021-07-22 | End: 2021-10-25

## 2021-07-22 NOTE — TELEPHONE ENCOUNTER
Medication:  PDMP   04/27/2021  3  04/27/2021  LORAZEPAM 1 MG TABLET  90 0  90  TI CHI     Active agreement on file -No

## 2021-07-31 ENCOUNTER — APPOINTMENT (OUTPATIENT)
Dept: LAB | Facility: CLINIC | Age: 41
End: 2021-07-31
Payer: COMMERCIAL

## 2021-07-31 DIAGNOSIS — Z79.810 USE OF TAMOXIFEN (NOLVADEX): ICD-10-CM

## 2021-07-31 LAB
ANION GAP SERPL CALCULATED.3IONS-SCNC: 7 MMOL/L (ref 4–13)
BUN SERPL-MCNC: 13 MG/DL (ref 5–25)
CALCIUM SERPL-MCNC: 8.3 MG/DL (ref 8.3–10.1)
CHLORIDE SERPL-SCNC: 108 MMOL/L (ref 100–108)
CHOLEST SERPL-MCNC: 124 MG/DL (ref 50–200)
CO2 SERPL-SCNC: 26 MMOL/L (ref 21–32)
CREAT SERPL-MCNC: 1.03 MG/DL (ref 0.6–1.3)
GFR SERPL CREATININE-BSD FRML MDRD: 69 ML/MIN/1.73SQ M
GLUCOSE P FAST SERPL-MCNC: 103 MG/DL (ref 65–99)
HDLC SERPL-MCNC: 22 MG/DL
LDLC SERPL CALC-MCNC: 61 MG/DL (ref 0–100)
POTASSIUM SERPL-SCNC: 3.9 MMOL/L (ref 3.5–5.3)
SODIUM SERPL-SCNC: 141 MMOL/L (ref 136–145)
TRIGL SERPL-MCNC: 207 MG/DL

## 2021-07-31 PROCEDURE — 80061 LIPID PANEL: CPT

## 2021-07-31 PROCEDURE — 80048 BASIC METABOLIC PNL TOTAL CA: CPT

## 2021-08-06 ENCOUNTER — TELEPHONE (OUTPATIENT)
Dept: HEMATOLOGY ONCOLOGY | Facility: CLINIC | Age: 41
End: 2021-08-06

## 2021-08-06 ENCOUNTER — PATIENT MESSAGE (OUTPATIENT)
Dept: HEMATOLOGY ONCOLOGY | Facility: CLINIC | Age: 41
End: 2021-08-06

## 2021-08-06 NOTE — TELEPHONE ENCOUNTER
Patient called to inform Torito Gao she had recent lab work and her WBC was borderline low  Patient did say she was getting over a cold   Please call patient back at [phone]

## 2021-08-23 ENCOUNTER — OFFICE VISIT (OUTPATIENT)
Dept: FAMILY MEDICINE CLINIC | Facility: CLINIC | Age: 41
End: 2021-08-23
Payer: COMMERCIAL

## 2021-08-23 VITALS
OXYGEN SATURATION: 99 % | TEMPERATURE: 98.5 F | BODY MASS INDEX: 31.08 KG/M2 | HEART RATE: 83 BPM | DIASTOLIC BLOOD PRESSURE: 80 MMHG | RESPIRATION RATE: 16 BRPM | SYSTOLIC BLOOD PRESSURE: 120 MMHG | HEIGHT: 63 IN | WEIGHT: 175.4 LBS

## 2021-08-23 DIAGNOSIS — Z23 ENCOUNTER FOR VACCINATION: ICD-10-CM

## 2021-08-23 DIAGNOSIS — J45.20 MILD INTERMITTENT ASTHMA WITHOUT COMPLICATION: ICD-10-CM

## 2021-08-23 DIAGNOSIS — Z00.00 ANNUAL PHYSICAL EXAM: Primary | ICD-10-CM

## 2021-08-23 PROBLEM — D25.2 SUBSEROUS LEIOMYOMA OF UTERUS: Status: RESOLVED | Noted: 2020-11-18 | Resolved: 2021-08-23

## 2021-08-23 PROCEDURE — 99395 PREV VISIT EST AGE 18-39: CPT | Performed by: FAMILY MEDICINE

## 2021-08-23 PROCEDURE — 90471 IMMUNIZATION ADMIN: CPT

## 2021-08-23 PROCEDURE — 3008F BODY MASS INDEX DOCD: CPT | Performed by: FAMILY MEDICINE

## 2021-08-23 PROCEDURE — 1036F TOBACCO NON-USER: CPT | Performed by: FAMILY MEDICINE

## 2021-08-23 PROCEDURE — 90670 PCV13 VACCINE IM: CPT

## 2021-08-23 PROCEDURE — 3725F SCREEN DEPRESSION PERFORMED: CPT | Performed by: FAMILY MEDICINE

## 2021-08-23 NOTE — PATIENT INSTRUCTIONS

## 2021-08-23 NOTE — PROGRESS NOTES
[address]    NAME: Herberth Sanchez  AGE: 46 y o  SEX: female  : 1980     DATE: 2021     Assessment and Plan:   Breana Rosenberg was seen today for physical exam     Diagnoses and all orders for this visit:    Annual physical exam  -     CBC and differential  -     Lipid Panel with Direct LDL reflex; Future  -     Comprehensive metabolic panel  -     Hemoglobin A1C    Mild intermittent asthma without complication  Comments:  stable  albuterol PRN     Encounter for vaccination  -     PNEUMOCOCCAL CONJUGATE VACCINE 13-VALENT GREATER THAN 6 MONTHS        Immunizations and preventive care screenings were discussed with patient today  Appropriate education was printed on patient's after visit summary  Counseling:  Alcohol/drug use: discussed moderation in alcohol intake, the recommendations for healthy alcohol use, and avoidance of illicit drug use  Dental Health: discussed importance of regular tooth brushing, flossing, and dental visits  Injury prevention: discussed safety/seat belts, safety helmets, smoke detectors, carbon dioxide detectors, and smoking near bedding or upholstery  Sexual health: discussed sexually transmitted diseases, partner selection, use of condoms, avoidance of unintended pregnancy, and contraceptive alternatives  · Exercise: the importance of regular exercise/physical activity was discussed  Recommend exercise 3-5 times per week for at least 30 minutes  BMI Counseling: Body mass index is 31 12 kg/m²  The BMI is above normal  Nutrition recommendations include decreasing portion sizes and encouraging healthy choices of fruits and vegetables  Exercise recommendations include moderate physical activity 150 minutes/week  No pharmacotherapy was ordered  No follow-ups on file       Chief Complaint:     Chief Complaint   Patient presents with    Physical Exam     Patient is here today for an annual exam       History of Present Illness:     Adult Annual Physical   Patient here for a comprehensive physical exam  The patient reports no problems  Diet and Physical Activity  · Diet/Nutrition: well balanced diet and consuming 3-5 servings of fruits/vegetables daily  · Exercise: 3-4 times a week on average  Depression Screening  PHQ-9 Depression Screening    PHQ-9:   Frequency of the following problems over the past two weeks:           General Health  · Sleep: sleeps well and gets 7-8 hours of sleep on average  · Hearing: normal - bilateral   · Vision: goes for regular eye exams and most recent eye exam >1 year ago  · Dental: regular dental visits and brushes teeth twice daily  /GYN Health  · Last menstrual period: 11/2020  · Contraceptive method: none  · History of STDs?: no      Review of Systems:     Review of Systems   Constitutional: Negative  HENT: Negative  Eyes: Negative  Respiratory: Negative  Cardiovascular: Negative  Gastrointestinal: Negative  Endocrine: Negative  Genitourinary: Negative  Musculoskeletal: Negative  Skin: Negative  Allergic/Immunologic: Negative  Neurological: Negative  Hematological: Negative  Psychiatric/Behavioral: Negative         Past Medical History:     Past Medical History:   Diagnosis Date    Anxiety     Asthma     allergy excaberated    Breast cancer ([facility] ) 01/25/2018    Cancer ([facility] )     Cigarette nicotine dependence without complication 05/8/8447    Fibroid 2017    GERD (gastroesophageal reflux disease)     Gestational diabetes     History of adverse reaction to anesthesia     Pt reports waking up severly anxious    Pneumonia     Seasonal allergies     Subserous leiomyoma of uterus 11/18/2020      Past Surgical History:     Past Surgical History:   Procedure Laterality Date    BREAST RECONSTRUCTION Bilateral 6/21/2018    Procedure: RECONSTRUCTION BREAST W/ IMPLANT;  Surgeon: Ifeoma Lopez MD;  [facility];  Service: Plastics     SECTION      HYSTERECTOMY N/A 2020    Procedure: HYSTERECTOMY LAPAROSCOPIC TOTAL ([address]) WITH BILATERAL SALPINGECTOMY-ATTEMPTED;  Surgeon: Louise Cope MD;  [facility];  Service: Gynecology    HYSTERECTOMY N/A 2020    Procedure: HYSTERECTOMY TOTAL ABDOMINAL (AARON);   Surgeon: Louise Cope MD;  [facility];  Service: Gynecology    IR PORT REMOVAL  2019    MASTECTOMY      PILONIDAL CYST EXCISION      resection    CO CYSTOURETHROSCOPY N/A 2020    Procedure: CYSTOSCOPY;  Surgeon: Louise oCpe MD;  [facility];  Service: Gynecology    CO INSJ TUNNELED CTR VAD W/SUBQ PORT AGE 5 YR/> Left 2018    Procedure: INSERTION VENOUS PORT ( PORT-A-CATH) IR;  Surgeon: Yulissa Sales DO;  [facility];  Service: Interventional Radiology    CO LAP,DIAGNOSTIC ABDOMEN N/A 2020    Procedure: LAPAROSCOPY DIAGNOSTIC;  Surgeon: Louise Cope MD;  [facility];  Service: Gynecology    CO MASTECTOMY, MODIFIED RADICAL Right 2018    Procedure: BREAST MODIFIED RADICAL MASTECTOMY;  Surgeon: Ismael Buck MD;  [facility];  Service: Surgical Oncology    CO MASTECTOMY, SIMPLE, COMPLETE Left 2018    Procedure: BREAST SIMPLE MASTECTOMY;  Surgeon: Ismael Buck MD;  [facility];  Service: Surgical Oncology    CO REVISION OF RECONSTRUCTED BREAST Bilateral 2018    Procedure: REVISION BREAST RECON [place] CLOSURE;  Surgeon: Rainer Russo MD;  [facility];  Service: Plastics    TUBAL LIGATION      WISDOM TOOTH EXTRACTION        Social History:     Social History     Socioeconomic History    Marital status: /Civil Union     Spouse name: None    Number of children: None    Years of education: None    Highest education level: None   Occupational History    None   Tobacco Use    Smoking status: Former Smoker     Packs/day: 0 50     Years: 15 00     Pack years: 7 50     Quit date: 2014     Years since quittin 7    Smokeless tobacco: Never Used   Vaping Use    Vaping Use: Former   Substance and Sexual Activity    Alcohol use: Yes     Alcohol/week: 1 0 standard drinks     Types: 1 Standard drinks or equivalent per week     Comment: occ    Drug use: No    Sexual activity: Yes     Partners: Male     Birth control/protection: Other   Other Topics Concern    None   Social History Narrative    Drinks coffee     Social Determinants of Health     Financial Resource Strain:     Difficulty of Paying Living Expenses:    Food Insecurity:     Worried About Running Out of Food in the Last Year:     Ran Out of Food in the Last Year:    Transportation Needs:     Lack of Transportation (Medical):      Lack of Transportation (Non-Medical):    Physical Activity:     Days of Exercise per Week:     Minutes of Exercise per Session:    Stress:     Feeling of Stress :    Social Connections:     Frequency of Communication with Friends and Family:     Frequency of Social Gatherings with Friends and Family:     Attends Mosque Services:     Active Member of Clubs or Organizations:     Attends Club or Organization Meetings:     Marital Status:    Intimate Partner Violence:     Fear of Current or Ex-Partner:     Emotionally Abused:     Physically Abused:     Sexually Abused:       Family History:     Family History   Problem Relation Age of Onset    Diabetes Mother     Hypertension Mother     Rosacea Father     Allergies Father         seasonal    Liver cancer Paternal Grandfather     Cancer Paternal Grandfather     No Known Problems Brother     No Known Problems Son     Breast cancer Maternal Grandmother     Brain cancer Maternal Grandmother     Cancer Maternal Grandmother     No Known Problems Maternal Grandfather     Diabetes Paternal Grandmother     Kidney failure Paternal Grandmother     Hypertension Paternal Grandmother     No Known Problems Brother     Breast cancer Paternal Aunt       Current Medications:     Current Outpatient Medications   Medication Sig Dispense Refill    albuterol (VENTOLIN HFA) 90 mcg/act inhaler Inhale 2 puffs every 4 (four) hours as needed      diphenhydrAMINE-acetaminophen (TYLENOL PM)  MG TABS Take 1 tablet by mouth daily at bedtime      levocetirizine (XYZAL) 5 MG tablet Take 5 mg by mouth every evening       linaCLOtide 145 MCG CAPS Take 1 capsule (145 mcg total) by mouth daily 90 capsule 2    LORazepam (ATIVAN) 1 mg tablet TAKE 1 TABLET BY MOUTH EVERYDAY AT BEDTIME 90 tablet 0    omeprazole (PriLOSEC) 20 mg delayed release capsule Take 20 mg by mouth daily       tamoxifen (NOLVADEX) 20 mg tablet TAKE 1 TABLET BY MOUTH EVERY DAY 90 tablet 3     No current facility-administered medications for this visit  Allergies: Allergies   Allergen Reactions    Trichophyton Other (See Comments)     AKA Fungi - Pt does not know reaction     Latex Swelling    Cat Hair Extract Sneezing    Dust Mite Extract Sneezing    Molds & Smuts Sneezing    Pollen Extract Sneezing    Tree Extract Sneezing      Physical Exam:     /80 (BP Location: Left arm, Patient Position: Sitting, Cuff Size: Adult)   Pulse 83   Temp 98 5 °F (36 9 °C) (Tympanic)   Resp 16   Ht 5' 2 95" (1 599 m)   Wt 79 6 kg (175 lb 6 4 oz)   LMP 10/15/2020   SpO2 99%   BMI 31 12 kg/m²     Physical Exam  Vitals and nursing note reviewed  Constitutional:       General: She is not in acute distress  Appearance: She is well-developed  HENT:      Head: Normocephalic and atraumatic  Eyes:      Conjunctiva/sclera: Conjunctivae normal    Cardiovascular:      Rate and Rhythm: Normal rate and regular rhythm  Heart sounds: No murmur heard  Pulmonary:      Effort: Pulmonary effort is normal  No respiratory distress  Breath sounds: Normal breath sounds  Abdominal:      Palpations: Abdomen is soft  Tenderness: There is no abdominal tenderness  Musculoskeletal:      Cervical back: Neck supple  Skin:     General: Skin is warm and dry  Neurological:      Mental Status: She is alert            Laura Higgins MD   [address] [facility]

## 2021-08-29 ENCOUNTER — HOSPITAL ENCOUNTER (OUTPATIENT)
Dept: ULTRASOUND IMAGING | Facility: HOSPITAL | Age: 41
Discharge: HOME | End: 2021-08-29
Payer: COMMERCIAL

## 2021-08-29 DIAGNOSIS — E04.1 LEFT THYROID NODULE: ICD-10-CM

## 2021-08-29 PROCEDURE — 76536 US EXAM OF HEAD AND NECK: CPT

## 2021-09-20 ENCOUNTER — ANNUAL EXAM (OUTPATIENT)
Dept: OBGYN CLINIC | Facility: CLINIC | Age: 41
End: 2021-09-20
Payer: COMMERCIAL

## 2021-09-20 VITALS
BODY MASS INDEX: 31.15 KG/M2 | DIASTOLIC BLOOD PRESSURE: 72 MMHG | HEIGHT: 63 IN | SYSTOLIC BLOOD PRESSURE: 118 MMHG | WEIGHT: 175.8 LBS

## 2021-09-20 DIAGNOSIS — Z12.31 ENCOUNTER FOR SCREENING MAMMOGRAM FOR MALIGNANT NEOPLASM OF BREAST: Primary | ICD-10-CM

## 2021-09-20 PROCEDURE — 3008F BODY MASS INDEX DOCD: CPT | Performed by: OBSTETRICS & GYNECOLOGY

## 2021-09-20 PROCEDURE — 99395 PREV VISIT EST AGE 18-39: CPT | Performed by: OBSTETRICS & GYNECOLOGY

## 2021-09-20 PROCEDURE — 1036F TOBACCO NON-USER: CPT | Performed by: OBSTETRICS & GYNECOLOGY

## 2021-09-20 NOTE — PROGRESS NOTES
ASSESSMENT & PLAN: Lizbet Slaughter is a 46 y o   with normal gynecologic exam     1   Routine well woman exam done today  2    Pap and HPV:Pap with HPV was not done today  Current ASCCP Guidelines reviewed  Pap not indicated  Hx of hysterectomy for benign reasons  3   The patient declined STD testing  4  Contraception: hx of hysterectomy  5  The following were reviewed in today's visit: clinical breast exams with surg onc, tamoxifen, adequate intake of calcium and vitamin D, exercise and healthy diet  6  Patient to return to office in 12 months for annual gyn exam      All questions have been answered to her satisfaction  CC:  Annual Gynecologic Examination    HPI: Lizbet Slaughter is a 46 y o  [id number] who presents for annual gynecologic examination  She has the following concerns:  none    Health Maintenance:    She exercises 2 days per week  She wears her seatbelt routinely  She is practicing breast self awareness  She feels safe at home  Patients does follow a balanced diet        Past Medical History:   Diagnosis Date    Abnormal Pap smear of cervix     Anxiety     Asthma     allergy excaberated    [facility] ) 2018    Cancer ([facility] )     Cigarette nicotine dependence without complication     Fibroid 2017    GERD (gastroesophageal reflux disease)     Gestational diabetes     History of adverse reaction to anesthesia     Pt reports waking up severly anxious    Pneumonia     Seasonal allergies     Subserous leiomyoma of uterus 2020       Past Surgical History:   Procedure Laterality Date    BREAST RECONSTRUCTION Bilateral 2018    Procedure: RECONSTRUCTION BREAST W/ IMPLANT;  Surgeon: Ifeoma Lopez MD;  [facility];  Service: Plastics     SECTION      HYSTERECTOMY N/A 2020    Procedure: HYSTERECTOMY LAPAROSCOPIC TOTAL ([address]) WITH BILATERAL SALPINGECTOMY-ATTEMPTED;  Surgeon: Bao Burleson MD;  Location: [facility] OR;  Service: Gynecology    HYSTERECTOMY N/A 11/25/2020    Procedure: HYSTERECTOMY TOTAL ABDOMINAL (AARON); Surgeon: Norma Coronado MD;  [facility];  Service: Gynecology    IR PORT REMOVAL  2/22/2019    MASTECTOMY      PILONIDAL CYST EXCISION      resection    AL CYSTOURETHROSCOPY N/A 11/25/2020    Procedure: CYSTOSCOPY;  Surgeon: Norma Coronado MD;  [facility];  Service: Gynecology    AL [address] TUNNELED CTR VAD W/SUBQ PORT AGE 5 YR/> Left 2/13/2018    Procedure: INSERTION VENOUS PORT ( PORT-A-CATH) IR;  Surgeon: Allison Mora DO;  [facility];  Service: Interventional Radiology    AL LAP,DIAGNOSTIC ABDOMEN N/A 11/25/2020    Procedure: LAPAROSCOPY DIAGNOSTIC;  Surgeon: Norma Coronado MD;  [facility];  Service: Gynecology    AL MASTECTOMY, MODIFIED RADICAL Right 6/21/2018    Procedure: BREAST MODIFIED RADICAL MASTECTOMY;  Surgeon: Maribel Newton MD;  [facility];  Service: Surgical Oncology    AL MASTECTOMY, SIMPLE, COMPLETE Left 6/21/2018    Procedure: BREAST SIMPLE MASTECTOMY;  Surgeon: Maribel Newton MD;  [facility];  Service: Surgical Oncology    AL REVISION OF RECONSTRUCTED BREAST Bilateral 9/19/2018    Procedure: REVISION BREAST RECON [place] CLOSURE;  Surgeon: Marcos Quinteros MD;  [facility];  Service: Plastics    TUBAL LIGATION      WISDOM TOOTH EXTRACTION         Past OB/Gyn History:   Patient's last menstrual period was 10/15/2020  History of sexually transmitted infection No  Patient is currently sexually active  Birth control: hysterectomy  Last Pap  2018 :  ASCUS with NEGATIVE high risk HPV   Cervix negative for dysplasia on total hysterectomy specimen  Paps no longer indicated      Family History  Family History   Problem Relation Age of Onset    Diabetes Mother     Hypertension Mother     Rosacea Father     Allergies Father         seasonal    Liver cancer Paternal Grandfather     Cancer Paternal Grandfather     No Known Problems Brother     No Known Problems Son     Breast cancer Maternal Grandmother     Brain cancer Maternal Grandmother     Cancer Maternal Grandmother     No Known Problems Maternal Grandfather     Diabetes Paternal Grandmother     Kidney failure Paternal Grandmother     Hypertension Paternal Grandmother     No Known Problems Brother     Breast cancer Paternal Aunt        Social History:  Social History     Socioeconomic History    Marital status: /Civil Union     Spouse name: Not on file    Number of children: Not on file    Years of education: Not on file    Highest education level: Not on file   Occupational History    Not on file   Tobacco Use    Smoking status: Former Smoker     Packs/day: 0 50     Years: 15 00     Pack years: 7 50     Quit date: 2014     Years since quittin 8    Smokeless tobacco: Never Used   Vaping Use    Vaping Use: Never used   Substance and Sexual Activity    Alcohol use: Yes     Alcohol/week: 1 0 standard drinks     Types: 1 Standard drinks or equivalent per week     Comment: occ    Drug use: No    Sexual activity: Yes     Partners: Male     Birth control/protection: Female Sterilization   Other Topics Concern    Not on file   Social History Narrative    Drinks coffee     Social Determinants of Health     Financial Resource Strain:     Difficulty of Paying Living Expenses:    Food Insecurity:     Worried About Running Out of Food in the Last Year:     Ran Out of Food in the Last Year:    Transportation Needs:     Lack of Transportation (Medical):      Lack of Transportation (Non-Medical):    Physical Activity:     Days of Exercise per Week:     Minutes of Exercise per Session:    Stress:     Feeling of Stress :    Social Connections:     Frequency of Communication with Friends and Family:     Frequency of Social Gatherings with Friends and Family:     Attends Evangelical Services:     Active Member of Clubs or Organizations:     Attends Club or Organization Meetings:     Marital Status:    Intimate Partner Violence:     Fear of Current or Ex-Partner:     Emotionally Abused:     Physically Abused:     Sexually Abused:      Presently lives with family,  and son  Patient is   Patient is currently employed  Allergies: Allergies   Allergen Reactions    Trichophyton Other (See Comments)     AKA Fungi - Pt does not know reaction     Latex Swelling    Cat Hair Extract Sneezing    Dust Mite Extract Sneezing    Molds & Smuts Sneezing    Pollen Extract Sneezing    Tree Extract Sneezing       Medications:    Current Outpatient Medications:     albuterol (VENTOLIN HFA) 90 mcg/act inhaler, Inhale 2 puffs every 4 (four) hours as needed, Disp: , Rfl:     diphenhydrAMINE-acetaminophen (TYLENOL PM)  MG TABS, Take 1 tablet by mouth daily at bedtime, Disp: , Rfl:     levocetirizine (XYZAL) 5 MG tablet, Take 5 mg by mouth every evening , Disp: , Rfl:     linaCLOtide 145 MCG CAPS, Take 1 capsule (145 mcg total) by mouth daily, Disp: 90 capsule, Rfl: 2    LORazepam (ATIVAN) 1 mg tablet, TAKE 1 TABLET BY MOUTH EVERYDAY AT BEDTIME, Disp: 90 tablet, Rfl: 0    omeprazole (PriLOSEC) 20 mg delayed release capsule, Take 20 mg by mouth daily , Disp: , Rfl:     tamoxifen (NOLVADEX) 20 mg tablet, TAKE 1 TABLET BY MOUTH EVERY DAY, Disp: 90 tablet, Rfl: 3    Review of Systems:  Review of Systems   Constitutional: Negative for activity change, appetite change and unexpected weight change  Respiratory: Negative for cough and shortness of breath  Cardiovascular: Negative for chest pain  Gastrointestinal: Negative for abdominal pain, constipation, diarrhea, nausea and vomiting  Genitourinary: Negative for difficulty urinating, dyspareunia, frequency, menstrual problem, pelvic pain, urgency, vaginal bleeding, vaginal discharge and vaginal pain  Musculoskeletal: Negative for back pain  Skin: Negative      Neurological: Negative for dizziness, weakness, light-headedness and headaches  Psychiatric/Behavioral: Negative  Physical Exam:  /72 (BP Location: Left arm, Patient Position: Sitting, Cuff Size: Standard)   Ht 5' 3" (1 6 m)   Wt 79 7 kg (175 lb 12 8 oz)   LMP 10/15/2020   Breastfeeding Unknown   BMI 31 14 kg/m²    Physical Exam  Constitutional:       General: She is not in acute distress  Appearance: Normal appearance  She is well-developed  She is not diaphoretic  Genitourinary:      Pelvic exam was performed with patient in the lithotomy position  Vulva, urethra, bladder, vagina, right adnexa and left adnexa normal       No vulval lesion, tenderness, ulcerations or rash noted  No posterior fourchette tenderness or injury present  No inguinal adenopathy present in the right or left side  No urethral prolapse or mass present  Bladder is not tender  No lesions in the vagina  Vaginal rugosity present  No vaginal discharge, erythema, tenderness or bleeding  Cervix is absent  Uterus is absent  No right or left adnexal mass present  Right adnexa not tender or full  Left adnexa not tender or full  Genitourinary Comments: Perineum normal in appearance, no lacerations, no ulcerations, no lesions visualized  Status post total hysterectomy, uterus and cervix surgically absent  Rectum:      No tenderness or external hemorrhoid  HENT:      Head: Normocephalic and atraumatic  Neck:      Thyroid: No thyromegaly or thyroid tenderness  Cardiovascular:      Rate and Rhythm: Normal rate and regular rhythm  Heart sounds: Normal heart sounds  No murmur heard  No friction rub  Pulmonary:      Effort: Pulmonary effort is normal  No respiratory distress  Breath sounds: Normal breath sounds  No wheezing or rales  Chest:      Breasts: Breasts are symmetrical          Right: Normal  No swelling, bleeding, mass, skin change or tenderness  Left: Normal  No swelling, bleeding, mass, skin change or tenderness  Comments: Bilateral breast implants, hx of bilateral mastectomy  Abdominal:      Palpations: Abdomen is soft  There is no mass  Tenderness: There is no abdominal tenderness  There is no guarding  Musculoskeletal:         General: No tenderness  Normal range of motion  Right lower leg: No edema  Left lower leg: No edema  Lymphadenopathy:      Lower Body: No right inguinal adenopathy  No left inguinal adenopathy  Neurological:      Mental Status: She is alert and oriented to person, place, and time  Skin:     General: Skin is warm and dry  Coloration: Skin is not pale  Findings: No erythema  Psychiatric:         Mood and Affect: Mood normal          Behavior: Behavior normal          Thought Content: Thought content normal          Judgment: Judgment normal    Vitals and nursing note reviewed

## 2021-10-24 DIAGNOSIS — F41.1 GENERALIZED ANXIETY DISORDER: ICD-10-CM

## 2021-10-25 RX ORDER — LORAZEPAM 1 MG/1
TABLET ORAL
Qty: 90 TABLET | Refills: 0 | Status: SHIPPED | OUTPATIENT
Start: 2021-10-25 | End: 2022-01-24

## 2021-11-06 ENCOUNTER — IMMUNIZATIONS (OUTPATIENT)
Dept: FAMILY MEDICINE CLINIC | Facility: HOSPITAL | Age: 41
End: 2021-11-06

## 2021-11-06 DIAGNOSIS — Z23 ENCOUNTER FOR IMMUNIZATION: Primary | ICD-10-CM

## 2021-11-06 PROCEDURE — 91300 COVID-19 PFIZER VACC 0.3 ML: CPT

## 2021-11-06 PROCEDURE — 0001A COVID-19 PFIZER VACC 0.3 ML: CPT

## 2021-12-02 ENCOUNTER — OFFICE VISIT (OUTPATIENT)
Dept: SURGICAL ONCOLOGY | Facility: CLINIC | Age: 41
End: 2021-12-02
Payer: COMMERCIAL

## 2021-12-02 VITALS
RESPIRATION RATE: 16 BRPM | OXYGEN SATURATION: 99 % | TEMPERATURE: 98 F | WEIGHT: 198 LBS | HEIGHT: 63 IN | BODY MASS INDEX: 35.08 KG/M2 | HEART RATE: 75 BPM | SYSTOLIC BLOOD PRESSURE: 138 MMHG | DIASTOLIC BLOOD PRESSURE: 84 MMHG

## 2021-12-02 DIAGNOSIS — Z15.89 MONOALLELIC MUTATION OF PALB2 GENE: ICD-10-CM

## 2021-12-02 DIAGNOSIS — Z15.01 MONOALLELIC MUTATION OF PALB2 GENE: ICD-10-CM

## 2021-12-02 DIAGNOSIS — Z17.0 MALIGNANT NEOPLASM OF UPPER-OUTER QUADRANT OF RIGHT BREAST IN FEMALE, ESTROGEN RECEPTOR POSITIVE (HCC): Primary | ICD-10-CM

## 2021-12-02 DIAGNOSIS — Z15.09 MONOALLELIC MUTATION OF PALB2 GENE: ICD-10-CM

## 2021-12-02 DIAGNOSIS — Z79.810 USE OF TAMOXIFEN (NOLVADEX): ICD-10-CM

## 2021-12-02 DIAGNOSIS — C50.411 MALIGNANT NEOPLASM OF UPPER-OUTER QUADRANT OF RIGHT BREAST IN FEMALE, ESTROGEN RECEPTOR POSITIVE (HCC): Primary | ICD-10-CM

## 2021-12-02 PROCEDURE — 99213 OFFICE O/P EST LOW 20 MIN: CPT | Performed by: NURSE PRACTITIONER

## 2021-12-02 PROCEDURE — 3008F BODY MASS INDEX DOCD: CPT | Performed by: NURSE PRACTITIONER

## 2021-12-02 PROCEDURE — 1036F TOBACCO NON-USER: CPT | Performed by: NURSE PRACTITIONER

## 2022-01-23 DIAGNOSIS — F41.1 GENERALIZED ANXIETY DISORDER: ICD-10-CM

## 2022-01-24 RX ORDER — LORAZEPAM 1 MG/1
TABLET ORAL
Qty: 90 TABLET | Refills: 0 | Status: SHIPPED | OUTPATIENT
Start: 2022-01-24 | End: 2022-04-22

## 2022-03-08 ENCOUNTER — OFFICE VISIT (OUTPATIENT)
Dept: HEMATOLOGY ONCOLOGY | Facility: CLINIC | Age: 42
End: 2022-03-08
Payer: COMMERCIAL

## 2022-03-08 VITALS
HEIGHT: 64 IN | BODY MASS INDEX: 33.8 KG/M2 | RESPIRATION RATE: 18 BRPM | SYSTOLIC BLOOD PRESSURE: 124 MMHG | OXYGEN SATURATION: 99 % | DIASTOLIC BLOOD PRESSURE: 82 MMHG | WEIGHT: 198 LBS | HEART RATE: 88 BPM | TEMPERATURE: 97.4 F

## 2022-03-08 DIAGNOSIS — C50.411 MALIGNANT NEOPLASM OF UPPER-OUTER QUADRANT OF RIGHT BREAST IN FEMALE, ESTROGEN RECEPTOR POSITIVE (HCC): Primary | ICD-10-CM

## 2022-03-08 DIAGNOSIS — Z17.0 MALIGNANT NEOPLASM OF UPPER-OUTER QUADRANT OF RIGHT BREAST IN FEMALE, ESTROGEN RECEPTOR POSITIVE (HCC): Primary | ICD-10-CM

## 2022-03-08 PROBLEM — N63.20 LEFT BREAST LUMP: Chronic | Status: ACTIVE | Noted: 2021-04-29

## 2022-03-08 PROCEDURE — 3008F BODY MASS INDEX DOCD: CPT | Performed by: INTERNAL MEDICINE

## 2022-03-08 PROCEDURE — 99214 OFFICE O/P EST MOD 30 MIN: CPT | Performed by: INTERNAL MEDICINE

## 2022-03-08 NOTE — PROGRESS NOTES
Hematology / Oncology Outpatient Follow Up Note    Jackie Phipps Darinyolanda 46 y o  female MKO:[id number] TFO:[id number]         Date:  3/8/2022    Assessment / Plan:  A 72-year-old premenopausal woman with locally advanced right breast cancer   She has PALB-2 probable pathogenic mutation   She had quite large palpable right breast mass and axillary adenopathy  This was mucinous histology, grade 2, ER 30% positive, MD 2% positive, HER2 3+ disease  She underwent neoadjuvant chemotherapy with [address] with pertuzumab x6 cycle, resulting in good clinical partial response   She underwent right mastectomy and axillary lymph node dissection as well as left prophylactic mastectomy, resulting in MCKAYLA   She had less than 1 mm of residual mucinous carcinoma as well as 3 positive lymph nodes which has micrometastasis   This is consistent with pathological good partial response   She completed adjuvant trastuzumab monotherapy without cardiac toxicity   She is currently on adjuvant hormonal therapy with tamoxifen 20 mg daily with minimal side effect  She has no evidence recurrent disease, based on her symptoms and physical examination  I recommended her to continue tamoxifen 20 mg daily  She is in agreement with my recommendation    I will see her again in a year for routine follow-up         Subjective:      HPI:  A 46-year-old premenopausal woman who initially noticed right breast lump when she was 8 months pregnant   She delivered 1st baby in June 2017  Katya Rivass the delivery, she noticed right breast lump to be slowly enlarging   She brought this to medical attention   She underwent mammography which was quite abnormal   Biopsy from right breast at 10:00 position 12 cm from nipple showed mucinous carcinoma, grade 2   This was ER 30 to 40% positive, MD 2 to 3% positive, her 2 3+ disease   She was seen by Dr Zandra Cooks who ordered MRI which showed extensive abnormality in her right breast including 3 5 cm of right breast mass as well as 4 cm of axillary adenopathy  She was referred to me to discuss neoadjuvant chemotherapy   She went to [facility] where she was recommended to have [address] P  she presents today with her mother to discuss treatment options  Karena Irvin is somewhat anxious   Otherwise, she feels well   She denied any bone pain   Her weight is stable   She has no respiratory symptoms   Her performance status is normal  She does not have significant past medical history   Her paternal aunt had breast cancer at age of 62   Her 2 paternal grandfather's sister had breast cancer in their 46s   She underwent genetic testing of which result is pending at this time            Interval History:   A 56-year-old premenopausal woman with locally advanced right breast cancer  Karena Irvin has quite large palpable right breast mass and axillary adenopathy  This was mucinous histology, grade 2, ER 30% positive, IA 2% positive, HER2 3+ disease   She was treated with neoadjuvant chemotherapy with TCH with pertuzumab with excellent tolerance x6 cycle which was completed in June 2018   She had clinical good partial response  Subsequently, she underwent mastectomy as well as prophylactic left mastectomy   Right mastectomy specimen showed less than 1 mm of residual mucinous carcinoma as well as 3/16 positive lymph nodes with tumor deposit less than 0 4 mm   She also completed adjuvant trastuzumab monotherapy with no cardiac toxicity  She is currently on adjuvant hormonal therapy with tamoxifen  she presents today for routine follow-up  She has quite mild hot flashes  She denied any pain  Her weight is stable  She has no respiratory symptoms  Her performance status is normal         Objective:      Primary Diagnosis:     1  Locally advanced right breast cancer with invasive mucinous histology, grade 2, ER 30% positive, IA 2% positive, HER2 3+ disease   Diagnosed in January 2018  2   PALB2 probable pathogenic mutation      Cancer Staging:  Malignant neoplasm of upper-outer quadrant of right breast in female, estrogen receptor positive ([facility] [address] )    Staging form: Breast, AJCC 8th Edition    - Clinical stage from 1/26/2018: Stage IB (cT2(3), cN1, cM0, G2, ER: Positive, CA: Positive, HER2: Positive) - Signed by Nat Macedo MD on 1/26/2018        Previous Hematologic/ Oncologic Treatment:       1  Neoadjuvant chemotherapy with [facility] with pertuzumab x6 cycle, completed in June 2018  2    Adjuvant trastuzumab monotherapy from June 2018 through January 2019      Current Hematologic/ Oncologic Treatment:       1   Adjuvant hormonal therapy with tamoxifen since January 2019      Disease Status:      Clinical partial response  Pathologically good partial response  MCKAYLA status post right mastectomy with axillary lymph node dissection and prophylactic left mastectomy      Test Results:     Pathology:     Biopsy from right breast showed invasive mucinous carcinoma, grade 2, ER 30 to 40% positive, CA 2 to 3% positive, HER2 3+ disease      Mastectomy specimen showed less than 1 mm residual mucinous carcinoma within mucin pool   3/16 axillary lymph nodes were positive for metastatic disease with largest dimension measuring 0 4 mm without extranodal extension         Radiology:     Echocardiogram in December 2018 showed ejection fraction 60%     Laboratory:     See below     Physical Exam:        General Appearance:    Alert, oriented          Eyes:    PERRL   Ears:    Normal external ear canals, both ears   Nose:   Nares normal, septum midline   Throat:   Mucosa moist  Pharynx without injection  Neck:   Supple         Lungs:     Clear to auscultation bilaterally   Chest Wall:    No tenderness or deformity    Heart:    Regular rate and rhythm         Abdomen:     Soft, non-tender, bowel sounds +, no organomegaly               Extremities:   Extremities no cyanosis or edema         Skin:   no rash or icterus      Lymph nodes:   Cervical, supraclavicular, and axillary nodes normal   Neurologic:   CNII-XII intact, normal strength, sensation and reflexes     Throughout             Breast exam:  Status post bilateral mastectomy with reconstruction   No palpable abnormality in either reconstructed breast             ROS: Review of Systems   All other systems reviewed and are negative  Imaging: No results found  Labs:   Lab Results   Component Value Date    WBC 3 93 (L) 07/31/2021    HGB 12 8 07/31/2021    HCT 39 9 07/31/2021     (H) 07/31/2021     07/31/2021     Lab Results   Component Value Date    K 3 9 07/31/2021     07/31/2021    CO2 26 07/31/2021    BUN 13 07/31/2021    CREATININE 1 03 07/31/2021    GLUF 103 (H) 07/31/2021    CALCIUM 8 3 07/31/2021    AST 24 06/12/2018    ALT 33 06/12/2018    ALKPHOS 84 06/12/2018    EGFR 69 07/31/2021       Current Medications: Reviewed  Allergies: Reviewed  PMH/FH/SH:  Reviewed      Vital Sign:    Body surface area is 1 95 meters squared      Wt Readings from Last 3 Encounters:   03/08/22 89 8 kg (198 lb)   12/02/21 89 8 kg (198 lb)   09/20/21 79 7 kg (175 lb 12 8 oz)        Temp Readings from Last 3 Encounters:   03/08/22 (!) 97 4 °F (36 3 °C) (Tympanic Core)   12/02/21 98 °F (36 7 °C) (Tympanic)   08/23/21 98 5 °F (36 9 °C) (Tympanic)        BP Readings from Last 3 Encounters:   03/08/22 124/82   12/02/21 138/84   09/20/21 118/72         Pulse Readings from Last 3 Encounters:   03/08/22 88   12/02/21 75   08/23/21 83     @LASTSAO2(3)@

## 2022-03-29 ENCOUNTER — TELEPHONE (OUTPATIENT)
Dept: GYNECOLOGIC ONCOLOGY | Facility: CLINIC | Age: 42
End: 2022-03-29

## 2022-03-29 NOTE — TELEPHONE ENCOUNTER
Called patient and rescheduled appt on 6/8/22 with Jase FARRAR to be seen with Pastor Darby on 6/9/22

## 2022-04-21 DIAGNOSIS — F41.1 GENERALIZED ANXIETY DISORDER: ICD-10-CM

## 2022-04-22 RX ORDER — LORAZEPAM 1 MG/1
TABLET ORAL
Qty: 90 TABLET | Refills: 0 | Status: SHIPPED | OUTPATIENT
Start: 2022-04-22 | End: 2022-07-22

## 2022-06-09 ENCOUNTER — PATIENT MESSAGE (OUTPATIENT)
Dept: GYNECOLOGIC ONCOLOGY | Facility: CLINIC | Age: 42
End: 2022-06-09

## 2022-06-20 DIAGNOSIS — C50.411 MALIGNANT NEOPLASM OF UPPER-OUTER QUADRANT OF RIGHT BREAST IN FEMALE, ESTROGEN RECEPTOR POSITIVE (HCC): ICD-10-CM

## 2022-06-20 DIAGNOSIS — Z17.0 MALIGNANT NEOPLASM OF UPPER-OUTER QUADRANT OF RIGHT BREAST IN FEMALE, ESTROGEN RECEPTOR POSITIVE (HCC): ICD-10-CM

## 2022-06-21 RX ORDER — TAMOXIFEN CITRATE 20 MG/1
TABLET ORAL
Qty: 90 TABLET | Refills: 3 | Status: SHIPPED | OUTPATIENT
Start: 2022-06-21

## 2022-06-27 ENCOUNTER — OFFICE VISIT (OUTPATIENT)
Dept: SURGICAL ONCOLOGY | Facility: CLINIC | Age: 42
End: 2022-06-27
Payer: COMMERCIAL

## 2022-06-27 VITALS
SYSTOLIC BLOOD PRESSURE: 130 MMHG | RESPIRATION RATE: 18 BRPM | OXYGEN SATURATION: 98 % | BODY MASS INDEX: 30.22 KG/M2 | WEIGHT: 177 LBS | DIASTOLIC BLOOD PRESSURE: 72 MMHG | TEMPERATURE: 98.4 F | HEIGHT: 64 IN | HEART RATE: 75 BPM

## 2022-06-27 DIAGNOSIS — C50.411 MALIGNANT NEOPLASM OF UPPER-OUTER QUADRANT OF RIGHT BREAST IN FEMALE, ESTROGEN RECEPTOR POSITIVE (HCC): Primary | ICD-10-CM

## 2022-06-27 DIAGNOSIS — Z15.89 MONOALLELIC MUTATION OF PALB2 GENE: ICD-10-CM

## 2022-06-27 DIAGNOSIS — Z15.01 MONOALLELIC MUTATION OF PALB2 GENE: ICD-10-CM

## 2022-06-27 DIAGNOSIS — Z79.810 USE OF TAMOXIFEN (NOLVADEX): ICD-10-CM

## 2022-06-27 DIAGNOSIS — Z15.09 MONOALLELIC MUTATION OF PALB2 GENE: ICD-10-CM

## 2022-06-27 DIAGNOSIS — Z17.0 MALIGNANT NEOPLASM OF UPPER-OUTER QUADRANT OF RIGHT BREAST IN FEMALE, ESTROGEN RECEPTOR POSITIVE (HCC): Primary | ICD-10-CM

## 2022-06-27 PROCEDURE — 3008F BODY MASS INDEX DOCD: CPT

## 2022-06-27 PROCEDURE — 1036F TOBACCO NON-USER: CPT

## 2022-06-27 PROCEDURE — 99214 OFFICE O/P EST MOD 30 MIN: CPT

## 2022-06-27 NOTE — PROGRESS NOTES
Surgical Oncology Follow Up       [address]  [facility] [place]  [address] [facility] [place] [place] [address]    Lissette JANSEN Laura  1980  [id number]  [address] [facility]  [facility] [place]  [address] [facility] [address]    No chief complaint on file  Assessment/Plan:  1  Malignant neoplasm of upper-outer quadrant of right breast in female, estrogen receptor positive ([facility] [address] )  - 6 month follow up    2  Use of tamoxifen (Nolvadex)  - continue use per medical oncology    3  Monoallelic mutation of PALB2 gene       Discussion/Summary:  Patient is a 46-year-old female presenting today for a six-month follow-up for right breast cancer diagnosed in January of 2018  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%, ME 2-3%, HER2 positive  She had 3/16 positive nodes  She underwent genetic testing which revealed a PALB2 mutation  She received neoadjuvant chemotherapy and underwent a right modified radical mastectomy and left simple mastectomy with Dr Bruce Perez  She had immediate reconstruction with Dr Raymundo Estrella   She completed Herceptin monotherapy and radiation therapy  She is currently on tamoxifen  There are no concerns on her clinical breast exam  I will see the patient back in 6 months or sooner should the need arise  She was instructed to call with any questions or concerns prior to this time  All questions were answered today  History of Present Illness:     Oncology History   Malignant neoplasm of upper-outer quadrant of right breast in female, estrogen receptor positive ([facility] [address] )   1/19/2018 Initial Diagnosis    US guided core biopsy of right breast mass at [facility]  Malignant neoplasm of UOQ of right breast,   ER positive  HER2 positive     1/29/2018 Genetic Testing    Likely pathogenic variant was identified in the PALB2 gene       2/2018 - 6/1/2018 Chemotherapy    Neoadjuvant chemotherapy with [address] with pertuzumab  Dr Rafael Stack  6/21/2018 Surgery    Right MRM  Invasive mucinous carcinoma  3 4 cm geronimo  Grade 1  3/16 lymph nodes  Stage IB - ypT1mi, ypN1mi, G1  Left simple mastectomy  Benign breast     Immediate reconstruction Dr Chetan Lewis      7/19/2018 - 1/2019 Chemotherapy    trastuzumab monotherapy every 3 weeks  11/1/2018 - 12/14/2018 Radiation    Course: C1    Plan ID Energy Fractions Dose per Fraction (cGy) Dose Correction (cGy) Total Dose Delivered (cGy) Elapsed Days   LAT R CW bst 6E 5 / 5 200 0 1,000 4   MED R CW bst 6E 5 / 5 200 0 1,000 4   New R PAB2 6X 25 / 25 47 0 1,175 36   New R Sclav2 6X 25 / 25 200 0 5,000 36   OvoIMNjw28_94 6X 12 / 12 200 0 2,400 34   QdzXtkJtu84_2 10X 12 / 12 200 0 2,400 34   SnnIaqKV85_41 10X 13 / 13 200 0 2,600 36   NewR CW 10_30 6X 13 / 13 200 0 2,600 36      Treatment dates:  C1: 11/1/2018 - 12/14/2018 1/22/2019 -  Hormone Therapy    Tamoxifen  With Dr Rafael Stack          -Interval History: Patient is a 72-year-old female presenting today for a six-month follow-up for right breast cancer diagnosed in January of 2018  She is currently on tamoxifen  No masses, nodularity, skin changes, nipple changes or discharge, or adenopathy appreciated on physical exam      Review of Systems:  Review of Systems   Constitutional: Negative for activity change, appetite change, fatigue and unexpected weight change  Respiratory: Negative for cough and shortness of breath  Cardiovascular: Negative for chest pain  Gastrointestinal: Negative for abdominal pain, diarrhea, nausea and vomiting  Endocrine: Negative for heat intolerance  Musculoskeletal: Negative for arthralgias, back pain and myalgias  Skin: Negative for rash  Neurological: Negative for weakness and headaches  Hematological: Negative for adenopathy         Patient Active Problem List   Diagnosis    Asthma    Other constipation    Macular atrophy, retinal    Seasonal allergies    Malignant neoplasm of upper-outer quadrant of right breast in female, estrogen receptor positive ([facility] [address] )    Anxiety    Use of tamoxifen (Nolvadex)    Monoallelic mutation of PALB2 gene    Status post total abdominal hysterectomy    Left breast lump     Past Medical History:   Diagnosis Date    Abnormal Pap smear of cervix     Anxiety     Asthma     allergy excaberated    Breast cancer ([facility] [address] ) 2018    Cancer ([facility] [address] )     Cigarette nicotine dependence without complication 8977    Fibroid 2017    GERD (gastroesophageal reflux disease)     Gestational diabetes     History of adverse reaction to anesthesia     Pt reports waking up severly anxious    Pneumonia     Seasonal allergies     Subserous leiomyoma of uterus 2020     Past Surgical History:   Procedure Laterality Date    BREAST RECONSTRUCTION Bilateral 2018    Procedure: RECONSTRUCTION BREAST W/ IMPLANT;  Surgeon: Fatimah Cowan MD;  [facility];  Service: Plastics     SECTION      HYSTERECTOMY N/A 2020    Procedure: HYSTERECTOMY LAPAROSCOPIC TOTAL ([address]) WITH BILATERAL SALPINGECTOMY-ATTEMPTED;  Surgeon: Jocelyn Abraham MD;  [facility];  Service: Gynecology    HYSTERECTOMY N/A 2020    Procedure: HYSTERECTOMY TOTAL ABDOMINAL ([facility]);   Surgeon: Jocelyn Abraham MD;  [facility];  Service: Gynecology    IR PORT REMOVAL  2019    MASTECTOMY      PILONIDAL CYST EXCISION      resection    MS CYSTOURETHROSCOPY N/A 2020    Procedure: CYSTOSCOPY;  Surgeon: Jocelyn Abraham MD;  [facility];  Service: Gynecology    MS INSJ TUNNELED CTR VAD W/SUBQ PORT AGE 5 YR/> Left 2018    Procedure: INSERTION VENOUS PORT ( PORT-A-CATH) IR;  Surgeon: Shannon Levy DO;  [facility];  Service: Interventional Radiology    MS LAP,DIAGNOSTIC ABDOMEN N/A 2020    Procedure: LAPAROSCOPY DIAGNOSTIC;  Surgeon: Jocelyn Abraham MD;  [facility];  Service: Gynecology    MS MASTECTOMY, MODIFIED RADICAL Right 2018    Procedure: BREAST MODIFIED RADICAL MASTECTOMY;  Surgeon: Jhonatan Fraser MD;  [facility];  Service: Surgical Oncology    MA MASTECTOMY, SIMPLE, COMPLETE Left 2018    Procedure: BREAST SIMPLE MASTECTOMY;  Surgeon: Jhonatan Fraser MD;  [facility];  Service: Surgical Oncology    MA REVISION OF RECONSTRUCTED BREAST Bilateral 2018    Procedure: REVISION BREAST RECON [place] CLOSURE;  Surgeon: Shilpa Johnson MD;  [facility];  Service: Plastics    TUBAL LIGATION      WISDOM TOOTH EXTRACTION       Family History   Problem Relation Age of Onset    Diabetes Mother     Hypertension Mother     Rosacea Father     Allergies Father         seasonal    Liver cancer Paternal Grandfather     Cancer Paternal Grandfather     No Known Problems Brother     No Known Problems Son     Breast cancer Maternal Grandmother     Brain cancer Maternal Grandmother     Cancer Maternal Grandmother     No Known Problems Maternal Grandfather     Diabetes Paternal Grandmother     Kidney failure Paternal Grandmother     Hypertension Paternal Grandmother     No Known Problems Brother     Breast cancer Paternal Aunt      Social History     Socioeconomic History    Marital status: /Civil Union     Spouse name: Not on file    Number of children: Not on file    Years of education: Not on file    Highest education level: Not on file   Occupational History    Not on file   Tobacco Use    Smoking status: Former Smoker     Packs/day: 0 50     Years: 15 00     Pack years: 7 50     Quit date: 2014     Years since quittin 5    Smokeless tobacco: Never Used   Vaping Use    Vaping Use: Never used   Substance and Sexual Activity    Alcohol use:  Yes     Alcohol/week: 1 0 standard drink     Types: 1 Standard drinks or equivalent per week     Comment: occ    Drug use: No    Sexual activity: Yes     Partners: Male     Birth control/protection: Female Sterilization   Other Topics Concern    Not on file   Social History Narrative    Drinks coffee     Social Determinants of Health     Financial Resource Strain: Not on file   Food Insecurity: Not on file   Transportation Needs: Not on file   Physical Activity: Not on file   Stress: Not on file   Social Connections: Not on file   Intimate Partner Violence: Not on file   Housing Stability: Not on file       Current Outpatient Medications:     albuterol (VENTOLIN HFA) 90 mcg/act inhaler, Inhale 2 puffs every 4 (four) hours as needed, Disp: , Rfl:     diphenhydrAMINE-acetaminophen (TYLENOL PM)  MG TABS, Take 1 tablet by mouth daily at bedtime, Disp: , Rfl:     levocetirizine (XYZAL) 5 MG tablet, Take 5 mg by mouth every evening , Disp: , Rfl:     linaCLOtide 145 MCG CAPS, Take 1 capsule (145 mcg total) by mouth daily, Disp: 90 capsule, Rfl: 2    LORazepam (ATIVAN) 1 mg tablet, TAKE 1 TABLET BY MOUTH EVERYDAY AT BEDTIME, Disp: 90 tablet, Rfl: 0    omeprazole (PriLOSEC) 20 mg delayed release capsule, Take 20 mg by mouth daily , Disp: , Rfl:     tamoxifen (NOLVADEX) 20 mg tablet, TAKE 1 TABLET BY MOUTH EVERY DAY, Disp: 90 tablet, Rfl: 3  Allergies   Allergen Reactions    Trichophyton Other (See Comments)     AKA Fungi - Pt does not know reaction     Latex Swelling    Cat Hair Extract Sneezing    Dust Mite Extract Sneezing    Molds & Smuts Sneezing    Pollen Extract Sneezing    Tree Extract Sneezing     There were no vitals filed for this visit  Physical Exam  Constitutional:       General: She is not in acute distress  Appearance: Normal appearance  Cardiovascular:      Rate and Rhythm: Normal rate and regular rhythm  Pulses: Normal pulses  Heart sounds: Normal heart sounds  Pulmonary:      Effort: Pulmonary effort is normal       Breath sounds: Normal breath sounds  Chest:      Chest wall: No mass     Breasts:      Right: No swelling, bleeding, inverted nipple, mass, nipple discharge, skin change, tenderness, axillary adenopathy or supraclavicular adenopathy  Left: No swelling, bleeding, inverted nipple, mass, nipple discharge, skin change, tenderness, axillary adenopathy or supraclavicular adenopathy  Comments: B/l mastectomies with implants  Skin tightening secondary to radiation of the right breast  Area of confirmed fat necrosis on the incisional scar of the left breast  No masses, nodularity, skin changes, nipple changes or discharge, or adenopathy appreciated on physical exam      Abdominal:      General: Abdomen is flat  Palpations: Abdomen is soft  Lymphadenopathy:      Upper Body:      Right upper body: No supraclavicular, axillary or pectoral adenopathy  Left upper body: No supraclavicular, axillary or pectoral adenopathy  Skin:     General: Skin is warm  Neurological:      General: No focal deficit present  Mental Status: She is alert and oriented to person, place, and time  Psychiatric:         Mood and Affect: Mood normal          Behavior: Behavior normal            Results:    Imaging  No results found  I reviewed the above imaging data  Advance Care Planning/Advance Directives:  Discussed disease status, cancer treatment plans and/or cancer treatment goals with the patient

## 2022-07-21 DIAGNOSIS — F41.1 GENERALIZED ANXIETY DISORDER: ICD-10-CM

## 2022-07-22 RX ORDER — LORAZEPAM 1 MG/1
TABLET ORAL
Qty: 90 TABLET | Refills: 0 | Status: SHIPPED | OUTPATIENT
Start: 2022-07-22 | End: 2022-10-24

## 2022-09-06 DIAGNOSIS — Z00.00 ANNUAL PHYSICAL EXAM: Primary | ICD-10-CM

## 2022-09-08 ENCOUNTER — OFFICE VISIT (OUTPATIENT)
Dept: FAMILY MEDICINE CLINIC | Facility: CLINIC | Age: 42
End: 2022-09-08
Payer: COMMERCIAL

## 2022-09-08 VITALS
OXYGEN SATURATION: 99 % | TEMPERATURE: 98.1 F | WEIGHT: 175 LBS | SYSTOLIC BLOOD PRESSURE: 120 MMHG | BODY MASS INDEX: 31.01 KG/M2 | RESPIRATION RATE: 16 BRPM | DIASTOLIC BLOOD PRESSURE: 80 MMHG | HEART RATE: 82 BPM | HEIGHT: 63 IN

## 2022-09-08 DIAGNOSIS — J45.20 MILD INTERMITTENT ASTHMA WITHOUT COMPLICATION: ICD-10-CM

## 2022-09-08 DIAGNOSIS — Z17.0 MALIGNANT NEOPLASM OF UPPER-OUTER QUADRANT OF RIGHT BREAST IN FEMALE, ESTROGEN RECEPTOR POSITIVE (HCC): ICD-10-CM

## 2022-09-08 DIAGNOSIS — F41.9 ANXIETY: ICD-10-CM

## 2022-09-08 DIAGNOSIS — Z23 ENCOUNTER FOR IMMUNIZATION: ICD-10-CM

## 2022-09-08 DIAGNOSIS — Z00.00 ANNUAL PHYSICAL EXAM: Primary | ICD-10-CM

## 2022-09-08 DIAGNOSIS — Z11.59 NEED FOR HEPATITIS C SCREENING TEST: ICD-10-CM

## 2022-09-08 DIAGNOSIS — Z79.899 ENCOUNTER FOR LONG-TERM (CURRENT) USE OF HIGH-RISK MEDICATION: ICD-10-CM

## 2022-09-08 DIAGNOSIS — C50.411 MALIGNANT NEOPLASM OF UPPER-OUTER QUADRANT OF RIGHT BREAST IN FEMALE, ESTROGEN RECEPTOR POSITIVE (HCC): ICD-10-CM

## 2022-09-08 PROCEDURE — 90472 IMMUNIZATION ADMIN EACH ADD: CPT

## 2022-09-08 PROCEDURE — 90471 IMMUNIZATION ADMIN: CPT

## 2022-09-08 PROCEDURE — 3725F SCREEN DEPRESSION PERFORMED: CPT | Performed by: FAMILY MEDICINE

## 2022-09-08 PROCEDURE — 99396 PREV VISIT EST AGE 40-64: CPT | Performed by: FAMILY MEDICINE

## 2022-09-08 PROCEDURE — 90686 IIV4 VACC NO PRSV 0.5 ML IM: CPT

## 2022-09-08 PROCEDURE — 90677 PCV20 VACCINE IM: CPT

## 2022-09-08 RX ORDER — ALBUTEROL SULFATE 90 UG/1
2 AEROSOL, METERED RESPIRATORY (INHALATION) EVERY 4 HOURS PRN
Qty: 18 G | Refills: 0 | Status: SHIPPED | OUTPATIENT
Start: 2022-09-08

## 2022-09-08 NOTE — PROGRESS NOTES
[address]    NAME: Arnoldo Sanchez  AGE: 38 y o  SEX: female  : 1980     DATE: 2022     Assessment and Plan:     Problem List Items Addressed This Visit        Respiratory    Asthma    Relevant Medications    albuterol (PROVENTIL HFA,VENTOLIN HFA) 90 mcg/act inhaler       Other    Malignant neoplasm of upper-outer quadrant of right breast in female, estrogen receptor positive ([address] )     In remission   Continue care per hematologist          Anxiety     Ativan as needed            Other Visit Diagnoses     Annual physical exam    -  Primary    Need for hepatitis C screening test        Relevant Orders    Hepatitis C Antibody ([facility])    Encounter for immunization        Relevant Orders    influenza vaccine, quadrivalent, 0 5 mL, preservative-free, for adult and pediatric patients 6 mos+ (AFLURIA, FLUARIX, FLULAVAL, FLUZONE) (Completed)    Pneumococcal Conjugate Vaccine 20-valent (Pcv20) (Completed)    Encounter for long-term (current) use of high-risk medication        Relevant Orders    Pain Management, Profile 6 With Confirmation          Immunizations and preventive care screenings were discussed with patient today  Appropriate education was printed on patient's after visit summary  Counseling:  Alcohol/drug use: discussed moderation in alcohol intake, the recommendations for healthy alcohol use, and avoidance of illicit drug use  Dental Health: discussed importance of regular tooth brushing, flossing, and dental visits  Injury prevention: discussed safety/seat belts, safety helmets, smoke detectors, carbon dioxide detectors, and smoking near bedding or upholstery  Sexual health: discussed sexually transmitted diseases, partner selection, use of condoms, avoidance of unintended pregnancy, and contraceptive alternatives  Exercise: the importance of regular exercise/physical activity was discussed  Recommend exercise 3-5 times per week for at least 30 minutes  BMI Counseling: Body mass index is 31 01 kg/m²  The BMI is above normal  Nutrition recommendations include decreasing portion sizes and encouraging healthy choices of fruits and vegetables  Exercise recommendations include moderate physical activity 150 minutes/week  No pharmacotherapy was ordered  Rationale for BMI follow-up plan is due to patient being overweight or obese  Depression Screening and Follow-up Plan: Patient was screened for depression during today's encounter  They screened negative with a PHQ-2 score of 0  No follow-ups on file  Chief Complaint:     Chief Complaint   Patient presents with    Physical Exam     Patient is here today for an annual exam       History of Present Illness:     Adult Annual Physical   Patient here for a comprehensive physical exam  The patient reports no problems  Diet and Physical Activity  Diet/Nutrition: well balanced diet and consuming 3-5 servings of fruits/vegetables daily  Exercise: walking  Depression Screening  PHQ-2/9 Depression Screening    Little interest or pleasure in doing things: 0 - not at all  Feeling down, depressed, or hopeless: 0 - not at all  PHQ-2 Score: 0  PHQ-2 Interpretation: Negative depression screen       General Health  Sleep: sleeps well and gets 7-8 hours of sleep on average  Hearing: normal - bilateral   Vision: goes for regular eye exams  Dental: regular dental visits and brushes teeth twice daily  /GYN Health  Patient is: postmenopausal  Last menstrual period: none since hysterectomy   Contraceptive method: hysterectomy  Review of Systems:     Review of Systems   Constitutional: Negative  HENT: Negative  Eyes: Negative  Respiratory: Negative  Cardiovascular: Negative  Gastrointestinal: Negative  Endocrine: Negative  Genitourinary: Negative  Musculoskeletal: Negative  Skin: Negative  Allergic/Immunologic: Negative  Neurological: Negative  Hematological: Negative  Psychiatric/Behavioral: Negative  Past Medical History:     Past Medical History:   Diagnosis Date    Abnormal Pap smear of cervix     Anxiety     Asthma     allergy excaberated    Breast cancer ([facility] [address] ) 2018    Cancer ([facility] [address] )     Cigarette nicotine dependence without complication     Fibroid     GERD (gastroesophageal reflux disease)     Gestational diabetes     History of adverse reaction to anesthesia     Pt reports waking up severly anxious    Macular atrophy, retinal 2016    Pneumonia     Seasonal allergies     Subserous leiomyoma of uterus 2020      Past Surgical History:     Past Surgical History:   Procedure Laterality Date    BREAST RECONSTRUCTION Bilateral 2018    Procedure: RECONSTRUCTION BREAST W/ IMPLANT;  Surgeon: Saadia Sherman MD;  [facility];  Service: Plastics     SECTION      HYSTERECTOMY N/A 2020    Procedure: HYSTERECTOMY LAPAROSCOPIC TOTAL ([address]) WITH BILATERAL SALPINGECTOMY-ATTEMPTED;  Surgeon: Arnoldo Purdy MD;  [facility];  Service: Gynecology    HYSTERECTOMY N/A 2020    Procedure: HYSTERECTOMY TOTAL ABDOMINAL ([facility]);   Surgeon: Arnoldo Purdy MD;  [facility];  Service: Gynecology    IR PORT REMOVAL  2019    MASTECTOMY      PILONIDAL CYST EXCISION      resection    UT CYSTOURETHROSCOPY N/A 2020    Procedure: CYSTOSCOPY;  Surgeon: Arnoldo Purdy MD;  [facility];  Service: Gynecology    UT INSJ TUNNELED CTR VAD W/SUBQ PORT AGE 5 YR/> Left 2018    Procedure: INSERTION VENOUS PORT ( PORT-A-CATH) IR;  Surgeon: Gema Foote DO;  [facility];  Service: Interventional Radiology    UT LAP,DIAGNOSTIC ABDOMEN N/A 2020    Procedure: LAPAROSCOPY DIAGNOSTIC;  Surgeon: Arnoldo Purdy MD;  [facility];  Service: Gynecology    UT MASTECTOMY, MODIFIED RADICAL Right 2018    Procedure: BREAST MODIFIED RADICAL MASTECTOMY;  Surgeon: David Leon MD;  [facility];  Service: Surgical Oncology    TX MASTECTOMY, SIMPLE, COMPLETE Left 2018    Procedure: BREAST SIMPLE MASTECTOMY;  Surgeon: David Leon MD;  [facility];  Service: Surgical Oncology    TX REVISION OF RECONSTRUCTED BREAST Bilateral 2018    Procedure: REVISION BREAST RECON Jaylin Bun CLOSURE;  Surgeon: Bishop Rosales MD;  [facility];  Service: Plastics    TUBAL LIGATION      WISDOM TOOTH EXTRACTION        Social History:     Social History     Socioeconomic History    Marital status: /Civil Union     Spouse name: None    Number of children: None    Years of education: None    Highest education level: None   Occupational History    None   Tobacco Use    Smoking status: Former Smoker     Packs/day: 0 50     Years: 15 00     Pack years: 7 50     Quit date: 2014     Years since quittin 7    Smokeless tobacco: Never Used   Vaping Use    Vaping Use: Never used   Substance and Sexual Activity    Alcohol use:  Yes     Alcohol/week: 1 0 standard drink     Types: 1 Standard drinks or equivalent per week     Comment: occ    Drug use: No    Sexual activity: Yes     Partners: Male     Birth control/protection: Female Sterilization   Other Topics Concern    None   Social History Narrative    Drinks coffee     Social Determinants of Health     Financial Resource Strain: Not on file   Food Insecurity: Not on file   Transportation Needs: Not on file   Physical Activity: Not on file   Stress: Not on file   Social Connections: Not on file   Intimate Partner Violence: Not on file   Housing Stability: Not on file      Family History:     Family History   Problem Relation Age of Onset    Diabetes Mother     Hypertension Mother     Rosacea Father     Allergies Father         seasonal    Liver cancer Paternal Grandfather     Cancer Paternal Grandfather     No Known Problems Brother     No Known Problems Son     Breast cancer Maternal Grandmother     Brain cancer Maternal Grandmother     Cancer Maternal Grandmother     No Known Problems Maternal Grandfather     Diabetes Paternal Grandmother     Kidney failure Paternal Grandmother     Hypertension Paternal Grandmother     No Known Problems Brother     Breast cancer Paternal Aunt       Current Medications:     Current Outpatient Medications   Medication Sig Dispense Refill    albuterol (PROVENTIL HFA,VENTOLIN HFA) 90 mcg/act inhaler Inhale 2 puffs every 4 (four) hours as needed for shortness of breath 18 g 0    diphenhydrAMINE-acetaminophen (TYLENOL PM)  MG TABS Take 1 tablet by mouth daily at bedtime      levocetirizine (XYZAL) 5 MG tablet Take 5 mg by mouth every evening       linaCLOtide 145 MCG CAPS Take 1 capsule (145 mcg total) by mouth daily (Patient taking differently: Take 145 mcg by mouth if needed) 90 capsule 2    LORazepam (ATIVAN) 1 mg tablet TAKE 1 TABLET BY MOUTH EVERYDAY AT BEDTIME 90 tablet 0    omeprazole (PriLOSEC) 20 mg delayed release capsule Take 20 mg by mouth daily       tamoxifen (NOLVADEX) 20 mg tablet TAKE 1 TABLET BY MOUTH EVERY DAY 90 tablet 3     No current facility-administered medications for this visit  Allergies: Allergies   Allergen Reactions    Trichophyton Other (See Comments)     AKA Fungi - Pt does not know reaction     Latex Swelling    Cat Hair Extract Sneezing    Dust Mite Extract Sneezing    Molds & Smuts Sneezing    Pollen Extract Sneezing    Tree Extract Sneezing      Physical Exam:     /80 (BP Location: Left arm, Patient Position: Sitting, Cuff Size: Adult)   Pulse 82   Temp 98 1 °F (36 7 °C) (Tympanic)   Resp 16   Ht 5' 2 99" (1 6 m)   Wt 79 4 kg (175 lb)   LMP 10/15/2020   SpO2 99%   BMI 31 01 kg/m²     Physical Exam  Constitutional:       Appearance: She is well-developed  HENT:      Head: Normocephalic and atraumatic  Right Ear: Tympanic membrane normal       Left Ear: Tympanic membrane normal       Nose: Nose normal       Mouth/Throat:      Mouth: Mucous membranes are moist    Eyes:      Pupils: Pupils are equal, round, and reactive to light  Cardiovascular:      Rate and Rhythm: Normal rate and regular rhythm  Pulses: Normal pulses  Heart sounds: Normal heart sounds  Pulmonary:      Effort: Pulmonary effort is normal  No respiratory distress  Breath sounds: Normal breath sounds  No wheezing  Abdominal:      General: Bowel sounds are normal       Palpations: Abdomen is soft  Musculoskeletal:         General: No deformity  Normal range of motion  Cervical back: Normal range of motion and neck supple  Skin:     General: Skin is warm  Capillary Refill: Capillary refill takes less than 2 seconds  Neurological:      General: No focal deficit present  Mental Status: She is alert and oriented to person, place, and time     Psychiatric:         Behavior: Behavior normal           Sawyer Moore MD  [address] [facility]

## 2022-09-08 NOTE — PATIENT INSTRUCTIONS

## 2022-09-09 ENCOUNTER — TELEPHONE (OUTPATIENT)
Dept: ADMINISTRATIVE | Facility: OTHER | Age: 42
End: 2022-09-09

## 2022-09-09 LAB
ALBUMIN SERPL-MCNC: 4.1 G/DL (ref 3.6–5.1)
ALBUMIN/GLOB SERPL: 1.4 (CALC) (ref 1–2.5)
ALP SERPL-CCNC: 58 U/L (ref 31–125)
ALT SERPL-CCNC: 13 U/L (ref 6–29)
AST SERPL-CCNC: 16 U/L (ref 10–30)
BASOPHILS # BLD AUTO: 47 CELLS/UL (ref 0–200)
BASOPHILS NFR BLD AUTO: 0.6 %
BILIRUB SERPL-MCNC: 0.5 MG/DL (ref 0.2–1.2)
BUN SERPL-MCNC: 14 MG/DL (ref 7–25)
BUN/CREAT SERPL: 14 (CALC) (ref 6–22)
CALCIUM SERPL-MCNC: 9 MG/DL (ref 8.6–10.2)
CHLORIDE SERPL-SCNC: 106 MMOL/L (ref 98–110)
CHOLEST SERPL-MCNC: 114 MG/DL
CHOLEST/HDLC SERPL: 3.8 (CALC)
CO2 SERPL-SCNC: 26 MMOL/L (ref 20–32)
CREAT SERPL-MCNC: 1.01 MG/DL (ref 0.5–0.99)
EOSINOPHIL # BLD AUTO: 47 CELLS/UL (ref 15–500)
EOSINOPHIL NFR BLD AUTO: 0.6 %
ERYTHROCYTE [DISTWIDTH] IN BLOOD BY AUTOMATED COUNT: 11.9 % (ref 11–15)
GFR/BSA.PRED SERPLBLD CYS-BASED-ARV: 72 ML/MIN/1.73M2
GLOBULIN SER CALC-MCNC: 3 G/DL (CALC) (ref 1.9–3.7)
GLUCOSE SERPL-MCNC: 105 MG/DL (ref 65–99)
HCT VFR BLD AUTO: 38.5 % (ref 35–45)
HCV AB S/CO SERPL IA: 0.07
HCV AB SERPL QL IA: NORMAL
HDLC SERPL-MCNC: 30 MG/DL
HGB BLD-MCNC: 13.3 G/DL (ref 11.7–15.5)
LDLC SERPL CALC-MCNC: 61 MG/DL (CALC)
LYMPHOCYTES # BLD AUTO: 853 CELLS/UL (ref 850–3900)
LYMPHOCYTES NFR BLD AUTO: 10.8 %
MCH RBC QN AUTO: 33.5 PG (ref 27–33)
MCHC RBC AUTO-ENTMCNC: 34.5 G/DL (ref 32–36)
MCV RBC AUTO: 97 FL (ref 80–100)
MONOCYTES # BLD AUTO: 553 CELLS/UL (ref 200–950)
MONOCYTES NFR BLD AUTO: 7 %
NEUTROPHILS # BLD AUTO: 6399 CELLS/UL (ref 1500–7800)
NEUTROPHILS NFR BLD AUTO: 81 %
NONHDLC SERPL-MCNC: 84 MG/DL (CALC)
PLATELET # BLD AUTO: 259 THOUSAND/UL (ref 140–400)
PMV BLD REES-ECKER: 9.5 FL (ref 7.5–12.5)
POTASSIUM SERPL-SCNC: 4.5 MMOL/L (ref 3.5–5.3)
PROT SERPL-MCNC: 7.1 G/DL (ref 6.1–8.1)
RBC # BLD AUTO: 3.97 MILLION/UL (ref 3.8–5.1)
SODIUM SERPL-SCNC: 140 MMOL/L (ref 135–146)
TRIGL SERPL-MCNC: 147 MG/DL
TSH SERPL-ACNC: 2.7 MIU/L
WBC # BLD AUTO: 7.9 THOUSAND/UL (ref 3.8–10.8)

## 2022-09-09 NOTE — TELEPHONE ENCOUNTER
Upon review of the In Basket request we were able to identify that the patient had the requested item(s) completed internally  The item(s) requested can be found within the Chart Review tabs  Because of this we are requesting that you forward this request/concern to the [employer] email  The Quality team members assigned to this email will be more than happy to assist you  Any additional questions or concerns should be emailed to the Practice Liaisons via [email or web address] email, please do not reply via In Basket      Thank you  Jose Dominguez MA

## 2022-09-09 NOTE — TELEPHONE ENCOUNTER
Upon review of the In Basket request we were able to locate, review, and update the patient chart as requested for HIV  Any additional questions or concerns should be emailed to the Practice Liaisons via [email or web address] email, please do not reply via In Basket      Thank you  Daniela De La Rosa MA

## 2022-09-09 NOTE — TELEPHONE ENCOUNTER
----- Message from Fidel Myers sent at 9/8/2022  2:47 PM EDT -----  Regarding: care gap request  09/08/22 2:47 PM    Hello, our patient attached above has had HIV completed/performed  Please assist in updating the patient chart by pulling the Care Everywhere (CE) document  The date of service is 10/27/2016       Thank you,  Fidel BUIS [facility]

## 2022-09-09 NOTE — TELEPHONE ENCOUNTER
----- Message from Meliton Alexander sent at 9/8/2022  2:44 PM EDT -----  Regarding: care gap request  09/08/22 2:44 PM    Hello, our patient attached above has had total abdominal hysterectomy completed/performed  Please assist in obtaining the exclusion documentation by pulling a previous Electronic Medical Record (EMR) document  The previous EMR is in Encounters  The date of service is 11/25/2020       Thank you,  Meliton [facility]

## 2022-09-10 LAB
1OH-MIDAZOLAM UR-MCNC: NEGATIVE NG/ML
6MAM UR QL: NEGATIVE NG/ML
A-OH ALPRAZ UR-MCNC: NEGATIVE NG/ML
A-OH-TRIAZOLAM UR-MCNC: NEGATIVE NG/ML
AMPHET UR-MCNC: NEGATIVE NG/ML
AMPHETAMINES UR QL: ABNORMAL NG/ML
BARBITURATES UR QL: NEGATIVE NG/ML
BENZODIAZ UR QL: POSITIVE NG/ML
BZE UR QL: NEGATIVE NG/ML
CREAT UR-MCNC: 197.2 MG/DL
ETHANOL UR QL: NEGATIVE NG/ML
LORAZEPAM UR-MCNC: 1225 NG/ML
METHADONE UR QL: NEGATIVE NG/ML
METHAMPHET UR-MCNC: NEGATIVE NG/ML
NORDIAZEPAM UR-MCNC: NEGATIVE NG/ML
OPIATES UR QL: NEGATIVE NG/ML
OXAZEPAM UR-MCNC: NEGATIVE NG/ML
OXIDANTS UR QL: NEGATIVE MCG/ML
OXYCODONE UR QL: NEGATIVE NG/ML
PCP UR QL: NEGATIVE NG/ML
PH UR: 5.6 [PH] (ref 4.5–9)
SL AMB AMINOCLONAZEPAM: NEGATIVE NG/ML
SL AMB HYDROXYETHYLFLURAZEPAM: NEGATIVE NG/ML
TEMAZEPAM UR-MCNC: NEGATIVE NG/ML
THC UR QL: NEGATIVE NG/ML

## 2022-10-23 DIAGNOSIS — F41.1 GENERALIZED ANXIETY DISORDER: ICD-10-CM

## 2022-10-24 RX ORDER — LORAZEPAM 1 MG/1
TABLET ORAL
Qty: 90 TABLET | Refills: 0 | Status: SHIPPED | OUTPATIENT
Start: 2022-10-24

## 2022-11-18 ENCOUNTER — TELEPHONE (OUTPATIENT)
Dept: HEMATOLOGY ONCOLOGY | Facility: CLINIC | Age: 42
End: 2022-11-18

## 2022-12-06 ENCOUNTER — TELEPHONE (OUTPATIENT)
Dept: SURGICAL ONCOLOGY | Facility: CLINIC | Age: 42
End: 2022-12-06

## 2022-12-06 NOTE — TELEPHONE ENCOUNTER
Patient confirmed appointment with Alejandra Kan on 3/8 at 2:30pm  Patient is seeing Dr Chris Aguayo at 3 same day

## 2023-01-16 DIAGNOSIS — F41.1 GENERALIZED ANXIETY DISORDER: ICD-10-CM

## 2023-01-16 DIAGNOSIS — J45.20 MILD INTERMITTENT ASTHMA WITHOUT COMPLICATION: ICD-10-CM

## 2023-01-17 RX ORDER — ALBUTEROL SULFATE 90 UG/1
2 AEROSOL, METERED RESPIRATORY (INHALATION) EVERY 4 HOURS PRN
Qty: 18 G | Refills: 0 | Status: SHIPPED | OUTPATIENT
Start: 2023-01-17 | End: 2023-01-17

## 2023-01-17 RX ORDER — LORAZEPAM 1 MG/1
1 TABLET ORAL
Qty: 90 TABLET | Refills: 0 | Status: SHIPPED | OUTPATIENT
Start: 2023-01-17

## 2023-01-17 NOTE — TELEPHONE ENCOUNTER
Medication:  PDMP   10/24/2022  10/24/2022 LORazepam 1 MG TABLET (non-liquid)  90 0 90 1 MG NA AWILDA LANGE     Active agreement on file -Yes

## 2023-02-21 ENCOUNTER — TELEPHONE (OUTPATIENT)
Dept: HEMATOLOGY ONCOLOGY | Facility: CLINIC | Age: 43
End: 2023-02-21

## 2023-02-21 NOTE — TELEPHONE ENCOUNTER
Called and spoke with patient about her appointment that she had scheduled with Dr Lowe on 3/8  [facility] will not be in this office this day so this appointment needed to be rescheduled  I was able to put patient on Dr Lowe's schedule for 5/3 at 10:40am   Patient was not very happy with this however we found a date and time that she could/would agree with

## 2023-03-08 ENCOUNTER — OFFICE VISIT (OUTPATIENT)
Dept: SURGICAL ONCOLOGY | Facility: CLINIC | Age: 43
End: 2023-03-08

## 2023-03-08 VITALS
HEART RATE: 95 BPM | RESPIRATION RATE: 16 BRPM | HEIGHT: 62 IN | BODY MASS INDEX: 35.06 KG/M2 | DIASTOLIC BLOOD PRESSURE: 80 MMHG | SYSTOLIC BLOOD PRESSURE: 128 MMHG | WEIGHT: 190.5 LBS | TEMPERATURE: 98 F | OXYGEN SATURATION: 100 %

## 2023-03-08 DIAGNOSIS — Z15.01 MONOALLELIC MUTATION OF PALB2 GENE: ICD-10-CM

## 2023-03-08 DIAGNOSIS — C50.411 MALIGNANT NEOPLASM OF UPPER-OUTER QUADRANT OF RIGHT BREAST IN FEMALE, ESTROGEN RECEPTOR POSITIVE (HCC): Primary | ICD-10-CM

## 2023-03-08 DIAGNOSIS — Z15.09 MONOALLELIC MUTATION OF PALB2 GENE: ICD-10-CM

## 2023-03-08 DIAGNOSIS — Z15.89 MONOALLELIC MUTATION OF PALB2 GENE: ICD-10-CM

## 2023-03-08 DIAGNOSIS — Z79.810 USE OF TAMOXIFEN (NOLVADEX): ICD-10-CM

## 2023-03-08 DIAGNOSIS — Z17.0 MALIGNANT NEOPLASM OF UPPER-OUTER QUADRANT OF RIGHT BREAST IN FEMALE, ESTROGEN RECEPTOR POSITIVE (HCC): Primary | ICD-10-CM

## 2023-03-08 NOTE — PROGRESS NOTES
Surgical Oncology Follow Up       [address]  [facility] [place]  [address] [facility] [address]  [place] [address]    Lissette JANSEN Sanjaychristianoomaira  1980  [id number]  [address] [facility]  [facility] [place]  [address] [place] [address]    No chief complaint on file  Assessment/Plan:  1  Malignant neoplasm of upper-outer quadrant of right breast in female, estrogen receptor positive ([facility] [address] )  - 1 year follow up    2  Use of tamoxifen (Nolvadex)  - continue use per medical oncology    3  Monoallelic mutation of PALB2 gene       Discussion/Summary: Patient is a 61-year-old female presenting today for a six-month follow-up for right breast cancer diagnosed in January of 2018  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%, 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%, HER2 positive  She had 3/16 positive nodes  She underwent genetic testing which revealed a PALB2 mutation  She received neoadjuvant chemotherapy and underwent a right modified radical mastectomy and left simple mastectomy with Dr Laine David  She had immediate reconstruction with Dr Mariela Johnston  She is currently on tamoxifen  There were no concerns on her clinical breast exam  I will see the patient back in 1 year or sooner should the need arise  She was instructed to call with any questions or concerns prior to this time  All questions were answered today  History of Present Illness:     Oncology History   Malignant neoplasm of upper-outer quadrant of right breast in female, estrogen receptor positive ([facility] [address] )   1/19/2018 Initial Diagnosis    US guided core biopsy of right breast mass at [facility]  Malignant neoplasm of UOQ of right breast,   ER positive  HER2 positive     1/29/2018 Genetic Testing    Likely pathogenic variant was identified in the PALB2 gene  2/2018 - 6/1/2018 Chemotherapy    Neoadjuvant chemotherapy with TCH with pertuzumab  Dr Patricia Mccullough       6/21/2018 Surgery    Right MRM  Invasive mucinous carcinoma  3 4 cm geronimo  Grade 1  3/16 lymph nodes  Stage IB - ypT1mi, ypN1mi, G1  Left simple mastectomy  Benign breast     Immediate reconstruction Dr Florinda Hennessy      7/19/2018 - 1/2019 Chemotherapy    trastuzumab monotherapy every 3 weeks  11/1/2018 - 12/14/2018 Radiation    Course: C1    Plan ID Energy Fractions Dose per Fraction (cGy) Dose Correction (cGy) Total Dose Delivered (cGy) Elapsed Days   LAT R CW bst 6E 5 / 5 200 0 1,000 4   MED R CW bst 6E 5 / 5 200 0 1,000 4   New R PAB2 6X 25 / 25 47 0 1,175 36   New R Sclav2 6X 25 / 25 200 0 5,000 36   IloMADei56_65 6X 12 / 12 200 0 2,400 34   JvrObfFvw60_5 10X 12 / 12 200 0 2,400 34   DqfZvbBR61_89 10X 13 / 13 200 0 2,600 36   NewR CW 10_30 6X 13 / 13 200 0 2,600 36      Treatment dates:  C1: 11/1/2018 - 12/14/2018 1/22/2019 -  Hormone Therapy    Tamoxifen  With Dr Mila Acuna          -Interval History: Patient is a 74-year-old female presenting today for a six-month follow-up for right breast cancer diagnosed in January of 2018  She is currently on tamoxifen  Patient denies changes on her breast exam  She denies persistent headaches, bone pain, back pain, shortness of breath, or abdominal pain  Review of Systems:  Review of Systems   Constitutional: Negative for activity change, appetite change, fatigue and unexpected weight change  Respiratory: Negative for cough and shortness of breath  Cardiovascular: Negative for chest pain  Gastrointestinal: Negative for abdominal pain, diarrhea, nausea and vomiting  Endocrine: Negative for heat intolerance  Musculoskeletal: Negative for arthralgias, back pain and myalgias  Skin: Negative for rash  Neurological: Negative for weakness and headaches  Hematological: Negative for adenopathy         Patient Active Problem List   Diagnosis   • Asthma   • Seasonal allergies   • Malignant neoplasm of upper-outer quadrant of right breast in female, estrogen receptor positive ([facility] [address] )   • Anxiety • Use of tamoxifen (Nolvadex)   • Monoallelic mutation of PALB2 gene   • Status post total abdominal hysterectomy   • Left breast lump     Past Medical History:   Diagnosis Date   • Abnormal Pap smear of cervix    • Anxiety    • Asthma     allergy excaberated   • Breast cancer ([facility] [address] ) 2018   • Cancer ([facility] [address] )    • Cigarette nicotine dependence without complication 8355   • Fibroid    • GERD (gastroesophageal reflux disease)    • Gestational diabetes    • History of adverse reaction to anesthesia     Pt reports waking up severly anxious   • Macular atrophy, retinal 2016   • Pneumonia    • Seasonal allergies    • Subserous leiomyoma of uterus 2020     Past Surgical History:   Procedure Laterality Date   • BREAST RECONSTRUCTION Bilateral 2018    Procedure: RECONSTRUCTION BREAST W/ IMPLANT;  Surgeon: Toni Francis MD;  [facility];  Service: Plastics   •  SECTION     • HYSTERECTOMY N/A 2020    Procedure: HYSTERECTOMY LAPAROSCOPIC TOTAL ([address]) WITH BILATERAL SALPINGECTOMY-ATTEMPTED;  Surgeon: Ynois Junior MD;  Location: [facility];  Service: Gynecology   • HYSTERECTOMY N/A 2020    Procedure: HYSTERECTOMY TOTAL ABDOMINAL (A[facility]ON);   Surgeon: Yonis Junior MD;  [facility];  Service: Gynecology   • IR PORT REMOVAL  2019   • MASTECTOMY     • PILONIDAL CYST EXCISION      resection   • ME CYSTOURETHROSCOPY N/A 2020    Procedure: CYSTOSCOPY;  Surgeon: Yonis Junior MD;  [facility];  Service: Gynecology   • ME INSJ TUNNELED CTR VAD W/SUBQ PORT AGE 5 YR/> Left 2018    Procedure: INSERTION VENOUS PORT ( PORT-A-CATH) IR;  Surgeon: Mel Townsend DO;  [facility];  Service: Interventional Radiology   • ME LAPS ABD PRTM&OMENTUM DX W/WO SPEC BR/WA SPX N/A 2020    Procedure: LAPAROSCOPY DIAGNOSTIC;  Surgeon: Yonis Junior MD;  [facility];  Service: Gynecology   • ME MAST MODF RAD W/AX LYMPH NOD W/WO PECT/DARBY MIN Right 2018    Procedure: BREAST MODIFIED RADICAL MASTECTOMY;  Surgeon: Mayo Hodge MD;  [facility]: [facility];  Service: Surgical Oncology   • MO MASTECTOMY SIMPLE COMPLETE Left 2018    Procedure: BREAST SIMPLE MASTECTOMY;  Surgeon: Mayo Hodge MD;  [facility]: [facility];  Service: Surgical Oncology   • MO REVISION OF RECONSTRUCTED BREAST Bilateral 2018    Procedure: REVISION BREAST RECON Lollie Shook CLOSURE;  Surgeon: Kirby Freed MD;  [facility]: [facility];  Service: Plastics   • TUBAL LIGATION     • WISDOM TOOTH EXTRACTION       Family History   Problem Relation Age of Onset   • Diabetes Mother    • Hypertension Mother    • Rosacea Father    • Allergies Father         seasonal   • Liver cancer Paternal Grandfather    • Cancer Paternal Grandfather    • No Known Problems Brother    • No Known Problems Son    • Breast cancer Maternal Grandmother    • Brain cancer Maternal Grandmother    • Cancer Maternal Grandmother    • No Known Problems Maternal Grandfather    • Diabetes Paternal Grandmother    • Kidney failure Paternal Grandmother    • Hypertension Paternal Grandmother    • No Known Problems Brother    • Breast cancer Paternal Aunt      Social History     Socioeconomic History   • Marital status: /Civil Union     Spouse name: Not on file   • Number of children: Not on file   • Years of education: Not on file   • Highest education level: Not on file   Occupational History   • Not on file   Tobacco Use   • Smoking status: Former     Packs/day: 0 50     Years: 15 00     Pack years: 7 50     Types: Cigarettes     Quit date: 2014     Years since quittin 2   • Smokeless tobacco: Never   Vaping Use   • Vaping Use: Never used   Substance and Sexual Activity   • Alcohol use:  Yes     Alcohol/week: 1 0 standard drink     Types: 1 Standard drinks or equivalent per week     Comment: occ   • Drug use: No   • Sexual activity: Yes     Partners: Male     Birth control/protection: Female Sterilization   Other Topics Concern   • Not on file   Social History Narrative    Drinks coffee     Social Determinants of Health     Financial Resource Strain: Not on file   Food Insecurity: Not on file   Transportation Needs: Not on file   Physical Activity: Not on file   Stress: Not on file   Social Connections: Not on file   Intimate Partner Violence: Not on file   Housing Stability: Not on file       Current Outpatient Medications:   •  diphenhydrAMINE-acetaminophen (TYLENOL PM)  MG TABS, Take 1 tablet by mouth daily at bedtime, Disp: , Rfl:   •  levalbuterol (XOPENEX HFA) 45 mcg/act inhaler, Inhale 2 puffs every 8 (eight) hours as needed for wheezing, Disp: 15 g, Rfl: 0  •  levocetirizine (XYZAL) 5 MG tablet, Take 5 mg by mouth every evening , Disp: , Rfl:   •  linaCLOtide 145 MCG CAPS, Take 1 capsule (145 mcg total) by mouth daily (Patient taking differently: Take 145 mcg by mouth if needed), Disp: 90 capsule, Rfl: 2  •  LORazepam (ATIVAN) 1 mg tablet, Take 1 tablet (1 mg total) by mouth daily at bedtime, Disp: 90 tablet, Rfl: 0  •  omeprazole (PriLOSEC) 20 mg delayed release capsule, Take 20 mg by mouth daily , Disp: , Rfl:   •  tamoxifen (NOLVADEX) 20 mg tablet, TAKE 1 TABLET BY MOUTH EVERY DAY, Disp: 90 tablet, Rfl: 3  Allergies   Allergen Reactions   • Trichophyton Other (See Comments)     AKA Fungi - Pt does not know reaction    • Latex Swelling   • Cat Hair Extract Sneezing   • Dust Mite Extract Sneezing   • Molds & Smuts Sneezing   • Pollen Extract Sneezing   • Tree Extract Sneezing     Vitals:    03/08/23 1430   BP: 128/80   Pulse: 95   Resp: 16   Temp: 98 °F (36 7 °C)   SpO2: 100%       Physical Exam  Constitutional:       General: She is not in acute distress  Appearance: Normal appearance  Cardiovascular:      Rate and Rhythm: Normal rate and regular rhythm  Pulses: Normal pulses  Heart sounds: Normal heart sounds     Pulmonary:      Effort: Pulmonary effort is normal       Breath sounds: Normal breath sounds  Chest:      Chest wall: No mass  Breasts:     Right: No swelling, bleeding, inverted nipple, mass, nipple discharge, skin change or tenderness  Left: No swelling, bleeding, inverted nipple, mass, nipple discharge, skin change or tenderness  Abdominal:      General: Abdomen is flat  Palpations: Abdomen is soft  Lymphadenopathy:      Upper Body:      Right upper body: No supraclavicular, axillary or pectoral adenopathy  Left upper body: No supraclavicular, axillary or pectoral adenopathy  Skin:     General: Skin is warm  Neurological:      General: No focal deficit present  Mental Status: She is alert and oriented to person, place, and time  Psychiatric:         Mood and Affect: Mood normal          Behavior: Behavior normal            Results:    Imaging  No results found  I reviewed the above imaging data  Advance Care Planning/Advance Directives:  Discussed disease status, cancer treatment plans and/or cancer treatment goals with the patient

## 2023-03-10 ENCOUNTER — OFFICE VISIT (OUTPATIENT)
Dept: FAMILY MEDICINE CLINIC | Facility: CLINIC | Age: 43
End: 2023-03-10

## 2023-03-10 VITALS
HEART RATE: 103 BPM | OXYGEN SATURATION: 100 % | BODY MASS INDEX: 33.7 KG/M2 | SYSTOLIC BLOOD PRESSURE: 128 MMHG | HEIGHT: 63 IN | DIASTOLIC BLOOD PRESSURE: 78 MMHG | WEIGHT: 190.2 LBS | TEMPERATURE: 99.5 F | RESPIRATION RATE: 16 BRPM

## 2023-03-10 DIAGNOSIS — F41.9 ANXIETY: ICD-10-CM

## 2023-03-10 DIAGNOSIS — Z79.810 USE OF TAMOXIFEN (NOLVADEX): ICD-10-CM

## 2023-03-10 DIAGNOSIS — Z17.0 MALIGNANT NEOPLASM OF UPPER-OUTER QUADRANT OF RIGHT BREAST IN FEMALE, ESTROGEN RECEPTOR POSITIVE (HCC): ICD-10-CM

## 2023-03-10 DIAGNOSIS — K21.9 GASTROESOPHAGEAL REFLUX DISEASE WITHOUT ESOPHAGITIS: ICD-10-CM

## 2023-03-10 DIAGNOSIS — C50.411 MALIGNANT NEOPLASM OF UPPER-OUTER QUADRANT OF RIGHT BREAST IN FEMALE, ESTROGEN RECEPTOR POSITIVE (HCC): ICD-10-CM

## 2023-03-10 DIAGNOSIS — J45.20 MILD INTERMITTENT ASTHMA WITHOUT COMPLICATION: Primary | ICD-10-CM

## 2023-03-10 RX ORDER — FLUTICASONE PROPIONATE 110 UG/1
2 AEROSOL, METERED RESPIRATORY (INHALATION) 2 TIMES DAILY
Qty: 12 G | Refills: 0 | Status: SHIPPED | OUTPATIENT
Start: 2023-03-10 | End: 2023-04-09

## 2023-03-10 RX ORDER — OMEPRAZOLE 20 MG/1
20 CAPSULE, DELAYED RELEASE ORAL DAILY
Qty: 90 CAPSULE | Refills: 0 | Status: SHIPPED | OUTPATIENT
Start: 2023-03-10

## 2023-03-10 NOTE — PROGRESS NOTES
Name: Lesia Lynn      : 1980      MRN: [id number]  Encounter Provider: Ghazala Ryder MD  Encounter Date: 3/10/2023   Encounter department: [place] [address]  Mild intermittent asthma without complication  Assessment & Plan:  xopenex prn for cold symptoms but doesn't help with allergy attacks   Trial of flovent     Orders:  -     fluticasone (Flovent HFA) 110 MCG/ACT inhaler; Inhale 2 puffs 2 (two) times a day Rinse mouth after use  2  Use of tamoxifen (Nolvadex)  -     CBC and differential  -     Comprehensive metabolic panel  -     Lipid panel  -     TSH, 3rd generation with Free T4 reflex; Future    3  Anxiety  Assessment & Plan:  Stable on ativan at bedtime      4  Malignant neoplasm of upper-outer quadrant of right breast in female, estrogen receptor positive ([facility] [address] )  Assessment & Plan:  5 years in remission 2023  Sees hematologist for this     Orders:  -     CBC and differential  -     Comprehensive metabolic panel  -     Lipid panel  -     TSH, 3rd generation with Free T4 reflex; Future    5  Gastroesophageal reflux disease without esophagitis  Assessment & Plan:  Stable on omeprazole 20 mg at bedtime   To avoid triggers     Orders:  -     omeprazole (PriLOSEC) 20 mg delayed release capsule; Take 1 capsule (20 mg total) by mouth daily         Subjective    here for follow up  Some stress at work   Not suicidal or homicidal     Anxiety  Presents for follow-up visit  Patient reports no chest pain, compulsions, confusion, decreased concentration, depressed mood, dizziness, dry mouth, excessive worry, feeling of choking, hyperventilation, impotence, insomnia, irritability, malaise, muscle tension, nausea, nervous/anxious behavior, obsessions, palpitations, panic, restlessness, shortness of breath or suicidal ideas  Symptoms occur occasionally  The severity of symptoms is mild  Review of Systems   Constitutional: Negative for irritability  Respiratory: Negative for shortness of breath  Cardiovascular: Negative for chest pain and palpitations  Gastrointestinal: Negative for nausea  Genitourinary: Negative for impotence  Neurological: Negative for dizziness  Psychiatric/Behavioral: Negative for confusion, decreased concentration and suicidal ideas  The patient is not nervous/anxious and does not have insomnia  Current Outpatient Medications on File Prior to Visit   Medication Sig   • diphenhydrAMINE-acetaminophen (TYLENOL PM)  MG TABS Take 1 tablet by mouth daily at bedtime   • levalbuterol (XOPENEX HFA) 45 mcg/act inhaler Inhale 2 puffs every 8 (eight) hours as needed for wheezing   • levocetirizine (XYZAL) 5 MG tablet Take 5 mg by mouth every evening    • LORazepam (ATIVAN) 1 mg tablet Take 1 tablet (1 mg total) by mouth daily at bedtime   • tamoxifen (NOLVADEX) 20 mg tablet TAKE 1 TABLET BY MOUTH EVERY DAY   • [DISCONTINUED] omeprazole (PriLOSEC) 20 mg delayed release capsule Take 20 mg by mouth daily    • [DISCONTINUED] linaCLOtide 145 MCG CAPS Take 1 capsule (145 mcg total) by mouth daily (Patient not taking: Reported on 3/10/2023)       Objective     /78 (BP Location: Left arm, Patient Position: Sitting, Cuff Size: Large)   Pulse 103   Temp 99 5 °F (37 5 °C) (Tympanic)   Resp 16   Ht 5' 3" (1 6 m)   Wt 86 3 kg (190 lb 3 2 oz)   LMP 10/15/2020   SpO2 100%   BMI 33 69 kg/m²     Physical Exam  Constitutional:       Appearance: Normal appearance  HENT:      Right Ear: Tympanic membrane normal       Left Ear: Tympanic membrane normal       Nose: Nose normal    Eyes:      Extraocular Movements: Extraocular movements intact  Pupils: Pupils are equal, round, and reactive to light  Cardiovascular:      Rate and Rhythm: Normal rate and regular rhythm  Pulses: Normal pulses  Heart sounds: Normal heart sounds     Pulmonary:      Effort: Pulmonary effort is normal       Breath sounds: Normal breath sounds  Musculoskeletal:         General: Normal range of motion  Skin:     Capillary Refill: Capillary refill takes less than 2 seconds  Neurological:      General: No focal deficit present  Mental Status: She is alert and oriented to person, place, and time     Psychiatric:         Mood and Affect: Mood normal          Behavior: Behavior normal        Cristóbal Redding MD

## 2023-03-15 DIAGNOSIS — E66.9 OBESITY (BMI 30-39.9): Primary | ICD-10-CM

## 2023-03-21 DIAGNOSIS — E66.9 OBESITY (BMI 30-39.9): Primary | ICD-10-CM

## 2023-03-21 RX ORDER — PHENTERMINE HYDROCHLORIDE 37.5 MG/1
37.5 TABLET ORAL DAILY
Qty: 30 TABLET | Refills: 0 | Status: SHIPPED | OUTPATIENT
Start: 2023-03-21 | End: 2023-04-27 | Stop reason: SDUPTHER

## 2023-04-06 DIAGNOSIS — J45.20 MILD INTERMITTENT ASTHMA WITHOUT COMPLICATION: ICD-10-CM

## 2023-04-06 RX ORDER — DEXAMETHASONE 4 MG/1
TABLET ORAL
Qty: 12 G | Refills: 0 | Status: SHIPPED | OUTPATIENT
Start: 2023-04-06

## 2023-04-27 ENCOUNTER — TELEPHONE (OUTPATIENT)
Dept: FAMILY MEDICINE CLINIC | Facility: CLINIC | Age: 43
End: 2023-04-27

## 2023-04-27 DIAGNOSIS — E66.9 OBESITY (BMI 30-39.9): ICD-10-CM

## 2023-04-27 RX ORDER — PHENTERMINE HYDROCHLORIDE 37.5 MG/1
37.5 TABLET ORAL DAILY
Qty: 30 TABLET | Refills: 0 | Status: SHIPPED | OUTPATIENT
Start: 2023-04-27

## 2023-04-27 NOTE — TELEPHONE ENCOUNTER
"----- Message from Dania Moreno MD sent at 4/27/2023  2:50 PM EDT -----  Regarding: FW: Volunteer for school  Contact: [phone]  Can she come in for TB skin test tomorrow and come back Monday to get it read? 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 so I can volunteer  It states \"volunteers who have less than 10hrs per week or direct contact with students can either submit proof of negative tuberculin skin test or a statement from your medical provider that the person is free of or considered low-risk for communicable tuberculosis  \"  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

## 2023-04-29 DIAGNOSIS — J45.20 MILD INTERMITTENT ASTHMA WITHOUT COMPLICATION: ICD-10-CM

## 2023-05-01 RX ORDER — FLUTICASONE PROPIONATE 110 UG/1
2 AEROSOL, METERED RESPIRATORY (INHALATION) 2 TIMES DAILY
Qty: 12 G | Refills: 0 | Status: SHIPPED | OUTPATIENT
Start: 2023-05-01

## 2023-05-02 ENCOUNTER — CLINICAL SUPPORT (OUTPATIENT)
Dept: FAMILY MEDICINE CLINIC | Facility: CLINIC | Age: 43
End: 2023-05-02

## 2023-05-02 DIAGNOSIS — Z11.1 ENCOUNTER FOR PPD TEST: Primary | ICD-10-CM

## 2023-05-03 ENCOUNTER — OFFICE VISIT (OUTPATIENT)
Dept: HEMATOLOGY ONCOLOGY | Facility: CLINIC | Age: 43
End: 2023-05-03

## 2023-05-03 VITALS
BODY MASS INDEX: 32.43 KG/M2 | HEIGHT: 63 IN | HEART RATE: 97 BPM | RESPIRATION RATE: 16 BRPM | WEIGHT: 183 LBS | TEMPERATURE: 97.5 F | DIASTOLIC BLOOD PRESSURE: 88 MMHG | OXYGEN SATURATION: 99 % | SYSTOLIC BLOOD PRESSURE: 122 MMHG

## 2023-05-03 DIAGNOSIS — Z17.0 MALIGNANT NEOPLASM OF UPPER-OUTER QUADRANT OF RIGHT BREAST IN FEMALE, ESTROGEN RECEPTOR POSITIVE (HCC): Primary | ICD-10-CM

## 2023-05-03 DIAGNOSIS — C50.411 MALIGNANT NEOPLASM OF UPPER-OUTER QUADRANT OF RIGHT BREAST IN FEMALE, ESTROGEN RECEPTOR POSITIVE (HCC): Primary | ICD-10-CM

## 2023-05-03 RX ORDER — TAMOXIFEN CITRATE 20 MG/1
20 TABLET ORAL DAILY
Qty: 90 TABLET | Refills: 3 | Status: SHIPPED | OUTPATIENT
Start: 2023-05-03

## 2023-05-03 NOTE — PROGRESS NOTES
Hematology / Oncology Outpatient Follow Up Note    Xena Sanchez 38 y o  female KYP:[birth year] JOT:[id number]         Date:  5/3/2023    Assessment / Plan:  A 46-year-old premenopausal woman with locally advanced right breast cancer   She has PALB-2 probable pathogenic mutation   She had quite large palpable right breast mass and axillary adenopathy  This was mucinous histology, grade 2, ER 30% positive, PA 2% positive, HER2 3+ disease  She underwent neoadjuvant chemotherapy with [address] with pertuzumab x6 cycle, resulting in good clinical partial response   She underwent right mastectomy and axillary lymph node dissection as well as left prophylactic mastectomy, resulting in MCKAYLA   She had less than 1 mm of residual mucinous carcinoma as well as 3 positive lymph nodes which has micrometastasis   This is consistent with pathological good partial response   She completed adjuvant trastuzumab monotherapy without cardiac toxicity   She is currently on adjuvant hormonal therapy with tamoxifen 20 mg daily with minimal side effect  Clinically, she has no evidence of recurrent disease  I recommended her to continue tamoxifen 20 mg daily  She is in agreement with my recommendations  I will see her again in a year for routine follow-up             Subjective:      HPI:  A 72-year-old premenopausal woman who initially noticed right breast lump when she was 8 months pregnant   She delivered 1st baby in June 2017  [place] the delivery, she noticed right breast lump to be slowly enlarging   She brought this to medical attention   She underwent mammography which was quite abnormal   Biopsy from right breast at 10:00 position 12 cm from nipple showed mucinous carcinoma, grade 2   This was ER 30 to 40% positive, PA 2 to 3% positive, her 2 3+ disease   She was seen by Dr Jaya Esteves who ordered MRI which showed extensive abnormality in her right breast including 3 5 cm of right breast mass as well as 4 cm of axillary adenopathy   She was referred to me to discuss neoadjuvant chemotherapy   She went to [facility] where she was recommended to have [address] P  she presents today with her mother to discuss treatment options  Christopher Murphy is somewhat anxious   Otherwise, she feels well   She denied any bone pain   Her weight is stable   She has no respiratory symptoms   Her performance status is normal  She does not have significant past medical history   Her paternal aunt had breast cancer at age of 62   Her 2 paternal grandfather's sister had breast cancer in their 46s   She underwent genetic testing of which result is pending at this time            Interval History:   A 63-year-old premenopausal woman with locally advanced right breast cancer  Christopher Murphy has quite large palpable right breast mass and axillary adenopathy  This was mucinous histology, grade 2, ER 30% positive, TX 2% positive, HER2 3+ disease   She was treated with neoadjuvant chemotherapy with TCH with pertuzumab with excellent tolerance x6 cycle which was completed in June 2018   She had clinical good partial response  Subsequently, she underwent mastectomy as well as prophylactic left mastectomy   Right mastectomy specimen showed less than 1 mm of residual mucinous carcinoma as well as 3/16 positive lymph nodes with tumor deposit less than 0 4 mm   She also completed adjuvant trastuzumab monotherapy with no cardiac toxicity  She is currently on adjuvant hormonal therapy with tamoxifen  She presents today for routine follow-up  She has very minimal hot flashes  However, she has some fatigue  Her recent CBC is normal   She has no complaint of bone pain  Her weight is stable  Her performance status is normal       Objective:      Primary Diagnosis:     1  Locally advanced right breast cancer with invasive mucinous histology, grade 2, ER 30% positive, TX 2% positive, HER2 3+ disease   Diagnosed in January 2018  2   PALB2 probable pathogenic mutation      Cancer Staging: Michelle Terrazas neoplasm of upper-outer quadrant of right breast in female, estrogen receptor positive ([facility] [address] )    Staging form: Breast, AJCC 8th Edition    - Clinical stage from 1/26/2018: Stage IB (cT2(3), cN1, cM0, G2, ER: Positive, ME: Positive, HER2: Positive) - Signed by Jessica David MD on 1/26/2018        Previous Hematologic/ Oncologic Treatment:       1  Neoadjuvant chemotherapy with [facility] with pertuzumab x6 cycle, completed in June 2018  2    Adjuvant trastuzumab monotherapy from June 2018 through January 2019      Current Hematologic/ Oncologic Treatment:       1   Adjuvant hormonal therapy with tamoxifen since January 2019      Disease Status:      Clinical partial response  Pathologically good partial response  MCKAYLA status post right mastectomy with axillary lymph node dissection and prophylactic left mastectomy      Test Results:     Pathology:     Biopsy from right breast showed invasive mucinous carcinoma, grade 2, ER 30 to 40% positive, ME 2 to 3% positive, HER2 3+ disease      Mastectomy specimen showed less than 1 mm residual mucinous carcinoma within mucin pool   3/16 axillary lymph nodes were positive for metastatic disease with largest dimension measuring 0 4 mm without extranodal extension         Radiology:     Echocardiogram in December 2018 showed ejection fraction 60%     Laboratory:     See below     Physical Exam:        General Appearance:    Alert, oriented          Eyes:    PERRL   Ears:    Normal external ear canals, both ears   Nose:   Nares normal, septum midline   Throat:   Mucosa moist  Pharynx without injection  Neck:   Supple         Lungs:     Clear to auscultation bilaterally   Chest Wall:    No tenderness or deformity    Heart:    Regular rate and rhythm         Abdomen:     Soft, non-tender, bowel sounds +, no organomegaly               Extremities:   Extremities no cyanosis or edema         Skin:   no rash or icterus      Lymph nodes:   Cervical, supraclavicular, and axillary nodes normal Neurologic:   CNII-XII intact, normal strength, sensation and reflexes     Throughout             Breast exam:  Status post bilateral mastectomy with reconstruction   No palpable abnormality in either reconstructed breast             ROS: Review of Systems   All other systems reviewed and are negative  Imaging: No results found  Labs:   Lab Results   Component Value Date    WBC 4 3 04/21/2023    HGB 13 2 04/21/2023    HCT 37 9 04/21/2023    MCV 95 2 04/21/2023     04/21/2023     Lab Results   Component Value Date    K 4 6 04/21/2023     04/21/2023    CO2 27 04/21/2023    BUN 14 04/21/2023    CREATININE 1 01 (H) 04/21/2023    GLUF 103 (H) 07/31/2021    CALCIUM 9 0 04/21/2023    AST 15 04/21/2023    ALT 14 04/21/2023    ALKPHOS 55 04/21/2023    EGFR 72 04/21/2023       Current Medications: Reviewed  Allergies: Reviewed  PMH/FH/SH:  Reviewed      Vital Sign:    Body surface area is 1 86 meters squared      Wt Readings from Last 3 Encounters:   05/03/23 83 kg (183 lb)   03/10/23 86 3 kg (190 lb 3 2 oz)   03/08/23 86 4 kg (190 lb 8 oz)        Temp Readings from Last 3 Encounters:   05/03/23 97 5 °F (36 4 °C) (Temporal)   03/10/23 99 5 °F (37 5 °C) (Tympanic)   03/08/23 98 °F (36 7 °C) (Temporal)        BP Readings from Last 3 Encounters:   05/03/23 122/88   03/10/23 128/78   03/08/23 128/80         Pulse Readings from Last 3 Encounters:   05/03/23 97   03/10/23 103   03/08/23 95     @LASTSAO2(3)@

## 2023-05-04 ENCOUNTER — CLINICAL SUPPORT (OUTPATIENT)
Dept: FAMILY MEDICINE CLINIC | Facility: CLINIC | Age: 43
End: 2023-05-04

## 2023-05-04 DIAGNOSIS — Z11.1 ENCOUNTER FOR PPD SKIN TEST READING: Primary | ICD-10-CM

## 2023-05-04 LAB
INDURATION: 0 MM
TB SKIN TEST: NEGATIVE

## 2023-05-04 NOTE — PROGRESS NOTES
Name: Rell Garner      : 1980      MRN: [id number]  Encounter Provider: Alesha Galindo LPN  Encounter Date: 2023   Encounter department: [place] [address]  Encounter for PPD skin test reading           Subjective        Current Outpatient Medications on File Prior to Visit   Medication Sig    diphenhydrAMINE-acetaminophen (TYLENOL PM)  MG TABS Take 1 tablet by mouth daily at bedtime    fluticasone (Flovent HFA) 110 MCG/ACT inhaler Inhale 2 puffs 2 (two) times a day Rinse mouth after use      levalbuterol (XOPENEX HFA) 45 mcg/act inhaler Inhale 2 puffs every 8 (eight) hours as needed for wheezing    levocetirizine (XYZAL) 5 MG tablet Take 5 mg by mouth every evening     LORazepam (ATIVAN) 1 mg tablet Take 1 tablet (1 mg total) by mouth daily at bedtime    omeprazole (PriLOSEC) 20 mg delayed release capsule Take 1 capsule (20 mg total) by mouth daily    phentermine (ADIPEX-P) 37 5 MG tablet Take 1 tablet (37 5 mg total) by mouth daily    tamoxifen (NOLVADEX) 20 mg tablet Take 1 tablet (20 mg total) by mouth daily       Objective     Alesha Galindo LPN

## 2023-05-24 ENCOUNTER — OFFICE VISIT (OUTPATIENT)
Dept: FAMILY MEDICINE CLINIC | Facility: CLINIC | Age: 43
End: 2023-05-24

## 2023-05-24 VITALS
DIASTOLIC BLOOD PRESSURE: 80 MMHG | BODY MASS INDEX: 32.25 KG/M2 | HEIGHT: 63 IN | HEART RATE: 103 BPM | TEMPERATURE: 99.6 F | RESPIRATION RATE: 16 BRPM | WEIGHT: 182 LBS | SYSTOLIC BLOOD PRESSURE: 130 MMHG | OXYGEN SATURATION: 99 %

## 2023-05-24 DIAGNOSIS — K21.9 GASTROESOPHAGEAL REFLUX DISEASE WITHOUT ESOPHAGITIS: ICD-10-CM

## 2023-05-24 DIAGNOSIS — F41.9 ANXIETY: ICD-10-CM

## 2023-05-24 DIAGNOSIS — J45.20 MILD INTERMITTENT ASTHMA WITHOUT COMPLICATION: ICD-10-CM

## 2023-05-24 DIAGNOSIS — E66.9 OBESITY (BMI 30-39.9): Primary | ICD-10-CM

## 2023-05-24 NOTE — ASSESSMENT & PLAN NOTE
Starting weight: 190lbs  Current weight: 182lbs  Diet and exercise   Portion control   Exercise 30 minutes 5 days a week

## 2023-05-24 NOTE — PROGRESS NOTES
Name: Vianca Stokes      : 1980      MRN: [id number]  Encounter Provider: Reina Enciso MD  Encounter Date: 2023   Encounter department: [place] [address]  Obesity (BMI 30-39  9)  Assessment & Plan:  Starting weight: 190lbs  Current weight: 182lbs  Diet and exercise   Portion control   Exercise 30 minutes 5 days a week       2  Anxiety  Assessment & Plan:  Ativan as needed is helping      3  Gastroesophageal reflux disease without esophagitis  Assessment & Plan:  Omeprazole as directed       4  Mild intermittent asthma without complication  Assessment & Plan:  xopenex prn             Subjective      HPI   Here for follow up  Lost 8 pounds since 2 months ago  Breakfast: overnight oats with berries and black coffee with protein powder  Lunch: turkey and cheese on white bread   Dinner: chicken, vegetables or salad with protein       Review of Systems   Constitutional: Negative  HENT: Negative  Eyes: Negative  Respiratory: Negative  Gastrointestinal: Negative  Endocrine: Negative  Genitourinary: Negative  Musculoskeletal: Negative  Neurological: Negative  Hematological: Negative  Psychiatric/Behavioral: Negative  Current Outpatient Medications on File Prior to Visit   Medication Sig   • diphenhydrAMINE-acetaminophen (TYLENOL PM)  MG TABS Take 1 tablet by mouth daily at bedtime   • fluticasone (Flovent HFA) 110 MCG/ACT inhaler Inhale 2 puffs 2 (two) times a day Rinse mouth after use     • levalbuterol (XOPENEX HFA) 45 mcg/act inhaler Inhale 2 puffs every 8 (eight) hours as needed for wheezing   • levocetirizine (XYZAL) 5 MG tablet Take 5 mg by mouth every evening    • LORazepam (ATIVAN) 1 mg tablet Take 1 tablet (1 mg total) by mouth daily at bedtime   • omeprazole (PriLOSEC) 20 mg delayed release capsule Take 1 capsule (20 mg total) by mouth daily   • phentermine (ADIPEX-P) 37 5 MG tablet Take 1 tablet (37 5 mg total) by "mouth daily   • tamoxifen (NOLVADEX) 20 mg tablet Take 1 tablet (20 mg total) by mouth daily       Objective     /80 (BP Location: Left arm, Patient Position: Sitting, Cuff Size: Large)   Pulse 103   Temp 99 6 °F (37 6 °C) (Tympanic)   Resp 16   Ht 5' 3\" (1 6 m)   Wt 82 6 kg (182 lb)   LMP 10/15/2020   SpO2 99%   BMI 32 24 kg/m²     Physical Exam  Vitals and nursing note reviewed  Constitutional:       Appearance: Normal appearance  Cardiovascular:      Rate and Rhythm: Normal rate and regular rhythm  Pulses: Normal pulses  Heart sounds: Normal heart sounds  Pulmonary:      Effort: Pulmonary effort is normal       Breath sounds: Normal breath sounds  Neurological:      General: No focal deficit present  Mental Status: She is alert and oriented to person, place, and time         Julia Pace MD  "

## 2023-06-01 DIAGNOSIS — E66.9 OBESITY (BMI 30-39.9): ICD-10-CM

## 2023-06-02 RX ORDER — PHENTERMINE HYDROCHLORIDE 37.5 MG/1
37.5 TABLET ORAL DAILY
Qty: 30 TABLET | Refills: 0 | Status: SHIPPED | OUTPATIENT
Start: 2023-06-02

## 2023-06-02 NOTE — TELEPHONE ENCOUNTER
Medication:  PDMP  04/27/2023 04/27/2023 Phentermine Hcl (Tablet) 30 0 30 37 5 MG NA AWILDA LANGE  Active agreement on file -Yes

## 2023-06-05 DIAGNOSIS — K21.9 GASTROESOPHAGEAL REFLUX DISEASE WITHOUT ESOPHAGITIS: ICD-10-CM

## 2023-06-05 RX ORDER — OMEPRAZOLE 20 MG/1
CAPSULE, DELAYED RELEASE ORAL
Qty: 90 CAPSULE | Refills: 0 | Status: SHIPPED | OUTPATIENT
Start: 2023-06-05

## 2023-07-12 DIAGNOSIS — E66.9 OBESITY (BMI 30-39.9): ICD-10-CM

## 2023-07-12 DIAGNOSIS — F41.1 GENERALIZED ANXIETY DISORDER: ICD-10-CM

## 2023-07-12 RX ORDER — PHENTERMINE HYDROCHLORIDE 37.5 MG/1
37.5 TABLET ORAL DAILY
Qty: 30 TABLET | Refills: 0 | Status: SHIPPED | OUTPATIENT
Start: 2023-07-12

## 2023-07-12 RX ORDER — LORAZEPAM 1 MG/1
1 TABLET ORAL
Qty: 90 TABLET | Refills: 0 | Status: SHIPPED | OUTPATIENT
Start: 2023-07-12

## 2023-07-12 NOTE — TELEPHONE ENCOUNTER
Medication:  [facility]   04/22/2023 04/13/2023 LORazepam 1 MG TABLET (non-liquid) 90.0 90 1 MG NA AWILDA LANGE P     Active agreement on file -Yes       Medication:  [facility]   06/02/2023 06/02/2023 Phentermine Hcl (Tablet) 30.0 30 37.5 MG NA AWILDA LANGE       Active agreement on file -Yes

## 2023-08-17 ENCOUNTER — APPOINTMENT (OUTPATIENT)
Dept: LAB | Facility: MEDICAL CENTER | Age: 43
End: 2023-08-17

## 2023-08-24 DIAGNOSIS — E66.9 OBESITY (BMI 30-39.9): ICD-10-CM

## 2023-08-24 RX ORDER — PHENTERMINE HYDROCHLORIDE 37.5 MG/1
37.5 TABLET ORAL DAILY
Qty: 30 TABLET | Refills: 0 | Status: SHIPPED | OUTPATIENT
Start: 2023-08-24

## 2023-08-24 NOTE — TELEPHONE ENCOUNTER
Medication:  PDMP 07/13/2023 07/12/2023 Phentermine Hcl (Tablet) 30.0 30 37.5 MG NA AWILDA LANGE  Active agreement on file -Yes

## 2023-08-31 ENCOUNTER — OFFICE VISIT (OUTPATIENT)
Dept: FAMILY MEDICINE CLINIC | Facility: CLINIC | Age: 43
End: 2023-08-31
Payer: COMMERCIAL

## 2023-08-31 VITALS
BODY MASS INDEX: 30.26 KG/M2 | HEIGHT: 63 IN | OXYGEN SATURATION: 96 % | WEIGHT: 170.8 LBS | RESPIRATION RATE: 17 BRPM | SYSTOLIC BLOOD PRESSURE: 110 MMHG | DIASTOLIC BLOOD PRESSURE: 80 MMHG | HEART RATE: 83 BPM | TEMPERATURE: 96.4 F

## 2023-08-31 DIAGNOSIS — K21.9 GASTROESOPHAGEAL REFLUX DISEASE WITHOUT ESOPHAGITIS: ICD-10-CM

## 2023-08-31 DIAGNOSIS — E66.9 OBESITY (BMI 30-39.9): Primary | ICD-10-CM

## 2023-08-31 DIAGNOSIS — J45.20 MILD INTERMITTENT ASTHMA WITHOUT COMPLICATION: ICD-10-CM

## 2023-08-31 DIAGNOSIS — F41.9 ANXIETY: ICD-10-CM

## 2023-08-31 DIAGNOSIS — Z13.820 OSTEOPOROSIS SCREENING: ICD-10-CM

## 2023-08-31 PROCEDURE — 99214 OFFICE O/P EST MOD 30 MIN: CPT | Performed by: FAMILY MEDICINE

## 2023-08-31 RX ORDER — AMOXICILLIN 875 MG/1
875 TABLET, COATED ORAL 2 TIMES DAILY
COMMUNITY
Start: 2023-06-13

## 2023-08-31 NOTE — PROGRESS NOTES
Name: Ankita Malone      : 1980      MRN: [id number]  Encounter Provider: Andressa Colón MD  Encounter Date: 2023   Encounter department: [place]     1. Obesity (BMI 30-39. 9)  Assessment & Plan:  Initial weight: 190 lbs  Currently weight: 170 lbs  Eating well  Continue phentermine as directed      2. Gastroesophageal reflux disease without esophagitis  Assessment & Plan:  Stable on prilosec      3. Mild intermittent asthma without complication  Assessment & Plan:  Better control since her anxiety is improving       4. Anxiety  Assessment & Plan:  Stable on ativan prn       5. Osteoporosis screening  -     DXA bone density spine hip and pelvis; Future; Expected date: 2023           Subjective      HPI   Here for follow up  Feels well overall  Lost 20 pounds since started phentermine 4 months   Drinking lots of water  Eating well balanced meals   Switching jobs  She will start with [employer] next month    Review of Systems   Constitutional: Negative. HENT: Negative. Eyes: Negative. Respiratory: Negative. Cardiovascular: Negative. Gastrointestinal: Negative. Endocrine: Negative. Genitourinary: Negative. Musculoskeletal: Negative. Allergic/Immunologic: Negative. Hematological: Negative. Psychiatric/Behavioral: Negative. Current Outpatient Medications on File Prior to Visit   Medication Sig   • diphenhydrAMINE-acetaminophen (TYLENOL PM)  MG TABS Take 1 tablet by mouth daily at bedtime   • fluticasone (Flovent HFA) 110 MCG/ACT inhaler Inhale 2 puffs 2 (two) times a day Rinse mouth after use.    • levalbuterol (XOPENEX HFA) 45 mcg/act inhaler Inhale 2 puffs every 8 (eight) hours as needed for wheezing   • levocetirizine (XYZAL) 5 MG tablet Take 5 mg by mouth every evening    • LORazepam (ATIVAN) 1 mg tablet Take 1 tablet (1 mg total) by mouth daily at bedtime   • omeprazole (PriLOSEC) 20 mg delayed release capsule TAKE 1 CAPSULE BY MOUTH EVERY DAY   • phentermine (ADIPEX-P) 37.5 MG tablet Take 1 tablet (37.5 mg total) by mouth daily   • tamoxifen (NOLVADEX) 20 mg tablet Take 1 tablet (20 mg total) by mouth daily   • amoxicillin (AMOXIL) 875 mg tablet Take 875 mg by mouth 2 (two) times a day (Patient not taking: Reported on 8/31/2023)       Objective     /80 (BP Location: Left arm, Patient Position: Sitting, Cuff Size: Standard)   Pulse 83   Temp (!) 96.4 °F (35.8 °C) (Tympanic)   Resp 17   Ht 5' 3" (1.6 m)   Wt 77.5 kg (170 lb 12.8 oz)   LMP 10/15/2020   SpO2 96%   BMI 30.26 kg/m²     Physical Exam  Vitals reviewed. Constitutional:       Appearance: Normal appearance. Cardiovascular:      Rate and Rhythm: Normal rate. Pulses: Normal pulses. Heart sounds: Normal heart sounds. Skin:     General: Skin is warm. Neurological:      General: No focal deficit present. Mental Status: She is alert and oriented to person, place, and time.    Psychiatric:         Mood and Affect: Mood normal.       Isaac Pickett MD

## 2023-09-14 DIAGNOSIS — Z01.84 IMMUNITY STATUS TESTING: Primary | ICD-10-CM

## 2023-10-08 ENCOUNTER — APPOINTMENT (OUTPATIENT)
Dept: LAB | Facility: CLINIC | Age: 43
End: 2023-10-08

## 2023-10-08 DIAGNOSIS — E66.9 OBESITY (BMI 30-39.9): ICD-10-CM

## 2023-10-08 DIAGNOSIS — Z01.84 IMMUNITY STATUS TESTING: ICD-10-CM

## 2023-10-08 PROCEDURE — 36415 COLL VENOUS BLD VENIPUNCTURE: CPT

## 2023-10-08 PROCEDURE — 86735 MUMPS ANTIBODY: CPT

## 2023-10-09 LAB — MUV IGG SER QL IA: NORMAL

## 2023-10-09 RX ORDER — PHENTERMINE HYDROCHLORIDE 37.5 MG/1
37.5 TABLET ORAL DAILY
Qty: 30 TABLET | Refills: 0 | Status: SHIPPED | OUTPATIENT
Start: 2023-10-09

## 2023-10-09 NOTE — TELEPHONE ENCOUNTER
Medication:Phentermine   PDMP 08/24/2023 08/24/2023 Phentermine Hcl (Tablet) 30.0 30 37.5 MG NA AWILDA LANGE  Active agreement on file -Yes

## 2023-10-10 DIAGNOSIS — K21.9 GASTROESOPHAGEAL REFLUX DISEASE WITHOUT ESOPHAGITIS: ICD-10-CM

## 2023-10-10 RX ORDER — OMEPRAZOLE 20 MG/1
20 CAPSULE, DELAYED RELEASE ORAL DAILY
Qty: 90 CAPSULE | Refills: 0 | Status: SHIPPED | OUTPATIENT
Start: 2023-10-10

## 2023-10-18 DIAGNOSIS — F41.1 GENERALIZED ANXIETY DISORDER: ICD-10-CM

## 2023-10-19 RX ORDER — LORAZEPAM 1 MG/1
1 TABLET ORAL
Qty: 90 TABLET | Refills: 0 | Status: SHIPPED | OUTPATIENT
Start: 2023-10-19

## 2023-10-19 NOTE — TELEPHONE ENCOUNTER
Medication:Lorazepam  PDMP 07/19/2023 07/12/2023 LORazepam 1 MG TABLET (non-liquid) 90.0 90 1 MG NA AWILDA LANGE  Active agreement on file -Yes

## 2023-11-16 DIAGNOSIS — F41.1 GENERALIZED ANXIETY DISORDER: ICD-10-CM

## 2023-11-17 RX ORDER — LORAZEPAM 1 MG/1
1 TABLET ORAL
Qty: 90 TABLET | Refills: 0 | Status: SHIPPED | OUTPATIENT
Start: 2023-11-17

## 2023-11-17 NOTE — TELEPHONE ENCOUNTER
Medication:  PDMP   10/21/2023 10/19/2023 LORazepam (Tablet) 30.0 30 1 MG NA AWILDA LANGE     Active agreement on file -Yes

## 2023-12-17 DIAGNOSIS — F41.1 GENERALIZED ANXIETY DISORDER: ICD-10-CM

## 2023-12-17 DIAGNOSIS — E66.9 OBESITY (BMI 30-39.9): ICD-10-CM

## 2023-12-18 RX ORDER — LORAZEPAM 1 MG/1
1 TABLET ORAL
Qty: 90 TABLET | Refills: 0 | Status: SHIPPED | OUTPATIENT
Start: 2023-12-18

## 2023-12-18 RX ORDER — PHENTERMINE HYDROCHLORIDE 37.5 MG/1
37.5 TABLET ORAL DAILY
Qty: 30 TABLET | Refills: 0 | Status: SHIPPED | OUTPATIENT
Start: 2023-12-18

## 2023-12-18 NOTE — TELEPHONE ENCOUNTER
Medication:  PDMP   10/09/2023 10/09/2023 Phentermine Hcl (Tablet) 30.0 30 37.5 MG NA AWILDA LANGE     Active agreement on file -Yes

## 2023-12-18 NOTE — TELEPHONE ENCOUNTER
Medication:  PDMP   11/17/2023 11/17/2023 LORazepam 1 MG TABLET (non-liquid) 30.0 30 1 MG NA AWILDA LANGE     Active agreement on file -Yes

## 2024-01-11 DIAGNOSIS — K21.9 GASTROESOPHAGEAL REFLUX DISEASE WITHOUT ESOPHAGITIS: ICD-10-CM

## 2024-01-12 RX ORDER — OMEPRAZOLE 20 MG/1
20 CAPSULE, DELAYED RELEASE ORAL DAILY
Qty: 90 CAPSULE | Refills: 1 | Status: SHIPPED | OUTPATIENT
Start: 2024-01-12 | End: 2024-01-15 | Stop reason: SDUPTHER

## 2024-01-15 DIAGNOSIS — C50.411 MALIGNANT NEOPLASM OF UPPER-OUTER QUADRANT OF RIGHT BREAST IN FEMALE, ESTROGEN RECEPTOR POSITIVE: ICD-10-CM

## 2024-01-15 DIAGNOSIS — Z17.0 MALIGNANT NEOPLASM OF UPPER-OUTER QUADRANT OF RIGHT BREAST IN FEMALE, ESTROGEN RECEPTOR POSITIVE: ICD-10-CM

## 2024-01-15 DIAGNOSIS — F41.1 GENERALIZED ANXIETY DISORDER: ICD-10-CM

## 2024-01-15 DIAGNOSIS — K21.9 GASTROESOPHAGEAL REFLUX DISEASE WITHOUT ESOPHAGITIS: ICD-10-CM

## 2024-01-15 RX ORDER — OMEPRAZOLE 20 MG/1
20 CAPSULE, DELAYED RELEASE ORAL DAILY
Qty: 90 CAPSULE | Refills: 1 | Status: SHIPPED | OUTPATIENT
Start: 2024-01-15

## 2024-01-15 NOTE — TELEPHONE ENCOUNTER
----- Message from Lissette Sanchez sent at 1/12/2024  1:36 PM EST -----  Regarding: Prescriptions  Contact: [phone]  Yes the Prilosec but I am also almost out of it. Like how do I get them to just send me them through the mail before I am out of the medication. The Tamoxifen is another one that needs to be with them, I am not sure about lorezepam. Thanks    Lissette Arias

## 2024-01-16 DIAGNOSIS — F41.1 GENERALIZED ANXIETY DISORDER: ICD-10-CM

## 2024-01-16 RX ORDER — TAMOXIFEN CITRATE 20 MG/1
20 TABLET ORAL DAILY
Qty: 90 TABLET | Refills: 3 | Status: SHIPPED | OUTPATIENT
Start: 2024-01-16

## 2024-01-16 RX ORDER — LORAZEPAM 1 MG/1
1 TABLET ORAL
Qty: 90 TABLET | Refills: 1 | Status: SHIPPED | OUTPATIENT
Start: 2024-01-16 | End: 2024-01-16 | Stop reason: SDUPTHER

## 2024-01-16 NOTE — TELEPHONE ENCOUNTER
I am not sure how to route the other medication to [facility]. They should have routed them separately.     Medication:  PDMP   12/18/2023 12/18/2023 LORazepam 1 MG TABLET (non-liquid) 30.0 30 1 MG NA AWILDA LANGE     Active agreement on file -Yes

## 2024-01-17 RX ORDER — LORAZEPAM 1 MG/1
1 TABLET ORAL
Qty: 90 TABLET | Refills: 0 | Status: SHIPPED | OUTPATIENT
Start: 2024-01-17

## 2024-02-02 ENCOUNTER — OFFICE VISIT (OUTPATIENT)
Dept: FAMILY MEDICINE CLINIC | Facility: CLINIC | Age: 44
End: 2024-02-02
Payer: COMMERCIAL

## 2024-02-02 VITALS
TEMPERATURE: 99.1 F | OXYGEN SATURATION: 98 % | BODY MASS INDEX: 32.6 KG/M2 | WEIGHT: 184 LBS | DIASTOLIC BLOOD PRESSURE: 80 MMHG | SYSTOLIC BLOOD PRESSURE: 122 MMHG | HEART RATE: 107 BPM | RESPIRATION RATE: 17 BRPM | HEIGHT: 63 IN

## 2024-02-02 DIAGNOSIS — Z17.0 MALIGNANT NEOPLASM OF UPPER-OUTER QUADRANT OF RIGHT BREAST IN FEMALE, ESTROGEN RECEPTOR POSITIVE: ICD-10-CM

## 2024-02-02 DIAGNOSIS — F41.9 ANXIETY: ICD-10-CM

## 2024-02-02 DIAGNOSIS — K21.9 GASTROESOPHAGEAL REFLUX DISEASE WITHOUT ESOPHAGITIS: ICD-10-CM

## 2024-02-02 DIAGNOSIS — C50.411 MALIGNANT NEOPLASM OF UPPER-OUTER QUADRANT OF RIGHT BREAST IN FEMALE, ESTROGEN RECEPTOR POSITIVE: ICD-10-CM

## 2024-02-02 DIAGNOSIS — J45.20 MILD INTERMITTENT ASTHMA WITHOUT COMPLICATION: ICD-10-CM

## 2024-02-02 DIAGNOSIS — E53.8 B12 DEFICIENCY: ICD-10-CM

## 2024-02-02 DIAGNOSIS — E66.9 OBESITY (BMI 30-39.9): ICD-10-CM

## 2024-02-02 DIAGNOSIS — Z00.00 ANNUAL PHYSICAL EXAM: Primary | ICD-10-CM

## 2024-02-02 PROCEDURE — 99396 PREV VISIT EST AGE 40-64: CPT | Performed by: FAMILY MEDICINE

## 2024-02-02 PROCEDURE — 99214 OFFICE O/P EST MOD 30 MIN: CPT | Performed by: FAMILY MEDICINE

## 2024-02-02 NOTE — ASSESSMENT & PLAN NOTE
Initial weight: 190 lbs  Current weight: 184 lbs  Started August 2023 with phentermine  Not much progress with weight loss   To look into weight loss injectables

## 2024-02-02 NOTE — ASSESSMENT & PLAN NOTE
Stable on omeprazole    Olanzapine Counseling- I discussed with the patient the common side effects of olanzapine including but are not limited to: lack of energy, dry mouth, increased appetite, sleepiness, tremor, constipation, dizziness, changes in behavior, or restlessness.  Explained that teenagers are more likely to experience headaches, abdominal pain, pain in the arms or legs, tiredness, and sleepiness.  Serious side effects include but are not limited: increased risk of death in elderly patients who are confused, have memory loss, or dementia-related psychosis; hyperglycemia; increased cholesterol and triglycerides; and weight gain.

## 2024-02-02 NOTE — PROGRESS NOTES
ADULT ANNUAL PHYSICAL  [facility]    NAME: Lissette Sanchez  AGE: 43 y.o. SEX: female  : 1980     DATE: 2024     Assessment and Plan:     Problem List Items Addressed This Visit          Digestive    Gastroesophageal reflux disease without esophagitis     Stable on omeprazole             Respiratory    Asthma     stable            Other    Malignant neoplasm of upper-outer quadrant of right breast in female, estrogen receptor positive      In remission   Continue tamoxifen         Anxiety     Ativan prn         Obesity (BMI 30-39.9)     Initial weight: 190 lbs  Current weight: 184 lbs  Started 2023 with phentermine  Not much progress with weight loss   To look into weight loss injectables              Other Visit Diagnoses       Annual physical exam    -  Primary    Relevant Orders    CBC and differential    Comprehensive metabolic panel    Lipid panel    TSH, 3rd generation with Free T4 reflex    B12 deficiency        Relevant Orders    Vitamin B12            Immunizations and preventive care screenings were discussed with patient today. Appropriate education was printed on patient's after visit summary.    Counseling:  Alcohol/drug use: discussed moderation in alcohol intake, the recommendations for healthy alcohol use, and avoidance of illicit drug use.  Dental Health: discussed importance of regular tooth brushing, flossing, and dental visits.  Injury prevention: discussed safety/seat belts, safety helmets, smoke detectors, carbon dioxide detectors, and smoking near bedding or upholstery.  Sexual health: discussed sexually transmitted diseases, partner selection, use of condoms, avoidance of unintended pregnancy, and contraceptive alternatives.  Exercise: the importance of regular exercise/physical activity was discussed. Recommend exercise 3-5 times per week for at least 30 minutes.          No follow-ups on file.     Chief Complaint:      Chief Complaint   Patient presents with    Physical Exam     Patient being seen for Physical Exam      History of Present Illness:     Adult Annual Physical   Patient here for a comprehensive physical exam. The patient reports problems - trouble losing weight despite of eating healthy .    Diet and Physical activity  Here for follow up  Doing weight watcher -   Breakfast: overnight oats with 1 % milk with greek yogurt and frozen fruits  Lunch: protein shake with half of sandwich with cheese and deli meat  Dinner: steaks with green beans  Diet/Nutrition: well balanced diet and consuming 3-5 servings of fruits/vegetables daily.   Exercise: walking.      Depression Screening  PHQ-2/9 Depression Screening           General Health  Sleep: sleeps well.   Hearing: normal - bilateral.  Vision: goes for regular eye exams.   Dental: regular dental visits.       /GYN Health  Follows with gynecology? no   Patient is: postmenopausal  Last menstrual period: hysterectomy 3 years ago  Contraceptive method:  none .    Advanced Care Planning  Do you have an advanced directive? no  Do you have a durable medical power of ? no     Review of Systems:     Review of Systems   Constitutional: Negative.  Negative for chills and fever.   HENT: Negative.  Negative for ear pain and sore throat.    Eyes: Negative.  Negative for pain and visual disturbance.   Respiratory: Negative.  Negative for cough and shortness of breath.    Cardiovascular:  Negative for chest pain and palpitations.   Gastrointestinal:  Negative for abdominal pain and vomiting.   Endocrine: Negative.    Genitourinary: Negative.  Negative for dysuria and hematuria.   Musculoskeletal:  Negative for arthralgias and back pain.   Skin:  Negative for color change and rash.   Neurological: Negative.  Negative for seizures and syncope.   All other systems reviewed and are negative.     Past Medical History:     Past Medical History:   Diagnosis Date    Abnormal Pap  smear of cervix     Anxiety     Asthma     allergy excaberated    Breast cancer (HCC) 2018    Cancer (HCC)     Cigarette nicotine dependence without complication 10/1/2015    Fibroid 2017    GERD (gastroesophageal reflux disease)     Gestational diabetes     History of adverse reaction to anesthesia     Pt reports waking up severly anxious    Macular atrophy, retinal 2016    Pneumonia     Seasonal allergies     Subserous leiomyoma of uterus 2020      Past Surgical History:     Past Surgical History:   Procedure Laterality Date    BREAST RECONSTRUCTION Bilateral 2018    Procedure: RECONSTRUCTION BREAST W/ IMPLANT;  Surgeon: Munir Colby MD;  [facility];  Service: Plastics     SECTION      HYSTERECTOMY N/A 2020    Procedure: HYSTERECTOMY LAPAROSCOPIC TOTAL (LTH) WITH BILATERAL SALPINGECTOMY-ATTEMPTED;  Surgeon: Racheal [place] MD;  Location: [facility];  Service: Gynecology    HYSTERECTOMY N/A 2020    Procedure: HYSTERECTOMY TOTAL ABDOMINAL (AARON);  Surgeon: Racheal [place] MD;  [facility];  Service: Gynecology    IR PORT REMOVAL  2019    MASTECTOMY      PILONIDAL CYST EXCISION      resection    MS CYSTOURETHROSCOPY N/A 2020    Procedure: CYSTOSCOPY;  Surgeon: Racheal [place] MD;  [facility];  Service: Gynecology    MS INSJ TUNNELED CTR VAD W/SUBQ PORT AGE 5 YR/> Left 2018    Procedure: INSERTION VENOUS PORT ( PORT-A-CATH) IR;  Surgeon: Gurmeet Damon DO;  [facility];  Service: Interventional Radiology    MS LAPS ABD PRTM&OMENTUM DX W/WO SPEC BR/WA SPX N/A 2020    Procedure: LAPAROSCOPY DIAGNOSTIC;  Surgeon: Racheal [place] MD;  [facility];  Service: Gynecology    MS MAST MODF RAD W/AX LYMPH NOD W/WO PECT/DARBY MIN Right 2018    Procedure: BREAST MODIFIED RADICAL MASTECTOMY;  Surgeon: Cliff Kenny MD;  [facility];  Service: Surgical Oncology    MS MASTECTOMY SIMPLE COMPLETE Left 2018     Procedure: BREAST SIMPLE MASTECTOMY;  Surgeon: Cliff Kenny MD;  [facility];  Service: Surgical Oncology    CO REVISION OF RECONSTRUCTED BREAST Bilateral 2018    Procedure: REVISION BREAST RECON W/WOUND CLOSURE;  Surgeon: Munir Colby MD;  [facility];  Service: Plastics    TUBAL LIGATION      WISDOM TOOTH EXTRACTION        Social History:     Social History     Socioeconomic History    Marital status: /Civil Union     Spouse name: None    Number of children: None    Years of education: None    Highest education level: None   Occupational History    None   Tobacco Use    Smoking status: Former     Current packs/day: 0.00     Average packs/day: 0.5 packs/day for 15.0 years (7.5 ttl pk-yrs)     Types: Cigarettes     Start date: 1999     Quit date: 2014     Years since quittin.1     Passive exposure: Past    Smokeless tobacco: Never   Vaping Use    Vaping status: Never Used   Substance and Sexual Activity    Alcohol use: Yes     Alcohol/week: 1.0 standard drink of alcohol     Types: 1 Standard drinks or equivalent per week     Comment: occ    Drug use: No    Sexual activity: Yes     Partners: Male     Birth control/protection: Female Sterilization   Other Topics Concern    None   Social History Narrative    Drinks coffee     Social Determinants of Health     Financial Resource Strain: Not on file   Food Insecurity: Not on file   Transportation Needs: Not on file   Physical Activity: Not on file   Stress: Not on file   Social Connections: Not on file   Intimate Partner Violence: Not on file   Housing Stability: Not on file      Family History:     Family History   Problem Relation Age of Onset    Diabetes Mother     Hypertension Mother     Rosacea Father     Allergies Father         seasonal    Liver cancer Paternal Grandfather     Cancer Paternal Grandfather     No Known Problems Brother     No Known Problems Son     Breast cancer Maternal Grandmother     Brain cancer Maternal  "Grandmother     Cancer Maternal Grandmother     No Known Problems Maternal Grandfather     Diabetes Paternal Grandmother     Kidney failure Paternal Grandmother     Hypertension Paternal Grandmother     No Known Problems Brother     Breast cancer Paternal Aunt       Current Medications:     Current Outpatient Medications   Medication Sig Dispense Refill    fluticasone (Flovent HFA) 110 MCG/ACT inhaler Inhale 2 puffs 2 (two) times a day Rinse mouth after use. 12 g 0    levalbuterol (XOPENEX HFA) 45 mcg/act inhaler Inhale 2 puffs every 8 (eight) hours as needed for wheezing 15 g 0    levocetirizine (XYZAL) 5 MG tablet Take 5 mg by mouth every evening       LORazepam (ATIVAN) 1 mg tablet Take 1 tablet (1 mg total) by mouth daily at bedtime 90 tablet 0    omeprazole (PriLOSEC) 20 mg delayed release capsule Take 1 capsule (20 mg total) by mouth daily 90 capsule 1    phentermine (ADIPEX-P) 37.5 MG tablet Take 1 tablet (37.5 mg total) by mouth daily 30 tablet 0    tamoxifen (NOLVADEX) 20 mg tablet Take 1 tablet (20 mg total) by mouth daily 90 tablet 3    amoxicillin (AMOXIL) 875 mg tablet Take 875 mg by mouth 2 (two) times a day (Patient not taking: Reported on 8/31/2023)       No current facility-administered medications for this visit.      Allergies:     Allergies   Allergen Reactions    Trichophyton Other (See Comments)     AKA Fungi - Pt does not know reaction     Latex Swelling    Cat Hair Extract Sneezing    Dust Mite Extract Sneezing    Molds & Smuts Sneezing    Pollen Extract Sneezing    Tree Extract Sneezing      Physical Exam:     /80 (BP Location: Left arm, Patient Position: Sitting, Cuff Size: Standard)   Pulse (!) 107   Temp 99.1 °F (37.3 °C) (Tympanic)   Resp 17   Ht 5' 3\" (1.6 m)   Wt 83.5 kg (184 lb)   LMP 10/15/2020   SpO2 98%   BMI 32.59 kg/m²     Physical Exam  Vitals and nursing note reviewed.   Constitutional:       General: She is not in acute distress.     Appearance: Normal " appearance. She is well-developed.   HENT:      Head: Normocephalic and atraumatic.      Right Ear: Tympanic membrane normal.      Left Ear: Tympanic membrane normal.      Nose: Nose normal.      Mouth/Throat:      Mouth: Mucous membranes are moist.   Eyes:      Conjunctiva/sclera: Conjunctivae normal.   Cardiovascular:      Rate and Rhythm: Normal rate and regular rhythm.      Heart sounds: No murmur heard.  Pulmonary:      Effort: Pulmonary effort is normal. No respiratory distress.      Breath sounds: Normal breath sounds.   Abdominal:      Palpations: Abdomen is soft.      Tenderness: There is no abdominal tenderness.   Musculoskeletal:         General: No swelling.      Cervical back: Normal range of motion and neck supple.   Skin:     General: Skin is warm and dry.      Capillary Refill: Capillary refill takes less than 2 seconds.   Neurological:      General: No focal deficit present.      Mental Status: She is alert and oriented to person, place, and time.   Psychiatric:         Mood and Affect: Mood normal.          Ying Sampson MD  [facility]

## 2024-02-09 DIAGNOSIS — F41.1 GENERALIZED ANXIETY DISORDER: ICD-10-CM

## 2024-02-12 RX ORDER — LORAZEPAM 1 MG/1
1 TABLET ORAL
Qty: 90 TABLET | Refills: 0 | Status: SHIPPED | OUTPATIENT
Start: 2024-02-12 | End: 2024-02-15 | Stop reason: SDUPTHER

## 2024-02-12 NOTE — TELEPHONE ENCOUNTER
Medication:  PDMP   01/17/2024 01/17/2024 LORazepam 1 MG TABLET (non-liquid) 30.0 30 1 MG NA AWILDA LANGE     Active agreement on file -Yes         
DISPLAY PLAN FREE TEXT

## 2024-02-15 DIAGNOSIS — E66.9 OBESITY (BMI 30-39.9): ICD-10-CM

## 2024-02-15 DIAGNOSIS — F41.1 GENERALIZED ANXIETY DISORDER: ICD-10-CM

## 2024-02-15 RX ORDER — LORAZEPAM 1 MG/1
1 TABLET ORAL
Qty: 90 TABLET | Refills: 0 | Status: SHIPPED | OUTPATIENT
Start: 2024-02-15

## 2024-02-15 RX ORDER — PHENTERMINE HYDROCHLORIDE 37.5 MG/1
37.5 TABLET ORAL DAILY
Qty: 30 TABLET | Refills: 0 | Status: SHIPPED | OUTPATIENT
Start: 2024-02-15

## 2024-02-15 NOTE — TELEPHONE ENCOUNTER
Medication:  PDMP     12/18/2023 12/18/2023 Phentermine Hcl (Tablet) 30.0 30 37.5 MG NA AWILDA LANGE     Active agreement on file -Yes

## 2024-02-15 NOTE — TELEPHONE ENCOUNTER
Medication:  Lorazepam 1 mg tablet  PDMP 02/12/2024 02/12/2024 LORazepam (Tablet) 30.0 30 1 MG NA AW[facility]DA LANGE [facility]  Active agreement on file -Yes      DUPLICATE REQUEST

## 2024-02-26 ENCOUNTER — OFFICE VISIT (OUTPATIENT)
Dept: OBGYN CLINIC | Facility: CLINIC | Age: 44
End: 2024-02-26
Payer: COMMERCIAL

## 2024-02-26 VITALS — SYSTOLIC BLOOD PRESSURE: 110 MMHG | WEIGHT: 181 LBS | DIASTOLIC BLOOD PRESSURE: 70 MMHG | BODY MASS INDEX: 32.06 KG/M2

## 2024-02-26 DIAGNOSIS — Z12.31 ENCOUNTER FOR SCREENING MAMMOGRAM FOR MALIGNANT NEOPLASM OF BREAST: ICD-10-CM

## 2024-02-26 DIAGNOSIS — Z01.419 WELL WOMAN EXAM WITH ROUTINE GYNECOLOGICAL EXAM: Primary | ICD-10-CM

## 2024-02-26 PROCEDURE — S0612 ANNUAL GYNECOLOGICAL EXAMINA: HCPCS | Performed by: OBSTETRICS & GYNECOLOGY

## 2024-02-26 NOTE — PROGRESS NOTES
ASSESSMENT & PLAN:   Diagnoses and all orders for this visit:    Well woman exam with routine gynecological exam    Encounter for screening mammogram for malignant neoplasm of breast  -     Cancel: Mammo screening bilateral w 3d & cad; Future          The following were reviewed in today's visit: ASCCP guidelines, Gardisil vaccination, STD testing breast self exam, menopause, exercise, and healthy diet.    Patient to return to office in yearly for annual exam.     All questions have been answered to her satisfaction.        CC:  Annual Gynecologic Examination  Chief Complaint   Patient presents with    Gynecologic Exam     Annual exam, pap no longer indicated. Pt is well, states there are no gyn concerns.   LTH hysterectomy 2020 with Dr. James -no further pap smear testing  Last mammo 2018        HPI: Lissette Sanchez is a 43 y.o.  who presents for annual gynecologic examination.  She has the following concerns:  none      Health Maintenance:    Exercise: frequently  Breast exams/breast awareness: yes  Hx of breast cancer, double mastectomy and reconstruction, currently on tamoxifen.     Past Medical History:   Diagnosis Date    Abnormal Pap smear of cervix     Anxiety     Asthma     allergy excaberated    Breast cancer (HCC) 2018    Cancer (HCC)     Cigarette nicotine dependence without complication 10/1/2015    Fibroid 2017    GERD (gastroesophageal reflux disease)     Gestational diabetes     History of adverse reaction to anesthesia     Pt reports waking up severly anxious    Macular atrophy, retinal 2016    Pneumonia     Seasonal allergies     Subserous leiomyoma of uterus 2020       Past Surgical History:   Procedure Laterality Date    BREAST RECONSTRUCTION Bilateral 2018    Procedure: RECONSTRUCTION BREAST W/ IMPLANT;  Surgeon: Munir Colby MD;  [facility];  Service: Plastics     SECTION      HYSTERECTOMY N/A 2020    Procedure: HYSTERECTOMY  LAPAROSCOPIC TOTAL (LTH) WITH BILATERAL SALPINGECTOMY-ATTEMPTED;  Surgeon: Racheal James MD;  [facility];  Service: Gynecology    HYSTERECTOMY N/A 11/25/2020    Procedure: HYSTERECTOMY TOTAL ABDOMINAL (AARON);  Surgeon: Racheal James MD;  [facility];  Service: Gynecology    IR PORT REMOVAL  2/22/2019    MASTECTOMY      PILONIDAL CYST EXCISION      resection    NH CYSTOURETHROSCOPY N/A 11/25/2020    Procedure: CYSTOSCOPY;  Surgeon: Racheal James MD;  [facility];  Service: Gynecology    NH INSJ TUNNELED CTR VAD W/SUBQ PORT AGE 5 YR/> Left 2/13/2018    Procedure: INSERTION VENOUS PORT ( PORT-A-CATH) IR;  Surgeon: Gurmeet Damon DO;  [facility];  Service: Interventional Radiology    NH LAPS ABD PRTM&OMENTUM DX W/WO SPEC BR/WA SPX N/A 11/25/2020    Procedure: LAPAROSCOPY DIAGNOSTIC;  Surgeon: Racheal James MD;  [facility];  Service: Gynecology    NH MAST MODF RAD W/AX LYMPH NOD W/WO PECT/DARBY MIN Right 6/21/2018    Procedure: BREAST MODIFIED RADICAL MASTECTOMY;  Surgeon: Cliff Kenny MD;  [facility];  Service: Surgical Oncology    NH MASTECTOMY SIMPLE COMPLETE Left 6/21/2018    Procedure: BREAST SIMPLE MASTECTOMY;  Surgeon: Cliff Kenny MD;  [facility];  Service: Surgical Oncology    NH REVISION OF RECONSTRUCTED BREAST Bilateral 9/19/2018    Procedure: REVISION BREAST RECON W/WOUND CLOSURE;  Surgeon: Munir Colby MD;  [facility];  Service: Plastics    TUBAL LIGATION      WISDOM TOOTH EXTRACTION         Past OB/Gyn History:   Patient's last menstrual period was 10/15/2020.    Last Pap: 2020 : no abnormalities  History of abnormal Pap smear: No    Patient is currently sexually active.   STD testing: no  Current contraception: status post hysterectomy      Family History  Family History   Problem Relation Age of Onset    Diabetes Mother     Hypertension Mother     Rosacea Father     Allergies Father         seasonal    No Known Problems Brother      No Known Problems Brother     No Known Problems Son     Breast cancer Maternal Grandmother     Brain cancer Maternal Grandmother     Cancer Maternal Grandmother     No Known Problems Maternal Grandfather     Diabetes Paternal Grandmother     Kidney failure Paternal Grandmother     Hypertension Paternal Grandmother     Liver cancer Paternal Grandfather     Cancer Paternal Grandfather     Breast cancer Paternal Aunt        Family history of uterine or ovarian cancer: no  Family history of breast cancer: yes  Family history of colon cancer: no    Social History:  Social History     Socioeconomic History    Marital status: /Civil Union     Spouse name: Not on file    Number of children: Not on file    Years of education: Not on file    Highest education level: Not on file   Occupational History    Not on file   Tobacco Use    Smoking status: Former     Current packs/day: 0.00     Average packs/day: 0.5 packs/day for 15.0 years (7.5 ttl pk-yrs)     Types: Cigarettes     Start date: 1999     Quit date: 2014     Years since quittin.2     Passive exposure: Past    Smokeless tobacco: Never   Vaping Use    Vaping status: Never Used   Substance and Sexual Activity    Alcohol use: Not Currently     Alcohol/week: 1.0 standard drink of alcohol     Types: 1 Standard drinks or equivalent per week     Comment: occ    Drug use: No    Sexual activity: Yes     Partners: Male     Birth control/protection: Other   Other Topics Concern    Not on file   Social History Narrative    Drinks coffee     Social Determinants of Health     Financial Resource Strain: Not on file   Food Insecurity: Not on file   Transportation Needs: Not on file   Physical Activity: Not on file   Stress: Not on file   Social Connections: Not on file   Intimate Partner Violence: Not on file   Housing Stability: Not on file     Domestic violence screen: negative    Allergies:  Allergies   Allergen Reactions    Trichophyton Other (See Comments)      AKA Fungi - Pt does not know reaction     Latex Swelling    Cat Hair Extract Sneezing    Dust Mite Extract Sneezing    Molds & Smuts Sneezing    Pollen Extract Sneezing    Tree Extract Sneezing       Medications:    Current Outpatient Medications:     fluticasone (Flovent HFA) 110 MCG/ACT inhaler, Inhale 2 puffs 2 (two) times a day Rinse mouth after use., Disp: 12 g, Rfl: 0    levalbuterol (XOPENEX HFA) 45 mcg/act inhaler, Inhale 2 puffs every 8 (eight) hours as needed for wheezing, Disp: 15 g, Rfl: 0    levocetirizine (XYZAL) 5 MG tablet, Take 5 mg by mouth every evening , Disp: , Rfl:     LORazepam (ATIVAN) 1 mg tablet, Take 1 tablet (1 mg total) by mouth daily at bedtime, Disp: 90 tablet, Rfl: 0    omeprazole (PriLOSEC) 20 mg delayed release capsule, Take 1 capsule (20 mg total) by mouth daily, Disp: 90 capsule, Rfl: 1    phentermine (ADIPEX-P) 37.5 MG tablet, Take 1 tablet (37.5 mg total) by mouth daily, Disp: 30 tablet, Rfl: 0    tamoxifen (NOLVADEX) 20 mg tablet, Take 1 tablet (20 mg total) by mouth daily, Disp: 90 tablet, Rfl: 3    amoxicillin (AMOXIL) 875 mg tablet, Take 875 mg by mouth 2 (two) times a day (Patient not taking: Reported on 8/31/2023), Disp: , Rfl:     Review of Systems:  Review of Systems   Constitutional:  Negative for activity change, appetite change and unexpected weight change.   Respiratory:  Negative for cough and shortness of breath.    Cardiovascular:  Negative for chest pain.   Gastrointestinal:  Negative for abdominal pain, constipation, diarrhea, nausea and vomiting.   Genitourinary:  Negative for difficulty urinating, dyspareunia, frequency, menstrual problem, pelvic pain, urgency, vaginal bleeding, vaginal discharge and vaginal pain.   Musculoskeletal:  Negative for back pain.   Skin: Negative.    Neurological:  Negative for dizziness, weakness, light-headedness and headaches.   Psychiatric/Behavioral: Negative.           Physical Exam:  /70 (BP Location: Left arm,  Patient Position: Sitting, Cuff Size: Standard)   Wt 82.1 kg (181 lb)   LMP 10/15/2020   Breastfeeding No   BMI 32.06 kg/m²    Physical Exam  Constitutional:       General: She is not in acute distress.     Appearance: Normal appearance. She is well-developed. She is not diaphoretic.   Genitourinary:      Vulva and bladder normal.      No lesions in the vagina.      Genitourinary Comments: Perineum normal in appearance, no lacerations, no ulcerations, no lesions visualized.      Right Labia: No rash, tenderness or lesions.     Left Labia: No tenderness, lesions or rash.     No inguinal adenopathy present in the right or left side.     Vaginal cuff intact.     No vaginal discharge, erythema, tenderness or bleeding.      No vaginal prolapse present.     No vaginal atrophy present.       Right Adnexa: not tender, not full and no mass present.     Left Adnexa: not tender, not full and no mass present.     Cervix is absent.      No parametrium nodularity or thickening present.     Uterus is absent.      No urethral prolapse or mass present.      Bladder is not tender.       Pelvic exam was performed with patient in the lithotomy position.   Rectum:      No tenderness or external hemorrhoid.   Breasts:     Breasts are symmetrical.      Right: No swelling, bleeding, mass, skin change or tenderness.      Left: No swelling, bleeding, mass, skin change or tenderness.   HENT:      Head: Normocephalic and atraumatic.   Neck:      Thyroid: No thyromegaly or thyroid tenderness.   Cardiovascular:      Rate and Rhythm: Normal rate and regular rhythm.      Heart sounds: Normal heart sounds. No murmur heard.     No friction rub.   Pulmonary:      Effort: Pulmonary effort is normal. No respiratory distress.      Breath sounds: Normal breath sounds. No wheezing or rales.   Abdominal:      Palpations: Abdomen is soft. There is no mass.      Tenderness: There is no abdominal tenderness. There is no guarding.   Musculoskeletal:          General: No tenderness. Normal range of motion.      Right lower leg: No edema.      Left lower leg: No edema.   Lymphadenopathy:      Lower Body: No right inguinal adenopathy. No left inguinal adenopathy.   Neurological:      Mental Status: She is alert and oriented to person, place, and time.   Skin:     General: Skin is warm and dry.      Coloration: Skin is not pale.      Findings: No erythema.   Psychiatric:         Mood and Affect: Mood normal.         Behavior: Behavior normal.         Thought Content: Thought content normal.         Judgment: Judgment normal.   Vitals and nursing note reviewed.

## 2024-03-13 ENCOUNTER — OFFICE VISIT (OUTPATIENT)
Dept: SURGICAL ONCOLOGY | Facility: CLINIC | Age: 44
End: 2024-03-13
Payer: COMMERCIAL

## 2024-03-13 VITALS
BODY MASS INDEX: 32.25 KG/M2 | HEIGHT: 63 IN | RESPIRATION RATE: 17 BRPM | SYSTOLIC BLOOD PRESSURE: 124 MMHG | DIASTOLIC BLOOD PRESSURE: 82 MMHG | TEMPERATURE: 97.8 F | HEART RATE: 90 BPM | WEIGHT: 182 LBS | OXYGEN SATURATION: 99 %

## 2024-03-13 DIAGNOSIS — Z17.0 MALIGNANT NEOPLASM OF UPPER-OUTER QUADRANT OF RIGHT BREAST IN FEMALE, ESTROGEN RECEPTOR POSITIVE: Primary | ICD-10-CM

## 2024-03-13 DIAGNOSIS — Z79.810 USE OF TAMOXIFEN (NOLVADEX): ICD-10-CM

## 2024-03-13 DIAGNOSIS — Z15.89 MONOALLELIC MUTATION OF PALB2 GENE: ICD-10-CM

## 2024-03-13 DIAGNOSIS — Z15.09 MONOALLELIC MUTATION OF PALB2 GENE: ICD-10-CM

## 2024-03-13 DIAGNOSIS — C50.411 MALIGNANT NEOPLASM OF UPPER-OUTER QUADRANT OF RIGHT BREAST IN FEMALE, ESTROGEN RECEPTOR POSITIVE: Primary | ICD-10-CM

## 2024-03-13 DIAGNOSIS — Z15.01 MONOALLELIC MUTATION OF PALB2 GENE: ICD-10-CM

## 2024-03-13 PROCEDURE — 99214 OFFICE O/P EST MOD 30 MIN: CPT

## 2024-03-13 NOTE — PROGRESS NOTES
Surgical Oncology Follow Up       [facility] [place]  [address]  [place] [address]    Lissette Sanchez  1980  [id number]  [facility] [place]  [address]  [place] [address]    Chief Complaint   Patient presents with   • Follow-up       Assessment/Plan:  1. Malignant neoplasm of upper-outer quadrant of right breast in female, estrogen receptor positive   - 1 year follow up    2. Use of tamoxifen (Nolvadex)  - continue use per medical oncology    3. Monoallelic mutation of PALB2 gene  - S/P total hysterectomy  - no family hx of pancreatic cancer    Discussion/Summary: Patient is a 43-year-old female presenting today for a six-month follow-up for right breast cancer diagnosed in January of 2018. 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 She had 3/16 positive nodes. She underwent genetic testing which revealed a PALB2 mutation. She received neoadjuvant chemotherapy and underwent a right modified radical mastectomy and left simple mastectomy with Dr. Kenny. She had immediate reconstruction with Dr. Colby. She is currently on tamoxifen.  There were no concerns on her clinical breast exam. I will see the patient back in 1 year or sooner should the need arise. She was instructed to call with any questions or concerns prior to this time. All questions were answered today.       History of Present Illness:     Oncology History   Malignant neoplasm of upper-outer quadrant of right breast in female, estrogen receptor positive    1/19/2018 Initial Diagnosis    US guided core biopsy of right breast mass at [facility].   Malignant neoplasm of UOQ of right breast,   ER positive  HER2 positive     1/29/2018 Genetic Testing    Likely pathogenic variant was identified in the PALB2 gene.     2/2018 - 6/1/2018 Chemotherapy    Neoadjuvant chemotherapy with TCH with  pertuzumab.  Dr. Lowe.     6/21/2018 Surgery    Right MRM  Invasive mucinous carcinoma  3.4 cm geronimo  Grade 1  3/16 lymph nodes  Stage IB - ypT1mi, ypN1mi, G1.    Left simple mastectomy  Benign breast.    Immediate reconstruction Dr. Givens     7/19/2018 - 1/2019 Chemotherapy    trastuzumab monotherapy every 3 weeks.       11/1/2018 - 12/14/2018 Radiation    Course: C1    Plan ID Energy Fractions Dose per Fraction (cGy) Dose Correction (cGy) Total Dose Delivered (cGy) Elapsed Days   LAT R CW bst 6E 5 / 5 200 0 1,000 4   MED R CW bst 6E 5 / 5 200 0 1,000 4   New R PAB2 6X 25 / 25 47 0 1,175 36   New R Sclav2 6X 25 / 25 200 0 5,000 36   EafWDQvf34_89 6X 12 / 12 200 0 2,400 34   OwoRgvEer87_3 10X 12 / 12 200 0 2,400 34   ZfzOeaVI30_84 10X 13 / 13 200 0 2,600 36   NewR CW 10_30 6X 13 / 13 200 0 2,600 36      Treatment dates:  C1: 11/1/2018 - 12/14/2018 1/22/2019 -  Hormone Therapy    Tamoxifen  With Dr. Lowe          -Interval History: Patient is a 43-year-old female presenting today for a six-month follow-up for right breast cancer diagnosed in January of 2018. She is currently on tamoxifen. Patient denies changes on her breast exam. She notes some peripheral neuropathy in her feet. She denies persistent headaches, bone pain, back pain, shortness of breath, or abdominal pain.      Review of Systems:  Review of Systems   Constitutional:  Negative for activity change, appetite change, fatigue and unexpected weight change.   Respiratory:  Negative for cough and shortness of breath.    Cardiovascular:  Negative for chest pain.   Gastrointestinal:  Negative for abdominal pain, diarrhea, nausea and vomiting.   Endocrine: Negative for heat intolerance.   Musculoskeletal:  Negative for arthralgias, back pain and myalgias.   Skin:  Negative for rash.   Neurological:  Negative for weakness and headaches.   Hematological:  Negative for adenopathy.       Patient Active Problem List   Diagnosis   • Asthma   • Seasonal  allergies   • Malignant neoplasm of upper-outer quadrant of right breast in female, estrogen receptor positive    • Anxiety   • Use of tamoxifen (Nolvadex)   • Monoallelic mutation of PALB2 gene   • Status post total abdominal hysterectomy   • Left breast lump   • Gastroesophageal reflux disease without esophagitis   • Obesity (BMI 30-39.9)     Past Medical History:   Diagnosis Date   • Abnormal Pap smear of cervix    • Anxiety    • Asthma     allergy excaberated   • Breast cancer (HCC) 2018   • Cancer (HCC)    • Cigarette nicotine dependence without complication 10/1/2015   • Fibroid 2017   • GERD (gastroesophageal reflux disease)    • Gestational diabetes    • History of adverse reaction to anesthesia     Pt reports waking up severly anxious   • Macular atrophy, retinal 2016   • Pneumonia    • Seasonal allergies    • Subserous leiomyoma of uterus 2020     Past Surgical History:   Procedure Laterality Date   • BREAST RECONSTRUCTION Bilateral 2018    Procedure: RECONSTRUCTION BREAST W/ IMPLANT;  Surgeon: Munir Colby MD;  [facility];  Service: Plastics   •  SECTION     • HYSTERECTOMY N/A 2020    Procedure: HYSTERECTOMY LAPAROSCOPIC TOTAL (LTH) WITH BILATERAL SALPINGECTOMY-ATTEMPTED;  Surgeon: Racheal James MD;  [facility];  Service: Gynecology   • HYSTERECTOMY N/A 2020    Procedure: HYSTERECTOMY TOTAL ABDOMINAL (AARON);  Surgeon: Racheal James MD;  [facility];  Service: Gynecology   • IR PORT REMOVAL  2019   • MASTECTOMY     • PILONIDAL CYST EXCISION      resection   • WA CYSTOURETHROSCOPY N/A 2020    Procedure: CYSTOSCOPY;  Surgeon: Racheal James MD;  [facility];  Service: Gynecology   • WA INSJ TUNNELED CTR VAD W/SUBQ PORT AGE 5 YR/> Left 2018    Procedure: INSERTION VENOUS PORT ( PORT-A-CATH) IR;  Surgeon: Gurmeet Damon DO;  [facility];  Service: Interventional Radiology   • WA LAPS ABD PRTM&OMENTUM DX  W/WO SPEC BR/WA SPX N/A 2020    Procedure: LAPAROSCOPY DIAGNOSTIC;  Surgeon: Racheal James MD;  [facility];  Service: Gynecology   • MO MAST MODF RAD W/AX LYMPH NOD W/WO PECT/DARBY MIN Right 2018    Procedure: BREAST MODIFIED RADICAL MASTECTOMY;  Surgeon: Cliff Kenny MD;  [facility];  Service: Surgical Oncology   • MO MASTECTOMY SIMPLE COMPLETE Left 2018    Procedure: BREAST SIMPLE MASTECTOMY;  Surgeon: Cliff Kenny MD;  [facility];  Service: Surgical Oncology   • MO REVISION OF RECONSTRUCTED BREAST Bilateral 2018    Procedure: REVISION BREAST RECON W/WOUND CLOSURE;  Surgeon: Munir Colby MD;  [facility];  Service: Plastics   • TUBAL LIGATION     • WISDOM TOOTH EXTRACTION       Family History   Problem Relation Age of Onset   • Diabetes Mother    • Hypertension Mother    • Rosacea Father    • Allergies Father         seasonal   • No Known Problems Brother    • No Known Problems Brother    • No Known Problems Son    • Breast cancer Maternal Grandmother    • Brain cancer Maternal Grandmother    • Cancer Maternal Grandmother    • No Known Problems Maternal Grandfather    • Diabetes Paternal Grandmother    • Kidney failure Paternal Grandmother    • Hypertension Paternal Grandmother    • Liver cancer Paternal Grandfather    • Cancer Paternal Grandfather    • Breast cancer Paternal Aunt      Social History     Socioeconomic History   • Marital status: /Civil Union     Spouse name: Not on file   • Number of children: Not on file   • Years of education: Not on file   • Highest education level: Not on file   Occupational History   • Not on file   Tobacco Use   • Smoking status: Former     Current packs/day: 0.00     Average packs/day: 0.5 packs/day for 15.0 years (7.5 ttl pk-yrs)     Types: Cigarettes     Start date: 1999     Quit date: 2014     Years since quittin.2     Passive exposure: Past   • Smokeless tobacco: Never   Vaping Use   • Vaping  status: Never Used   Substance and Sexual Activity   • Alcohol use: Not Currently     Alcohol/week: 1.0 standard drink of alcohol     Types: 1 Standard drinks or equivalent per week     Comment: occ   • Drug use: No   • Sexual activity: Yes     Partners: Male     Birth control/protection: Other   Other Topics Concern   • Not on file   Social History Narrative    Drinks coffee     Social Determinants of Health     Financial Resource Strain: Not on file   Food Insecurity: Not on file   Transportation Needs: Not on file   Physical Activity: Not on file   Stress: Not on file   Social Connections: Not on file   Intimate Partner Violence: Not on file   Housing Stability: Not on file       Current Outpatient Medications:   •  fluticasone (Flovent HFA) 110 MCG/ACT inhaler, Inhale 2 puffs 2 (two) times a day Rinse mouth after use., Disp: 12 g, Rfl: 0  •  levalbuterol (XOPENEX HFA) 45 mcg/act inhaler, Inhale 2 puffs every 8 (eight) hours as needed for wheezing, Disp: 15 g, Rfl: 0  •  levocetirizine (XYZAL) 5 MG tablet, Take 5 mg by mouth every evening , Disp: , Rfl:   •  LORazepam (ATIVAN) 1 mg tablet, Take 1 tablet (1 mg total) by mouth daily at bedtime, Disp: 90 tablet, Rfl: 0  •  omeprazole (PriLOSEC) 20 mg delayed release capsule, Take 1 capsule (20 mg total) by mouth daily, Disp: 90 capsule, Rfl: 1  •  phentermine (ADIPEX-P) 37.5 MG tablet, Take 1 tablet (37.5 mg total) by mouth daily, Disp: 30 tablet, Rfl: 0  •  tamoxifen (NOLVADEX) 20 mg tablet, Take 1 tablet (20 mg total) by mouth daily, Disp: 90 tablet, Rfl: 3  •  amoxicillin (AMOXIL) 875 mg tablet, Take 875 mg by mouth 2 (two) times a day (Patient not taking: Reported on 8/31/2023), Disp: , Rfl:   Allergies   Allergen Reactions   • Trichophyton Other (See Comments)     AKA Fungi - Pt does not know reaction    • Latex Swelling   • Cat Hair Extract Sneezing   • Dust Mite Extract Sneezing   • Molds & Smuts Sneezing   • Pollen Extract Sneezing   • Tree Extract Sneezing      Vitals:    03/13/24 1256   BP: 124/82   Pulse: 90   Resp: 17   Temp: 97.8 °F (36.6 °C)   SpO2: 99%       Physical Exam  Constitutional:       General: She is not in acute distress.     Appearance: Normal appearance.   Cardiovascular:      Rate and Rhythm: Normal rate and regular rhythm.      Pulses: Normal pulses.      Heart sounds: Normal heart sounds.   Pulmonary:      Effort: Pulmonary effort is normal.      Breath sounds: Normal breath sounds.   Chest:      Chest wall: No mass.   Breasts:     Right: No swelling, bleeding, mass, skin change or tenderness.      Left: No swelling, bleeding, mass, skin change or tenderness.          Comments: B/l mastectomy scars  Abdominal:      General: Abdomen is flat.      Palpations: Abdomen is soft.   Lymphadenopathy:      Upper Body:      Right upper body: No supraclavicular, axillary or pectoral adenopathy.      Left upper body: No supraclavicular, axillary or pectoral adenopathy.   Skin:     General: Skin is warm.   Neurological:      General: No focal deficit present.      Mental Status: She is alert and oriented to person, place, and time.   Psychiatric:         Mood and Affect: Mood normal.         Behavior: Behavior normal.           Results:    Imaging  No results found.    I reviewed the above imaging data.      Advance Care Planning/Advance Directives:  Discussed disease status, cancer treatment plans and/or cancer treatment goals with the patient.

## 2024-04-08 ENCOUNTER — TELEPHONE (OUTPATIENT)
Dept: HEMATOLOGY ONCOLOGY | Facility: CLINIC | Age: 44
End: 2024-04-08

## 2024-04-08 NOTE — TELEPHONE ENCOUNTER
MIKIE  DR tiffanie ORDAZ   Who are you speaking with? Patient   If it is not the patient, are they listed on an active communication consent form? N/A   Is this a MIKIE or DR tiffanie ORDAZ MIKIE   Which provider is patient currently scheduled or established with? Dr. Keane   What is the original appointment date and time? 5/7/24 1:00 PM   At which location is the appointment scheduled to take place? [place]   Which provider is the patient transitioning care to? Dr Bren Allison   What is the new appointment date and time? 5/7/24 1:40 PM   At which location is the new appointment scheduled to take place? [place]   What is the reason for this change? Provider requested location change. Left message for pt to call [facility] to confirm new appt works for her.        Lissette,     I am reaching out regarding your appointment with Dr. Keane on  5/7/24 .    There has been a change to the providers schedule, and your appointment has been cancelled. We have rescheduled your appointment with Dr Allison for 5/7/24 at 1:40 PM.    Please call the [facility] at [phone] and we would be happy to assist you with rescheduling if this new appointment does not work for you.     Thank you,   [facility]  Oncology [facility]  [phone])

## 2024-04-13 DIAGNOSIS — F41.1 GENERALIZED ANXIETY DISORDER: ICD-10-CM

## 2024-04-14 DIAGNOSIS — C50.411 MALIGNANT NEOPLASM OF UPPER-OUTER QUADRANT OF RIGHT BREAST IN FEMALE, ESTROGEN RECEPTOR POSITIVE (HCC): ICD-10-CM

## 2024-04-14 DIAGNOSIS — Z17.0 MALIGNANT NEOPLASM OF UPPER-OUTER QUADRANT OF RIGHT BREAST IN FEMALE, ESTROGEN RECEPTOR POSITIVE (HCC): ICD-10-CM

## 2024-04-14 DIAGNOSIS — E66.9 OBESITY (BMI 30-39.9): ICD-10-CM

## 2024-04-15 RX ORDER — PHENTERMINE HYDROCHLORIDE 37.5 MG/1
37.5 TABLET ORAL DAILY
Qty: 30 TABLET | Refills: 0 | Status: SHIPPED | OUTPATIENT
Start: 2024-04-15

## 2024-04-15 RX ORDER — LORAZEPAM 1 MG/1
1 TABLET ORAL
Qty: 90 TABLET | Refills: 0 | Status: SHIPPED | OUTPATIENT
Start: 2024-04-15

## 2024-04-15 RX ORDER — TAMOXIFEN CITRATE 20 MG/1
20 TABLET ORAL DAILY
Qty: 90 TABLET | Refills: 0 | Status: SHIPPED | OUTPATIENT
Start: 2024-04-15

## 2024-04-15 NOTE — TELEPHONE ENCOUNTER
Medication:  PDMP   03/09/2024 02/15/2024 LORazepam 1 MG TABLET (Tablet) 30.0 30 1 MG NA AWILDA LANGE     Active agreement on file -Yes

## 2024-04-15 NOTE — TELEPHONE ENCOUNTER
Medication:  PDMP   02/15/2024 02/15/2024 Phentermine Hcl (Tablet) 30.0 30 37.5 MG NA AWILDA LANGE     Active agreement on file -Yes

## 2024-04-28 ENCOUNTER — APPOINTMENT (OUTPATIENT)
Dept: LAB | Facility: CLINIC | Age: 44
End: 2024-04-28
Payer: COMMERCIAL

## 2024-04-28 DIAGNOSIS — E53.8 B12 DEFICIENCY: ICD-10-CM

## 2024-04-28 LAB
ALBUMIN SERPL BCP-MCNC: 3.8 G/DL (ref 3.5–5)
ALP SERPL-CCNC: 48 U/L (ref 34–104)
ALT SERPL W P-5'-P-CCNC: 14 U/L (ref 7–52)
ANION GAP SERPL CALCULATED.3IONS-SCNC: 4 MMOL/L (ref 4–13)
AST SERPL W P-5'-P-CCNC: 16 U/L (ref 13–39)
BASOPHILS # BLD AUTO: 0.05 THOUSANDS/ÂΜL (ref 0–0.1)
BASOPHILS NFR BLD AUTO: 1 % (ref 0–1)
BILIRUB SERPL-MCNC: 0.41 MG/DL (ref 0.2–1)
BUN SERPL-MCNC: 12 MG/DL (ref 5–25)
CALCIUM SERPL-MCNC: 8.8 MG/DL (ref 8.4–10.2)
CHLORIDE SERPL-SCNC: 109 MMOL/L (ref 96–108)
CHOLEST SERPL-MCNC: 100 MG/DL
CO2 SERPL-SCNC: 25 MMOL/L (ref 21–32)
CREAT SERPL-MCNC: 0.96 MG/DL (ref 0.6–1.3)
EOSINOPHIL # BLD AUTO: 0.06 THOUSAND/ÂΜL (ref 0–0.61)
EOSINOPHIL NFR BLD AUTO: 1 % (ref 0–6)
ERYTHROCYTE [DISTWIDTH] IN BLOOD BY AUTOMATED COUNT: 13.1 % (ref 11.6–15.1)
GFR SERPL CREATININE-BSD FRML MDRD: 73 ML/MIN/1.73SQ M
GLUCOSE P FAST SERPL-MCNC: 100 MG/DL (ref 65–99)
HCT VFR BLD AUTO: 40.2 % (ref 34.8–46.1)
HDLC SERPL-MCNC: 30 MG/DL
HGB BLD-MCNC: 13.4 G/DL (ref 11.5–15.4)
IMM GRANULOCYTES # BLD AUTO: 0.02 THOUSAND/UL (ref 0–0.2)
IMM GRANULOCYTES NFR BLD AUTO: 0 % (ref 0–2)
LDLC SERPL CALC-MCNC: 41 MG/DL (ref 0–100)
LYMPHOCYTES # BLD AUTO: 1.68 THOUSANDS/ÂΜL (ref 0.6–4.47)
LYMPHOCYTES NFR BLD AUTO: 34 % (ref 14–44)
MCH RBC QN AUTO: 32.4 PG (ref 26.8–34.3)
MCHC RBC AUTO-ENTMCNC: 33.3 G/DL (ref 31.4–37.4)
MCV RBC AUTO: 97 FL (ref 82–98)
MONOCYTES # BLD AUTO: 0.54 THOUSAND/ÂΜL (ref 0.17–1.22)
MONOCYTES NFR BLD AUTO: 11 % (ref 4–12)
NEUTROPHILS # BLD AUTO: 2.59 THOUSANDS/ÂΜL (ref 1.85–7.62)
NEUTS SEG NFR BLD AUTO: 53 % (ref 43–75)
NONHDLC SERPL-MCNC: 70 MG/DL
NRBC BLD AUTO-RTO: 0 /100 WBCS
PLATELET # BLD AUTO: 246 THOUSANDS/UL (ref 149–390)
PMV BLD AUTO: 9 FL (ref 8.9–12.7)
POTASSIUM SERPL-SCNC: 4.1 MMOL/L (ref 3.5–5.3)
PROT SERPL-MCNC: 6.7 G/DL (ref 6.4–8.4)
RBC # BLD AUTO: 4.14 MILLION/UL (ref 3.81–5.12)
SODIUM SERPL-SCNC: 138 MMOL/L (ref 135–147)
TRIGL SERPL-MCNC: 145 MG/DL
TSH SERPL DL<=0.05 MIU/L-ACNC: 2.68 UIU/ML (ref 0.45–4.5)
VIT B12 SERPL-MCNC: 672 PG/ML (ref 180–914)
WBC # BLD AUTO: 4.94 THOUSAND/UL (ref 4.31–10.16)

## 2024-04-28 PROCEDURE — 82607 VITAMIN B-12: CPT

## 2024-05-12 DIAGNOSIS — F41.1 GENERALIZED ANXIETY DISORDER: ICD-10-CM

## 2024-05-13 RX ORDER — LORAZEPAM 1 MG/1
1 TABLET ORAL
Qty: 90 TABLET | Refills: 0 | Status: SHIPPED | OUTPATIENT
Start: 2024-05-13

## 2024-05-13 NOTE — TELEPHONE ENCOUNTER
Medication:  PDMP   04/15/2024 04/15/2024 LORazepam 1 MG TABLET (Tablet) 30.0 30 1 MG NA AWILDA LANGE     Active agreement on file -Yes

## 2024-06-10 DIAGNOSIS — E66.9 OBESITY (BMI 30-39.9): ICD-10-CM

## 2024-06-10 DIAGNOSIS — F41.1 GENERALIZED ANXIETY DISORDER: ICD-10-CM

## 2024-06-10 RX ORDER — PHENTERMINE HYDROCHLORIDE 37.5 MG/1
37.5 TABLET ORAL DAILY
Qty: 30 TABLET | Refills: 0 | Status: SHIPPED | OUTPATIENT
Start: 2024-06-10

## 2024-06-10 RX ORDER — LORAZEPAM 1 MG/1
1 TABLET ORAL
Qty: 90 TABLET | Refills: 0 | Status: SHIPPED | OUTPATIENT
Start: 2024-06-10

## 2024-06-10 NOTE — TELEPHONE ENCOUNTER
Medication:  PDMP   05/13/2024 05/13/2024 LORazepam 1 MG TABLET (Tablet) 30.0 30 1 MG NA AWILDA LANGE    04/15/2024 04/15/2024 Phentermine Hcl (Tablet) 30.0 30 37.5 MG NA AWILDA LANGE      Active agreement on file -Yes

## 2024-07-10 DIAGNOSIS — F41.1 GENERALIZED ANXIETY DISORDER: ICD-10-CM

## 2024-07-11 RX ORDER — LORAZEPAM 1 MG/1
1 TABLET ORAL
Qty: 90 TABLET | Refills: 0 | Status: SHIPPED | OUTPATIENT
Start: 2024-07-11

## 2024-07-11 NOTE — TELEPHONE ENCOUNTER
Medication:  PDMP     06/11/2024 06/10/2024 LORazepam (Tablet) 30.0 30 1 MG MEHDI LYNN     Active agreement on file -Yes

## 2024-08-08 DIAGNOSIS — E66.9 OBESITY (BMI 30-39.9): ICD-10-CM

## 2024-08-08 DIAGNOSIS — F41.1 GENERALIZED ANXIETY DISORDER: ICD-10-CM

## 2024-08-08 RX ORDER — PHENTERMINE HYDROCHLORIDE 37.5 MG/1
37.5 TABLET ORAL DAILY
Qty: 30 TABLET | Refills: 0 | Status: SHIPPED | OUTPATIENT
Start: 2024-08-08

## 2024-08-08 RX ORDER — LORAZEPAM 1 MG/1
1 TABLET ORAL
Qty: 90 TABLET | Refills: 0 | Status: SHIPPED | OUTPATIENT
Start: 2024-08-08

## 2024-08-08 NOTE — TELEPHONE ENCOUNTER
Medication:  PDMP   07/11/2024 07/11/2024 LORazepam (Tablet) 30.0 30 1 MG NA AWILDA LANGE     Active agreement on file -Yes

## 2024-08-14 ENCOUNTER — OFFICE VISIT (OUTPATIENT)
Dept: FAMILY MEDICINE CLINIC | Facility: CLINIC | Age: 44
End: 2024-08-14
Payer: COMMERCIAL

## 2024-08-14 VITALS
WEIGHT: 174 LBS | TEMPERATURE: 97.8 F | DIASTOLIC BLOOD PRESSURE: 78 MMHG | HEIGHT: 63 IN | SYSTOLIC BLOOD PRESSURE: 120 MMHG | OXYGEN SATURATION: 100 % | RESPIRATION RATE: 18 BRPM | BODY MASS INDEX: 30.83 KG/M2 | HEART RATE: 56 BPM

## 2024-08-14 DIAGNOSIS — J45.20 MILD INTERMITTENT ASTHMA WITHOUT COMPLICATION: ICD-10-CM

## 2024-08-14 DIAGNOSIS — Z17.0 MALIGNANT NEOPLASM OF UPPER-OUTER QUADRANT OF RIGHT BREAST IN FEMALE, ESTROGEN RECEPTOR POSITIVE (HCC): ICD-10-CM

## 2024-08-14 DIAGNOSIS — K21.9 GASTROESOPHAGEAL REFLUX DISEASE WITHOUT ESOPHAGITIS: ICD-10-CM

## 2024-08-14 DIAGNOSIS — E66.9 OBESITY (BMI 30-39.9): Primary | ICD-10-CM

## 2024-08-14 DIAGNOSIS — C50.411 MALIGNANT NEOPLASM OF UPPER-OUTER QUADRANT OF RIGHT BREAST IN FEMALE, ESTROGEN RECEPTOR POSITIVE (HCC): ICD-10-CM

## 2024-08-14 DIAGNOSIS — F41.9 ANXIETY: ICD-10-CM

## 2024-08-14 DIAGNOSIS — Z79.810 USE OF TAMOXIFEN (NOLVADEX): ICD-10-CM

## 2024-08-14 PROCEDURE — 99214 OFFICE O/P EST MOD 30 MIN: CPT | Performed by: FAMILY MEDICINE

## 2024-08-14 NOTE — PROGRESS NOTES
Ambulatory Visit  Name: Lissette Sanchez      : 1980      MRN: [id number]  Encounter Provider: Ying Sampson MD  Encounter Date: 2024   Encounter department: [facility]    Assessment & Plan   1. Obesity (BMI 30-39.9)  Assessment & Plan:  Initial weight: 190 lbs  Current weight: 174 lbs  No side effects from phentermine   Eating fruits and vegetables  To eat more protein in her diet   2. Gastroesophageal reflux disease without esophagitis  Assessment & Plan:  Stable on pepcid 20 mg at bedtime  3. Anxiety  Assessment & Plan:  Stable on ativan at bedtime  4. Use of tamoxifen (Nolvadex)  5. Mild intermittent asthma without complication  Assessment & Plan:  Stable on flovent as needed          History of Present Illness     HPI  Here for follow up  Eating more protein , fruits and vegetables  No side effects from the meds  Breakfast: overnight oats with fruits and yared seeds  Lunch: protein shake   Dinner: meat, vegetables       Review of Systems   Constitutional: Negative.    HENT: Negative.     Respiratory: Negative.     Genitourinary: Negative.    Neurological: Negative.    Hematological: Negative.    Psychiatric/Behavioral: Negative.       Past Medical History:   Diagnosis Date   • Abnormal Pap smear of cervix    • Allergic     Seasonal dx at 14 years old   • Anxiety    • Asthma     allergy excaberated   • Breast cancer (HCC) 2018   • Cancer (HCC)    • Cigarette nicotine dependence without complication 10/01/2015   • Fibroid 2017   • GERD (gastroesophageal reflux disease)    • Gestational diabetes    • History of adverse reaction to anesthesia     Pt reports waking up severly anxious   • Macular atrophy, retinal 2016   • Pneumonia    • Seasonal allergies    • Subserous leiomyoma of uterus 2020     Past Surgical History:   Procedure Laterality Date   • BREAST RECONSTRUCTION Bilateral 2018    Procedure: RECONSTRUCTION BREAST W/ IMPLANT;  Surgeon: Munir Colby MD;   [facility]: [facility];  Service: Plastics   •  SECTION     • HYSTERECTOMY N/A 2020    Procedure: HYSTERECTOMY LAPAROSCOPIC TOTAL (LTH) WITH BILATERAL SALPINGECTOMY-ATTEMPTED;  Surgeon: Racheal James MD;  Location: [facility];  Service: Gynecology   • HYSTERECTOMY N/A 2020    Procedure: HYSTERECTOMY TOTAL ABDOMINAL (AARON);  Surgeon: Racheal James MD;  [facility];  Service: Gynecology   • IR PORT REMOVAL  2019   • MASTECTOMY     • PILONIDAL CYST EXCISION      resection   • MT CYSTOURETHROSCOPY N/A 2020    Procedure: CYSTOSCOPY;  Surgeon: Racheal James MD;  [facility];  Service: Gynecology   • MT INSJ TUNNELED CTR VAD W/SUBQ PORT AGE 5 YR/> Left 2018    Procedure: INSERTION VENOUS PORT ( PORT-A-CATH) IR;  Surgeon: Gurmeet Damon DO;  Location: [facility];  Service: Interventional Radiology   • MT LAPS ABD PRTM&OMENTUM DX W/WO SPEC BR/WA SPX N/A 2020    Procedure: LAPAROSCOPY DIAGNOSTIC;  Surgeon: Racheal James MD;  [facility];  Service: Gynecology   • MT MAST MODF RAD W/AX LYMPH NOD W/WO PECT/DARBY MIN Right 2018    Procedure: BREAST MODIFIED RADICAL MASTECTOMY;  Surgeon: Cliff Kenny MD;  [facility];  Service: Surgical Oncology   • MT MASTECTOMY SIMPLE COMPLETE Left 2018    Procedure: BREAST SIMPLE MASTECTOMY;  Surgeon: Cliff Kenny MD;  [facility];  Service: Surgical Oncology   • MT REVISION OF RECONSTRUCTED BREAST Bilateral 2018    Procedure: REVISION BREAST RECON W/WOUND CLOSURE;  Surgeon: Munir Colby MD;  [facility];  Service: Plastics   • TUBAL LIGATION     • WISDOM TOOTH EXTRACTION       Family History   Problem Relation Age of Onset   • Diabetes Mother    • Hypertension Mother    • Rosacea Father    • Allergies Father         seasonal   • No Known Problems Brother    • No Known Problems Brother    • No Known Problems Son    • Breast cancer Maternal Grandmother    • Brain cancer Maternal  Grandmother    • Cancer Maternal Grandmother    • No Known Problems Maternal Grandfather    • Diabetes Paternal Grandmother    • Kidney failure Paternal Grandmother    • Hypertension Paternal Grandmother    • Liver cancer Paternal Grandfather    • Cancer Paternal Grandfather    • Prostate cancer Paternal Grandfather    • Breast cancer Paternal Aunt      Social History     Tobacco Use   • Smoking status: Former     Current packs/day: 0.00     Average packs/day: 0.5 packs/day for 15.0 years (7.5 ttl pk-yrs)     Types: Cigarettes     Start date: 1999     Quit date: 2014     Years since quittin.7     Passive exposure: Past   • Smokeless tobacco: Never   Vaping Use   • Vaping status: Never Used   Substance and Sexual Activity   • Alcohol use: Yes     Alcohol/week: 1.0 standard drink of alcohol     Types: 1 Standard drinks or equivalent per week     Comment: occ   • Drug use: No   • Sexual activity: Yes     Partners: Male     Birth control/protection: Female Sterilization     Current Outpatient Medications on File Prior to Visit   Medication Sig   • fluticasone (Flovent HFA) 110 MCG/ACT inhaler Inhale 2 puffs 2 (two) times a day Rinse mouth after use.   • levalbuterol (XOPENEX HFA) 45 mcg/act inhaler Inhale 2 puffs every 8 (eight) hours as needed for wheezing   • levocetirizine (XYZAL) 5 MG tablet Take 5 mg by mouth every evening    • LORazepam (ATIVAN) 1 mg tablet Take 1 tablet (1 mg total) by mouth daily at bedtime   • phentermine (ADIPEX-P) 37.5 MG tablet Take 1 tablet (37.5 mg total) by mouth daily   • tamoxifen (NOLVADEX) 20 mg tablet Take 1 tablet (20 mg total) by mouth daily   • [DISCONTINUED] amoxicillin (AMOXIL) 875 mg tablet Take 875 mg by mouth 2 (two) times a day (Patient not taking: Reported on 2023)   • [DISCONTINUED] omeprazole (PriLOSEC) 20 mg delayed release capsule Take 1 capsule (20 mg total) by mouth daily     Allergies   Allergen Reactions   • Trichophyton Other (See Comments)      "AKA Fungi - Pt does not know reaction    • Latex Swelling   • Cat Hair Extract Sneezing   • Dust Mite Extract Sneezing   • Molds & Smuts Sneezing   • Pollen Extract Sneezing   • Tree Extract Sneezing     Immunization History   Administered Date(s) Administered   • COVID-19 PFIZER VACCINE 0.3 ML IM 01/18/2021, 02/06/2021, 11/06/2021   • INFLUENZA 09/07/2017, 10/04/2019   • Influenza Injectable, MDCK, Preservative Free, Quadrivalent 10/04/2019   • Influenza Quadrivalent Preservative Free 3 years and older IM 09/07/2017   • Influenza, injectable, quadrivalent, preservative free 0.5 mL 09/14/2020, 09/08/2022   • Influenza, recombinant, quadrivalent,injectable, preservative free 09/24/2018   • Pneumococcal Conjugate 13-Valent 08/23/2021   • Pneumococcal Conjugate Vaccine 20-valent (Pcv20), Polysace 09/08/2022   • Tdap 09/10/2015, 05/03/2017   • Tuberculin Skin Test-PPD Intradermal 09/13/2011, 05/02/2023     Objective     /78 (BP Location: Left arm, Patient Position: Sitting, Cuff Size: Standard)   Pulse 56   Temp 97.8 °F (36.6 °C) (Tympanic)   Resp 18   Ht 5' 3\" (1.6 m)   Wt 78.9 kg (174 lb)   LMP 10/15/2020   SpO2 100%   BMI 30.82 kg/m²     Physical Exam  Vitals and nursing note reviewed.   Constitutional:       Appearance: Normal appearance.   HENT:      Right Ear: Tympanic membrane normal.      Left Ear: Tympanic membrane normal.   Cardiovascular:      Rate and Rhythm: Normal rate and regular rhythm.      Pulses: Normal pulses.      Heart sounds: Normal heart sounds.   Neurological:      General: No focal deficit present.      Mental Status: She is alert and oriented to person, place, and time.   Psychiatric:         Mood and Affect: Mood normal.         Behavior: Behavior normal.         "

## 2024-08-14 NOTE — ASSESSMENT & PLAN NOTE
Initial weight: 190 lbs  Current weight: 174 lbs  No side effects from phentermine   Eating fruits and vegetables  To eat more protein in her diet

## 2024-08-15 RX ORDER — TAMOXIFEN CITRATE 20 MG/1
20 TABLET ORAL DAILY
Qty: 90 TABLET | Refills: 0 | Status: SHIPPED | OUTPATIENT
Start: 2024-08-15

## 2024-08-15 NOTE — TELEPHONE ENCOUNTER
Requested medication(s) are due for refill today: Yes  Patient has already received a courtesy refill: No  Other reason request has been forwarded to provider: Off-Protocol Failed. Please refill if appropriate.

## 2024-09-11 DIAGNOSIS — F41.1 GENERALIZED ANXIETY DISORDER: ICD-10-CM

## 2024-09-12 RX ORDER — LORAZEPAM 1 MG/1
1 TABLET ORAL
Qty: 90 TABLET | Refills: 0 | Status: SHIPPED | OUTPATIENT
Start: 2024-09-12

## 2024-09-12 NOTE — TELEPHONE ENCOUNTER
Medication:  PDMP   08/11/2024 06/10/2024 LORazepam (Tablet) 30.0 30 1 MG MEHDI LYNN     Active agreement on file -Yes

## 2024-10-10 DIAGNOSIS — E66.9 OBESITY (BMI 30-39.9): ICD-10-CM

## 2024-10-10 DIAGNOSIS — F41.1 GENERALIZED ANXIETY DISORDER: ICD-10-CM

## 2024-10-10 RX ORDER — PHENTERMINE HYDROCHLORIDE 37.5 MG/1
37.5 TABLET ORAL DAILY
Qty: 30 TABLET | Refills: 0 | Status: SHIPPED | OUTPATIENT
Start: 2024-10-10

## 2024-10-10 RX ORDER — LORAZEPAM 1 MG/1
1 TABLET ORAL
Qty: 90 TABLET | Refills: 0 | Status: SHIPPED | OUTPATIENT
Start: 2024-10-10

## 2024-10-10 NOTE — TELEPHONE ENCOUNTER
Medication:  [facility]   08/08/2024 08/08/2024 Phentermine Hcl (Tablet) 30.0 30 37.5 MG NA AWILDA LANGE     Active agreement on file -Yes       Medication:  [facility]     09/12/2024 09/12/2024 LORazepam (Tablet) 30.0 30 1 MG NA AWILDA LANGE     Active agreement on file -Yes

## 2024-11-10 DIAGNOSIS — F41.1 GENERALIZED ANXIETY DISORDER: ICD-10-CM

## 2024-11-11 RX ORDER — LORAZEPAM 1 MG/1
1 TABLET ORAL
Qty: 90 TABLET | Refills: 0 | Status: SHIPPED | OUTPATIENT
Start: 2024-11-11

## 2024-11-11 NOTE — TELEPHONE ENCOUNTER
Medication:  PDMP   10/11/2024 08/08/2024 LORazepam (Tablet) 30.0 30 1 MG NA AWILDA LANGE     Active agreement on file -Yes

## 2024-11-14 DIAGNOSIS — C50.411 MALIGNANT NEOPLASM OF UPPER-OUTER QUADRANT OF RIGHT BREAST IN FEMALE, ESTROGEN RECEPTOR POSITIVE (HCC): ICD-10-CM

## 2024-11-14 DIAGNOSIS — Z17.0 MALIGNANT NEOPLASM OF UPPER-OUTER QUADRANT OF RIGHT BREAST IN FEMALE, ESTROGEN RECEPTOR POSITIVE (HCC): ICD-10-CM

## 2024-11-14 RX ORDER — TAMOXIFEN CITRATE 20 MG/1
20 TABLET ORAL DAILY
Qty: 90 TABLET | Refills: 0 | Status: SHIPPED | OUTPATIENT
Start: 2024-11-14

## 2024-11-18 DIAGNOSIS — J45.20 MILD INTERMITTENT ASTHMA WITHOUT COMPLICATION: ICD-10-CM

## 2024-11-18 RX ORDER — LEVALBUTEROL TARTRATE 45 UG/1
2 AEROSOL, METERED ORAL EVERY 8 HOURS PRN
Qty: 15 G | Refills: 0 | Status: SHIPPED | OUTPATIENT
Start: 2024-11-18

## 2024-11-18 RX ORDER — LEVOCETIRIZINE DIHYDROCHLORIDE 5 MG/1
5 TABLET, FILM COATED ORAL EVERY EVENING
Qty: 30 TABLET | Refills: 0 | Status: CANCELLED | OUTPATIENT
Start: 2024-11-18

## 2024-12-10 DIAGNOSIS — E66.9 OBESITY (BMI 30-39.9): ICD-10-CM

## 2024-12-10 DIAGNOSIS — F41.1 GENERALIZED ANXIETY DISORDER: ICD-10-CM

## 2024-12-11 RX ORDER — PHENTERMINE HYDROCHLORIDE 37.5 MG/1
37.5 TABLET ORAL DAILY
Qty: 30 TABLET | Refills: 0 | Status: SHIPPED | OUTPATIENT
Start: 2024-12-11

## 2024-12-11 RX ORDER — LORAZEPAM 1 MG/1
1 TABLET ORAL
Qty: 90 TABLET | Refills: 0 | Status: SHIPPED | OUTPATIENT
Start: 2024-12-11

## 2024-12-11 NOTE — TELEPHONE ENCOUNTER
Medication:  PDMP     10/10/2024 10/10/2024 Phentermine Hcl (Tablet) 30.0 30 37.5 MG NA BRUCE BOJORQUEZ     Active agreement on file -Yes

## 2025-01-10 DIAGNOSIS — F41.1 GENERALIZED ANXIETY DISORDER: ICD-10-CM

## 2025-01-13 RX ORDER — LORAZEPAM 1 MG/1
1 TABLET ORAL
Qty: 90 TABLET | Refills: 0 | Status: SHIPPED | OUTPATIENT
Start: 2025-01-13

## 2025-01-13 NOTE — TELEPHONE ENCOUNTER
Medication:  PDMP   12/12/2024 08/08/2024 LORazepam (Tablet) 30.0 30 1 MG NA AWILDA LANGE     Active agreement on file -Yes

## 2025-01-15 ENCOUNTER — OFFICE VISIT (OUTPATIENT)
Dept: FAMILY MEDICINE CLINIC | Facility: CLINIC | Age: 45
End: 2025-01-15
Payer: COMMERCIAL

## 2025-01-15 VITALS
OXYGEN SATURATION: 98 % | SYSTOLIC BLOOD PRESSURE: 110 MMHG | WEIGHT: 184 LBS | BODY MASS INDEX: 32.6 KG/M2 | DIASTOLIC BLOOD PRESSURE: 80 MMHG | TEMPERATURE: 97.5 F | HEIGHT: 63 IN | HEART RATE: 75 BPM | RESPIRATION RATE: 17 BRPM

## 2025-01-15 DIAGNOSIS — C50.411 MALIGNANT NEOPLASM OF UPPER-OUTER QUADRANT OF RIGHT BREAST IN FEMALE, ESTROGEN RECEPTOR POSITIVE (HCC): ICD-10-CM

## 2025-01-15 DIAGNOSIS — K21.9 GASTROESOPHAGEAL REFLUX DISEASE WITHOUT ESOPHAGITIS: ICD-10-CM

## 2025-01-15 DIAGNOSIS — J45.20 MILD INTERMITTENT ASTHMA WITHOUT COMPLICATION: ICD-10-CM

## 2025-01-15 DIAGNOSIS — F41.9 ANXIETY: ICD-10-CM

## 2025-01-15 DIAGNOSIS — E66.9 OBESITY (BMI 30-39.9): Primary | ICD-10-CM

## 2025-01-15 DIAGNOSIS — Z17.0 MALIGNANT NEOPLASM OF UPPER-OUTER QUADRANT OF RIGHT BREAST IN FEMALE, ESTROGEN RECEPTOR POSITIVE (HCC): ICD-10-CM

## 2025-01-15 PROCEDURE — 99214 OFFICE O/P EST MOD 30 MIN: CPT | Performed by: FAMILY MEDICINE

## 2025-01-15 NOTE — ASSESSMENT & PLAN NOTE
Prior Authorization Clinical Questions for Weight Management Pharmacotherapy    1. Does the patient have a contrainidcation to medication prescribed for weight management?: No  2. Does the patient have a diagnosis of obesity, confirmed by a BMI greater than or equal to 30 kg/m^2?: Yes  3. Does the patient have a BMI of greater than or equal to 27 kg/m^2 with at least one weight-related comorbidity/risk factor/complication (e.g. diabetes, dyslipidemia, coronary artery disease)?: Yes  5. Has the patient been on a weight loss regimen of low-calorie diet, increased physical activity, and lifestyle modifications for a minimum of 6 months?: Yes  6. Has the patient completed a comprehensive weight loss program (ie, Weight Watchers, Noom, Bariatrics, other [place] on phone)? If so, what?: No  7. Does the patient have a history of type 2 diabetes?: No  8. Has the member tried and failed other weight loss medication within the past 12 months?: No  9. Will the member use requested medication in combination with another GLP agonist or weight loss drug?: No  10. Is the medication a controlled substance?: No     Baseline weight (in pounds): 184 lbs     Diet, exercise and portion control  Myfitness pal   Goal calories < 1700 carlos.day   Protein 70 grams/day   She wants to try wegovy  Will start after getting her food diary back   Orders:  •  Comprehensive metabolic panel  •  CBC and differential  •  Lipid panel  •  Hemoglobin A1C

## 2025-01-15 NOTE — PROGRESS NOTES
Name: Lissette Sanchez      : 1980      MRN: [id number]  Encounter Provider: Ying Sampson MD  Encounter Date: 1/15/2025   Encounter department: [facility]  :  Assessment & Plan  Obesity (BMI 30-39.9)  Prior Authorization Clinical Questions for Weight Management Pharmacotherapy    1. Does the patient have a contrainidcation to medication prescribed for weight management?: No  2. Does the patient have a diagnosis of obesity, confirmed by a BMI greater than or equal to 30 kg/m^2?: Yes  3. Does the patient have a BMI of greater than or equal to 27 kg/m^2 with at least one weight-related comorbidity/risk factor/complication (e.g. diabetes, dyslipidemia, coronary artery disease)?: Yes  5. Has the patient been on a weight loss regimen of low-calorie diet, increased physical activity, and lifestyle modifications for a minimum of 6 months?: Yes  6. Has the patient completed a comprehensive weight loss program (ie, Weight Watchers, Noom, Bariatrics, other [place] on phone)? If so, what?: No  7. Does the patient have a history of type 2 diabetes?: No  8. Has the member tried and failed other weight loss medication within the past 12 months?: No  9. Will the member use requested medication in combination with another GLP agonist or weight loss drug?: No  10. Is the medication a controlled substance?: No     Baseline weight (in pounds): 184 lbs     Diet, exercise and portion control  Myfitness pal   Goal calories < 1700 carlos.day   Protein 70 grams/day   She wants to try wegovy  Will start after getting her food diary back   Orders:  •  Comprehensive metabolic panel  •  CBC and differential  •  Lipid panel  •  Hemoglobin A1C    Malignant neoplasm of upper-outer quadrant of right breast in female, estrogen receptor positive (HCC)  In remission   Continue tamoxifen   See oncologist        Mild intermittent asthma without complication  Stable   Xopenex prn          Gastroesophageal reflux disease without  "esophagitis  Doing well on pepcid 20 mg at bedtime        Anxiety  Ativan at bedtime              History of Present Illness     HPI  Here for follow up  Trouble losing weight   Not counting calories     Review of Systems   Constitutional: Negative.    HENT: Negative.     Eyes: Negative.    Respiratory: Negative.     Cardiovascular: Negative.    Gastrointestinal: Negative.    Endocrine: Negative.    Genitourinary: Negative.    Musculoskeletal: Negative.    Hematological: Negative.    Psychiatric/Behavioral: Negative.         Objective   /80 (BP Location: Left arm, Patient Position: Sitting, Cuff Size: Standard)   Pulse 75   Temp 97.5 °F (36.4 °C) (Tympanic)   Resp 17   Ht 5' 3\" (1.6 m)   Wt 83.5 kg (184 lb)   LMP 10/15/2020   SpO2 98%   BMI 32.59 kg/m²      Physical Exam  Vitals and nursing note reviewed.   Constitutional:       Appearance: Normal appearance.   Cardiovascular:      Pulses: Normal pulses.      Heart sounds: Normal heart sounds.   Pulmonary:      Effort: Pulmonary effort is normal.      Breath sounds: Normal breath sounds.   Neurological:      General: No focal deficit present.      Mental Status: She is alert and oriented to person, place, and time.   Psychiatric:         Mood and Affect: Mood normal.         Behavior: Behavior normal.         "

## 2025-01-31 ENCOUNTER — TELEPHONE (OUTPATIENT)
Dept: FAMILY MEDICINE CLINIC | Facility: CLINIC | Age: 45
End: 2025-01-31

## 2025-01-31 DIAGNOSIS — E66.9 OBESITY (BMI 30-39.9): Primary | ICD-10-CM

## 2025-02-05 NOTE — TELEPHONE ENCOUNTER
PA for wegovy 0.25 mg/0.5 ml SUBMITTED to [facility] rx    via    []CMM-KEY:   [x]Surescripts-Case ID # [id number]   []Availity-Auth ID # NDC #   []Faxed to plan   []Other website   []Phone call Case ID #     [x]PA sent as URGENT    All office notes, labs and other pertaining documents and studies sent. Clinical questions answered. Awaiting determination from insurance company.     Turnaround time for your insurance to make a decision on your Prior Authorization can take 7-21 business days.

## 2025-02-13 DIAGNOSIS — C50.411 MALIGNANT NEOPLASM OF UPPER-OUTER QUADRANT OF RIGHT BREAST IN FEMALE, ESTROGEN RECEPTOR POSITIVE (HCC): ICD-10-CM

## 2025-02-13 DIAGNOSIS — Z17.0 MALIGNANT NEOPLASM OF UPPER-OUTER QUADRANT OF RIGHT BREAST IN FEMALE, ESTROGEN RECEPTOR POSITIVE (HCC): ICD-10-CM

## 2025-02-13 RX ORDER — TAMOXIFEN CITRATE 20 MG/1
20 TABLET ORAL DAILY
Qty: 90 TABLET | Refills: 0 | Status: SHIPPED | OUTPATIENT
Start: 2025-02-13 | End: 2025-02-18 | Stop reason: SDUPTHER

## 2025-02-18 DIAGNOSIS — C50.411 MALIGNANT NEOPLASM OF UPPER-OUTER QUADRANT OF RIGHT BREAST IN FEMALE, ESTROGEN RECEPTOR POSITIVE (HCC): ICD-10-CM

## 2025-02-18 DIAGNOSIS — Z17.0 MALIGNANT NEOPLASM OF UPPER-OUTER QUADRANT OF RIGHT BREAST IN FEMALE, ESTROGEN RECEPTOR POSITIVE (HCC): ICD-10-CM

## 2025-02-19 RX ORDER — TAMOXIFEN CITRATE 20 MG/1
20 TABLET ORAL DAILY
Qty: 90 TABLET | Refills: 0 | Status: SHIPPED | OUTPATIENT
Start: 2025-02-19

## 2025-03-10 DIAGNOSIS — F41.1 GENERALIZED ANXIETY DISORDER: ICD-10-CM

## 2025-03-11 ENCOUNTER — TELEPHONE (OUTPATIENT)
Dept: SURGICAL ONCOLOGY | Facility: CLINIC | Age: 45
End: 2025-03-11

## 2025-03-11 DIAGNOSIS — E66.9 OBESITY (BMI 30-39.9): Primary | ICD-10-CM

## 2025-03-11 RX ORDER — LORAZEPAM 1 MG/1
1 TABLET ORAL
Qty: 90 TABLET | Refills: 0 | Status: SHIPPED | OUTPATIENT
Start: 2025-03-11

## 2025-03-11 RX ORDER — SEMAGLUTIDE 0.5 MG/.5ML
INJECTION, SOLUTION SUBCUTANEOUS
Qty: 2 ML | Refills: 0 | Status: SHIPPED | OUTPATIENT
Start: 2025-03-11

## 2025-03-11 NOTE — TELEPHONE ENCOUNTER
Medication:  PDMP     02/09/2025 12/11/2024 LORazepam (Tablet) 30.0 30 1 MG NA AWILDA LANGE     Active agreement on file -Yes

## 2025-03-11 NOTE — TELEPHONE ENCOUNTER
Called patient per Johana to see if she would be willing to be swapped from Kathryn's schedule to Johana's for her appointment tomorrow. There was no answer and voicemail box was full so was unable to leave a message.

## 2025-03-12 ENCOUNTER — DOCUMENTATION (OUTPATIENT)
Dept: HEMATOLOGY ONCOLOGY | Facility: CLINIC | Age: 45
End: 2025-03-12

## 2025-03-12 ENCOUNTER — OFFICE VISIT (OUTPATIENT)
Dept: SURGICAL ONCOLOGY | Facility: CLINIC | Age: 45
End: 2025-03-12
Payer: COMMERCIAL

## 2025-03-12 ENCOUNTER — TELEPHONE (OUTPATIENT)
Dept: HEMATOLOGY ONCOLOGY | Facility: CLINIC | Age: 45
End: 2025-03-12

## 2025-03-12 VITALS
HEIGHT: 63 IN | RESPIRATION RATE: 15 BRPM | HEART RATE: 65 BPM | OXYGEN SATURATION: 99 % | TEMPERATURE: 97.6 F | BODY MASS INDEX: 31.89 KG/M2 | SYSTOLIC BLOOD PRESSURE: 120 MMHG | DIASTOLIC BLOOD PRESSURE: 70 MMHG | WEIGHT: 180 LBS

## 2025-03-12 DIAGNOSIS — Z15.01 MONOALLELIC MUTATION OF PALB2 GENE: ICD-10-CM

## 2025-03-12 DIAGNOSIS — Z17.0 MALIGNANT NEOPLASM OF UPPER-OUTER QUADRANT OF RIGHT BREAST IN FEMALE, ESTROGEN RECEPTOR POSITIVE (HCC): Primary | ICD-10-CM

## 2025-03-12 DIAGNOSIS — C50.411 MALIGNANT NEOPLASM OF UPPER-OUTER QUADRANT OF RIGHT BREAST IN FEMALE, ESTROGEN RECEPTOR POSITIVE (HCC): Primary | ICD-10-CM

## 2025-03-12 DIAGNOSIS — Z79.810 USE OF TAMOXIFEN (NOLVADEX): ICD-10-CM

## 2025-03-12 DIAGNOSIS — Z15.09 MONOALLELIC MUTATION OF PALB2 GENE: ICD-10-CM

## 2025-03-12 DIAGNOSIS — Z15.89 MONOALLELIC MUTATION OF PALB2 GENE: ICD-10-CM

## 2025-03-12 PROCEDURE — 99243 OFF/OP CNSLTJ NEW/EST LOW 30: CPT

## 2025-03-12 RX ORDER — SEMAGLUTIDE 0.25 MG/.5ML
INJECTION, SOLUTION SUBCUTANEOUS
COMMUNITY
Start: 2025-02-17

## 2025-03-12 NOTE — PROGRESS NOTES
Referral to medical oncology received.  Chart reviewed by oncology nurse navigator at this time.      Diagnosis: h/o breast cancer.  Patient last saw Dr Lowe in May 2023.  Was lost to follow up.  Provider requesting patient be seen by Dr Breen.  Message sent to the [facility] onc clerical pool to get patient scheduled.  Closing referral at this time, as patient is already established with [facility] onc.

## 2025-03-12 NOTE — PROGRESS NOTES
Name: Lissette Sanchez      : 1980      MRN: [id number]  Encounter Provider: CHARAN Gandhi  Encounter Date: 3/12/2025   Encounter department: [facility]  :  Assessment & Plan  Malignant neoplasm of upper-outer quadrant of right breast in female, estrogen receptor positive (HCC)  Ms. Lissette Sanchez is a 44 y.o. female presenting today for 1 year follow up of right breast cancer, diagnosed in 2018. She is s/p right mastectomy with SLNB, as well as left simple mastectomy with reconstruction on 2018 with Dr. Galeana and Dr. Colby. Surgical pathology demonstrated invasive mucinous carcinoma ER/SC+, and HER-2 positive, 3/16 lymph nodes positive. Genetic testing was positive - a pathogenic variant was identified in PALB2. Adjuvant therapy completed with chemotherapy as well as radiation therapy She continues on hormone therapy with Tamoxifen, although has been lost to follow up with Hem/Onc. This has been ordered by PCP. She has no regular interval breast imaging secondary to lack of breast tissue. Silicone implants in place bilaterally.     I have recommended follow up with GYN/ONC given presence of ovaries and current NCCN recommendation for consideration of RRSO at 45-51y/o for PALB2 PV carriers in light of ovarian risk. Pt does not want to have this done, however I encouraged her to establish care for possible screening/ close monitoring given her risk. We discussed pancreas cancer risk as well, although screening is only recommended for carriers with a family history over the age of 50.    Additionally, I have placed referral to medical oncology to re-establish care. I advised pt that hormone therapy is typically recommended for 5-10 years, she may need adjustments to current treatment plan. I also emphasized importance of monitoring adverse effects by med/onc.    There were no concerning findings upon clinical breast exam (CBE) today. I did  "appreciate a small firm lump at the left central breast along the mastectomy incision site. This is consistent with left breast dx US in 2021 demonstrating fat necrosis. I will see the patient back in 1 year or sooner should the need arise. She was instructed to call with any questions or concerns prior to this time. All questions were answered today. .  Orders:  •  Ambulatory referral to Gynecologic Oncology; Future  •  Ambulatory Referral to Hematology / Oncology; Future    Monoallelic mutation of PALB2 gene  Orders:  •  Ambulatory referral to Gynecologic Oncology; Future  •  Ambulatory Referral to Hematology / Oncology; Future    Use of tamoxifen (Nolvadex)         History of Present Illness   Lissette Sanchez is a 44 y.o. year old female who presents for 1 year follow up of right breast cancer, diagnosed in Jan 2018. She continues on Tamoxifen. She offers no new breast concerns today. She mentions chronic asymmetry and tighter skin on the right breast, which she suspects is due to the radiation she received. She denies breast changes, persistent cough, SOB, headaches, as well as any new or persistent back, bone, or abdominal pain. She reports anxiousness about return of cancer, her maternal grandmother passed from breast cancer that was treated but became metastatic to brain. She denies any known family hx of pancreas cancer. She has informed her siblings of PALB2 mutation, although they have not completed genetic testing as of yet. She has 1 8yr old son. She is saddened by Dr. Lowe leaving, and has not re-established with medical oncology since. She reports her PCP to prescribe ongoing Tamoxifen. Pt notes she is not wanting transition to AI as she has heard this is not well tolerated and with significant side effects. She is agreeable to referral to Med/Onc and GYN/ONC. She was on OCP for an estimated 10yrs, and is not interested in RRSO at this time as she does not \"want to age\".       Oncology History "   Cancer Staging   Malignant neoplasm of upper-outer quadrant of right breast in female, estrogen receptor positive (HCC)  Staging form: Breast, AJCC 8th Edition  - Clinical stage from 1/26/2018: Stage IB (cT2(3), cN1, cM0, G2, ER: Positive, NJ: Positive, HER2: Positive) - Signed by Cliff Kenny MD on 1/26/2018  Method of lymph node assessment: Other  Nuclear grade: G3  Histologic grading system: 3 grade system  Laterality: Right  Tumor size (mm): 35  Multiple tumors: Yes  Number of tumors: 3  Oncology History   Malignant neoplasm of upper-outer quadrant of right breast in female, estrogen receptor positive (HCC)   1/19/2018 Initial Diagnosis    US guided core biopsy of right breast mass at [facility].   Malignant neoplasm of UOQ of right breast,   ER positive  HER2 positive     1/29/2018 Genetic Testing    Likely pathogenic variant was identified in the PALB2 gene.     2/2018 - 6/1/2018 Chemotherapy    Neoadjuvant chemotherapy with TCH with pertuzumab.  Dr. Lowe.     6/21/2018 Surgery    Right MRM  Invasive mucinous carcinoma  3.4 cm geronimo  Grade 1  3/16 lymph nodes  Stage IB - ypT1mi, ypN1mi, G1.    Left simple mastectomy  Benign breast.    Immediate reconstruction Dr. Givens     7/19/2018 - 1/2019 Chemotherapy    trastuzumab monotherapy every 3 weeks.       11/1/2018 - 12/14/2018 Radiation    Course: C1    Plan ID Energy Fractions Dose per Fraction (cGy) Dose Correction (cGy) Total Dose Delivered (cGy) Elapsed Days   LAT R CW bst 6E 5 / 5 200 0 1,000 4   MED R CW bst 6E 5 / 5 200 0 1,000 4   New R PAB2 6X 25 / 25 47 0 1,175 36   New R Sclav2 6X 25 / 25 200 0 5,000 36   XdnIMLmp20_06 6X 12 / 12 200 0 2,400 34   GjbKwaXul88_2 10X 12 / 12 200 0 2,400 34   [id number]_62 10X 13 / 13 200 0 2,600 36   NewR CW 10_30 6X 13 / 13 200 0 2,600 36      Treatment dates:  C1: 11/1/2018 - 12/14/2018 1/22/2019 -  Hormone Therapy    Tamoxifen  With Dr. Lowe        Review of Systems   Constitutional:  Negative for activity change,  "appetite change, fatigue, fever and unexpected weight change.   Respiratory:  Negative for cough and shortness of breath.    Gastrointestinal:  Negative for abdominal pain.   Endocrine: Positive for heat intolerance (secondary to Tamoxifen).   Musculoskeletal:  Negative for back pain.   Skin: Negative.    Neurological: Negative.    Psychiatric/Behavioral:  Negative for confusion.     A complete review of systems is negative other than that noted above in the HPI.       Objective   /70 (BP Location: Left arm, Patient Position: Sitting, Cuff Size: Standard)   Pulse 65   Temp 97.6 °F (36.4 °C) (Temporal)   Resp 15   Ht 5' 3\" (1.6 m)   Wt 81.6 kg (180 lb)   LMP 10/15/2020   SpO2 99%   BMI 31.89 kg/m²     Physical Exam  Vitals and nursing note reviewed.   Constitutional:       Appearance: Normal appearance. She is normal weight.   Eyes:      General: No scleral icterus.     Conjunctiva/sclera: Conjunctivae normal.   Cardiovascular:      Rate and Rhythm: Normal rate and regular rhythm.   Pulmonary:      Effort: Pulmonary effort is normal.      Breath sounds: Normal breath sounds.   Chest:      Chest wall: No mass.   Breasts:     Right: Skin change (surgical scar) present. No swelling, bleeding, mass or tenderness.      Left: Skin change (surgical scar) present. No swelling, bleeding, mass or tenderness.          Comments: S/p bilateral mastectomy with reconstruction  Silicone implants in situ  There were no other concerning findings upon CBE today. No dominant masses, nodularities, enlarged lymph nodes, skin changes, or other concerning findings.    Lymphadenopathy:      Upper Body:      Right upper body: No supraclavicular or axillary adenopathy.      Left upper body: No supraclavicular or axillary adenopathy.   Skin:     General: Skin is warm and dry.   Neurological:      General: No focal deficit present.      Mental Status: She is alert and oriented to person, place, and time. Mental status is at " baseline.   Psychiatric:         Mood and Affect: Mood normal.         Behavior: Behavior normal.         Thought Content: Thought content normal.         Judgment: Judgment normal.      I have spent a total time of 39 minutes in caring for this patient on the day of the visit/encounter including Diagnostic results, Prognosis, Risks and benefits of tx options, Instructions for management, Patient and family education, Importance of tx compliance, Impressions, Counseling / Coordination of care, Documenting in the medical record, Reviewing/placing orders in the medical record (including tests, medications, and/or procedures), and Obtaining or reviewing history  .

## 2025-03-12 NOTE — ASSESSMENT & PLAN NOTE
Ms. Lissette Sanchez is a 44 y.o. female presenting today for 1 year follow up of right breast cancer, diagnosed in Jan 2018. She is s/p right mastectomy with SLNB, as well as left simple mastectomy with reconstruction on 06/21/2018 with Dr. Galeana and Dr. Colby. Surgical pathology demonstrated invasive mucinous carcinoma ER/MA+, and HER-2 positive, 3/16 lymph nodes positive. Genetic testing was positive - a pathogenic variant was identified in PALB2. Adjuvant therapy completed with chemotherapy as well as radiation therapy She continues on hormone therapy with Tamoxifen, although has been lost to follow up with Hem/Onc. This has been ordered by PCP. She has no regular interval breast imaging secondary to lack of breast tissue. Silicone implants in place bilaterally.     I have recommended follow up with GYN/ONC given presence of ovaries and current NCCN recommendation for consideration of RRSO at 45-51y/o for PALB2 PV carriers in light of ovarian risk. Pt does not want to have this done, however I encouraged her to establish care for possible screening/ close monitoring given her risk. We discussed pancreas cancer risk as well, although screening is only recommended for carriers with a family history over the age of 50.    Additionally, I have placed referral to medical oncology to re-establish care. I advised pt that hormone therapy is typically recommended for 5-10 years, she may need adjustments to current treatment plan. I also emphasized importance of monitoring adverse effects by med/onc.    There were no concerning findings upon clinical breast exam (CBE) today. I did appreciate a small firm lump at the left central breast along the mastectomy incision site. This is consistent with left breast dx US in 2021 demonstrating fat necrosis. I will see the patient back in 1 year or sooner should the need arise. She was instructed to call with any questions or concerns prior to this time. All questions were answered  today. .  Orders:  •  Ambulatory referral to Gynecologic Oncology; Future  •  Ambulatory Referral to Hematology / Oncology; Future

## 2025-03-12 NOTE — ASSESSMENT & PLAN NOTE
Orders:  •  Ambulatory referral to Gynecologic Oncology; Future  •  Ambulatory Referral to Hematology / Oncology; Future

## 2025-03-22 LAB
ALBUMIN SERPL-MCNC: 4 G/DL (ref 3.6–5.1)
ALBUMIN/GLOB SERPL: 1.7 (CALC) (ref 1–2.5)
ALP SERPL-CCNC: 50 U/L (ref 31–125)
ALT SERPL-CCNC: 12 U/L (ref 6–29)
AST SERPL-CCNC: 14 U/L (ref 10–30)
BASOPHILS # BLD AUTO: 71 CELLS/UL (ref 0–200)
BASOPHILS NFR BLD AUTO: 1.4 %
BILIRUB SERPL-MCNC: 0.3 MG/DL (ref 0.2–1.2)
BUN SERPL-MCNC: 17 MG/DL (ref 7–25)
BUN/CREAT SERPL: NORMAL (CALC) (ref 6–22)
CALCIUM SERPL-MCNC: 8.7 MG/DL (ref 8.6–10.2)
CHLORIDE SERPL-SCNC: 107 MMOL/L (ref 98–110)
CHOLEST SERPL-MCNC: 100 MG/DL
CHOLEST/HDLC SERPL: 3.1 (CALC)
CO2 SERPL-SCNC: 28 MMOL/L (ref 20–32)
CREAT SERPL-MCNC: 0.9 MG/DL (ref 0.5–0.99)
EOSINOPHIL # BLD AUTO: 260 CELLS/UL (ref 15–500)
EOSINOPHIL NFR BLD AUTO: 5.1 %
ERYTHROCYTE [DISTWIDTH] IN BLOOD BY AUTOMATED COUNT: 12.5 % (ref 11–15)
GFR/BSA.PRED SERPLBLD CYS-BASED-ARV: 82 ML/MIN/1.73M2
GLOBULIN SER CALC-MCNC: 2.4 G/DL (CALC) (ref 1.9–3.7)
GLUCOSE SERPL-MCNC: 84 MG/DL (ref 65–99)
HCT VFR BLD AUTO: 41.3 % (ref 35–45)
HDLC SERPL-MCNC: 32 MG/DL
HGB BLD-MCNC: 13.9 G/DL (ref 11.7–15.5)
LDLC SERPL CALC-MCNC: 42 MG/DL (CALC)
LYMPHOCYTES # BLD AUTO: 2091 CELLS/UL (ref 850–3900)
LYMPHOCYTES NFR BLD AUTO: 41 %
MCH RBC QN AUTO: 33.3 PG (ref 27–33)
MCHC RBC AUTO-ENTMCNC: 33.7 G/DL (ref 32–36)
MCV RBC AUTO: 98.8 FL (ref 80–100)
MONOCYTES # BLD AUTO: 469 CELLS/UL (ref 200–950)
MONOCYTES NFR BLD AUTO: 9.2 %
NEUTROPHILS # BLD AUTO: 2208 CELLS/UL (ref 1500–7800)
NEUTROPHILS NFR BLD AUTO: 43.3 %
NONHDLC SERPL-MCNC: 68 MG/DL (CALC)
PLATELET # BLD AUTO: 273 THOUSAND/UL (ref 140–400)
PMV BLD REES-ECKER: 9.4 FL (ref 7.5–12.5)
POTASSIUM SERPL-SCNC: 4.2 MMOL/L (ref 3.5–5.3)
PROT SERPL-MCNC: 6.4 G/DL (ref 6.1–8.1)
RBC # BLD AUTO: 4.18 MILLION/UL (ref 3.8–5.1)
SODIUM SERPL-SCNC: 140 MMOL/L (ref 135–146)
TRIGL SERPL-MCNC: 191 MG/DL
WBC # BLD AUTO: 5.1 THOUSAND/UL (ref 3.8–10.8)

## 2025-03-24 ENCOUNTER — RESULTS FOLLOW-UP (OUTPATIENT)
Dept: FAMILY MEDICINE CLINIC | Facility: CLINIC | Age: 45
End: 2025-03-24

## 2025-03-25 ENCOUNTER — OFFICE VISIT (OUTPATIENT)
Dept: FAMILY MEDICINE CLINIC | Facility: CLINIC | Age: 45
End: 2025-03-25
Payer: COMMERCIAL

## 2025-03-25 VITALS
BODY MASS INDEX: 31.18 KG/M2 | SYSTOLIC BLOOD PRESSURE: 110 MMHG | OXYGEN SATURATION: 98 % | DIASTOLIC BLOOD PRESSURE: 86 MMHG | HEART RATE: 104 BPM | TEMPERATURE: 98.3 F | HEIGHT: 63 IN | RESPIRATION RATE: 16 BRPM | WEIGHT: 176 LBS

## 2025-03-25 DIAGNOSIS — K21.9 GASTROESOPHAGEAL REFLUX DISEASE WITHOUT ESOPHAGITIS: ICD-10-CM

## 2025-03-25 DIAGNOSIS — Z00.00 ANNUAL PHYSICAL EXAM: Primary | ICD-10-CM

## 2025-03-25 DIAGNOSIS — E66.9 OBESITY (BMI 30-39.9): ICD-10-CM

## 2025-03-25 DIAGNOSIS — Z13.820 OSTEOPOROSIS SCREENING: ICD-10-CM

## 2025-03-25 DIAGNOSIS — F41.9 ANXIETY: ICD-10-CM

## 2025-03-25 PROCEDURE — 99396 PREV VISIT EST AGE 40-64: CPT | Performed by: FAMILY MEDICINE

## 2025-03-25 RX ORDER — SEMAGLUTIDE 1 MG/.5ML
INJECTION, SOLUTION SUBCUTANEOUS
Qty: 2 ML | Refills: 0 | Status: SHIPPED | OUTPATIENT
Start: 2025-04-04 | End: 2026-03-04

## 2025-03-25 NOTE — PROGRESS NOTES
Adult Annual Physical  Name: Lissette Sanchez      : 1980      MRN: [id number]  Encounter Provider: Ying Sampson MD  Encounter Date: 3/25/2025   Encounter department: [facility]    Assessment & Plan  Annual physical exam         Gastroesophageal reflux disease without esophagitis  Stable on PRN prilosec       Obesity (BMI 30-39.9)  Initial weight : 184 lbs   Current weight: 176 lbs   Continue diet, exercise and portion control   Wegovy is helping   Orders:    Semaglutide-Weight Management (Wegovy) 1 MG/0.5ML; Inject 1 mg under the skin weekly Do not start before 2025.    Anxiety  Stable on ativan prn       Osteoporosis screening    Orders:    DXA bone density spine hip and pelvis; Future    Preventive Screenings:    - Cervical cancer screening: screening not indicated   - Breast cancer screening: screening not indicated and has history of breast cancer     BMI Counseling: Body mass index is 31.03 kg/m². The BMI is above normal. Exercise recommendations include exercising 3-5 times per week.     Depression Screening and Follow-up Plan: Patient was screened for depression during today's encounter. They screened negative with a PHQ-2 score of 0.          History of Present Illness     Adult Annual Physical:  Patient presents for annual physical.     Diet and Physical Activity:  - Diet/Nutrition: limited junk food and consuming 3-5 servings of fruits/vegetables daily.  - Exercise: moderate cardiovascular exercise.    Depression Screening:  - PHQ-2 Score: 0    General Health:  - Sleep: sleeps well and 7-8 hours of sleep on average.  - Hearing: normal hearing bilateral ears.  - Vision: most recent eye exam < 1 year ago and no vision problems.  - Dental: regular dental visits and brushes teeth twice daily.    /GYN Health:  - Follows with GYN: yes.   - History of STDs: no    Advanced Care Planning:  - Has an advanced directive?: no    - Has a durable medical POA?: no    - ACP  document given to patient?: no      Review of Systems   Constitutional:  Negative for chills and fever.   HENT:  Negative for ear pain and sore throat.    Eyes:  Negative for pain and visual disturbance.   Respiratory: Negative.  Negative for cough and shortness of breath.    Cardiovascular: Negative.  Negative for chest pain and palpitations.   Gastrointestinal:  Negative for abdominal pain and vomiting.   Endocrine: Negative.    Genitourinary: Negative.  Negative for dysuria and hematuria.   Musculoskeletal:  Negative for arthralgias and back pain.   Skin:  Negative for color change and rash.   Allergic/Immunologic: Negative.    Neurological: Negative.  Negative for seizures and syncope.   Hematological: Negative.    Psychiatric/Behavioral: Negative.     All other systems reviewed and are negative.    Medical History Reviewed by provider this encounter:  Tobacco  Allergies  Meds  Problems  Med Hx  Surg Hx  Fam Hx     .  Past Medical History   Past Medical History:   Diagnosis Date    Abnormal Pap smear of cervix     Allergic     Seasonal dx at 14 years old    Anxiety     Asthma     allergy excaberated    Breast cancer (HCC) 2018    Cancer (HCC)     Cigarette nicotine dependence without complication 10/01/2015    Fibroid 2017    GERD (gastroesophageal reflux disease)     Gestational diabetes     History of adverse reaction to anesthesia     Pt reports waking up severly anxious    Macular atrophy, retinal 2016    Pneumonia     Seasonal allergies     Subserous leiomyoma of uterus 2020     Past Surgical History:   Procedure Laterality Date    BREAST RECONSTRUCTION Bilateral 2018    Procedure: RECONSTRUCTION BREAST W/ IMPLANT;  Surgeon: Munir Colby MD;  [facility];  Service: Plastics     SECTION      HYSTERECTOMY N/A 2020    Procedure: HYSTERECTOMY LAPAROSCOPIC TOTAL (LTH) WITH BILATERAL SALPINGECTOMY-ATTEMPTED;  Surgeon: Racheal James MD;  [facility];   Service: Gynecology    HYSTERECTOMY N/A 11/25/2020    Procedure: HYSTERECTOMY TOTAL ABDOMINAL (AARON);  Surgeon: Racheal James MD;  [facility];  Service: Gynecology    IR PORT REMOVAL  2/22/2019    MASTECTOMY      PILONIDAL CYST EXCISION      resection    RI CYSTOURETHROSCOPY N/A 11/25/2020    Procedure: CYSTOSCOPY;  Surgeon: Rcaheal James MD;  [facility];  Service: Gynecology    RI INSJ TUNNELED CTR VAD W/SUBQ PORT AGE 7 YR/> Left 2/13/2018    Procedure: INSERTION VENOUS PORT ( PORT-A-CATH) IR;  Surgeon: Gurmeet Damon DO;  [facility];  Service: Interventional Radiology    RI LAPS ABD PRTM&OMENTUM DX W/WO SPEC BR/WA SPX N/A 11/25/2020    Procedure: LAPAROSCOPY DIAGNOSTIC;  Surgeon: Racheal James MD;  [facility];  Service: Gynecology    RI MAST MODF RAD W/AX LYMPH NOD W/WO PECT/DARBY MIN Right 6/21/2018    Procedure: BREAST MODIFIED RADICAL MASTECTOMY;  Surgeon: Cliff Kenny MD;  [facility];  Service: Surgical Oncology    RI MASTECTOMY SIMPLE COMPLETE Left 6/21/2018    Procedure: BREAST SIMPLE MASTECTOMY;  Surgeon: Cliff Kenny MD;  [facility];  Service: Surgical Oncology    RI REVISION OF RECONSTRUCTED BREAST Bilateral 9/19/2018    Procedure: REVISION BREAST RECON W/WOUND CLOSURE;  Surgeon: Munir Colby MD;  [facility];  Service: Plastics    TUBAL LIGATION      WISDOM TOOTH EXTRACTION       Family History   Problem Relation Age of Onset    Diabetes Mother     Hypertension Mother     Rosacea Father     Allergies Father         seasonal    No Known Problems Brother     No Known Problems Brother     No Known Problems Son     Breast cancer Maternal Grandmother     Brain cancer Maternal Grandmother     Cancer Maternal Grandmother     No Known Problems Maternal Grandfather     Diabetes Paternal Grandmother     Kidney failure Paternal Grandmother     Hypertension Paternal Grandmother     Liver cancer Paternal Grandfather     Cancer Paternal Grandfather      Prostate cancer Paternal Grandfather     Breast cancer Paternal Aunt       reports that she quit smoking about 10 years ago. Her smoking use included cigarettes. She started smoking about 25 years ago. She has a 7.5 pack-year smoking history. She has been exposed to tobacco smoke. She has never used smokeless tobacco. She reports current alcohol use of about 1.0 standard drink of alcohol per week. She reports that she does not use drugs.  Current Outpatient Medications   Medication Instructions    fluticasone (Flovent HFA) 110 MCG/ACT inhaler 2 puffs, Inhalation, 2 times daily, Rinse mouth after use.    levalbuterol (XOPENEX HFA) 45 mcg/act inhaler 2 puffs, Inhalation, Every 8 hours PRN    levocetirizine (XYZAL) 5 mg, Every evening    LORazepam (ATIVAN) 1 mg, Oral, Daily at bedtime,       [START ON 4/4/2025] Semaglutide-Weight Management (Wegovy) 1 MG/0.5ML Inject 1 mg under the skin weekly    tamoxifen (NOLVADEX) 20 mg, Oral, Daily     Allergies   Allergen Reactions    Trichophyton Other (See Comments)     AKA Fungi - Pt does not know reaction     Latex Swelling    Cat Dander Sneezing    Dust Mite Extract Sneezing    Molds & Smuts Sneezing    Pollen Extract Sneezing    Tree Extract Sneezing      Current Outpatient Medications on File Prior to Visit   Medication Sig Dispense Refill    fluticasone (Flovent HFA) 110 MCG/ACT inhaler Inhale 2 puffs 2 (two) times a day Rinse mouth after use. 12 g 0    levalbuterol (XOPENEX HFA) 45 mcg/act inhaler Inhale 2 puffs every 8 (eight) hours as needed for wheezing 15 g 0    levocetirizine (XYZAL) 5 MG tablet Take 5 mg by mouth every evening       LORazepam (ATIVAN) 1 mg tablet Take 1 tablet (1 mg total) by mouth daily at bedtime 90 tablet 0    tamoxifen (NOLVADEX) 20 mg tablet Take 1 tablet (20 mg total) by mouth daily 90 tablet 0    [DISCONTINUED] Semaglutide-Weight Management (Wegovy) 0.5 MG/0.5ML Inject 0.5 mg under the skin weekly 2 mL 0    [DISCONTINUED] Wegovy 0.25 MG/0.5ML  "INJECT 0.5 ML (0.25 MG TOTAL) UNDER THE SKIN ONCE A WEEK FOR 28 DAYS (Patient not taking: Reported on 3/12/2025)       No current facility-administered medications on file prior to visit.      Social History     Tobacco Use    Smoking status: Former     Current packs/day: 0.00     Average packs/day: 0.5 packs/day for 15.0 years (7.5 ttl pk-yrs)     Types: Cigarettes     Start date: 12/1/1999     Quit date: 12/1/2014     Years since quitting: 10.3     Passive exposure: Past    Smokeless tobacco: Never   Vaping Use    Vaping status: Never Used   Substance and Sexual Activity    Alcohol use: Yes     Alcohol/week: 1.0 standard drink of alcohol     Types: 1 Standard drinks or equivalent per week     Comment: occ    Drug use: No    Sexual activity: Yes     Partners: Male     Birth control/protection: Female Sterilization       Objective   /86 (BP Location: Left arm, Patient Position: Sitting, Cuff Size: Standard)   Pulse 104   Temp 98.3 °F (36.8 °C) (Tympanic)   Resp 16   Ht 5' 3.15\" (1.604 m)   Wt 79.8 kg (176 lb)   LMP 10/15/2020   SpO2 98%   BMI 31.03 kg/m²     Physical Exam  Vitals and nursing note reviewed.   Constitutional:       Appearance: Normal appearance. She is well-developed.   HENT:      Head: Normocephalic and atraumatic.      Right Ear: Tympanic membrane normal.      Left Ear: Tympanic membrane normal.      Nose: Nose normal.   Eyes:      Pupils: Pupils are equal, round, and reactive to light.   Neck:      Thyroid: No thyromegaly.   Cardiovascular:      Rate and Rhythm: Normal rate and regular rhythm.      Heart sounds: No murmur heard.  Pulmonary:      Effort: Pulmonary effort is normal.      Breath sounds: Normal breath sounds.   Abdominal:      General: Bowel sounds are normal.      Palpations: Abdomen is soft.   Musculoskeletal:         General: No deformity. Normal range of motion.      Cervical back: Normal range of motion and neck supple.   Skin:     General: Skin is warm.      " Capillary Refill: Capillary refill takes less than 2 seconds.      Findings: No erythema or rash.   Neurological:      General: No focal deficit present.      Mental Status: She is alert and oriented to person, place, and time.      Deep Tendon Reflexes: Reflexes are normal and symmetric.   Psychiatric:         Behavior: Behavior normal.       BMI Counseling: Body mass index is 31.03 kg/m². The BMI is above normal. Nutrition recommendations include decreasing overall calorie intake, 3-5 servings of fruits/vegetables daily, reducing fast food intake, and decreasing soda and/or juice intake. Exercise recommendations include moderate aerobic physical activity for 150 minutes/week.

## 2025-03-25 NOTE — ASSESSMENT & PLAN NOTE
Initial weight : 184 lbs   Current weight: 176 lbs   Continue diet, exercise and portion control   Wegovy is helping   Orders:    Semaglutide-Weight Management (Wegovy) 1 MG/0.5ML; Inject 1 mg under the skin weekly Do not start before April 4, 2025.

## 2025-03-27 ENCOUNTER — TELEPHONE (OUTPATIENT)
Dept: HEMATOLOGY ONCOLOGY | Facility: CLINIC | Age: 45
End: 2025-03-27

## 2025-04-11 DIAGNOSIS — F41.1 GENERALIZED ANXIETY DISORDER: ICD-10-CM

## 2025-04-14 RX ORDER — LORAZEPAM 1 MG/1
1 TABLET ORAL
Qty: 30 TABLET | Refills: 0 | Status: SHIPPED | OUTPATIENT
Start: 2025-04-14

## 2025-04-14 NOTE — TELEPHONE ENCOUNTER
Medication:  PDMP   03/11/2025 03/11/2025 LORazepam (Tablet) 30.0 30 1 MG NA AWILDA LANGE     Active agreement on file -Yes

## 2025-04-21 NOTE — PROGRESS NOTES
Name: Lissette Sanchez      : 1980      MRN: [id number]  Encounter Provider: Raphael Breen MD  Encounter Date: 2025   Encounter department: [facility] [place]    Chief Complaint   Patient presents with   • Follow-up     :  Assessment & Plan  Malignant neoplasm of upper-outer quadrant of right breast in female, estrogen receptor positive (HCC)  By sign, symptom or clinical examination, no evidence of metastatic disease in recurrence  Orders:  •  CBC and differential; Future  •  Comprehensive metabolic panel; Future  •  Magnesium; Future    Monoallelic mutation of PALB2 gene  Reconsider routine conversation and follow up with gyn oncology  Orders:  •  MRI abdomen w wo contrast and mrcp; Future    Class 1 obesity due to excess calories without serious comorbidity with body mass index (BMI) of 31.0 to 31.9 in adult  Pursue weight loss 10 percent       Family history of cancer  Patient asked to reconsider risk reducing oophorectomy surgeries  Orders:  •  MRI abdomen w wo contrast and mrcp; Future    Tobacco abuse, in remission  Ct chest screening on return       History of hysterectomy  2020 status post hysterectomy and bilateral salpingectomy with removal of uterus cervix and bilateral fallopian tubes.  Ovaries remain in place  Diagnosis-symptomatic leiomyomata with increased bleeding             Clinical/Pathologic Stage  Cancer Staging   Malignant neoplasm of upper-outer quadrant of right breast in female, estrogen receptor positive (HCC)  Staging form: Breast, AJCC 8th Edition  - Clinical stage from 2018: Stage IB (cT2(3), cN1, cM0, G2, ER: Positive, DE: Positive, HER2: Positive) - Signed by Cliff Kenny MD on 2018  Method of lymph node assessment: Other  Nuclear grade: G3  Histologic grading system: 3 grade system  Laterality: Right  Tumor size (mm): 35  Multiple tumors: Yes  Number of tumors: 3      Current Therapy  Tamoxifen 20 mg/day from January  2019    Discussion  This pleasant 44-year-old premenopausal female returns to medical oncology review on adjuvant therapy for locally advanced invasive carcinoma of the of the right breast establish in 2018.  The patient remains on oral tamoxifen    We had a long discussion about her risk factors for the development of breast cancer and other solid tumor cancers.  We had considerable time focused on her PAL B2 high risk gene mutation as it applies to breast cancer, ovarian cancer and pancreatic cancer.  Educational materials were provided around this genetic proclivity.    The patient has no symptoms signs or clinical findings to suggest recurrent breast cancer.    I encouraged her to be seen by GYN oncology and continue discussions around prophylactic oophorectomy.  I note that the patient had hysterectomy surgery due to bleeding fibroids in November 2020.  At that time she was encouraged to complete oophorectomy but declined.  She simply had hysterectomy and bilateral removal of tubes.  She continues on today to maintain a position of aversion towards risk reducing oophorectomy surgery.  She remains concerned about the impact on lifestyle and quality of life.    We discussed weight, consistent exercise and other risk reducing elements.      The patient is asked to return in 1-2 months after an MRI MRCP of the abdomen is performed to review for possible early changes suggestive of pancreatic malignancy.  The ideal approach to screening for pancreatic neoplasia in this setting a PAL B2 mutation remains a matter of active discussion amongst research experts.  This screening will be her first    The patient was given copious educational materials and a handwritten sheet from this physician.  She expressed understanding and clarity along with her mental and emotional disquiet around oophorectomy    Special Studies  January 29, 2018 [employer] germline genetic panel  Positive PAL B2 variant-deletion in exons 11-12,  "heterozygous, \"likely pathogenic\"    No results found for: \"GENETIC\", \"INTERP\", \"GENES\"      History of Present Illness   This 44-year-old woman returns for medical oncology follow-up.    Diagnostic bilateral mammography was performed out of the [facility] on January 15, 2018.  The study revealed a dominant 3.6 x 2.4 x 3.1 cm mass in the right right breast and right axillary tail at the 10 o'clock position.  A second smaller 1.4 x 0.6 x 0.8 hypoechoic mass at the 930-10 o'clock position was also noted.  Right axillary lymph nodes were noted ultrasound-guided biopsies were recommended and the findings were concerning for and highly suspicious of malignancy    The patient completed guided biopsy of right breast mass at 10 o'clock position on January 30, 2018.Please see 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  Tumor phenotype was estrogen receptor +35%, progesterone receptor +10% and HER2/mariluz +3+ by IHC    The patient proceeded to receive neoadjuvant preoperative therapy with docetaxel, carboplatin, trastuzumab plus pertuzumab for 6 cycles.  Preoperative therapy ended in June 2018    On June 21, 2018 the patient completed her right sided mastectomy with sentinel lymph node reviews as well as a left sided prophylactic simple mastectomy.  Please see . [facility] surgical pathology number [id number] for confirmation and review.  The right mastectomy specimen confirmed the diagnosis of residual mucinous carcinoma of the left breast grade 1.  The residual tumor deposits measured less than 1 mm and 3 of 16 regional lymph nodes were positive for metastatic carcinoma.  Final pathologic staging following neoadjuvant therapy was ypT1 microscopic ypN1 microscopic Grade 1..  The left breast simple mastectomy showed benign nonproliferative fibrocystic changes without atypia    These " pathologic results were deemed as an excellent pathologic benefit to preoperative neoadjuvant therapy with docetaxel, carboplatin, trastuzumab with pertuzumab.    The patient received maintenance therapy with trastuzumab alone from June 2018 through January 2019    The patient also completed postmastectomy radiation therapy in December 2018    The patient was placed on oral tamoxifen 20 mg/day in approximately January 2019      The patient has a PAL B2 germline mutation and has had discussions regarding risk reducing bilateral salpingo-oophorectomy surgery and close clinical follow up    Pertinent History from May 3, 2023  Patient's last visit with medical oncology was May 3, 2023  Today represents my initial visit with this patient    Cancer Screening and Prevention  Mammography: 06/21/2018   GI/Colonoscopy: Not on file   GYN: Not on file   Bone Density: Not on file   Covid 19 Vaccination Status: Pfizer series January 21/February 21, Pfizer booster November 2021.    Oncology Therapeutic Summary:    January 2019 -     tamoxifen 20 mg/day    January 2019 from July 19, 2018 maintenance trastuzumab every 3 weeks    December 14, 2018-November 1, 2018 status post adjuvant radiation therapy to the right right breast , right supraclavicular, draining lymph nodes with a right breast boost        June 21, 2018 [id number] status post left simple mastectomy plus right breast modified radical mastectomy plus right axillary sentinel lymph node review    June 2018-  S/p preoperative chemotherapy with anti her HER2/mariluz therapy consisting of docetaxel, carboplatin, trastuzumab plus pertuzumab x 6 cycles      Oncology History   Cancer Staging   Malignant neoplasm of upper-outer quadrant of right breast in female, estrogen receptor positive (HCC)  Staging form: Breast, AJCC 8th Edition  - Clinical stage from 1/26/2018: Stage IB (cT2(3), cN1, cM0, G2, ER: Positive, MN: Positive, HER2: Positive) - Signed by Cliff Kenny MD on  1/26/2018  Method of lymph node assessment: Other  Nuclear grade: G3  Histologic grading system: 3 grade system  Laterality: Right  Tumor size (mm): 35  Multiple tumors: Yes  Number of tumors: 3  Oncology History   Malignant neoplasm of upper-outer quadrant of right breast in female, estrogen receptor positive (HCC)   1/19/2018 Initial Diagnosis    US guided core biopsy of right breast mass at [facility].   Malignant neoplasm of UOQ of right breast,   ER positive  HER2 positive     1/29/2018 Genetic Testing    Likely pathogenic variant was identified in the PALB2 gene.     2/2018 - 6/1/2018 Chemotherapy    Neoadjuvant chemotherapy with TCH with pertuzumab.  Dr. Lowe.     6/21/2018 Surgery    Right MRM  Invasive mucinous carcinoma  3.4 cm geronimo  Grade 1  3/16 lymph nodes  Stage IB - ypT1mi, ypN1mi, G1.    Left simple mastectomy  Benign breast.    Immediate reconstruction Dr. Givens     7/19/2018 - 1/2019 Chemotherapy    trastuzumab monotherapy every 3 weeks.       11/1/2018 - 12/14/2018 Radiation    Course: C1    Plan ID Energy Fractions Dose per Fraction (cGy) Dose Correction (cGy) Total Dose Delivered (cGy) Elapsed Days   LAT R CW bst 6E 5 / 5 200 0 1,000 4   MED R CW bst 6E 5 / 5 200 0 1,000 4   New R PAB2 6X 25 / 25 47 0 1,175 36   New R Sclav2 6X 25 / 25 200 0 5,000 36   [id number]_[id number] 6X 12 / 12 200 0 2,400 34   LkhYklNkh48_1 10X 12 / 12 200 0 2,400 34   [id number] 10X 13 / 13 200 0 2,600 36   NewR CW 10_30 6X 13 / 13 200 0 2,600 36      Treatment dates:  C1: 11/1/2018 - 12/14/2018 1/22/2019 -  Hormone Therapy    Tamoxifen  With Dr. Lowe        Review of Systems   Constitutional: Negative.      Medical History Reviewed by provider this encounter:     .    Current Outpatient Medications on File Prior to Visit   Medication Sig Dispense Refill   • fluticasone (Flovent HFA) 110 MCG/ACT inhaler Inhale 2 puffs 2 (two) times a day Rinse mouth after use. 12 g 0   • levalbuterol (XOPENEX HFA) 45 mcg/act inhaler  "Inhale 2 puffs every 8 (eight) hours as needed for wheezing 15 g 0   • levocetirizine (XYZAL) 5 MG tablet Take 5 mg by mouth every evening      • LORazepam (ATIVAN) 1 mg tablet Take 1 tablet (1 mg total) by mouth daily at bedtime 30 tablet 0   • Semaglutide-Weight Management (Wegovy) 1 MG/0.5ML Inject 1 mg under the skin weekly Do not start before April 4, 2025. 2 mL 0   • tamoxifen (NOLVADEX) 20 mg tablet Take 1 tablet (20 mg total) by mouth daily 90 tablet 0     No current facility-administered medications on file prior to visit.      Social History     Tobacco Use   • Smoking status: Former     Current packs/day: 0.00     Average packs/day: 0.5 packs/day for 15.0 years (7.5 ttl pk-yrs)     Types: Cigarettes     Start date: 12/1/1999     Quit date: 12/1/2014     Years since quitting: 10.4     Passive exposure: Past   • Smokeless tobacco: Never   Vaping Use   • Vaping status: Never Used   Substance and Sexual Activity   • Alcohol use: Yes     Alcohol/week: 1.0 standard drink of alcohol     Types: 1 Standard drinks or equivalent per week     Comment: occ   • Drug use: No   • Sexual activity: Yes     Partners: Male     Birth control/protection: Female Sterilization         Objective   /82 (BP Location: Left arm, Patient Position: Sitting, Cuff Size: Adult)   Pulse 78   Temp 98.5 °F (36.9 °C) (Temporal)   Resp 17   Ht 5' 3\" (1.6 m)   Wt 77.6 kg (171 lb)   LMP 10/15/2020   SpO2 98%   BMI 30.29 kg/m²     Pain Screening:  Pain Score: 0-No pain  ECOG     Physical Exam  Constitutional:       Appearance: She is obese.   Neck:      Thyroid: No thyroid mass, thyromegaly or thyroid tenderness.      Trachea: Trachea normal.   Cardiovascular:      Rate and Rhythm: Normal rate and regular rhythm.      Heart sounds: Normal heart sounds. No murmur heard.     No systolic murmur is present.      No diastolic murmur is present.      No friction rub. No gallop. No S3 or S4 sounds.   Pulmonary:      Breath sounds: Normal " breath sounds. No decreased air movement or transmitted upper airway sounds. No decreased breath sounds, wheezing, rhonchi or rales.   Chest:      Chest wall: No mass. There is no dullness to percussion.   Breasts:     Breasts are asymmetrical.      Right: Skin change present. No mass or tenderness.      Left: Skin change present. No mass or tenderness.   Abdominal:      General: Abdomen is protuberant. Bowel sounds are normal.      Palpations: Abdomen is soft. There is no hepatomegaly, splenomegaly, mass or pulsatile mass.      Tenderness: There is no abdominal tenderness. There is no right CVA tenderness or left CVA tenderness.   Musculoskeletal:      Right lower leg: No edema.      Left lower leg: No edema.   Lymphadenopathy:      Cervical: No cervical adenopathy.      Right cervical: No superficial, deep or posterior cervical adenopathy.     Left cervical: No superficial, deep or posterior cervical adenopathy.      Upper Body:      Right upper body: No supraclavicular, axillary or pectoral adenopathy.      Left upper body: No supraclavicular, axillary or pectoral adenopathy.         Labs: I have reviewed the following labs:  Lab Results   Component Value Date/Time    White Blood Cell Count 5.1 03/21/2025 07:56 AM    Red Blood Cell Count 4.18 03/21/2025 07:56 AM    Hemoglobin 13.9 03/21/2025 07:56 AM    HCT 41.3 03/21/2025 07:56 AM    MCV 98.8 03/21/2025 07:56 AM    MCH 33.3 (H) 03/21/2025 07:56 AM    RDW 12.5 03/21/2025 07:56 AM    Platelet Count 273 03/21/2025 07:56 AM    Neutrophils 43.3 03/21/2025 07:56 AM    Lymphocytes 41.0 03/21/2025 07:56 AM    Monocytes 9.2 03/21/2025 07:56 AM    Eosinophils 5.1 03/21/2025 07:56 AM    Neutrophils (Absolute) 2,208 03/21/2025 07:56 AM     Lab Results   Component Value Date/Time    Potassium 4.2 03/21/2025 07:56 AM    Chloride 107 03/21/2025 07:56 AM    CO2 28 03/21/2025 07:56 AM    BUN 17 03/21/2025 07:56 AM    Creatinine 0.90 03/21/2025 07:56 AM    Calcium 8.7 03/21/2025  07:56 AM    AST 14 03/21/2025 07:56 AM    ALT 12 03/21/2025 07:56 AM    Alkaline Phosphatase 50 03/21/2025 07:56 AM    Protein, Total 6.4 03/21/2025 07:56 AM    Albumin 4.0 03/21/2025 07:56 AM    TOTAL BILIRUBIN 0.3 03/21/2025 07:56 AM    eGFR 82 03/21/2025 07:56 AM         Pathology:     June 21, 2018 [id number] status post left simple mastectomy plus right mastectomy plus right axillary sentinel lymph node review    Imaging:     January 15, 2018 bilateral diagnostic mammogram [facility]    Impression    IMPRESSION:   Dominant 3.6 x 2.4 x 3.1 cm mass right breast/axillary tail region at the 10 o'clock position, 12 cm from the nipple is highly suspicious for malignancy. Suggestion of additional disease extending closer to the nipple, including a second smaller  dominant 1.4 x 0.6 x 0.8 cm hypoechoic mass at the 9:30 to 10:00 axis, 8 cm from nipple. Ultrasound-guided biopsy of the dominant mass and possibly the second smaller mass closer to the nipple is recommended.     Loosely grouped mildly heterogeneous calcifications in a segmental distribution extending along the 10:00 to 11:00 axis from the dominant mass towards the nipple is suspicious for additional in situ disease. Management will be determined pending biopsy  results above.     Right axillary lymphadenopathy.     Findings and recommendations discussed with the patient. Biopsy is recommended. [facility] will facilitate the patient's follow up.     No results found for this or any previous visit.

## 2025-04-21 NOTE — ASSESSMENT & PLAN NOTE
By sign, symptom or clinical examination, no evidence of metastatic disease in recurrence  Orders:  •  CBC and differential; Future  •  Comprehensive metabolic panel; Future  •  Magnesium; Future

## 2025-04-21 NOTE — ASSESSMENT & PLAN NOTE
Reconsider routine conversation and follow up with gyn oncology  Orders:  •  MRI abdomen w wo contrast and mrcp; Future   show

## 2025-04-23 ENCOUNTER — OFFICE VISIT (OUTPATIENT)
Dept: HEMATOLOGY ONCOLOGY | Facility: CLINIC | Age: 45
End: 2025-04-23
Payer: COMMERCIAL

## 2025-04-23 VITALS
DIASTOLIC BLOOD PRESSURE: 82 MMHG | TEMPERATURE: 98.5 F | SYSTOLIC BLOOD PRESSURE: 108 MMHG | HEIGHT: 63 IN | WEIGHT: 171 LBS | BODY MASS INDEX: 30.3 KG/M2 | RESPIRATION RATE: 17 BRPM | HEART RATE: 78 BPM | OXYGEN SATURATION: 98 %

## 2025-04-23 DIAGNOSIS — F17.201 TOBACCO ABUSE, IN REMISSION: ICD-10-CM

## 2025-04-23 DIAGNOSIS — Z15.09 MONOALLELIC MUTATION OF PALB2 GENE: ICD-10-CM

## 2025-04-23 DIAGNOSIS — Z15.01 MONOALLELIC MUTATION OF PALB2 GENE: ICD-10-CM

## 2025-04-23 DIAGNOSIS — Z80.9 FAMILY HISTORY OF CANCER: ICD-10-CM

## 2025-04-23 DIAGNOSIS — Z90.710 HISTORY OF HYSTERECTOMY: ICD-10-CM

## 2025-04-23 DIAGNOSIS — E66.811 CLASS 1 OBESITY DUE TO EXCESS CALORIES WITHOUT SERIOUS COMORBIDITY WITH BODY MASS INDEX (BMI) OF 31.0 TO 31.9 IN ADULT: ICD-10-CM

## 2025-04-23 DIAGNOSIS — C50.411 MALIGNANT NEOPLASM OF UPPER-OUTER QUADRANT OF RIGHT BREAST IN FEMALE, ESTROGEN RECEPTOR POSITIVE (HCC): Primary | ICD-10-CM

## 2025-04-23 DIAGNOSIS — Z17.0 MALIGNANT NEOPLASM OF UPPER-OUTER QUADRANT OF RIGHT BREAST IN FEMALE, ESTROGEN RECEPTOR POSITIVE (HCC): Primary | ICD-10-CM

## 2025-04-23 DIAGNOSIS — Z15.89 MONOALLELIC MUTATION OF PALB2 GENE: ICD-10-CM

## 2025-04-23 DIAGNOSIS — E66.09 CLASS 1 OBESITY DUE TO EXCESS CALORIES WITHOUT SERIOUS COMORBIDITY WITH BODY MASS INDEX (BMI) OF 31.0 TO 31.9 IN ADULT: ICD-10-CM

## 2025-04-23 PROCEDURE — 99245 OFF/OP CONSLTJ NEW/EST HI 55: CPT | Performed by: INTERNAL MEDICINE

## 2025-05-01 DIAGNOSIS — E66.9 OBESITY (BMI 30-39.9): ICD-10-CM

## 2025-05-02 RX ORDER — SEMAGLUTIDE 1 MG/.5ML
INJECTION, SOLUTION SUBCUTANEOUS
Qty: 2 ML | Refills: 0 | Status: SHIPPED | OUTPATIENT
Start: 2025-05-02 | End: 2026-03-31

## 2025-05-05 DIAGNOSIS — E66.9 OBESITY (BMI 30-39.9): Primary | ICD-10-CM

## 2025-05-05 RX ORDER — SEMAGLUTIDE 1.7 MG/.75ML
INJECTION, SOLUTION SUBCUTANEOUS
Qty: 3 ML | Refills: 0 | Status: SHIPPED | OUTPATIENT
Start: 2025-05-05 | End: 2026-04-30

## 2025-05-12 DIAGNOSIS — F41.1 GENERALIZED ANXIETY DISORDER: ICD-10-CM

## 2025-05-12 RX ORDER — LORAZEPAM 1 MG/1
1 TABLET ORAL
Qty: 30 TABLET | Refills: 0 | Status: SHIPPED | OUTPATIENT
Start: 2025-05-12

## 2025-05-12 NOTE — TELEPHONE ENCOUNTER
Medication:  PDMP   04/14/2025 04/14/2025 LORazepam (Tablet) 30.0 30 1 MG NA AWILDA LANGE     Active agreement on file -Yes

## 2025-05-17 LAB
ALBUMIN SERPL-MCNC: 4.1 G/DL (ref 3.6–5.1)
ALBUMIN/GLOB SERPL: 1.6 (CALC) (ref 1–2.5)
ALP SERPL-CCNC: 40 U/L (ref 31–125)
ALT SERPL-CCNC: 13 U/L (ref 6–29)
AST SERPL-CCNC: 14 U/L (ref 10–30)
BASOPHILS # BLD AUTO: 62 CELLS/UL (ref 0–200)
BASOPHILS NFR BLD AUTO: 1 %
BILIRUB SERPL-MCNC: 0.2 MG/DL (ref 0.2–1.2)
BUN SERPL-MCNC: 20 MG/DL (ref 7–25)
BUN/CREAT SERPL: NORMAL (CALC) (ref 6–22)
CALCIUM SERPL-MCNC: 9 MG/DL (ref 8.6–10.2)
CHLORIDE SERPL-SCNC: 108 MMOL/L (ref 98–110)
CO2 SERPL-SCNC: 26 MMOL/L (ref 20–32)
CREAT SERPL-MCNC: 0.92 MG/DL (ref 0.5–0.99)
EOSINOPHIL # BLD AUTO: 81 CELLS/UL (ref 15–500)
EOSINOPHIL NFR BLD AUTO: 1.3 %
ERYTHROCYTE [DISTWIDTH] IN BLOOD BY AUTOMATED COUNT: 12.3 % (ref 11–15)
GFR/BSA.PRED SERPLBLD CYS-BASED-ARV: 79 ML/MIN/1.73M2
GLOBULIN SER CALC-MCNC: 2.5 G/DL (CALC) (ref 1.9–3.7)
GLUCOSE SERPL-MCNC: 78 MG/DL (ref 65–99)
HCT VFR BLD AUTO: 39.9 % (ref 35–45)
HGB BLD-MCNC: 13.4 G/DL (ref 11.7–15.5)
LYMPHOCYTES # BLD AUTO: 1612 CELLS/UL (ref 850–3900)
LYMPHOCYTES NFR BLD AUTO: 26 %
MAGNESIUM SERPL-MCNC: 2 MG/DL (ref 1.5–2.5)
MCH RBC QN AUTO: 33 PG (ref 27–33)
MCHC RBC AUTO-ENTMCNC: 33.6 G/DL (ref 32–36)
MCV RBC AUTO: 98.3 FL (ref 80–100)
MONOCYTES # BLD AUTO: 546 CELLS/UL (ref 200–950)
MONOCYTES NFR BLD AUTO: 8.8 %
NEUTROPHILS # BLD AUTO: 3900 CELLS/UL (ref 1500–7800)
NEUTROPHILS NFR BLD AUTO: 62.9 %
PLATELET # BLD AUTO: 246 THOUSAND/UL (ref 140–400)
PMV BLD REES-ECKER: 9.5 FL (ref 7.5–12.5)
POTASSIUM SERPL-SCNC: 4.1 MMOL/L (ref 3.5–5.3)
PROT SERPL-MCNC: 6.6 G/DL (ref 6.1–8.1)
RBC # BLD AUTO: 4.06 MILLION/UL (ref 3.8–5.1)
SODIUM SERPL-SCNC: 140 MMOL/L (ref 135–146)
WBC # BLD AUTO: 6.2 THOUSAND/UL (ref 3.8–10.8)

## 2025-05-22 ENCOUNTER — HOSPITAL ENCOUNTER (OUTPATIENT)
Dept: MRI IMAGING | Facility: HOSPITAL | Age: 45
Discharge: HOME | End: 2025-05-22
Attending: INTERNAL MEDICINE
Payer: COMMERCIAL

## 2025-05-22 DIAGNOSIS — Z80.9 FAMILY HISTORY OF CANCER: ICD-10-CM

## 2025-05-22 DIAGNOSIS — Z15.01 MONOALLELIC MUTATION OF PALB2 GENE: ICD-10-CM

## 2025-05-22 DIAGNOSIS — Z15.09 MONOALLELIC MUTATION OF PALB2 GENE: ICD-10-CM

## 2025-05-22 DIAGNOSIS — Z15.89 MONOALLELIC MUTATION OF PALB2 GENE: ICD-10-CM

## 2025-05-22 PROCEDURE — 74183 MRI ABD W/O CNTR FLWD CNTR: CPT

## 2025-05-22 PROCEDURE — A9585 GADOBUTROL INJECTION: HCPCS | Performed by: FAMILY MEDICINE

## 2025-05-22 RX ORDER — GADOBUTROL 604.72 MG/ML
7 INJECTION INTRAVENOUS
Status: COMPLETED | OUTPATIENT
Start: 2025-05-22 | End: 2025-05-22

## 2025-05-22 RX ADMIN — GADOBUTROL 7 ML: 604.72 INJECTION INTRAVENOUS at 16:39

## 2025-05-28 DIAGNOSIS — E66.9 OBESITY (BMI 30-39.9): Primary | ICD-10-CM

## 2025-05-28 RX ORDER — SEMAGLUTIDE 2.4 MG/.75ML
INJECTION, SOLUTION SUBCUTANEOUS
Qty: 3 ML | Refills: 0 | Status: SHIPPED | OUTPATIENT
Start: 2025-05-28 | End: 2026-04-30

## 2025-06-01 DIAGNOSIS — C50.411 MALIGNANT NEOPLASM OF UPPER-OUTER QUADRANT OF RIGHT BREAST IN FEMALE, ESTROGEN RECEPTOR POSITIVE (HCC): ICD-10-CM

## 2025-06-01 DIAGNOSIS — Z17.0 MALIGNANT NEOPLASM OF UPPER-OUTER QUADRANT OF RIGHT BREAST IN FEMALE, ESTROGEN RECEPTOR POSITIVE (HCC): ICD-10-CM

## 2025-06-02 RX ORDER — TAMOXIFEN CITRATE 20 MG/1
20 TABLET ORAL DAILY
Qty: 90 TABLET | Refills: 0 | Status: SHIPPED | OUTPATIENT
Start: 2025-06-02

## 2025-06-06 ENCOUNTER — PATIENT MESSAGE (OUTPATIENT)
Dept: FAMILY MEDICINE CLINIC | Facility: CLINIC | Age: 45
End: 2025-06-06

## 2025-06-06 DIAGNOSIS — Z87.898 HISTORY OF MOTION SICKNESS: Primary | ICD-10-CM

## 2025-06-06 RX ORDER — SCOPOLAMINE 1 MG/3D
1 PATCH, EXTENDED RELEASE TRANSDERMAL
Qty: 3 PATCH | Refills: 0 | Status: SHIPPED | OUTPATIENT
Start: 2025-06-06

## 2025-06-09 DIAGNOSIS — F41.1 GENERALIZED ANXIETY DISORDER: ICD-10-CM

## 2025-06-09 RX ORDER — LORAZEPAM 1 MG/1
1 TABLET ORAL
Qty: 30 TABLET | Refills: 0 | Status: SHIPPED | OUTPATIENT
Start: 2025-06-09

## 2025-06-09 NOTE — TELEPHONE ENCOUNTER
Medication:  PDMP     05/12/2025 05/12/2025 LORazepam (Tablet) 30.0 30 1 MG NA AWILDA LANGE     Active agreement on file -Yes

## 2025-06-24 NOTE — PROGRESS NOTES
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  Education materials provided  Orders:        Class 1 obesity due to excess calories without serious comorbidity with body mass index (BMI) of 31.0 to 31.9 in adult  Pursue weight loss 10 percent-15 percent     Family history of cancer  Patient asked to reconsider risk reducing oophorectomy surgery  Orders:    MRI abdomen w wo contrast and mrcp; Future     Tobacco abuse, in remission  Ct chest screening 0rdered     History of hysterectomy  November 2020 status post hysterectomy and bilateral salpingectomy with removal of uterus cervix and bilateral fallopian tubes.  Ovaries remain in place  Diagnosis-symptomatic leiomyomata with increased bleeding              Clinical/Pathologic Stage  Cancer Staging   Malignant neoplasm of upper-outer quadrant of right breast in female, estrogen receptor positive (HCC)  Staging form: Breast, AJCC 8th Edition  - Clinical stage from 1/26/2018: Stage IB (cT2(3), cN1, cM0, G2, ER: Positive, WV: Positive, HER2: Positive) - Signed by Cliff Kenny MD on 1/26/2018  Method of lymph node assessment: Other  Nuclear grade: G3  Histologic grading system: 3 grade system  Laterality: Right  Tumor size (mm): 35  Multiple tumors: Yes  Number of tumors: 3        Current Therapy  Tamoxifen 20 mg/day from January 2019     Discussion  This pleasant 44-year-old premenopausal female returns to medical oncology  review on adjuvant therapy for locally advanced invasive carcinoma of the of the right breast establish in 2018.  The patient remains on oral tamoxifen     We had a long discussion about her risk factors for the development of breast cancer and other solid tumor cancers.  We had considerable time focused on her PAL B2 high risk gene mutation as it applies to breast cancer, ovarian cancer and pancreatic cancer.  Educational materials were provided around this genetic proclivity.     The patient has no symptoms signs or clinical findings to suggest recurrent breast cancer.     I encouraged her to be seen by GYN oncology and continue discussions around prophylactic oophorectomy.  I note that the patient had hysterectomy surgery due to bleeding fibroids in November 2020.  At that time she was encouraged to complete oophorectomy but declined.  She simply had hysterectomy and bilateral removal of tubes.  She continues on today to maintain a position of aversion towards risk reducing oophorectomy surgery.  She remains concerned about the impact on lifestyle and quality of life.     We discussed weight, consistent exercise and other risk reducing elements.         MRI /MRCP has been performed on May 22, 2025 and shows no evidence of pancreatic lesions or metastatic recurrent disease.    Going forward the patient has had optimal risk reducing bilateral mastectomy surgeries.  We discussed her baseline clinical presentation with multifocal disease in consideration of optimal adjuvant therapies pertaining to her diagnosis of breast cancer.  She has completed 6+ years of oral tamoxifen.  Her menopausal status remains unclear.    I have raised a consideration for some period of time on aromatase inhibitor therapy as a complement to her years of tamoxifen.  The patient has been on oral tamoxifen since January 2019 and clearly has had a reasonable duration of adjuvant hormonal based care. The patient agrees to update LH FSH  "estradiol levels.  In my mind, some exposure to aromatase inhibitor therapy boosts the adjuvant therapeutic projections of hormonal-based therapies in this young woman.      Special Studies  January 29, 2018 [facility] germline genetic panel  Positive PAL B2 variant-deletion in exons 11-12, heterozygous, \"likely pathogenic\"     No results found for: \"GENETIC\", \"INTERP\", \"GENES\"        History of Present Illness     This 44-year-old woman returns for medical oncology follow-up.     Diagnostic bilateral mammography was performed out of the [facility] on January 15, 2018.  The study revealed a dominant 3.6 x 2.4 x 3.1 cm mass in the right right breast and right axillary tail at the 10 o'clock position.  A second smaller 1.4 x 0.6 x 0.8 hypoechoic mass at the 930-10 o'clock position was also noted.  Right axillary lymph nodes were noted ultrasound-guided biopsies were recommended and the findings were concerning for and highly suspicious of malignancy     The patient completed guided biopsy of right breast mass at 10 o'clock position on January 30, 2018.Please see [facility] surgical pathology number [id number] for confirmation and review final pathologic diagnosis was positive for invasive mucinous carcinoma grade 2 out of 3.  Tumor phenotype was estrogen receptor +35%, progesterone receptor +10% and HER2/mariluz +3+ by IHC     The patient proceeded to receive neoadjuvant preoperative therapy with docetaxel, carboplatin, trastuzumab plus pertuzumab for 6 cycles.  Preoperative therapy ended in June 2018     On June 21, 2018 the patient completed her right sided mastectomy with sentinel lymph node reviews as well as a left sided prophylactic simple mastectomy.  Please see [facility] surgical pathology number [id number] for confirmation and review.  The right mastectomy specimen confirmed the diagnosis of residual mucinous carcinoma of the left breast grade 1.  The residual tumor deposits measured less than 1 " mm and 3 of 16 regional lymph nodes were positive for metastatic carcinoma.  Final pathologic staging following neoadjuvant therapy was ypT1 microscopic ypN1 microscopic Grade 1..  The left breast simple mastectomy showed benign nonproliferative fibrocystic changes without atypia     These pathologic results were deemed as an excellent pathologic benefit to preoperative neoadjuvant therapy with docetaxel, carboplatin, trastuzumab with pertuzumab.     The patient received maintenance therapy with trastuzumab alone from June 2018 through January 2019     The patient also completed postmastectomy radiation therapy in December 2018     The patient was placed on oral tamoxifen 20 mg/day in approximately January 2019        The patient has a PAL B2 germline mutation and has had discussions regarding risk reducing bilateral salpingo-oophorectomy surgery and close clinical follow up     Pertinent History from May 3, 2023  Patient's last visit with medical oncology was May 3, 2023  Today represents my initial visit with this patient     Cancer Screening and Prevention  Mammography: 06/21/2018   GI/Colonoscopy: Not on file   GYN: Not on file   Bone Density: Not on file   Covid 19 Vaccination Status: Pfizer series January 21/February 21, Pfizer booster November 2021.     Oncology Therapeutic Summary:     January 2019 -     tamoxifen 20 mg/day     January 2019 from July 19, 2018 maintenance trastuzumab every 3 weeks     December 14, 2018-November 1, 2018 status post adjuvant radiation therapy to the right right breast , right supraclavicular, draining lymph nodes with a right breast boost           June 21, 2018 [id number] status post left simple mastectomy plus right breast modified radical mastectomy plus right axillary sentinel lymph node review     June 2018-  S/p preoperative chemotherapy with anti her HER2/mariluz therapy consisting of docetaxel, carboplatin, trastuzumab plus pertuzumab x 6 cycles        Oncology History    Cancer Staging   Malignant neoplasm of upper-outer quadrant of right breast in female, estrogen receptor positive (HCC)  Staging form: Breast, AJCC 8th Edition  - Clinical stage from 1/26/2018: Stage IB (cT2(3), cN1, cM0, G2, ER: Positive, SC: Positive, HER2: Positive) - Signed by Cliff Kenny MD on 1/26/2018  Method of lymph node assessment: Other  Nuclear grade: G3  Histologic grading system: 3 grade system  Laterality: Right  Tumor size (mm): 35  Multiple tumors: Yes  Number of tumors: 3      Oncology History   Malignant neoplasm of upper-outer quadrant of right breast in female, estrogen receptor positive (HCC)   1/19/2018 Initial Diagnosis     US guided core biopsy of right breast mass at [facility].   Malignant neoplasm of UOQ of right breast,   ER positive  HER2 positive      1/29/2018 Genetic Testing     Likely pathogenic variant was identified in the PALB2 gene.      2/2018 - 6/1/2018 Chemotherapy     Neoadjuvant chemotherapy with TCH with pertuzumab.  Dr. Lowe.      6/21/2018 Surgery     Right MRM  Invasive mucinous carcinoma  3.4 cm geronimo  Grade 1  3/16 lymph nodes  Stage IB - ypT1mi, ypN1mi, G1.     Left simple mastectomy  Benign breast.     Immediate reconstruction Dr. Givens      7/19/2018 - 1/2019 Chemotherapy     trastuzumab monotherapy every 3 weeks.        11/1/2018 - 12/14/2018 Radiation     Course: C1     Plan ID Energy Fractions Dose per Fraction (cGy) Dose Correction (cGy) Total Dose Delivered (cGy) Elapsed Days   LAT R CW bst 6E 5 / 5 200 0 1,000 4   MED R CW bst 6E 5 / 5 200 0 1,000 4   New R PAB2 6X 25 / 25 47 0 1,175 36   New R Sclav2 6X 25 / 25 200 0 5,000 36   [id number]_[id number] 6X 12 / 12 200 0 2,400 34   VgjQsjTcn26_2 10X 12 / 12 200 0 2,400 34   YtvVvcYG09_50 10X 13 / 13 200 0 2,600 36   NewR CW 10_30 6X 13 / 13 200 0 2,600 36      Treatment dates:  C1: 11/1/2018 - 12/14/2018 1/22/2019 -  Hormone Therapy     Tamoxifen  With Dr. Lowe         Review of Systems   Constitutional: Negative.  "      Medical History Reviewed by provider this encounter:     .     Medications Ordered Prior to Encounter          Current Outpatient Medications on File Prior to Visit   Medication Sig Dispense Refill    fluticasone (Flovent HFA) 110 MCG/ACT inhaler Inhale 2 puffs 2 (two) times a day Rinse mouth after use. 12 g 0    levalbuterol (XOPENEX HFA) 45 mcg/act inhaler Inhale 2 puffs every 8 (eight) hours as needed for wheezing 15 g 0    levocetirizine (XYZAL) 5 MG tablet Take 5 mg by mouth every evening         LORazepam (ATIVAN) 1 mg tablet Take 1 tablet (1 mg total) by mouth daily at bedtime 30 tablet 0    Semaglutide-Weight Management (Wegovy) 1 MG/0.5ML Inject 1 mg under the skin weekly Do not start before April 4, 2025. 2 mL 0    tamoxifen (NOLVADEX) 20 mg tablet Take 1 tablet (20 mg total) by mouth daily 90 tablet 0      No current facility-administered medications on file prior to visit.         Social History   Social History            Tobacco Use    Smoking status: Former       Current packs/day: 0.00       Average packs/day: 0.5 packs/day for 15.0 years (7.5 ttl pk-yrs)       Types: Cigarettes       Start date: 12/1/1999       Quit date: 12/1/2014       Years since quitting: 10.4       Passive exposure: Past    Smokeless tobacco: Never   Vaping Use    Vaping status: Never Used   Substance and Sexual Activity    Alcohol use: Yes       Alcohol/week: 1.0 standard drink of alcohol       Types: 1 Standard drinks or equivalent per week       Comment: occ    Drug use: No    Sexual activity: Yes       Partners: Male       Birth control/protection: Female Sterilization             Objective     /82 (BP Location: Left arm, Patient Position: Sitting, Cuff Size: Adult)   Pulse 78   Temp 98.5 °F (36.9 °C) (Temporal)   Resp 17   Ht 5' 3\" (1.6 m)   Wt 77.6 kg (171 lb)   LMP 10/15/2020   SpO2 98%   BMI 30.29 kg/m²      Pain Screening:  Pain Score: 0-No pain  ECOG     Physical Exam  Constitutional:       " Appearance: She is obese.   Neck:      Thyroid: No thyroid mass, thyromegaly or thyroid tenderness.      Trachea: Trachea normal.   Cardiovascular:      Rate and Rhythm: Normal rate and regular rhythm.      Heart sounds: Normal heart sounds. No murmur heard.     No systolic murmur is present.      No diastolic murmur is present.      No friction rub. No gallop. No S3 or S4 sounds.   Pulmonary:      Breath sounds: Normal breath sounds. No decreased air movement or transmitted upper airway sounds. No decreased breath sounds, wheezing, rhonchi or rales.   Chest:      Chest wall: No mass. There is no dullness to percussion.   Breasts:     Breasts are asymmetrical.      Right: Skin change present. No mass or tenderness.      Left: Skin change present. No mass or tenderness.   Abdominal:      General: Abdomen is protuberant. Bowel sounds are normal.      Palpations: Abdomen is soft. There is no hepatomegaly, splenomegaly, mass or pulsatile mass.      Tenderness: There is no abdominal tenderness. There is no right CVA tenderness or left CVA tenderness.   Musculoskeletal:      Right lower leg: No edema.      Left lower leg: No edema.   Lymphadenopathy:      Cervical: No cervical adenopathy.      Right cervical: No superficial, deep or posterior cervical adenopathy.     Left cervical: No superficial, deep or posterior cervical adenopathy.      Upper Body:      Right upper body: No supraclavicular, axillary or pectoral adenopathy.      Left upper body: No supraclavicular, axillary or pectoral adenopathy.            Labs: I have reviewed the following labs:        Lab Results   Component Value Date/Time     White Blood Cell Count 5.1 03/21/2025 07:56 AM     Red Blood Cell Count 4.18 03/21/2025 07:56 AM     Hemoglobin 13.9 03/21/2025 07:56 AM     HCT 41.3 03/21/2025 07:56 AM     MCV 98.8 03/21/2025 07:56 AM     MCH 33.3 (H) 03/21/2025 07:56 AM     RDW 12.5 03/21/2025 07:56 AM     Platelet Count 273 03/21/2025 07:56 AM      Neutrophils 43.3 03/21/2025 07:56 AM     Lymphocytes 41.0 03/21/2025 07:56 AM     Monocytes 9.2 03/21/2025 07:56 AM     Eosinophils 5.1 03/21/2025 07:56 AM     Neutrophils (Absolute) 2,208 03/21/2025 07:56 AM            Lab Results   Component Value Date/Time     Potassium 4.2 03/21/2025 07:56 AM     Chloride 107 03/21/2025 07:56 AM     CO2 28 03/21/2025 07:56 AM     BUN 17 03/21/2025 07:56 AM     Creatinine 0.90 03/21/2025 07:56 AM     Calcium 8.7 03/21/2025 07:56 AM     AST 14 03/21/2025 07:56 AM     ALT 12 03/21/2025 07:56 AM     Alkaline Phosphatase 50 03/21/2025 07:56 AM     Protein, Total 6.4 03/21/2025 07:56 AM     Albumin 4.0 03/21/2025 07:56 AM     TOTAL BILIRUBIN 0.3 03/21/2025 07:56 AM     eGFR 82 03/21/2025 07:56 AM            Pathology:      June 21, 2018 [id number] status post left simple mastectomy plus right mastectomy plus right axillary sentinel lymph node review     Imaging:      January 15, 2018 bilateral diagnostic mammogram [facility]     Impression     IMPRESSION:   Dominant 3.6 x 2.4 x 3.1 cm mass right breast/axillary tail region at the 10 o'clock position, 12 cm from the nipple is highly suspicious for malignancy. Suggestion of additional disease extending closer to the nipple, including a second smaller  dominant 1.4 x 0.6 x 0.8 cm hypoechoic mass at the 9:30 to 10:00 axis, 8 cm from nipple. Ultrasound-guided biopsy of the dominant mass and possibly the second smaller mass closer to the nipple is recommended.     Loosely grouped mildly heterogeneous calcifications in a segmental distribution extending along the 10:00 to 11:00 axis from the dominant mass towards the nipple is suspicious for additional in situ disease. Management will be determined pending biopsy  results above.     Right axillary lymphadenopathy.     Findings and recommendations discussed with the patient. Biopsy is recommended. [facility] will facilitate the patient's follow up.      No results found  for this or any previous visit.

## 2025-06-25 ENCOUNTER — OFFICE VISIT (OUTPATIENT)
Dept: HEMATOLOGY ONCOLOGY | Facility: CLINIC | Age: 45
End: 2025-06-25
Payer: COMMERCIAL

## 2025-06-25 VITALS
SYSTOLIC BLOOD PRESSURE: 106 MMHG | HEART RATE: 95 BPM | WEIGHT: 163 LBS | BODY MASS INDEX: 28.88 KG/M2 | DIASTOLIC BLOOD PRESSURE: 70 MMHG | HEIGHT: 63 IN | OXYGEN SATURATION: 99 % | RESPIRATION RATE: 16 BRPM | TEMPERATURE: 97.7 F

## 2025-06-25 DIAGNOSIS — Z79.810 USE OF TAMOXIFEN (NOLVADEX): Primary | ICD-10-CM

## 2025-06-25 DIAGNOSIS — F17.201 TOBACCO ABUSE, IN REMISSION: ICD-10-CM

## 2025-06-25 DIAGNOSIS — Z90.710 STATUS POST TOTAL ABDOMINAL HYSTERECTOMY: ICD-10-CM

## 2025-06-25 DIAGNOSIS — Z15.01 MONOALLELIC MUTATION OF PALB2 GENE: ICD-10-CM

## 2025-06-25 DIAGNOSIS — Z15.09 MONOALLELIC MUTATION OF PALB2 GENE: ICD-10-CM

## 2025-06-25 DIAGNOSIS — Z15.89 MONOALLELIC MUTATION OF PALB2 GENE: ICD-10-CM

## 2025-06-25 PROCEDURE — 99215 OFFICE O/P EST HI 40 MIN: CPT | Performed by: INTERNAL MEDICINE

## 2025-06-27 DIAGNOSIS — E66.9 OBESITY (BMI 30-39.9): ICD-10-CM

## 2025-06-29 DIAGNOSIS — Z17.0 MALIGNANT NEOPLASM OF UPPER-OUTER QUADRANT OF RIGHT BREAST IN FEMALE, ESTROGEN RECEPTOR POSITIVE (HCC): ICD-10-CM

## 2025-06-29 DIAGNOSIS — C50.411 MALIGNANT NEOPLASM OF UPPER-OUTER QUADRANT OF RIGHT BREAST IN FEMALE, ESTROGEN RECEPTOR POSITIVE (HCC): ICD-10-CM

## 2025-06-30 RX ORDER — SEMAGLUTIDE 2.4 MG/.75ML
2.4 INJECTION, SOLUTION SUBCUTANEOUS WEEKLY
OUTPATIENT
Start: 2025-06-30

## 2025-06-30 RX ORDER — SEMAGLUTIDE 2.4 MG/.75ML
INJECTION, SOLUTION SUBCUTANEOUS
Qty: 3 ML | Refills: 0 | Status: SHIPPED | OUTPATIENT
Start: 2025-06-30 | End: 2026-05-30

## 2025-06-30 RX ORDER — SEMAGLUTIDE 0.25 MG/.5ML
INJECTION, SOLUTION SUBCUTANEOUS
OUTPATIENT
Start: 2025-06-30

## 2025-07-01 RX ORDER — TAMOXIFEN CITRATE 20 MG/1
20 TABLET ORAL DAILY
Qty: 90 TABLET | Refills: 0 | Status: SHIPPED | OUTPATIENT
Start: 2025-07-01

## 2025-07-07 DIAGNOSIS — F41.1 GENERALIZED ANXIETY DISORDER: ICD-10-CM

## 2025-07-08 RX ORDER — LORAZEPAM 1 MG/1
1 TABLET ORAL
Qty: 30 TABLET | Refills: 0 | Status: SHIPPED | OUTPATIENT
Start: 2025-07-08

## 2025-07-08 NOTE — TELEPHONE ENCOUNTER
Medication:  PDMP     06/09/2025 06/09/2025 LORazepam (Tablet) 30.0 30 1 MG MEHDI LYNN     Active agreement on file -Yes

## 2025-07-09 ENCOUNTER — APPOINTMENT (OUTPATIENT)
Dept: LAB | Facility: HOSPITAL | Age: 45
End: 2025-07-09
Payer: COMMERCIAL

## 2025-07-09 ENCOUNTER — HOSPITAL ENCOUNTER (OUTPATIENT)
Dept: RADIOLOGY | Facility: HOSPITAL | Age: 45
Discharge: HOME | End: 2025-07-09
Attending: FAMILY MEDICINE
Payer: COMMERCIAL

## 2025-07-09 ENCOUNTER — APPOINTMENT (OUTPATIENT)
Dept: LAB | Facility: HOSPITAL | Age: 45
End: 2025-07-09
Attending: INTERNAL MEDICINE
Payer: COMMERCIAL

## 2025-07-09 VITALS — HEIGHT: 63 IN | WEIGHT: 163 LBS | BODY MASS INDEX: 28.88 KG/M2

## 2025-07-09 DIAGNOSIS — Z00.8 ENCOUNTER FOR OTHER GENERAL EXAMINATION: ICD-10-CM

## 2025-07-09 DIAGNOSIS — Z00.8 HEALTH EXAMINATION IN POPULATION SURVEYS: ICD-10-CM

## 2025-07-09 DIAGNOSIS — Z13.820 OSTEOPOROSIS SCREENING: ICD-10-CM

## 2025-07-09 LAB
CHOLEST SERPL-MCNC: 88 MG/DL (ref ?–200)
EST. AVERAGE GLUCOSE BLD GHB EST-MCNC: 108 MG/DL
HBA1C MFR BLD: 5.4 %
HDLC SERPL-MCNC: 35 MG/DL
LDLC SERPL CALC-MCNC: 26 MG/DL (ref 0–100)
NONHDLC SERPL-MCNC: 53 MG/DL
TRIGL SERPL-MCNC: 134 MG/DL (ref ?–150)

## 2025-07-09 PROCEDURE — 77080 DXA BONE DENSITY AXIAL: CPT

## 2025-07-09 PROCEDURE — 80061 LIPID PANEL: CPT

## 2025-07-15 NOTE — PROGRESS NOTES
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  Education materials provided  Orders:        Class 1 obesity due to excess calories without serious comorbidity with body mass index (BMI) of 31.0 to 31.9 in adult  Pursue weight loss 10 percent-15 percent     Family history of cancer  Patient asked to reconsider risk reducing oophorectomy surgery  Orders:    MRI abdomen w wo contrast and mrcp; Future     Tobacco abuse, in remission  Ct chest screening 0rdered     History of hysterectomy  November 2020 status post hysterectomy and bilateral salpingectomy with removal of uterus cervix and bilateral fallopian tubes.  Ovaries remain in place  Diagnosis-symptomatic leiomyomata with increased bleeding              Clinical/Pathologic Stage  Cancer Staging   Malignant neoplasm of upper-outer quadrant of right breast in female, estrogen receptor positive (HCC)  Staging form: Breast, AJCC 8th Edition  - Clinical stage from 1/26/2018: Stage IB (cT2(3), cN1, cM0, G2, ER: Positive, NC: Positive, HER2: Positive) - Signed by Cliff Kenny MD on 1/26/2018  Method of lymph node assessment: Other  Nuclear grade: G3  Histologic grading system: 3 grade system  Laterality: Right  Tumor size (mm): 35  Multiple tumors: Yes  Number of tumors: 3        Current Therapy  Tamoxifen 20 mg/day from January 2019     Discussion  This pleasant 44-year-old premenopausal female returns to medical oncology  review on adjuvant therapy for locally advanced invasive carcinoma of the of the right breast established in 2018.  The patient remains on oral tamoxifen     We had a long discussion about her risk factors for the development of breast cancer and other solid tumor cancers.  We had considerable time focused on her PAL B2 high risk gene mutation as it applies to breast cancer, ovarian cancer and pancreatic cancer.  Educational materials were provided around this genetic proclivity.     The patient has no symptoms signs or clinical findings to suggest recurrent breast cancer.     I encouraged her to be seen by GYN oncology and continue discussions around prophylactic oophorectomy.  I note that the patient had hysterectomy surgery due to bleeding fibroids in November 2020.  At that time she was encouraged to complete oophorectomy but declined.  She simply had hysterectomy and bilateral removal of tubes.  She continues on today to maintain a position of aversion towards risk reducing oophorectomy surgery.  She remains concerned about the impact on lifestyle and quality of life.     We discussed weight, consistent exercise and other risk reducing elements.      MRI /MRCP has been performed on May 22, 2025 and shows no evidence of pancreatic lesions or metastatic recurrent disease.     Going forward the patient has had optimal risk reducing bilateral mastectomy surgeries.  We discussed her baseline clinical presentation with multifocal disease in consideration of optimal adjuvant therapies pertaining to her diagnosis of breast cancer.  She has completed 6+ years of oral tamoxifen.  We now know that she remains premenopausal.     I have raised a consideration for a transition of care for some period of time on an aromatase inhibitor agent as a complement to her years of tamoxifen.  The patient has been on oral tamoxifen since January 2019.     We now know that she would require Ovarian suppression as an adjunct to carry  "this out. The patient remains clear on her position regarding oophorectomy and a loss of ovarian function.     While in  my mind, some exposure to aromatase inhibitor therapy across years 6-10 would boost the adjuvant therapeutic benefits of hormonal-based therapies in this young woman, She will maintain current care with Tamoxifen.        Special Studies  January 29, 2018 [facility] germline genetic panel  Positive PAL B2 variant-deletion in exons 11-12, heterozygous, \"likely pathogenic\"     No results found for: \"GENETIC\", \"INTERP\", \"GENES\"        History of Present Illness     This 44-year-old woman returns for medical oncology follow-up.     Diagnostic bilateral mammography was performed out of the [facility] on January 15, 2018.  The study revealed a dominant 3.6 x 2.4 x 3.1 cm mass in the right right breast and right axillary tail at the 10 o'clock position.  A second smaller 1.4 x 0.6 x 0.8 hypoechoic mass at the 930-10 o'clock position was also noted.  Right axillary lymph nodes were noted ultrasound-guided biopsies were recommended and the findings were concerning for and highly suspicious of malignancy     The patient completed guided biopsy of right breast mass at 10 o'clock position on January 30, 2018.Please see . [place] surgical pathology number [id number] for confirmation and review final pathologic diagnosis was positive for invasive mucinous carcinoma grade 2 out of 3.  Tumor phenotype was estrogen receptor +35%, progesterone receptor +10% and HER2/mariluz +3+ by IHC     The patient proceeded to receive neoadjuvant preoperative therapy with docetaxel, carboplatin, trastuzumab plus pertuzumab for 6 cycles.  Preoperative therapy ended in June 2018     On June 21, 2018 the patient completed her right sided mastectomy with sentinel lymph node reviews as well as a left sided prophylactic simple mastectomy.  Please see [facility] surgical pathology number [id number] for confirmation and " review.  The right mastectomy specimen confirmed the diagnosis of residual mucinous carcinoma of the left breast grade 1.  The residual tumor deposits measured less than 1 mm and 3 of 16 regional lymph nodes were positive for metastatic carcinoma.  Final pathologic staging following neoadjuvant therapy was ypT1 microscopic ypN1 microscopic Grade 1..  The left breast simple mastectomy showed benign nonproliferative fibrocystic changes without atypia     These pathologic results were deemed as an excellent pathologic benefit to preoperative neoadjuvant therapy with docetaxel, carboplatin, trastuzumab with pertuzumab.     The patient received maintenance therapy with trastuzumab alone from June 2018 through January 2019     The patient also completed postmastectomy radiation therapy in December 2018     The patient was placed on oral tamoxifen 20 mg/day in approximately January 2019        The patient has a PAL B2 germline mutation and has had discussions regarding risk reducing bilateral salpingo-oophorectomy surgery and close clinical follow up     Pertinent History from May 3, 2023  Patient's last visit with medical oncology was May 3, 2023  Today represents my initial visit with this patient     Cancer Screening and Prevention  Mammography: 06/21/2018   GI/Colonoscopy: Not on file   GYN: Not on file   Bone Density: Not on file   Covid 19 Vaccination Status: Pfizer series January 21/February 21, Pfizer booster November 2021.           Oncology Therapeutic Summary:     January 2019 -  Present    Tamoxifen 20 mg/day     January 2019 from July 19, 2018 maintenance trastuzumab every 3 weeks     December 14, 2018-November 1, 2018 status post adjuvant radiation therapy to the right right breast , right supraclavicular, draining lymph nodes with a right breast boost           June 21, 2018 [id number] status post left simple mastectomy plus right breast modified radical mastectomy plus right axillary sentinel lymph node  review     June 2018-  S/p preoperative chemotherapy with anti her HER2/mariluz therapy consisting of docetaxel, carboplatin, trastuzumab plus pertuzumab x 6 cycles        Oncology History   Cancer Staging   Malignant neoplasm of upper-outer quadrant of right breast in female, estrogen receptor positive (HCC)  Staging form: Breast, AJCC 8th Edition  - Clinical stage from 1/26/2018: Stage IB (cT2(3), cN1, cM0, G2, ER: Positive, NV: Positive, HER2: Positive) - Signed by Cliff Kenny MD on 1/26/2018  Method of lymph node assessment: Other  Nuclear grade: G3  Histologic grading system: 3 grade system  Laterality: Right  Tumor size (mm): 35  Multiple tumors: Yes  Number of tumors: 3        Oncology History   Malignant neoplasm of upper-outer quadrant of right breast in female, estrogen receptor positive (HCC)   1/19/2018 Initial Diagnosis     US guided core biopsy of right breast mass at [facility].   Malignant neoplasm of UOQ of right breast,   ER positive  HER2 positive      1/29/2018 Genetic Testing     Likely pathogenic variant was identified in the PALB2 gene.      2/2018 - 6/1/2018 Chemotherapy     Neoadjuvant chemotherapy with TCH with pertuzumab.  Dr. Lowe.      6/21/2018 Surgery     Right MRM  Invasive mucinous carcinoma  3.4 cm geronimo  Grade 1  3/16 lymph nodes  Stage IB - ypT1mi, ypN1mi, G1.     Left simple mastectomy  Benign breast.     Immediate reconstruction Dr. Givens      7/19/2018 - 1/2019 Chemotherapy     trastuzumab monotherapy every 3 weeks.        11/1/2018 - 12/14/2018 Radiation     Course: C1     Plan ID Energy Fractions Dose per Fraction (cGy) Dose Correction (cGy) Total Dose Delivered (cGy) Elapsed Days   LAT R CW bst 6E 5 / 5 200 0 1,000 4   MED R CW bst 6E 5 / 5 200 0 1,000 4   New R PAB2 6X 25 / 25 47 0 1,175 36   New R Sclav2 6X 25 / 25 200 0 5,000 36   [id number] 6X 12 / 12 200 0 2,400 34   GinLvdEkc53_6 10X 12 / 12 200 0 2,400 34   [id number] 10X 13 / 13 200 0 2,600 36   NewR CW 10_30 6X 13 / 13  200 0 2,600 36      Treatment dates:  C1: 11/1/2018 - 12/14/2018 1/22/2019 -  Hormone Therapy     Tamoxifen  With Dr. Lowe         Review of Systems   Constitutional: Negative.       Medical History Reviewed by provider this encounter:     .     Medications Ordered Prior to Encounter               Current Outpatient Medications on File Prior to Visit   Medication Sig Dispense Refill    fluticasone (Flovent HFA) 110 MCG/ACT inhaler Inhale 2 puffs 2 (two) times a day Rinse mouth after use. 12 g 0    levalbuterol (XOPENEX HFA) 45 mcg/act inhaler Inhale 2 puffs every 8 (eight) hours as needed for wheezing 15 g 0    levocetirizine (XYZAL) 5 MG tablet Take 5 mg by mouth every evening         LORazepam (ATIVAN) 1 mg tablet Take 1 tablet (1 mg total) by mouth daily at bedtime 30 tablet 0    Semaglutide-Weight Management (Wegovy) 1 MG/0.5ML Inject 1 mg under the skin weekly Do not start before April 4, 2025. 2 mL 0    tamoxifen (NOLVADEX) 20 mg tablet Take 1 tablet (20 mg total) by mouth daily 90 tablet 0      No current facility-administered medications on file prior to visit.         Social History   Social History                Tobacco Use    Smoking status: Former       Current packs/day: 0.00       Average packs/day: 0.5 packs/day for 15.0 years (7.5 ttl pk-yrs)       Types: Cigarettes       Start date: 12/1/1999       Quit date: 12/1/2014       Years since quitting: 10.4       Passive exposure: Past    Smokeless tobacco: Never   Vaping Use    Vaping status: Never Used   Substance and Sexual Activity    Alcohol use: Yes       Alcohol/week: 1.0 standard drink of alcohol       Types: 1 Standard drinks or equivalent per week       Comment: occ    Drug use: No    Sexual activity: Yes       Partners: Male       Birth control/protection: Female Sterilization             Objective     /82 (BP Location: Left arm, Patient Position: Sitting, Cuff Size: Adult)   Pulse 78   Temp 98.5 °F (36.9 °C) (Temporal)    "Resp 17   Ht 5' 3\" (1.6 m)   Wt 77.6 kg (171 lb)   LMP 10/15/2020   SpO2 98%   BMI 30.29 kg/m²      Pain Screening:  Pain Score: 0-No pain  ECOG     Physical Exam  Constitutional:       Appearance: She is obese.   Neck:      Thyroid: No thyroid mass, thyromegaly or thyroid tenderness.      Trachea: Trachea normal.   Cardiovascular:      Rate and Rhythm: Normal rate and regular rhythm.      Heart sounds: Normal heart sounds. No murmur heard.     No systolic murmur is present.      No diastolic murmur is present.      No friction rub. No gallop. No S3 or S4 sounds.   Pulmonary:      Breath sounds: Normal breath sounds. No decreased air movement or transmitted upper airway sounds. No decreased breath sounds, wheezing, rhonchi or rales.   Chest:      Chest wall: No mass. There is no dullness to percussion.   Breasts:     Breasts are asymmetrical.      Right: Skin change present. No mass or tenderness.      Left: Skin change present. No mass or tenderness.   Abdominal:      General: Abdomen is protuberant. Bowel sounds are normal.      Palpations: Abdomen is soft. There is no hepatomegaly, splenomegaly, mass or pulsatile mass.      Tenderness: There is no abdominal tenderness. There is no right CVA tenderness or left CVA tenderness.   Musculoskeletal:      Right lower leg: No edema.      Left lower leg: No edema.   Lymphadenopathy:      Cervical: No cervical adenopathy.      Right cervical: No superficial, deep or posterior cervical adenopathy.     Left cervical: No superficial, deep or posterior cervical adenopathy.      Upper Body:      Right upper body: No supraclavicular, axillary or pectoral adenopathy.      Left upper body: No supraclavicular, axillary or pectoral adenopathy.            Labs: I have reviewed the following labs:            Lab Results   Component Value Date/Time     White Blood Cell Count 5.1 03/21/2025 07:56 AM     Red Blood Cell Count 4.18 03/21/2025 07:56 AM     Hemoglobin 13.9 03/21/2025 " 07:56 AM     HCT 41.3 03/21/2025 07:56 AM     MCV 98.8 03/21/2025 07:56 AM     MCH 33.3 (H) 03/21/2025 07:56 AM     RDW 12.5 03/21/2025 07:56 AM     Platelet Count 273 03/21/2025 07:56 AM     Neutrophils 43.3 03/21/2025 07:56 AM     Lymphocytes 41.0 03/21/2025 07:56 AM     Monocytes 9.2 03/21/2025 07:56 AM     Eosinophils 5.1 03/21/2025 07:56 AM     Neutrophils (Absolute) 2,208 03/21/2025 07:56 AM                Lab Results   Component Value Date/Time     Potassium 4.2 03/21/2025 07:56 AM     Chloride 107 03/21/2025 07:56 AM     CO2 28 03/21/2025 07:56 AM     BUN 17 03/21/2025 07:56 AM     Creatinine 0.90 03/21/2025 07:56 AM     Calcium 8.7 03/21/2025 07:56 AM     AST 14 03/21/2025 07:56 AM     ALT 12 03/21/2025 07:56 AM     Alkaline Phosphatase 50 03/21/2025 07:56 AM     Protein, Total 6.4 03/21/2025 07:56 AM     Albumin 4.0 03/21/2025 07:56 AM     TOTAL BILIRUBIN 0.3 03/21/2025 07:56 AM     eGFR 82 03/21/2025 07:56 AM            Pathology:      June 21, 2018 [id number] status post left simple mastectomy plus right mastectomy plus right axillary sentinel lymph node review     Imaging:     July 9, 2025 bone density DEXA scan  IMPRESSION:     Bone mineral density within the expected range for age.        According to 2023 ISCD Official Positions-Adult, for premenopausal women and men under the age of 50, Z scores, not T-scores are preferred    May 22, 2025 MRI of the abdomen with and without contrast (genetic susceptibility)   FINDINGS:     LOWER CHEST: Bilateral breast implants.     LIVER:  Normal in size and configuration.  No suspicious mass. Tiny cyst in the periphery of segment 7.  Nonspecific trace loculated fluid adjacent to the superior reflection of the liver capsule.  Patent hepatic and portal veins.     BILE DUCTS: No intrahepatic or extrahepatic bile duct dilation. Common bile duct is normal in caliber. No choledocholithiasis, biliary stricture or suspicious mass.     GALLBLADDER: Normal.     PANCREAS: No  suspicious pancreas mass. Normal caliber main pancreatic duct.     ADRENAL GLANDS: Unremarkable.     SPLEEN: Normal.     KIDNEYS/PROXIMAL URETERS: No hydroureteronephrosis. No suspicious renal mass.     BOWEL: No dilated loops of bowel.     PERITONEUM/RETROPERITONEUM: No ascites.     LYMPH NODES: No abdominal lymphadenopathy.     VESSELS: No aneurysm.     ABDOMINAL WALL: Unremarkable.     BONES: No suspicious osseous lesion.     ADDITIONAL FINDINGS: Partially visualized follicles in the adnexa, the largest measuring 3.7 cm craniocaudal (series 6, image 14).           IMPRESSION:     No suspicious lesions in the pancreas.        January 15, 2018 bilateral diagnostic mammogram [facility]     Impression     IMPRESSION:   Dominant 3.6 x 2.4 x 3.1 cm mass right breast/axillary tail region at the 10 o'clock position, 12 cm from the nipple is highly suspicious for malignancy. Suggestion of additional disease extending closer to the nipple, including a second smaller  dominant 1.4 x 0.6 x 0.8 cm hypoechoic mass at the 9:30 to 10:00 axis, 8 cm from nipple. Ultrasound-guided biopsy of the dominant mass and possibly the second smaller mass closer to the nipple is recommended.     Loosely grouped mildly heterogeneous calcifications in a segmental distribution extending along the 10:00 to 11:00 axis from the dominant mass towards the nipple is suspicious for additional in situ disease. Management will be determined pending biopsy  results above.     Right axillary lymphadenopathy.     Findings and recommendations discussed with the patient. Biopsy is recommended. [facility] will facilitate the patient's follow up.      No results found for this or any previous visit.                   Instructions

## 2025-07-20 NOTE — PROGRESS NOTES
Name: Lissette Sanchez      : 1980      MRN: [id number]  Encounter Provider: Josefina Colon MD  Encounter Date: 2025   Encounter department: [facility]  :  Assessment & Plan  Monoallelic mutation of PALB2 gene  Records reviewed. Pt initially seen by me in  with known fibroid uterus. It was not yet known of the palb2 mutation. She underwent hysterectomy with salpingectomy by gyn.    She now represents to address genetic mjutation.     We discussed that PALB2 associated with 40-60% risk breast cancer and 3-5% ovarian cancer. Close surveillance with breast imaging starting at age 30 is recommended. Consideration for oophorectomy is made after age 45 for risk reduction or sooner based on family history and or personal history. She does not have any family history of ovarian cancer and is very hesitant to undergo any further surgical intevention.    Her options for follow up include:   1) Screening with twice yearly , TVUS, and physical exam starting at age 30. Studies have not necessarily confirmed the effectiveness of this regimen. One study (UKFOCSS) showed a PPV of 25.5% with NPV 99.9%  2) Chemoprophylaxis with OCP's. Given that her she has a breast cancer that is ER positive, this is NOT an option.   3) Prophylactic removal of the tubes and ovaries to decrease the risk of ovarian cancer by over 90%. Surgery also reduces the risk of recurrent breast cancer by about 40% in premenopausal women, but confers the risks of premature menopause with risks such as osteoporosis and cardiovascular disease. The surgery is not entirely protective against the development of primary peritoneal cancer.      At this time she would like to continue to monitoring understanding that its utility is limited. We will continue with q6 month u/s, ca125 and yearly exams. We will continue to address surgical intervention as she sees fit.           Assessment & Plan            History of  Present Illness   Reason for Visit / CC: PALB2   Lissette Sanchez is a 45 y.o. female   History of Present Illness  The patient is a 45-year-old female with ER/PA positive breast cancer and a PALB2 mutation, currently undergoing tamoxifen treatment. She was diagnosed in  and underwent a hysterectomy and bilateral salpingectomy in 2020, but declined an oophorectomy. She has completed chemotherapy and mastectomy, and started tamoxifen in 2019. She is accompanied by her mother during the visit.    The patient was referred by Dr. Centeno and reports no recent symptoms. She prefers increased ovarian monitoring and has not had any CT or PET scans yet, although a chest CT is scheduled for 2025. She has not experienced any recent bloating, abdominal pain, decreased appetite, bowel or bladder symptoms, constipation, diarrhea, fevers, chills, night sweats, hot flashes, or dyspareunia. She has not needed medication for nausea or vomiting post-chemotherapy and has adjusted to the effects of tamoxifen.    The patient is actively losing weight while on Wegovy with 25 lb weight loss. She has a history of GERD and takes over-the-counter Prevacid. Additionally, she takes allergy medication, decongestants, and Ativan at night.    Her past surgical history includes a hysterectomy, bilateral salpingectomy, mastectomy, , pilonidal cyst removal.    SOCIAL HISTORY  Former smoker, quit 10-11 years ago.  Pertinent Medical History           Oncology History   Cancer Staging   Malignant neoplasm of upper-outer quadrant of right breast in female, estrogen receptor positive (HCC)  Staging form: Breast, AJCC 8th Edition  - Clinical stage from 2018: Stage IB (cT2(3), cN1, cM0, G2, ER: Positive, PA: Positive, HER2: Positive) - Signed by Cliff Kenny MD on 2018  Method of lymph node assessment: Other  Nuclear grade: G3  Histologic grading system: 3 grade system  Laterality: Right  Tumor size (mm):  35  Multiple tumors: Yes  Number of tumors: 3  Oncology History   Malignant neoplasm of upper-outer quadrant of right breast in female, estrogen receptor positive (HCC)   1/19/2018 Initial Diagnosis    US guided core biopsy of right breast mass at [facility].   Malignant neoplasm of UOQ of right breast,   ER positive  HER2 positive     1/29/2018 Genetic Testing    Likely pathogenic variant was identified in the PALB2 gene.     2/2018 - 6/1/2018 Chemotherapy    Neoadjuvant chemotherapy with TCH with pertuzumab.  Dr. Lowe.     6/21/2018 Surgery    Right MRM  Invasive mucinous carcinoma  3.4 cm geronimo  Grade 1  3/16 lymph nodes  Stage IB - ypT1mi, ypN1mi, G1.    Left simple mastectomy  Benign breast.    Immediate reconstruction Dr. Givens     7/19/2018 - 1/2019 Chemotherapy    trastuzumab monotherapy every 3 weeks.       11/1/2018 - 12/14/2018 Radiation    Course: C1    Plan ID Energy Fractions Dose per Fraction (cGy) Dose Correction (cGy) Total Dose Delivered (cGy) Elapsed Days   LAT R CW bst 6E 5 / 5 200 0 1,000 4   MED R CW bst 6E 5 / 5 200 0 1,000 4   New R PAB2 6X 25 / 25 47 0 1,175 36   New R Sclav2 6X 25 / 25 200 0 5,000 36   VmzKRTac76_90 6X 12 / 12 200 0 2,400 34   EczHvlCqm70_6 10X 12 / 12 200 0 2,400 34   DhrIplEI74_34 10X 13 / 13 200 0 2,600 36   NewR CW 10_30 6X 13 / 13 200 0 2,600 36      Treatment dates:  C1: 11/1/2018 - 12/14/2018 1/22/2019 -  Hormone Therapy    Tamoxifen  With Dr. Lowe        Review of Systems   Constitutional: Negative.    HENT: Negative.     Eyes: Negative.    Respiratory: Negative.     Cardiovascular: Negative.    Gastrointestinal: Negative.    Endocrine: Negative.    Genitourinary: Negative.    Musculoskeletal: Negative.    Neurological: Negative.    Psychiatric/Behavioral: Negative.      A complete review of systems is negative other than that noted above in the HPI.  Past Medical History   Past Medical History[1]  Past Surgical History[1]  Family History[1]   reports that she  "quit smoking about 10 years ago. Her smoking use included cigarettes. She started smoking about 25 years ago. She has a 7.5 pack-year smoking history. She has been exposed to tobacco smoke. She has never used smokeless tobacco. She reports current alcohol use of about 1.0 standard drink of alcohol per week. She reports that she does not use drugs.  Current Outpatient Medications   Medication Instructions   • fluticasone (Flovent HFA) 110 MCG/ACT inhaler 2 puffs, Inhalation, 2 times daily, Rinse mouth after use.   • levalbuterol (XOPENEX HFA) 45 mcg/act inhaler 2 puffs, Inhalation, Every 8 hours PRN   • levocetirizine (XYZAL) 5 mg, Every evening   • LORazepam (ATIVAN) 1 mg, Oral, Daily at bedtime,      • scopolamine (TRANSDERM-SCOP) 1 mg/3 days TD 72 hr patch 1 patch, Transdermal, Every 72 hours   • Semaglutide-Weight Management (Wegovy) 2.4 MG/0.75ML Inject 2.4 mg under the skin weekly   • tamoxifen (NOLVADEX) 20 mg, Oral, Daily   Allergies[1]      Objective   /80   Pulse 73   Temp 97.5 °F (36.4 °C) (Temporal)   Ht 5' 3\" (1.6 m)   Wt 71.7 kg (158 lb)   LMP 10/15/2020   SpO2 (!) 10%   BMI 27.99 kg/m²     Body mass index is 27.99 kg/m².  Pain Screening:     ECOG ECOG Performance Status: 0 - Fully active, able to carry on all pre-disease performance without restriction   Physical Exam  HENT:      Head: Normocephalic and atraumatic.      Nose: Nose normal.     Cardiovascular:      Rate and Rhythm: Normal rate and regular rhythm.   Pulmonary:      Effort: Pulmonary effort is normal.   Abdominal:      General: There is no distension.      Palpations: Abdomen is soft. There is no mass.   Genitourinary:     Comments: defer    Musculoskeletal:         General: No swelling. Normal range of motion.      Cervical back: Normal range of motion.     Skin:     General: Skin is warm and dry.     Neurological:      General: No focal deficit present.      Mental Status: She is alert.     Psychiatric:         Mood and " "Affect: Mood normal.       Physical Exam  Constitutional: No distress, appears well.  Gastrointestinal: No abdominal pain or bloating.  Genitourinary: No dyspareunia.     Results    Labs: I have reviewed pertinent labs.   No results found for: \"\"  Lab Results   Component Value Date/Time    Potassium 4.1 2025 09:17 AM    Chloride 108 2025 09:17 AM    CO2 26 2025 09:17 AM    BUN 20 2025 09:17 AM    Creatinine 0.92 2025 09:17 AM    Calcium 9.0 2025 09:17 AM    AST 14 2025 09:17 AM    ALT 13 2025 09:17 AM    Alkaline Phosphatase 40 2025 09:17 AM    eGFR 79 2025 09:17 AM     Lab Results   Component Value Date/Time    White Blood Cell Count 6.2 2025 09:17 AM    Hemoglobin 13.4 2025 09:17 AM    HCT 39.9 2025 09:17 AM    MCV 98.3 2025 09:17 AM    Platelet Count 246 2025 09:17 AM     Lab Results   Component Value Date/Time    Neutrophils (Absolute) 3,900 2025 09:17 AM        Trend:  No results found for: \"\"                 [1]  Past Medical History:  Diagnosis Date   • Abnormal Pap smear of cervix    • Allergic     Seasonal dx at 14 years old   • Anxiety    • Asthma     allergy excaberated   • Breast cancer (HCC) 2018   • Cancer (HCC)    • Cigarette nicotine dependence without complication 10/01/2015   • Fibroid 2017   • GERD (gastroesophageal reflux disease)    • Gestational diabetes    • History of adverse reaction to anesthesia     Pt reports waking up severly anxious   • Macular atrophy, retinal 2016   • Pneumonia    • Seasonal allergies    • Subserous leiomyoma of uterus 2020   [1]  Past Surgical History:  Procedure Laterality Date   • BREAST RECONSTRUCTION Bilateral 2018    Procedure: RECONSTRUCTION BREAST W/ IMPLANT;  Surgeon: Munir Colby MD;  [facility];  Service: Plastics   •  SECTION     • HYSTERECTOMY N/A 2020    Procedure: HYSTERECTOMY LAPAROSCOPIC TOTAL " (LTH) WITH BILATERAL SALPINGECTOMY-ATTEMPTED;  Surgeon: Racheal James MD;  [facility];  Service: Gynecology   • HYSTERECTOMY N/A 11/25/2020    Procedure: HYSTERECTOMY TOTAL ABDOMINAL (AARON);  Surgeon: Racheal James MD;  [facility];  Service: Gynecology   • IR PORT REMOVAL  2/22/2019   • MASTECTOMY     • PILONIDAL CYST EXCISION      resection   • MO CYSTOURETHROSCOPY N/A 11/25/2020    Procedure: CYSTOSCOPY;  Surgeon: Racheal James MD;  [facility];  Service: Gynecology   • MO INSJ TUNNELED CTR VAD W/SUBQ PORT AGE 7 YR/> Left 2/13/2018    Procedure: INSERTION VENOUS PORT ( PORT-A-CATH) IR;  Surgeon: Gurmeet Damon DO;  [facility];  Service: Interventional Radiology   • MO LAPS ABD PRTM&OMENTUM DX W/WO SPEC BR/WA SPX N/A 11/25/2020    Procedure: LAPAROSCOPY DIAGNOSTIC;  Surgeon: Racheal James MD;  [facility];  Service: Gynecology   • MO MAST MODF RAD W/AX LYMPH NOD W/WO PECT/DARBY MIN Right 6/21/2018    Procedure: BREAST MODIFIED RADICAL MASTECTOMY;  Surgeon: Cliff Kenny MD;  [facility];  Service: Surgical Oncology   • MO MASTECTOMY SIMPLE COMPLETE Left 6/21/2018    Procedure: BREAST SIMPLE MASTECTOMY;  Surgeon: Cliff Kenny MD;  [facility];  Service: Surgical Oncology   • MO REVISION OF RECONSTRUCTED BREAST Bilateral 9/19/2018    Procedure: REVISION BREAST RECON W/WOUND CLOSURE;  Surgeon: Munir Colby MD;  [facility];  Service: Plastics   • TUBAL LIGATION     • WISDOM TOOTH EXTRACTION     [1]  Family History  Problem Relation Name Age of Onset   • Diabetes Mother Beatrice    • Hypertension Mother Beatrice    • Rosacea Father     • Allergies Father          seasonal   • No Known Problems Brother     • No Known Problems Brother     • No Known Problems Son     • Breast cancer Maternal Grandmother Roxie    • Brain cancer Maternal Grandmother Roxie    • Cancer Maternal Grandmother Roxie    • No Known Problems Maternal Grandfather     • Diabetes  Paternal Grandmother Momma    • Kidney failure Paternal Grandmother Momma    • Hypertension Paternal Grandmother Momma    • Liver cancer Paternal Grandfather Poppa    • Cancer Paternal Grandfather Poppa    • Prostate cancer Paternal Grandfather Poppa    • Breast cancer Paternal Aunt Yamileth Moss    [1]  Allergies  Allergen Reactions   • Trichophyton Other (See Comments)     AKA Fungi - Pt does not know reaction    • Latex Swelling   • Cat Dander Sneezing   • Dust Mite Extract Sneezing   • Molds & Smuts Sneezing   • Pollen Extract Sneezing   • Tree Extract Sneezing

## 2025-07-21 ENCOUNTER — CONSULT (OUTPATIENT)
Dept: GYNECOLOGIC ONCOLOGY | Facility: CLINIC | Age: 45
End: 2025-07-21
Payer: COMMERCIAL

## 2025-07-21 ENCOUNTER — OFFICE VISIT (OUTPATIENT)
Dept: HEMATOLOGY ONCOLOGY | Facility: CLINIC | Age: 45
End: 2025-07-21
Payer: COMMERCIAL

## 2025-07-21 VITALS
OXYGEN SATURATION: 10 % | HEART RATE: 73 BPM | SYSTOLIC BLOOD PRESSURE: 122 MMHG | WEIGHT: 158 LBS | HEIGHT: 63 IN | TEMPERATURE: 97.5 F | DIASTOLIC BLOOD PRESSURE: 80 MMHG | BODY MASS INDEX: 28 KG/M2

## 2025-07-21 VITALS
OXYGEN SATURATION: 100 % | TEMPERATURE: 97.9 F | RESPIRATION RATE: 18 BRPM | BODY MASS INDEX: 28 KG/M2 | HEART RATE: 77 BPM | HEIGHT: 63 IN | SYSTOLIC BLOOD PRESSURE: 122 MMHG | WEIGHT: 158 LBS | DIASTOLIC BLOOD PRESSURE: 84 MMHG

## 2025-07-21 DIAGNOSIS — C50.411 MALIGNANT NEOPLASM OF UPPER-OUTER QUADRANT OF RIGHT BREAST IN FEMALE, ESTROGEN RECEPTOR POSITIVE (HCC): ICD-10-CM

## 2025-07-21 DIAGNOSIS — Z15.09 MONOALLELIC MUTATION OF PALB2 GENE: Primary | ICD-10-CM

## 2025-07-21 DIAGNOSIS — Z15.01 MONOALLELIC MUTATION OF PALB2 GENE: Primary | ICD-10-CM

## 2025-07-21 DIAGNOSIS — E66.9 OBESITY (BMI 30-39.9): ICD-10-CM

## 2025-07-21 DIAGNOSIS — Z15.89 MONOALLELIC MUTATION OF PALB2 GENE: Primary | ICD-10-CM

## 2025-07-21 DIAGNOSIS — Z17.0 MALIGNANT NEOPLASM OF UPPER-OUTER QUADRANT OF RIGHT BREAST IN FEMALE, ESTROGEN RECEPTOR POSITIVE (HCC): ICD-10-CM

## 2025-07-21 PROCEDURE — 99204 OFFICE O/P NEW MOD 45 MIN: CPT | Performed by: OBSTETRICS & GYNECOLOGY

## 2025-07-21 PROCEDURE — 99215 OFFICE O/P EST HI 40 MIN: CPT | Performed by: INTERNAL MEDICINE

## 2025-07-21 NOTE — Clinical Note
2025     Ying Sampson MD  [address]  [address]  [facility] [address]    Patient: Lissette Sanchez   YOB: 1980   Date of Visit: 2025       Dear MD Nithya Davis MD Badar Ullah Jan, MD Nicholas Michael Cardiges, MD Rebekah Javed, CHARAN Samuels MD:    Thank you for referring Lissette Sanchez to me for evaluation. Below are my notes for this consultation.    If you have questions, please do not hesitate to call me. I look forward to following your patient along with you.         Sincerely,        Josefina Colon MD        CC: MD Munir Lin MD Nicholas Michael Cardiges, MD Rebekah Javed, CHARAN Samuels MD Ashley Graul, MD  2025  9:45 AM  Incomplete  Name: Lissette Sanchez      : 1980      MRN: [id number]  Encounter Provider: Josefina Colon MD  Encounter Date: 2025   Encounter department: [facility] [place]  :  Assessment & Plan  Monoallelic mutation of PALB2 gene  Records reviewed. Pt initially seen by me in  with known fibroid uterus. It was not yet known of the palb2 mutation. She underwent hysterectomy with salpingectomy by gyn.    She now represents to address genetic mjutation.     We discussed that PALB2 associated with 40-60% risk breast cancer and 3-5% ovarian cancer. Close surveillance with breast imaging starting at age 30 is recommended. Consideration for oophorectomy is made after age 45 for risk reduction or sooner based on family history and or personal history. She does not have any family history of ovarian cancer and is very hesitant to undergo any further surgical intevention.    Her options for follow up include:   1) Screening with twice yearly , TVUS, and physical exam starting at age 30. Studies have not necessarily confirmed the effectiveness of this regimen. One study (UKFOCSS)  "showed a PPV of 25.5% with NPV 99.9%  2) Chemoprophylaxis with OCP's. Given that her she has a breast cancer that is ER positive, this is NOT an option.   3) Prophylactic removal of the tubes and ovaries to decrease the risk of ovarian cancer by over 90%. Surgery also reduces the risk of recurrent breast cancer by about 40% in premenopausal women, but confers the risks of premature menopause with risks such as osteoporosis and cardiovascular disease. The surgery is not entirely protective against the development of primary peritoneal cancer.      At this time she would like to continue to monitoring understanding that its utility is limited. We will continue with q6 month u/s, ca125 and yearly exams. We will continue to address surgical intervention as she sees fit.           Assessment & Plan        {Oncology Survivorship (Optional):42208}    History of Present Illness{?Quick Links Encounters * My Last Note * Last Note in Specialty * Snapshot * Since Last Visit * History :55962}  Reason for Visit / CC: PALB2 {Reason for Visit (Optional):19258::\"***\"}  Lissette Sanchez is a 45 y.o. female   History of Present Illness  The patient is a 45-year-old female with ER/NM positive breast cancer and a PALB2 mutation, currently undergoing tamoxifen treatment. She was diagnosed in 2018 and underwent a hysterectomy and bilateral salpingectomy in November 2020, but declined an oophorectomy. She has completed chemotherapy and mastectomy, and started tamoxifen in January 2019. She is accompanied by her mother during the visit.    The patient was referred by Dr. Centeno and reports no recent symptoms. She prefers increased ovarian monitoring and has not had any CT or PET scans yet, although a chest CT is scheduled for August 2025. She has not experienced any recent bloating, abdominal pain, decreased appetite, bowel or bladder symptoms, constipation, diarrhea, fevers, chills, night sweats, hot flashes, or dyspareunia. She has " not needed medication for nausea or vomiting post-chemotherapy and has adjusted to the effects of tamoxifen.    The patient is actively losing weight while on Wegovy with 25 lb weight loss. She has a history of GERD and takes over-the-counter Prevacid. Additionally, she takes allergy medication, decongestants, and Ativan at night.    Her past surgical history includes a hysterectomy, bilateral salpingectomy, mastectomy, , pilonidal cyst removal.    SOCIAL HISTORY  Former smoker, quit 10-11 years ago.  Pertinent Medical History          Oncology History  Cancer Staging   Malignant neoplasm of upper-outer quadrant of right breast in female, estrogen receptor positive (HCC)  Staging form: Breast, AJCC 8th Edition  - Clinical stage from 2018: Stage IB (cT2(3), cN1, cM0, G2, ER: Positive, DE: Positive, HER2: Positive) - Signed by Cliff Kenny MD on 2018  Method of lymph node assessment: Other  Nuclear grade: G3  Histologic grading system: 3 grade system  Laterality: Right  Tumor size (mm): 35  Multiple tumors: Yes  Number of tumors: 3  Oncology History   Malignant neoplasm of upper-outer quadrant of right breast in female, estrogen receptor positive (HCC)   2018 Initial Diagnosis    US guided core biopsy of right breast mass at [facility].   Malignant neoplasm of UOQ of right breast,   ER positive  HER2 positive     2018 Genetic Testing    Likely pathogenic variant was identified in the PALB2 gene.     2018 - 2018 Chemotherapy    Neoadjuvant chemotherapy with TCH with pertuzumab.  Dr. Lowe.     2018 Surgery    Right MRM  Invasive mucinous carcinoma  3.4 cm geronimo  Grade 1  3/16 lymph nodes  Stage IB - ypT1mi, ypN1mi, G1.    Left simple mastectomy  Benign breast.    Immediate reconstruction Dr. Givens     2018 - 2019 Chemotherapy    trastuzumab monotherapy every 3 weeks.       2018 - 2018 Radiation    Course: C1    Plan ID Energy Fractions Dose per Fraction (cGy) Dose  Correction (cGy) Total Dose Delivered (cGy) Elapsed Days   LAT R CW bst 6E 5 / 5 200 0 1,000 4   MED R CW bst 6E 5 / 5 200 0 1,000 4   New R PAB2 6X 25 / 25 47 0 1,175 36   New R Sclav2 6X 25 / 25 200 0 5,000 36   HiiJUBul83_33 6X 12 / 12 200 0 2,400 34   TyaLmoZgu34_3 10X 12 / 12 200 0 2,400 34   UmzCwnNO64_08 10X 13 / 13 200 0 2,600 36   NewR CW 10_30 6X 13 / 13 200 0 2,600 36      Treatment dates:  C1: 11/1/2018 - 12/14/2018 1/22/2019 -  Hormone Therapy    Tamoxifen  With Dr. Lowe        Review of Systems   Constitutional: Negative.    HENT: Negative.     Eyes: Negative.    Respiratory: Negative.     Cardiovascular: Negative.    Gastrointestinal: Negative.    Endocrine: Negative.    Genitourinary: Negative.    Musculoskeletal: Negative.    Neurological: Negative.    Psychiatric/Behavioral: Negative.      A complete review of systems is negative other than that noted above in the HPI.  Past Medical History  Past Medical History[1]  Past Surgical History[2]  Family History[3]   reports that she quit smoking about 10 years ago. Her smoking use included cigarettes. She started smoking about 25 years ago. She has a 7.5 pack-year smoking history. She has been exposed to tobacco smoke. She has never used smokeless tobacco. She reports current alcohol use of about 1.0 standard drink of alcohol per week. She reports that she does not use drugs.  Current Outpatient Medications   Medication Instructions   • fluticasone (Flovent HFA) 110 MCG/ACT inhaler 2 puffs, Inhalation, 2 times daily, Rinse mouth after use.   • levalbuterol (XOPENEX HFA) 45 mcg/act inhaler 2 puffs, Inhalation, Every 8 hours PRN   • levocetirizine (XYZAL) 5 mg, Every evening   • LORazepam (ATIVAN) 1 mg, Oral, Daily at bedtime,      • scopolamine (TRANSDERM-SCOP) 1 mg/3 days TD 72 hr patch 1 patch, Transdermal, Every 72 hours   • Semaglutide-Weight Management (Wegovy) 2.4 MG/0.75ML Inject 2.4 mg under the skin weekly   • tamoxifen (NOLVADEX) 20  "mg, Oral, Daily   Allergies[4]      Objective{?Quick Links Trend Vitals * Enter New Vitals * Results Review * Timeline (Adult) * Labs * Imaging * Cardiology * Procedures * Lung Cancer Screening * Surgical eConsent :74998}  /80   Pulse 73   Temp 97.5 °F (36.4 °C) (Temporal)   Ht 5' 3\" (1.6 m)   Wt 71.7 kg (158 lb)   LMP 10/15/2020   SpO2 (!) 10%   BMI 27.99 kg/m²     Body mass index is 27.99 kg/m².  Pain Screening:     ECOG ECOG Performance Status: 0 - Fully active, able to carry on all pre-disease performance without restriction   Physical Exam  HENT:      Head: Normocephalic and atraumatic.      Nose: Nose normal.     Cardiovascular:      Rate and Rhythm: Normal rate and regular rhythm.   Pulmonary:      Effort: Pulmonary effort is normal.   Abdominal:      General: There is no distension.      Palpations: Abdomen is soft. There is no mass.   Genitourinary:     Comments: defer    Musculoskeletal:         General: No swelling. Normal range of motion.      Cervical back: Normal range of motion.     Skin:     General: Skin is warm and dry.     Neurological:      General: No focal deficit present.      Mental Status: She is alert.     Psychiatric:         Mood and Affect: Mood normal.       Physical Exam  Constitutional: No distress, appears well.  Gastrointestinal: No abdominal pain or bloating.  Genitourinary: No dyspareunia.     Results    Labs: I have reviewed pertinent labs. { AMB ONCOLOGY LABS (Optional):01517}  No results found for: \"\"  Lab Results   Component Value Date/Time    Potassium 4.1 05/16/2025 09:17 AM    Chloride 108 05/16/2025 09:17 AM    CO2 26 05/16/2025 09:17 AM    BUN 20 05/16/2025 09:17 AM    Creatinine 0.92 05/16/2025 09:17 AM    Calcium 9.0 05/16/2025 09:17 AM    AST 14 05/16/2025 09:17 AM    ALT 13 05/16/2025 09:17 AM    Alkaline Phosphatase 40 05/16/2025 09:17 AM    eGFR 79 05/16/2025 09:17 AM     Lab Results   Component Value Date/Time    White Blood Cell Count 6.2 " "2025 09:17 AM    Hemoglobin 13.4 2025 09:17 AM    HCT 39.9 2025 09:17 AM    MCV 98.3 2025 09:17 AM    Platelet Count 246 2025 09:17 AM     Lab Results   Component Value Date/Time    Neutrophils (Absolute) 3,900 2025 09:17 AM        Trend:  No results found for: \"\"    {Radiology Results Review (Optional):38145}  {Other Study Results Review (Optional):64888::\"No additional pertinent studies reviewed.\"}    {Administrative / Billing Section (Optional):23300}           [1]  Past Medical History:  Diagnosis Date   • Abnormal Pap smear of cervix    • Allergic     Seasonal dx at 14 years old   • Anxiety    • Asthma     allergy excaberated   • Breast cancer (HCC) 2018   • Cancer (HCC)    • Cigarette nicotine dependence without complication 10/01/2015   • Fibroid 2017   • GERD (gastroesophageal reflux disease)    • Gestational diabetes    • History of adverse reaction to anesthesia     Pt reports waking up severly anxious   • Macular atrophy, retinal 2016   • Pneumonia    • Seasonal allergies    • Subserous leiomyoma of uterus 2020   [2]  Past Surgical History:  Procedure Laterality Date   • BREAST RECONSTRUCTION Bilateral 2018    Procedure: RECONSTRUCTION BREAST W/ IMPLANT;  Surgeon: Munir Colby MD;  [facility];  Service: Plastics   •  SECTION     • HYSTERECTOMY N/A 2020    Procedure: HYSTERECTOMY LAPAROSCOPIC TOTAL (LTH) WITH BILATERAL SALPINGECTOMY-ATTEMPTED;  Surgeon: Racheal James MD;  [facility];  Service: Gynecology   • HYSTERECTOMY N/A 2020    Procedure: HYSTERECTOMY TOTAL ABDOMINAL (AARON);  Surgeon: Racheal James MD;  [facility];  Service: Gynecology   • IR PORT REMOVAL  2019   • MASTECTOMY     • PILONIDAL CYST EXCISION      resection   • AK CYSTOURETHROSCOPY N/A 2020    Procedure: CYSTOSCOPY;  Surgeon: Racheal James MD;  [facility];  Service: Gynecology   • AK INSJ TUNNELED CTR " VAD W/SUBQ PORT AGE 5 YR/> Left 2/13/2018    Procedure: INSERTION VENOUS PORT ( PORT-A-CATH) IR;  Surgeon: Gurmeet Damon DO;  [facility];  Service: Interventional Radiology   • CT LAPS ABD PRTM&OMENTUM DX W/WO SPEC BR/WA SPX N/A 11/25/2020    Procedure: LAPAROSCOPY DIAGNOSTIC;  Surgeon: Racheal James MD;  [facility];  Service: Gynecology   • CT MAST MODF RAD W/AX LYMPH NOD W/WO PECT/DARBY MIN Right 6/21/2018    Procedure: BREAST MODIFIED RADICAL MASTECTOMY;  Surgeon: Cliff Kenny MD;  [facility];  Service: Surgical Oncology   • CT MASTECTOMY SIMPLE COMPLETE Left 6/21/2018    Procedure: BREAST SIMPLE MASTECTOMY;  Surgeon: Cliff Kenny MD;  [facility];  Service: Surgical Oncology   • CT REVISION OF RECONSTRUCTED BREAST Bilateral 9/19/2018    Procedure: REVISION BREAST RECON W/WOUND CLOSURE;  Surgeon: Munir Colby MD;  [facility];  Service: Plastics   • TUBAL LIGATION     • WISDOM TOOTH EXTRACTION     [3]  Family History  Problem Relation Name Age of Onset   • Diabetes Mother Beatrice    • Hypertension Mother Beatrice    • Rosacea Father     • Allergies Father          seasonal   • No Known Problems Brother     • No Known Problems Brother     • No Known Problems Son     • Breast cancer Maternal Grandmother Roxie    • Brain cancer Maternal Grandmother Roxie    • Cancer Maternal Grandmother Roxie    • No Known Problems Maternal Grandfather     • Diabetes Paternal Grandmother Momma    • Kidney failure Paternal Grandmother Momma    • Hypertension Paternal Grandmother Momma    • Liver cancer Paternal Grandfather Poppa    • Cancer Paternal Grandfather Poppa    • Prostate cancer Paternal Grandfather Poppa    • Breast cancer Paternal Aunt Yamileth Moss    [4]  Allergies  Allergen Reactions   • Trichophyton Other (See Comments)     AKA Fungi - Pt does not know reaction    • Latex Swelling   • Cat Dander Sneezing   • Dust Mite Extract Sneezing   • Molds & Smuts Sneezing   • Pollen Extract  "Sneezing   • Tree Extract Sneezing     Josefina Colon MD  2025  9:34 AM  Sign when Signing Visit  Name: Lissette Sanchez      : 1980      MRN: [id number]  Encounter Provider: Josefina Colon MD  Encounter Date: 2025   Encounter department: [facility] [place]  :  Assessment & Plan  Monoallelic mutation of PALB2 gene    PALB2 associated with 40-60% risk breast cancer and 3-5% ovarian cancer. Close surveillance with breast imaging starting at age 30 is recommended. Consideration for oophorectomy is made after age 45 for risk reduction or sooner based on family history and or personal history.       Assessment & Plan  1. ER/AK positive breast cancer: Stable.  - Undergoing tamoxifen treatment since 2019, adjusted well.  - Discussed potential switch to letrozole for 2 years, inducing menopause.  - Risks and benefits of letrozole discussed.  - Supportive care includes monitoring response and addressing adverse effects.  - Chest CT ordered for 2025.    2. PALB2 mutation.  - Increased ovarian cancer risk.  - Discussed limitations of ultrasound and  monitoring.  - Declined oophorectomy, prefers thorough ovarian monitoring.    3. Constipation.  - Manages with Metamucil gummies.    4. Weight management.  - On Wegovy for weight loss.    5. GERD.  - Takes OTC Prevacid.    Follow-up  - Chest CT scheduled for 2025.      {Oncology Survivorship (Optional):99210}    History of Present Illness{?Quick Links Encounters * My Last Note * Last Note in Specialty * Snapshot * Since Last Visit * History :84020}  Reason for Visit / CC: PALB2 {Reason for Visit (Optional):69799::\"***\"}  Lissette Sanchez is a 45 y.o. female   History of Present Illness  The patient is a 45-year-old female with ER/AK positive breast cancer and a PALB2 mutation, currently undergoing tamoxifen treatment. She was diagnosed in  and underwent a hysterectomy and bilateral salpingectomy in 2020, but " declined an oophorectomy. She has completed chemotherapy and mastectomy, and started tamoxifen in 2019. She is accompanied by her mother during the visit.    The patient was referred by Dr. Centeno and reports no recent symptoms. She prefers increased ovarian monitoring and has not had any CT or PET scans yet, although a chest CT is scheduled for 2025. She has not experienced any recent bloating, abdominal pain, decreased appetite, bowel or bladder symptoms, constipation, diarrhea, fevers, chills, night sweats, hot flashes, or dyspareunia. She has not needed medication for nausea or vomiting post-chemotherapy and has adjusted to the effects of tamoxifen.    The patient is actively losing weight while on Wegovy with 25 lb weight loss. She has a history of GERD and takes over-the-counter Prevacid. Additionally, she takes allergy medication, decongestants, and Ativan at night.    Her past surgical history includes a hysterectomy, bilateral salpingectomy, mastectomy, , pilonidal cyst removal.    SOCIAL HISTORY  Former smoker, quit 10-11 years ago.  Pertinent Medical History         Oncology History  Cancer Staging   Malignant neoplasm of upper-outer quadrant of right breast in female, estrogen receptor positive (HCC)  Staging form: Breast, AJCC 8th Edition  - Clinical stage from 2018: Stage IB (cT2(3), cN1, cM0, G2, ER: Positive, MI: Positive, HER2: Positive) - Signed by Cliff Kenny MD on 2018  Method of lymph node assessment: Other  Nuclear grade: G3  Histologic grading system: 3 grade system  Laterality: Right  Tumor size (mm): 35  Multiple tumors: Yes  Number of tumors: 3  Oncology History   Malignant neoplasm of upper-outer quadrant of right breast in female, estrogen receptor positive (HCC)   2018 Initial Diagnosis    US guided core biopsy of right breast mass at [facility].   Malignant neoplasm of UOQ of right breast,   ER positive  HER2 positive     2018 Genetic Testing     Likely pathogenic variant was identified in the PALB2 gene.     2/2018 - 6/1/2018 Chemotherapy    Neoadjuvant chemotherapy with TC with pertuzumab.  Dr. Lowe.     6/21/2018 Surgery    Right MRM  Invasive mucinous carcinoma  3.4 cm geronimo  Grade 1  3/16 lymph nodes  Stage IB - ypT1mi, ypN1mi, G1.    Left simple mastectomy  Benign breast.    Immediate reconstruction Dr. Givens     7/19/2018 - 1/2019 Chemotherapy    trastuzumab monotherapy every 3 weeks.       11/1/2018 - 12/14/2018 Radiation    Course: C1    Plan ID Energy Fractions Dose per Fraction (cGy) Dose Correction (cGy) Total Dose Delivered (cGy) Elapsed Days   LAT R CW bst 6E 5 / 5 200 0 1,000 4   MED R CW bst 6E 5 / 5 200 0 1,000 4   New R PAB2 6X 25 / 25 47 0 1,175 36   New R Sclav2 6X 25 / 25 200 0 5,000 36   CtuMKTar05_56 6X 12 / 12 200 0 2,400 34   CwyFceEwr20_4 10X 12 / 12 200 0 2,400 34   TyxQrsPM83_15 10X 13 / 13 200 0 2,600 36   NewR CW 10_30 6X 13 / 13 200 0 2,600 36      Treatment dates:  C1: 11/1/2018 - 12/14/2018 1/22/2019 -  Hormone Therapy    Tamoxifen  With Dr. Lowe       Review of Systems A complete review of systems is negative other than that noted above in the HPI.  Past Medical History  Past Medical History[1]  Past Surgical History[1]  Family History[1]   reports that she quit smoking about 10 years ago. Her smoking use included cigarettes. She started smoking about 25 years ago. She has a 7.5 pack-year smoking history. She has been exposed to tobacco smoke. She has never used smokeless tobacco. She reports current alcohol use of about 1.0 standard drink of alcohol per week. She reports that she does not use drugs.  Current Outpatient Medications   Medication Instructions   • fluticasone (Flovent HFA) 110 MCG/ACT inhaler 2 puffs, Inhalation, 2 times daily, Rinse mouth after use.   • levalbuterol (XOPENEX HFA) 45 mcg/act inhaler 2 puffs, Inhalation, Every 8 hours PRN   • levocetirizine (XYZAL) 5 mg, Every evening   • LORazepam  "(ATIVAN) 1 mg, Oral, Daily at bedtime,      • scopolamine (TRANSDERM-SCOP) 1 mg/3 days TD 72 hr patch 1 patch, Transdermal, Every 72 hours   • Semaglutide-Weight Management (Wegovy) 2.4 MG/0.75ML Inject 2.4 mg under the skin weekly   • tamoxifen (NOLVADEX) 20 mg, Oral, Daily   Allergies[1]     Objective{?Quick Links Trend Vitals * Enter New Vitals * Results Review * Timeline (Adult) * Labs * Imaging * Cardiology * Procedures * Lung Cancer Screening * Surgical eConsent :43864}  /80   Pulse 73   Temp 97.5 °F (36.4 °C) (Temporal)   Ht 5' 3\" (1.6 m)   Wt 71.7 kg (158 lb)   LMP 10/15/2020   SpO2 (!) 10%   BMI 27.99 kg/m²     Body mass index is 27.99 kg/m².  Pain Screening:     ECOG ECOG Performance Status: 0 - Fully active, able to carry on all pre-disease performance without restriction   Physical Exam  Physical Exam  Constitutional: No distress, appears well.  Gastrointestinal: No abdominal pain or bloating.  Genitourinary: No dyspareunia.   ***  Results    Labs: I have reviewed pertinent labs. { AMB ONCOLOGY LABS (Optional):00452}  No results found for: \"\"  Lab Results   Component Value Date/Time    Potassium 4.1 05/16/2025 09:17 AM    Chloride 108 05/16/2025 09:17 AM    CO2 26 05/16/2025 09:17 AM    BUN 20 05/16/2025 09:17 AM    Creatinine 0.92 05/16/2025 09:17 AM    Calcium 9.0 05/16/2025 09:17 AM    AST 14 05/16/2025 09:17 AM    ALT 13 05/16/2025 09:17 AM    Alkaline Phosphatase 40 05/16/2025 09:17 AM    eGFR 79 05/16/2025 09:17 AM     Lab Results   Component Value Date/Time    White Blood Cell Count 6.2 05/16/2025 09:17 AM    Hemoglobin 13.4 05/16/2025 09:17 AM    HCT 39.9 05/16/2025 09:17 AM    MCV 98.3 05/16/2025 09:17 AM    Platelet Count 246 05/16/2025 09:17 AM     Lab Results   Component Value Date/Time    Neutrophils (Absolute) 3,900 05/16/2025 09:17 AM        Trend:  No results found for: \"\"    {Radiology Results Review (Optional):07054}  {Other Study Results Review " "(Optional):40421::\"No additional pertinent studies reviewed.\"}    {Administrative / Billing Section (Optional):91827}         [1]  Past Medical History:  Diagnosis Date   • Abnormal Pap smear of cervix    • Allergic     Seasonal dx at 14 years old   • Anxiety    • Asthma     allergy excaberated   • Breast cancer (HCC) 2018   • Cancer (HCC)    • Cigarette nicotine dependence without complication 10/01/2015   • Fibroid 2017   • GERD (gastroesophageal reflux disease)    • Gestational diabetes    • History of adverse reaction to anesthesia     Pt reports waking up severly anxious   • Macular atrophy, retinal 2016   • Pneumonia    • Seasonal allergies    • Subserous leiomyoma of uterus 2020   [1]  Past Surgical History:  Procedure Laterality Date   • BREAST RECONSTRUCTION Bilateral 2018    Procedure: RECONSTRUCTION BREAST W/ IMPLANT;  Surgeon: Munir Colby MD;  [facility];  Service: Plastics   •  SECTION     • HYSTERECTOMY N/A 2020    Procedure: HYSTERECTOMY LAPAROSCOPIC TOTAL (LTH) WITH BILATERAL SALPINGECTOMY-ATTEMPTED;  Surgeon: Racheal James MD;  [facility];  Service: Gynecology   • HYSTERECTOMY N/A 2020    Procedure: HYSTERECTOMY TOTAL ABDOMINAL (AARON);  Surgeon: Racheal Jaems MD;  [facility];  Service: Gynecology   • IR PORT REMOVAL  2019   • MASTECTOMY     • PILONIDAL CYST EXCISION      resection   • MO CYSTOURETHROSCOPY N/A 2020    Procedure: CYSTOSCOPY;  Surgeon: Racheal James MD;  [facility];  Service: Gynecology   • MO INSJ TUNNELED CTR VAD W/SUBQ PORT AGE 5 YR/> Left 2018    Procedure: INSERTION VENOUS PORT ( PORT-A-CATH) IR;  Surgeon: Gurmeet Damon DO;  [facility];  Service: Interventional Radiology   • MO LAPS ABD PRTM&OMENTUM DX W/WO SPEC BR/WA SPX N/A 2020    Procedure: LAPAROSCOPY DIAGNOSTIC;  Surgeon: Racheal James MD;  [facility];  Service: Gynecology   • MO MAST MODF RAD " W/AX LYMPH NOD W/WO PECT/DARBY MIN Right 6/21/2018    Procedure: BREAST MODIFIED RADICAL MASTECTOMY;  Surgeon: Cliff Kenny MD;  [facility];  Service: Surgical Oncology   • OK MASTECTOMY SIMPLE COMPLETE Left 6/21/2018    Procedure: BREAST SIMPLE MASTECTOMY;  Surgeon: Cliff Kenny MD;  [facility];  Service: Surgical Oncology   • OK REVISION OF RECONSTRUCTED BREAST Bilateral 9/19/2018    Procedure: REVISION BREAST RECON W/WOUND CLOSURE;  Surgeon: Munir Colby MD;  [facility];  Service: Plastics   • TUBAL LIGATION     • WISDOM TOOTH EXTRACTION     [1]  Family History  Problem Relation Name Age of Onset   • Diabetes Mother Beatrice    • Hypertension Mother Beatrice    • Rosacea Father     • Allergies Father          seasonal   • No Known Problems Brother     • No Known Problems Brother     • No Known Problems Son     • Breast cancer Maternal Grandmother Roxie    • Brain cancer Maternal Grandmother Roxie    • Cancer Maternal Grandmother Roxie    • No Known Problems Maternal Grandfather     • Diabetes Paternal Grandmother Momma    • Kidney failure Paternal Grandmother Momma    • Hypertension Paternal Grandmother Momma    • Liver cancer Paternal Grandfather Poppa    • Cancer Paternal Grandfather Poppa    • Prostate cancer Paternal Grandfather Poppa    • Breast cancer Paternal Aunt Yamileth Moss    [1]  Allergies  Allergen Reactions   • Trichophyton Other (See Comments)     AKA Fungi - Pt does not know reaction    • Latex Swelling   • Cat Dander Sneezing   • Dust Mite Extract Sneezing   • Molds & Smuts Sneezing   • Pollen Extract Sneezing   • Tree Extract Sneezing

## 2025-07-21 NOTE — ASSESSMENT & PLAN NOTE
Records reviewed. Pt initially seen by me in 2020 with known fibroid uterus. It was not yet known of the palb2 mutation. She underwent hysterectomy with salpingectomy by gyn.    She now represents to address genetic mjutation.     We discussed that PALB2 associated with 40-60% risk breast cancer and 3-5% ovarian cancer. Close surveillance with breast imaging starting at age 30 is recommended. Consideration for oophorectomy is made after age 45 for risk reduction or sooner based on family history and or personal history. She does not have any family history of ovarian cancer and is very hesitant to undergo any further surgical intevention.    Her options for follow up include:   1) Screening with twice yearly , TVUS, and physical exam starting at age 30. Studies have not necessarily confirmed the effectiveness of this regimen. One study (UKFOCSS) showed a PPV of 25.5% with NPV 99.9%  2) Chemoprophylaxis with OCP's. Given that her she has a breast cancer that is ER positive, this is NOT an option.   3) Prophylactic removal of the tubes and ovaries to decrease the risk of ovarian cancer by over 90%. Surgery also reduces the risk of recurrent breast cancer by about 40% in premenopausal women, but confers the risks of premature menopause with risks such as osteoporosis and cardiovascular disease. The surgery is not entirely protective against the development of primary peritoneal cancer.      At this time she would like to continue to monitoring understanding that its utility is limited. We will continue with q6 month u/s, ca125 and yearly exams. We will continue to address surgical intervention as she sees fit.

## 2025-07-21 NOTE — LETTER
2025     Ying Sampson MD  [address]  [address]  [facility] [address]    Patient: Lissette Sanchez   YOB: 1980   Date of Visit: 2025       Dear MD Munir Davis MD Nicholas Michael Cardiges, MD Stella M Ferker, CHARAN Samuels MD:    Thank you for referring Lissette Sanchez to me for evaluation. Below are my notes for this consultation.    If you have questions, please do not hesitate to call me. I look forward to following your patient along with you.         Sincerely,        Josefina Colon MD        CC: MD Chay Hui, MD Rebekah Javed, CHARAN Samuels MD Ashley Graul, MD  2025  9:46 AM  Sign when Signing Visit  Name: Lissette Sanchez      : 1980      MRN: [id number]  Encounter Provider: Josefina Colon MD  Encounter Date: 2025   Encounter department: [facility]  :  Assessment & Plan  Monoallelic mutation of PALB2 gene  Records reviewed. Pt initially seen by me in  with known fibroid uterus. It was not yet known of the palb2 mutation. She underwent hysterectomy with salpingectomy by gyn.    She now represents to address genetic mjutation.     We discussed that PALB2 associated with 40-60% risk breast cancer and 3-5% ovarian cancer. Close surveillance with breast imaging starting at age 30 is recommended. Consideration for oophorectomy is made after age 45 for risk reduction or sooner based on family history and or personal history. She does not have any family history of ovarian cancer and is very hesitant to undergo any further surgical intevention.    Her options for follow up include:   1) Screening with twice yearly , TVUS, and physical exam starting at age 30. Studies have not necessarily confirmed the effectiveness of this regimen. One study (UKFOCSS) showed a PPV of 25.5% with  NPV 99.9%  2) Chemoprophylaxis with OCP's. Given that her she has a breast cancer that is ER positive, this is NOT an option.   3) Prophylactic removal of the tubes and ovaries to decrease the risk of ovarian cancer by over 90%. Surgery also reduces the risk of recurrent breast cancer by about 40% in premenopausal women, but confers the risks of premature menopause with risks such as osteoporosis and cardiovascular disease. The surgery is not entirely protective against the development of primary peritoneal cancer.      At this time she would like to continue to monitoring understanding that its utility is limited. We will continue with q6 month u/s, ca125 and yearly exams. We will continue to address surgical intervention as she sees fit.           Assessment & Plan            History of Present Illness  Reason for Visit / CC: PALB2   Lissetet Sanchez is a 45 y.o. female   History of Present Illness  The patient is a 45-year-old female with ER/AR positive breast cancer and a PALB2 mutation, currently undergoing tamoxifen treatment. She was diagnosed in 2018 and underwent a hysterectomy and bilateral salpingectomy in November 2020, but declined an oophorectomy. She has completed chemotherapy and mastectomy, and started tamoxifen in January 2019. She is accompanied by her mother during the visit.    The patient was referred by Dr. Centeno and reports no recent symptoms. She prefers increased ovarian monitoring and has not had any CT or PET scans yet, although a chest CT is scheduled for August 2025. She has not experienced any recent bloating, abdominal pain, decreased appetite, bowel or bladder symptoms, constipation, diarrhea, fevers, chills, night sweats, hot flashes, or dyspareunia. She has not needed medication for nausea or vomiting post-chemotherapy and has adjusted to the effects of tamoxifen.    The patient is actively losing weight while on Wegovy with 25 lb weight loss. She has a history of GERD and  takes over-the-counter Prevacid. Additionally, she takes allergy medication, decongestants, and Ativan at night.    Her past surgical history includes a hysterectomy, bilateral salpingectomy, mastectomy, , pilonidal cyst removal.    SOCIAL HISTORY  Former smoker, quit 10-11 years ago.  Pertinent Medical History         Oncology History  Cancer Staging   Malignant neoplasm of upper-outer quadrant of right breast in female, estrogen receptor positive (HCC)  Staging form: Breast, AJCC 8th Edition  - Clinical stage from 2018: Stage IB (cT2(3), cN1, cM0, G2, ER: Positive, ND: Positive, HER2: Positive) - Signed by Cliff Kenny MD on 2018  Method of lymph node assessment: Other  Nuclear grade: G3  Histologic grading system: 3 grade system  Laterality: Right  Tumor size (mm): 35  Multiple tumors: Yes  Number of tumors: 3  Oncology History   Malignant neoplasm of upper-outer quadrant of right breast in female, estrogen receptor positive (HCC)   2018 Initial Diagnosis    US guided core biopsy of right breast mass at [facility].   Malignant neoplasm of UOQ of right breast,   ER positive  HER2 positive     2018 Genetic Testing    Likely pathogenic variant was identified in the PALB2 gene.     2018 - 2018 Chemotherapy    Neoadjuvant chemotherapy with TCH with pertuzumab.  Dr. Lowe.     2018 Surgery    Right MRM  Invasive mucinous carcinoma  3.4 cm geronimo  Grade 1  3/16 lymph nodes  Stage IB - ypT1mi, ypN1mi, G1.    Left simple mastectomy  Benign breast.    Immediate reconstruction Dr. Givens     2018 - 2019 Chemotherapy    trastuzumab monotherapy every 3 weeks.       2018 - 2018 Radiation    Course: C1    Plan ID Energy Fractions Dose per Fraction (cGy) Dose Correction (cGy) Total Dose Delivered (cGy) Elapsed Days   LAT R CW bst 6E 5 / 5 200 0 1,000 4   MED R CW bst 6E 5 / 5 200 0 1,000 4   New R PAB2 6X 25 / 25 47 0 1,175 36   New R Sclav2 6X 25 / 25 200 0 5,000 36   [id number]  "6X 12 / 12 200 0 2,400 34   YnxHgrVmt80_9 10X 12 / 12 200 0 2,400 34   [id number] 10X 13 / 13 200 0 2,600 36   NewR CW 10_30 6X 13 / 13 200 0 2,600 36      Treatment dates:  C1: 11/1/2018 - 12/14/2018 1/22/2019 -  Hormone Therapy    Tamoxifen  With Dr. Lowe         Review of Systems   Constitutional: Negative.    HENT: Negative.     Eyes: Negative.    Respiratory: Negative.     Cardiovascular: Negative.    Gastrointestinal: Negative.    Endocrine: Negative.    Genitourinary: Negative.    Musculoskeletal: Negative.    Neurological: Negative.    Psychiatric/Behavioral: Negative.      A complete review of systems is negative other than that noted above in the HPI.  Past Medical History  Past Medical History[1]  Past Surgical History[2]  Family History[3]   reports that she quit smoking about 10 years ago. Her smoking use included cigarettes. She started smoking about 25 years ago. She has a 7.5 pack-year smoking history. She has been exposed to tobacco smoke. She has never used smokeless tobacco. She reports current alcohol use of about 1.0 standard drink of alcohol per week. She reports that she does not use drugs.  Current Outpatient Medications   Medication Instructions   • fluticasone (Flovent HFA) 110 MCG/ACT inhaler 2 puffs, Inhalation, 2 times daily, Rinse mouth after use.   • levalbuterol (XOPENEX HFA) 45 mcg/act inhaler 2 puffs, Inhalation, Every 8 hours PRN   • levocetirizine (XYZAL) 5 mg, Every evening   • LORazepam (ATIVAN) 1 mg, Oral, Daily at bedtime,      • scopolamine (TRANSDERM-SCOP) 1 mg/3 days TD 72 hr patch 1 patch, Transdermal, Every 72 hours   • Semaglutide-Weight Management (Wegovy) 2.4 MG/0.75ML Inject 2.4 mg under the skin weekly   • tamoxifen (NOLVADEX) 20 mg, Oral, Daily   Allergies[4]     Objective  /80   Pulse 73   Temp 97.5 °F (36.4 °C) (Temporal)   Ht 5' 3\" (1.6 m)   Wt 71.7 kg (158 lb)   LMP 10/15/2020   SpO2 (!) 10%   BMI 27.99 kg/m²     Body mass index is " "27.99 kg/m².  Pain Screening:     ECOG ECOG Performance Status: 0 - Fully active, able to carry on all pre-disease performance without restriction   Physical Exam  HENT:      Head: Normocephalic and atraumatic.      Nose: Nose normal.     Cardiovascular:      Rate and Rhythm: Normal rate and regular rhythm.   Pulmonary:      Effort: Pulmonary effort is normal.   Abdominal:      General: There is no distension.      Palpations: Abdomen is soft. There is no mass.   Genitourinary:     Comments: defer    Musculoskeletal:         General: No swelling. Normal range of motion.      Cervical back: Normal range of motion.     Skin:     General: Skin is warm and dry.     Neurological:      General: No focal deficit present.      Mental Status: She is alert.     Psychiatric:         Mood and Affect: Mood normal.       Physical Exam  Constitutional: No distress, appears well.  Gastrointestinal: No abdominal pain or bloating.  Genitourinary: No dyspareunia.     Results    Labs: I have reviewed pertinent labs.   No results found for: \"\"  Lab Results   Component Value Date/Time    Potassium 4.1 05/16/2025 09:17 AM    Chloride 108 05/16/2025 09:17 AM    CO2 26 05/16/2025 09:17 AM    BUN 20 05/16/2025 09:17 AM    Creatinine 0.92 05/16/2025 09:17 AM    Calcium 9.0 05/16/2025 09:17 AM    AST 14 05/16/2025 09:17 AM    ALT 13 05/16/2025 09:17 AM    Alkaline Phosphatase 40 05/16/2025 09:17 AM    eGFR 79 05/16/2025 09:17 AM     Lab Results   Component Value Date/Time    White Blood Cell Count 6.2 05/16/2025 09:17 AM    Hemoglobin 13.4 05/16/2025 09:17 AM    HCT 39.9 05/16/2025 09:17 AM    MCV 98.3 05/16/2025 09:17 AM    Platelet Count 246 05/16/2025 09:17 AM     Lab Results   Component Value Date/Time    Neutrophils (Absolute) 3,900 05/16/2025 09:17 AM        Trend:  No results found for: \"\"                   [1]  Past Medical History:  Diagnosis Date   • Abnormal Pap smear of cervix    • Allergic     Seasonal dx at 14 " years old   • Anxiety    • Asthma     allergy excaberated   • Breast cancer (HCC) 2018   • Cancer (HCC)    • Cigarette nicotine dependence without complication 10/01/2015   • Fibroid 2017   • GERD (gastroesophageal reflux disease)    • Gestational diabetes    • History of adverse reaction to anesthesia     Pt reports waking up severly anxious   • Macular atrophy, retinal 2016   • Pneumonia    • Seasonal allergies    • Subserous leiomyoma of uterus 2020   [2]  Past Surgical History:  Procedure Laterality Date   • BREAST RECONSTRUCTION Bilateral 2018    Procedure: RECONSTRUCTION BREAST W/ IMPLANT;  Surgeon: Munir Colby MD;  [facility];  Service: Plastics   •  SECTION     • HYSTERECTOMY N/A 2020    Procedure: HYSTERECTOMY LAPAROSCOPIC TOTAL (LTH) WITH BILATERAL SALPINGECTOMY-ATTEMPTED;  Surgeon: Racheal James MD;  [facility];  Service: Gynecology   • HYSTERECTOMY N/A 2020    Procedure: HYSTERECTOMY TOTAL ABDOMINAL (AARON);  Surgeon: Racheal James MD;  [facility];  Service: Gynecology   • IR PORT REMOVAL  2019   • MASTECTOMY     • PILONIDAL CYST EXCISION      resection   • [place] CYSTOURETHROSCOPY N/A 2020    Procedure: CYSTOSCOPY;  Surgeon: Racheal James MD;  [facility];  Service: Gynecology   • NY INSJ TUNNELED CTR VAD W/SUBQ PORT AGE 5 YR/> Left 2018    Procedure: INSERTION VENOUS PORT ( PORT-A-CATH) IR;  Surgeon: Gurmeet Damon DO;  [facility];  Service: Interventional Radiology   • NY LAPS ABD PRTM&OMENTUM DX W/WO SPEC BR/WA SPX N/A 2020    Procedure: LAPAROSCOPY DIAGNOSTIC;  Surgeon: Racheal James MD;  [facility];  Service: Gynecology   • NY MAST MODF RAD W/AX LYMPH NOD W/WO PECT/DARBY MIN Right 2018    Procedure: BREAST MODIFIED RADICAL MASTECTOMY;  Surgeon: Cliff Kenny MD;  [facility];  Service: Surgical Oncology   • NY MASTECTOMY SIMPLE COMPLETE Left 2018    Procedure:  BREAST SIMPLE MASTECTOMY;  Surgeon: Cliff Kenny MD;  [facility];  Service: Surgical Oncology   • CT REVISION OF RECONSTRUCTED BREAST Bilateral 9/19/2018    Procedure: REVISION BREAST RECON W/WOUND CLOSURE;  Surgeon: Munir Colby MD;  [facility];  Service: Plastics   • TUBAL LIGATION     • WISDOM TOOTH EXTRACTION     [3]  Family History  Problem Relation Name Age of Onset   • Diabetes Mother Beatrice    • Hypertension Mother Beatrice    • Rosacea Father     • Allergies Father          seasonal   • No Known Problems Brother     • No Known Problems Brother     • No Known Problems Son     • Breast cancer Maternal Grandmother Roxie    • Brain cancer Maternal Grandmother Roxie    • Cancer Maternal Grandmother Roxie    • No Known Problems Maternal Grandfather     • Diabetes Paternal Grandmother Momma    • Kidney failure Paternal Grandmother Momma    • Hypertension Paternal Grandmother Momma    • Liver cancer Paternal Grandfather Poppa    • Cancer Paternal Grandfather Poppa    • Prostate cancer Paternal Grandfather Poppa    • Breast cancer Paternal Aunt Yamileth Moss    [4]  Allergies  Allergen Reactions   • Trichophyton Other (See Comments)     AKA Fungi - Pt does not know reaction    • Latex Swelling   • Cat Dander Sneezing   • Dust Mite Extract Sneezing   • Molds & Smuts Sneezing   • Pollen Extract Sneezing   • Tree Extract Sneezing

## 2025-07-23 RX ORDER — SEMAGLUTIDE 2.4 MG/.75ML
INJECTION, SOLUTION SUBCUTANEOUS
Qty: 3 ML | Refills: 0 | Status: SHIPPED | OUTPATIENT
Start: 2025-07-23 | End: 2026-06-20

## 2025-07-28 ENCOUNTER — HOSPITAL ENCOUNTER (OUTPATIENT)
Dept: ULTRASOUND IMAGING | Facility: HOSPITAL | Age: 45
Discharge: HOME | End: 2025-07-28
Attending: OBSTETRICS & GYNECOLOGY
Payer: COMMERCIAL

## 2025-07-28 DIAGNOSIS — Z15.09 MONOALLELIC MUTATION OF PALB2 GENE: ICD-10-CM

## 2025-07-28 DIAGNOSIS — Z15.89 MONOALLELIC MUTATION OF PALB2 GENE: ICD-10-CM

## 2025-07-28 DIAGNOSIS — Z15.01 MONOALLELIC MUTATION OF PALB2 GENE: ICD-10-CM

## 2025-07-28 PROCEDURE — 76830 TRANSVAGINAL US NON-OB: CPT

## 2025-07-28 PROCEDURE — 76856 US EXAM PELVIC COMPLETE: CPT

## 2025-08-01 ENCOUNTER — OFFICE VISIT (OUTPATIENT)
Dept: FAMILY MEDICINE CLINIC | Facility: CLINIC | Age: 45
End: 2025-08-01
Payer: COMMERCIAL

## 2025-08-01 VITALS
WEIGHT: 157 LBS | OXYGEN SATURATION: 98 % | HEIGHT: 63 IN | DIASTOLIC BLOOD PRESSURE: 78 MMHG | SYSTOLIC BLOOD PRESSURE: 104 MMHG | TEMPERATURE: 98.1 F | HEART RATE: 88 BPM | BODY MASS INDEX: 27.82 KG/M2 | RESPIRATION RATE: 17 BRPM

## 2025-08-01 DIAGNOSIS — J45.20 MILD INTERMITTENT ASTHMA WITHOUT COMPLICATION: ICD-10-CM

## 2025-08-01 DIAGNOSIS — E66.9 OBESITY (BMI 30-39.9): Primary | ICD-10-CM

## 2025-08-01 DIAGNOSIS — K21.9 GASTROESOPHAGEAL REFLUX DISEASE WITHOUT ESOPHAGITIS: ICD-10-CM

## 2025-08-01 PROCEDURE — 99214 OFFICE O/P EST MOD 30 MIN: CPT | Performed by: FAMILY MEDICINE

## 2025-08-02 ENCOUNTER — HOSPITAL ENCOUNTER (OUTPATIENT)
Dept: CT IMAGING | Facility: HOSPITAL | Age: 45
Discharge: HOME | End: 2025-08-02
Payer: COMMERCIAL

## 2025-08-02 DIAGNOSIS — F17.201 TOBACCO ABUSE, IN REMISSION: ICD-10-CM

## 2025-08-02 PROCEDURE — 71250 CT THORAX DX C-: CPT

## 2025-08-07 ENCOUNTER — TELEPHONE (OUTPATIENT)
Age: 45
End: 2025-08-07

## (undated) DEVICE — SUT SILK 2-0 SH 30 IN K833H

## (undated) DEVICE — PLUMEPEN PRO 10FT

## (undated) DEVICE — INTENDED FOR TISSUE SEPARATION, AND OTHER PROCEDURES THAT REQUIRE A SHARP SURGICAL BLADE TO PUNCTURE OR CUT.: Brand: BARD-PARKER SAFETY BLADES SIZE 15, STERILE

## (undated) DEVICE — GLOVE SRG BIOGEL 7

## (undated) DEVICE — ENSEAL LAPAROSCOPIC TISSUE SEALER G2 ARTICULATING  CURVED JAW FOR USE WITH G2 GENERATOR 5MM DIAMETER 35CM SHAFT LENGTH: Brand: ENSEAL

## (undated) DEVICE — MEDI-VAC YANKAUER SUCTION HANDLE W/STRAIGHT TIP & CONTROL VENT: Brand: CARDINAL HEALTH

## (undated) DEVICE — REM POLYHESIVE ADULT PATIENT RETURN ELECTRODE: Brand: VALLEYLAB

## (undated) DEVICE — CHEST/BREAST DRAPE: Brand: CONVERTORS

## (undated) DEVICE — VIAL DECANTER

## (undated) DEVICE — INTENDED FOR TISSUE SEPARATION, AND OTHER PROCEDURES THAT REQUIRE A SHARP SURGICAL BLADE TO PUNCTURE OR CUT.: Brand: BARD-PARKER SAFETY BLADES SIZE 11, STERILE

## (undated) DEVICE — TUBING SMOKE EVAC W/FILTRATION DEVICE PLUMEPORT ACTIV

## (undated) DEVICE — TROCAR: Brand: KII FIOS FIRST ENTRY

## (undated) DEVICE — GLOVE INDICATOR PI UNDERGLOVE SZ 6.5 BLUE

## (undated) DEVICE — MAYO STAND COVER: Brand: CONVERTORS

## (undated) DEVICE — WOUND RETRACTOR AND PROTECTOR: Brand: ALEXIS O WOUND PROTECTOR-RETRACTOR

## (undated) DEVICE — SUT STRATAFIX SPIRAL MONOCRYL PLUS 4-0 PS-2 45CM SXMP1B118

## (undated) DEVICE — SYRINGE 5ML LL

## (undated) DEVICE — SKIN MARKER DUAL TIP WITH RULER CAP, FLEXIBLE RULER AND LABELS: Brand: DEVON

## (undated) DEVICE — SCD SEQUENTIAL COMPRESSION COMFORT SLEEVE MEDIUM KNEE LENGTH: Brand: KENDALL SCD

## (undated) DEVICE — PREMIUM DRY TRAY LF: Brand: MEDLINE INDUSTRIES, INC.

## (undated) DEVICE — 3M™ STERI-STRIP™ COMPOUND BENZOIN TINCTURE 40 BAGS/CARTON 4 CARTONS/CASE C1544: Brand: 3M™ STERI-STRIP™

## (undated) DEVICE — BIOPSY PUNCH 3MM DISPOSABLE

## (undated) DEVICE — BETHLEHEM UNIVERSAL BREAST PK: Brand: CARDINAL HEALTH

## (undated) DEVICE — COVER PROBE INTRAOPERATIVE 6 X 96 IN

## (undated) DEVICE — SUT MONOCRYL 4-0 PS-2 18 IN Y496G

## (undated) DEVICE — FUNNEL E KELLER 2 DELIVERY DEVICE

## (undated) DEVICE — ULTRASOUND GEL STERILE FOIL PK

## (undated) DEVICE — SUT ETHILON 3-0 PS-1 18 IN 1663G

## (undated) DEVICE — GLOVE SRG BIOGEL 7.5

## (undated) DEVICE — 3000CC GUARDIAN II: Brand: GUARDIAN

## (undated) DEVICE — ELECTRODE BLADE MOD E-Z CLEAN  2.75IN 7CM -0012AM

## (undated) DEVICE — LIGHT HANDLE COVER SLEEVE DISP BLUE STELLAR

## (undated) DEVICE — SUT MONOCRYL 3-0 PS-2 27 IN Y427H

## (undated) DEVICE — DRAPE TOWEL: Brand: CONVERTORS

## (undated) DEVICE — BETHLEHEM UNIVERSAL LAPAROTOMY: Brand: CARDINAL HEALTH

## (undated) DEVICE — SUT PDS II 2-0 CT-2 27 IN Z333H

## (undated) DEVICE — SUT VICRYL 3-0 SH 27 IN J416H

## (undated) DEVICE — PVC URETHRAL CATHETER: Brand: DOVER

## (undated) DEVICE — SYRINGE 50ML LL

## (undated) DEVICE — TRAY FOLEY 16FR URIMETER SURESTEP

## (undated) DEVICE — NEEDLE 25G X 1 1/2

## (undated) DEVICE — GAUZE SPONGES,16 PLY: Brand: CURITY

## (undated) DEVICE — INSULATED BLADE ELECTRODE;CAUTION: FOR MANUFACTURING, PROCESSING, OR REPACKING.: Brand: EDGE

## (undated) DEVICE — 3M™ STERI-STRIP™ REINFORCED ADHESIVE SKIN CLOSURES, R1547, 1/2 IN X 4 IN (12 MM X 100 MM), 6 STRIPS/ENVELOPE: Brand: 3M™ STERI-STRIP™

## (undated) DEVICE — CONMED ACCESSORY ELECTRODE, FLAT BLADE WITH EXTENDED INSULATION: Brand: CONMED

## (undated) DEVICE — CHLORHEXIDINE 4PCT 4 OZ

## (undated) DEVICE — CHLORAPREP HI-LITE 26ML ORANGE

## (undated) DEVICE — GLOVE INDICATOR PI UNDERGLOVE SZ 7.5 BLUE

## (undated) DEVICE — DRESSING BIOPATCH ANTIMICROBIAL 1 IN DISC

## (undated) DEVICE — SUT PDS II 2-0 CT-1 27 IN Z339H

## (undated) DEVICE — LIGHT GLOVE GREEN

## (undated) DEVICE — SUT VICRYL 0 CT-1 CR/8 27 IN JJ41G

## (undated) DEVICE — SOFT SILICONE HYDROCELLULAR FOAM DRESSING WITH LOCK AWAY LAYER: Brand: ALLEVYN LIFE M 12.9X12.9 CTN10

## (undated) DEVICE — SUT VICRYL 0 CT-1 27 IN J260H

## (undated) DEVICE — NEEDLE 25GA X 1 IN SAFETY GLIDE

## (undated) DEVICE — TRAY FOLEY 16FR URIMETER SILICONE SURESTEP

## (undated) DEVICE — APPLICATOR 6 IN COTTON UNSTRL

## (undated) DEVICE — ADHESIVE SKN CLSR HISTOACRYL FLEX 0.5ML LF

## (undated) DEVICE — PREP PAD BNS: Brand: CONVERTORS

## (undated) DEVICE — STRL COTTON TIP APPLCTR 6IN PK: Brand: CARDINAL HEALTH

## (undated) DEVICE — LIGACLIP MCA MULTIPLE CLIP APPLIERS, 20 MEDIUM CLIPS: Brand: LIGACLIP

## (undated) DEVICE — UTERINE MANIPULATOR RUMI DISP TIP 6.7X12CM ORANGE

## (undated) DEVICE — DRAPE 1J/SUBCLAVIAN 75 X 175

## (undated) DEVICE — INTENDED FOR TISSUE SEPARATION, AND OTHER PROCEDURES THAT REQUIRE A SHARP SURGICAL BLADE TO PUNCTURE OR CUT.: Brand: BARD-PARKER ® CARBON RIB-BACK BLADES

## (undated) DEVICE — SYRINGE 10ML LL

## (undated) DEVICE — BULB SYRINGE,IRRIGATION WITH PROTECTIVE CAP: Brand: DOVER

## (undated) DEVICE — SAFETY PINS KIT: Brand: CARDINAL HEALTH

## (undated) DEVICE — BAG DECANTER

## (undated) DEVICE — GLOVE INDICATOR PI UNDERGLOVE SZ 7 BLUE

## (undated) DEVICE — IRRIG ENDO FLO TUBING

## (undated) DEVICE — CHLORAPREP HI-LITE 10.5ML ORANGE

## (undated) DEVICE — TUBING SUCTION 5MM X 12 FT

## (undated) DEVICE — CAUTERY TIP POLISHER: Brand: DEVON

## (undated) DEVICE — SUT MONOCRYL 3-0 PS-2 18 IN Y497G

## (undated) DEVICE — BUTTON SWITCH PENCIL HOLSTER: Brand: VALLEYLAB

## (undated) DEVICE — SMOKE EVACUATION TUBING WITH 8 IN INTEGRAL WAND AND SPONGE GUARD: Brand: BUFFALO FILTER

## (undated) DEVICE — INSULATED BLADE ELECTRODE: Brand: EDGE

## (undated) DEVICE — SUT PLAIN 2-0 CTX 27 IN 872H

## (undated) DEVICE — INSUFLATION TUBING INSUFLOW (LEXION)

## (undated) DEVICE — GLOVE SRG BIOGEL 6.5

## (undated) DEVICE — JACKSON-PRATT 100CC BULB RESERVOIR: Brand: CARDINAL HEALTH

## (undated) DEVICE — KERLIX BANDAGE ROLL: Brand: KERLIX

## (undated) DEVICE — UNDYED MONOFILAMENT (POLYDIOXANONE), ABSORBABLE SURGICAL SUTURE: Brand: PDS

## (undated) DEVICE — SUT STRATAFIX SPIRAL 2-0 CT-1 20 CM SXPD1B400

## (undated) DEVICE — BOWL: 16OZ PEELPOUCH 75/CS 16/PLT: Brand: MEDEGEN MEDICAL PRODUCTS, LLC

## (undated) DEVICE — ANTI-FOG SOLUTION WITH FOAM PAD: Brand: DEVON

## (undated) DEVICE — SUT VICRYL 0 REEL 54 IN J287G

## (undated) DEVICE — OCCLUDER COLPO-PNEUMO

## (undated) DEVICE — MEDI-VAC YANK SUCT HNDL W/TPRD BULBOUS TIP: Brand: CARDINAL HEALTH

## (undated) DEVICE — JP CHAN DRN SIL HUBLESS 15FR W/TRO: Brand: CARDINAL HEALTH

## (undated) DEVICE — WOUND RETRACTOR AND PROTECTOR: Brand: ALEXIS WOUND PROTECTOR-RETRACTOR

## (undated) DEVICE — DRESSING ALLEVYN LIFE HEEL 25 X 25.2CM

## (undated) DEVICE — DRAPE C-ARM X-RAY

## (undated) DEVICE — INTENDED FOR TISSUE SEPARATION, AND OTHER PROCEDURES THAT REQUIRE A SHARP SURGICAL BLADE TO PUNCTURE OR CUT.: Brand: BARD-PARKER SAFETY BLADES SIZE 10, STERILE

## (undated) DEVICE — ELECTRODE BLADE MOD  E-Z CLEAN 6.5IN -0014M

## (undated) DEVICE — ROSEBUD DISSECTORS: Brand: DEROYAL

## (undated) DEVICE — DRAPE SHEET THREE QUARTER

## (undated) DEVICE — UTILITY MARKER,BLACK WITH LABELS: Brand: DEVON

## (undated) DEVICE — ADHESIVE SKIN HIGH VISCOSITY EXOFIN 1ML

## (undated) DEVICE — PACK PBDS STERILE LAP LITHOTOMY RF

## (undated) DEVICE — 1820 FOAM BLOCK NEEDLE COUNTER: Brand: DEVON

## (undated) DEVICE — MEDI-VAC YANKAUER SUCTION HANDLE W/BULBOUS AND CONTROL VENT: Brand: CARDINAL HEALTH

## (undated) DEVICE — 2000CC GUARDIAN II: Brand: GUARDIAN

## (undated) DEVICE — 3M™ TEGADERM™ TRANSPARENT FILM DRESSING FRAME STYLE, 1626W, 4 IN X 4-3/4 IN (10 CM X 12 CM), 50/CT 4CT/CASE: Brand: 3M™ TEGADERM™

## (undated) DEVICE — SUT VICRYL 2-0 SH 27 IN UNDYED J417H

## (undated) DEVICE — STRL UNIVERSAL MINOR GENERAL: Brand: CARDINAL HEALTH

## (undated) DEVICE — TROCAR: Brand: KII SLEEVE

## (undated) DEVICE — DRAPE EQUIPMENT RF WAND

## (undated) DEVICE — GAUZE SPONGES,USP TYPE VII GAUZE, 12 PLY: Brand: CURITY

## (undated) DEVICE — MINOR PROCEDURE DRAPE: Brand: CONVERTORS